# Patient Record
Sex: MALE | Race: WHITE
[De-identification: names, ages, dates, MRNs, and addresses within clinical notes are randomized per-mention and may not be internally consistent; named-entity substitution may affect disease eponyms.]

---

## 2017-07-11 ENCOUNTER — HOSPITAL ENCOUNTER (OUTPATIENT)
Dept: HOSPITAL 47 - RADUSWWP | Age: 52
Discharge: HOME | End: 2017-07-11
Payer: COMMERCIAL

## 2017-07-11 DIAGNOSIS — R74.8: Primary | ICD-10-CM

## 2017-07-11 PROCEDURE — 76700 US EXAM ABDOM COMPLETE: CPT

## 2017-07-11 NOTE — US
EXAMINATION TYPE: US abdomen complete

 

DATE OF EXAM: 7/11/2017

 

COMPARISON: CT abdomen February 11, 2016, US

 

CLINICAL HISTORY: R74.8 Abn Liver Enzymes levels.

 

EXAM MEASUREMENTS:

 

Liver Length:  16.5 cm   

Gallbladder Wall:  0.2 cm   

CBD:  0.3 cm

Spleen:  10.0 cm   

Right Kidney:  9.8 x 5.3 x 5.3 cm 

Left Kidney:  9.7 x 5.9 x 5.7 cm   

 

 

 

Pancreas:  not visualized due to midline bowel gas

Liver:  difficult to penetrate  

Gallbladder:  No stones seen

**Evidence for sonographic Farley's sign:  No

CBD:  wnl 

Spleen:  wnl   

Right Kidney:  No hydronephrosis or masses seen   

Left Kidney:  No hydronephrosis or masses seen   

Upper IVC:  partially obscured by bowel, visualized portions wnl  

Abd Aorta:  wnl

 

 

 

The visualized liver is heterogeneously hyperechoic.  Evaluation for focal masses is suboptimal due t
o the heterogeneity. The intrahepatic portion of the IVC and visualized abdominal aorta are within no
rmal limits.  There is no evidence of shadowing mobile cholelithiasis.  Common bile duct is unremarka
ble.  The pancreas is not well seen on images saved secondary to shadowing from overlying bowel gas. 
 The spleen is unremarkable.  Kidneys are symmetric and free of hydronephrosis.  No renal lesions are
 seen.

 

IMPRESSION: Heterogeneous hyperechoic appearance of liver suggests diffuse fatty infiltration or unde
rlying hepatocellular disease. Imaging guided random biopsy for tissue analysis can be performed if d
esired.

## 2017-07-20 ENCOUNTER — HOSPITAL ENCOUNTER (OUTPATIENT)
Dept: HOSPITAL 47 - CPPFTMAIN | Age: 52
Discharge: HOME | End: 2017-07-20
Payer: COMMERCIAL

## 2017-07-20 DIAGNOSIS — J45.40: Primary | ICD-10-CM

## 2017-07-20 PROCEDURE — 94729 DIFFUSING CAPACITY: CPT

## 2017-07-20 PROCEDURE — 94726 PLETHYSMOGRAPHY LUNG VOLUMES: CPT

## 2017-07-20 PROCEDURE — 94060 EVALUATION OF WHEEZING: CPT

## 2018-08-07 ENCOUNTER — HOSPITAL ENCOUNTER (OUTPATIENT)
Dept: HOSPITAL 47 - RADMRIMAIN | Age: 53
Discharge: HOME | End: 2018-08-07
Attending: PHYSICIAN ASSISTANT
Payer: COMMERCIAL

## 2018-08-07 DIAGNOSIS — Z79.891: ICD-10-CM

## 2018-08-07 DIAGNOSIS — M47.816: Primary | ICD-10-CM

## 2018-08-07 DIAGNOSIS — M47.812: ICD-10-CM

## 2018-08-07 LAB
ALBUMIN SERPL-MCNC: 4.3 G/DL (ref 3.5–5)
ALP SERPL-CCNC: 72 U/L (ref 38–126)
ALT SERPL-CCNC: 75 U/L (ref 21–72)
AST SERPL-CCNC: 109 U/L (ref 17–59)
BILIRUB INDIRECT SERPL-MCNC: 0 MG/DL (ref 0–1.1)
BILIRUBIN DIRECT+TOT PNL SERPL-MCNC: 0.3 MG/DL (ref 0–0.2)
PROT SERPL-MCNC: 7.1 G/DL (ref 6.3–8.2)

## 2018-08-07 PROCEDURE — 72141 MRI NECK SPINE W/O DYE: CPT

## 2018-08-07 PROCEDURE — 72148 MRI LUMBAR SPINE W/O DYE: CPT

## 2018-08-07 PROCEDURE — 80076 HEPATIC FUNCTION PANEL: CPT

## 2018-08-07 PROCEDURE — 36415 COLL VENOUS BLD VENIPUNCTURE: CPT

## 2018-08-07 NOTE — MR
EXAMINATION TYPE: MR cspine/lspine wo con

 

DATE OF EXAM: 8/7/2018

 

COMPARISON: Prior MRI cervical and lumbar spine April 12, 2014

 

HISTORY: Back pain and neck pain per order. Low back pain for years into both thighs and buttocks per
 patient. Headache with neck pain and stiffness for years causing pain or weakness into both arms and
 fingers per patient.

 

TECHNIQUE: Multiplanar, multisequence imaging of the cervical and lumbar spine are performed without 
IV contrast.

 

FINDINGS: 

 

C-SPINE:

 

FINDINGS: Coronal images redemonstrate levoconvex scoliotic curvature centered in the upper thoracic 
spine. Sagittal images of the cervical spine show the craniocervical junction to remain within normal
 limits.  The cervical and upper thoracic spinal cord is normal in course, caliber, and signal.  Vert
ebral alignment is stable and anatomic.  The vertebral body heights remain normal.  There is redemons
tration of multilevel mild to moderate disc space narrowing C3-C4 through C5-C6 levels with posterior
 spur disc complexes effacing anterior thecal sac at these levels on sagittal images. Additional tiny
 posterior disc herniation C6-C7 level is redemonstrated. There is mild to moderate multilevel anteri
or spurring most prominent in the mid cervical spine redemonstrated. Some heterogeneous endplate marino
ges are again seen.

 

Axial images show the C2-C3 level to remain within normal limits.

 

Axial images at C3-C4 level show uncovertebral facet degenerative changes bilaterally and broad-based
 central spur disc complex effacing anterior thecal sac, there is mild to moderate bilateral neural f
oraminal narrowing. There is no significant change from prior study.

 

Axial images at C4-C5 level show right paracentral disc protrusion on current study axial image 29 wi
th some uncovertebral facet arthropathy, there is effacement of the anterior thecal sac and mild left
-sided neural foraminal narrowing. Right-sided neural foramen is patent. Some change from prior study
.

 

Axial images at C5-C6 level show uncovertebral facet degenerative changes bilaterally with central sp
ur disc complex, there is effacement of the anterior thecal sac, there is moderate left and mild righ
t-sided neural foraminal narrowing. No significant change from prior.

 

Axial images at C6-C7 level show right paracentral disc protrusion effacing anterior thecal sac, bila
teral neural foramina are patent. No significant change from prior.

 

Axial images at C7-T1 level are felt within normal limits.

 

IMPRESSION: Multilevel degenerative changes in the cervical spine as detailed above, findings fairly 
stable, some interval change C4-C5 level is noted.

 

L-SPINE:

 

Sagittal images of the lumbar spine show vertebral body heights and alignment to remain satisfactory.
 There is further progression of multilevel disc desiccation from prior MRI. Disc space heights remai
n fairly well-maintained. No new large posterior disc herniations are seen on sagittal images. The co
nus medullaris remains normal in position and signal ending T12-L1 level. Modic type II degenerative 
change anterior L2-L3 endplates is redemonstrated. Small hemangioma T12 vertebra is again seen sagitt
al image 6.

 

Axial images show the T12-L1 level to remain within normal limits.

 

Axial images at L1-L2 level show new mild broad disc bulge with left paracentral disc protrusion comp
onent effacing anterior thecal sac and axial image 25, bilateral neural foramina are patent.

 

Axial images at L2-L3 level redemonstrate mild to moderate broad disc bulge slightly more prominent w
ith mild effacement of the anterior thecal sac noted on axial image 20 on current study, bilateral ne
ural foramina are patent.

 

Axial images at L3-L4 level redemonstrate mild broad disc bulge but spinal canal is preserved and david
ateral neural foramina are patent.

 

Axial images at L4-L5 level redemonstrate mild broad disc bulge and mild to minimal facet degenerativ
e changes bilaterally. There is effacement of the anterior thecal sac. There is mild right-sided ante
rior inferior neural foraminal narrowing on axial image 9. Left-sided neural foramen is patent. No si
gnificant change from prior.

 

Axial images at L5-S1 level redemonstrate mild facet degenerative changes bilaterally. Increased sign
al posteriorly consistent with annular tear is redemonstrated. There is broad disc bulge minimally ef
facing the anterior thecal sac. Bilateral neural foramina are patent.

 

No suspicious retroperitoneal findings are seen.

 

IMPRESSION: Multilevel degenerative changes in the lumbar spine with some interval progression from 2
014 MRI as detailed above.

## 2018-09-01 ENCOUNTER — HOSPITAL ENCOUNTER (EMERGENCY)
Dept: HOSPITAL 47 - EC | Age: 53
Discharge: HOME | End: 2018-09-01
Payer: COMMERCIAL

## 2018-09-01 VITALS — SYSTOLIC BLOOD PRESSURE: 131 MMHG | DIASTOLIC BLOOD PRESSURE: 80 MMHG | RESPIRATION RATE: 16 BRPM | HEART RATE: 106 BPM

## 2018-09-01 VITALS — TEMPERATURE: 97.7 F

## 2018-09-01 DIAGNOSIS — F17.200: ICD-10-CM
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
## 2018-09-01 DIAGNOSIS — Z79.899: ICD-10-CM

## 2018-09-01 DIAGNOSIS — S60.512A: ICD-10-CM

## 2018-09-01 DIAGNOSIS — J45.909: ICD-10-CM

## 2018-09-01 DIAGNOSIS — S60.511A: ICD-10-CM

## 2018-09-01 DIAGNOSIS — S63.501A: ICD-10-CM

## 2018-09-01 DIAGNOSIS — S76.119A: ICD-10-CM
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
## 2018-09-01 DIAGNOSIS — S80.02XA: Primary | ICD-10-CM
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
## 2018-09-01 PROCEDURE — 99283 EMERGENCY DEPT VISIT LOW MDM: CPT

## 2018-09-01 NOTE — XR
EXAMINATION TYPE: XR wrist complete 4 views RT, XR knee complete 3 views LT

 

DATE OF EXAM: 9/1/2018

 

COMPARISON: NONE

 

HISTORY: 53-year-old male with pain after fall

 

FINDINGS: 

 

Right wrist:

The radiocarpal and distal radial ulnar joint as well as the midcarpal compartment appear intact. No 
acute fracture, subluxation, or dislocation seen.

 

Left knee:

Prepatellar soft tissue swelling. Small knee joint effusion. There is thickening of the suprapatellar
 soft tissues of the extensor mechanism seems to be maintained. No acute fracture or dislocation. Deg
enerative spurring at the patellofemoral compartment.

 

 

IMPRESSION: 

1. Right wrist: No acute osseous abnormality seen.

2. Left knee: Prepatellar and suprapatellar soft tissue swelling. Contusion/swelling could involve th
e quadriceps tendon. Suspect that the tendon remains intact. If concern for injury to the extensor me
chanism, follow-up ultrasound or MRI. Small knee joint effusion. No acute osseous abnormality seen.
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
## 2019-03-26 ENCOUNTER — HOSPITAL ENCOUNTER (OUTPATIENT)
Dept: HOSPITAL 47 - RADXRMAIN | Age: 54
End: 2019-03-26
Attending: PHYSICIAN ASSISTANT
Payer: COMMERCIAL

## 2019-03-26 DIAGNOSIS — M12.852: ICD-10-CM

## 2019-03-26 DIAGNOSIS — M12.851: ICD-10-CM

## 2019-03-26 DIAGNOSIS — M12.811: Primary | ICD-10-CM

## 2019-03-26 DIAGNOSIS — M21.821: ICD-10-CM

## 2019-03-26 PROCEDURE — 73521 X-RAY EXAM HIPS BI 2 VIEWS: CPT

## 2019-03-26 NOTE — XR
EXAMINATION TYPE: XR Hip Bilateral Complete

 

DATE OF EXAM: 3/26/2019

 

COMPARISON: NONE

 

HISTORY: Pain

 

TECHNIQUE: 2 views submitted

 

FINDINGS:

There is no evidence of erosive change or acute fracture. Hypertrophic change of the acetabulum. Calc
ifications in the pelvis appear to be vascular. Mild concentric narrowing of the joint space bilatera
lly.

 

 

IMPRESSION:

1. No evidence of acute fracture or dislocation. 

2. Mild bilateral arthropathy with findings suggestive of femoral acetabular impingement.

## 2019-03-26 NOTE — XR
EXAMINATION TYPE: XR shoulder complete BILAT

 

DATE OF EXAM: 3/26/2019

 

COMPARISON: NONE

 

HISTORY: Pain

 

TECHNIQUE: Three views are submitted bilaterally.

 

FINDINGS:

The osseous structures are intact.  There is no acute fracture or dislocation. There is narrowing of 
the AC joint on the right. There is chronic appearing deformity of the distal left clavicle. Chronic 
appearing right-sided rib deformities suggest previous fracture.

 

IMPRESSION:

1. AC joint arthropathy on the right.

2. Deformity of the distal left clavicle likely related to previous trauma..

## 2019-10-29 ENCOUNTER — HOSPITAL ENCOUNTER (OUTPATIENT)
Dept: HOSPITAL 47 - RADXRMAIN | Age: 54
Discharge: HOME | End: 2019-10-29
Attending: FAMILY MEDICINE
Payer: COMMERCIAL

## 2019-10-29 DIAGNOSIS — M51.36: Primary | ICD-10-CM

## 2019-10-29 DIAGNOSIS — G89.4: ICD-10-CM

## 2019-10-29 DIAGNOSIS — M46.96: ICD-10-CM

## 2019-10-29 PROCEDURE — 72110 X-RAY EXAM L-2 SPINE 4/>VWS: CPT

## 2019-10-29 NOTE — XR
EXAM TYPE: LUMBAR SPINE X RAY SERIES

 

COMPARISON: NONE

 

HISTORY: low back pain

 

TECHNIQUE: 4 views are submitted.

 

FINDINGS:

Alignment is anatomic.  The pedicles are intact.  The transverse processes are intact.  There is no s
pondylolysis or spondylolisthesis.  Diffuse osteopenia with mild multilevel degenerative disc disease
. Vascular calcifications noted. There is facet arthropathy.

 

IMPRESSION:

1. Multilevel mild facet arthropathy and degenerative disc disease. If symptoms persist consider MRI.

## 2019-11-29 ENCOUNTER — HOSPITAL ENCOUNTER (OUTPATIENT)
Dept: HOSPITAL 47 - RADMAMWWP | Age: 54
End: 2019-11-29
Attending: FAMILY MEDICINE
Payer: COMMERCIAL

## 2019-11-29 DIAGNOSIS — Z12.31: Primary | ICD-10-CM

## 2019-11-29 DIAGNOSIS — R92.8: ICD-10-CM

## 2019-11-29 DIAGNOSIS — Z80.3: ICD-10-CM

## 2019-11-29 PROCEDURE — 77066 DX MAMMO INCL CAD BI: CPT

## 2019-11-29 PROCEDURE — 77062 BREAST TOMOSYNTHESIS BI: CPT

## 2019-11-29 PROCEDURE — 76642 ULTRASOUND BREAST LIMITED: CPT

## 2019-12-02 NOTE — USB
Reason for exam: additional evaluation requested from abnormal screening.



History:

Family history of breast cancer in mother and breast cancer in aunt.



US Breast Limited BILAT

Right limited breast ultrasound including focal area of concern, retroareolar and 

axilla demonstrates a 1.9 x 1.1cm irregular lesion at the posterior nipple, 

edematous appearance posterior to the right nipple may reflect gynecomastia 

although cannot exclude mastitis and a 2.1 x 0.7cm oval node at the axilla.



Left limited breast ultrasound including focal area of concern, retroareolar and 

axilla demonstrates a 2.8 x 0.7cm oval node at the axilla.



These results were verbally communicated with the patient and result sheet given 

to the patient on 11/29/19.





ASSESSMENT: Probably benign, BI-RAD 3



RECOMMENDATION:

Follow-up diagnostic mammogram and ultrasound of the right breast in 3 months.

## 2019-12-02 NOTE — MM
Reason for exam: clinical finding.

Baseline mammogram.



History:

Family history of breast cancer in mother and breast cancer in aunt.



Physical Findings:

Nurse Summary: 2-3cm nodule in the right breast at 12 o'clock and a 0.5cm nodule 

in the left breast at 12 o'clock (nurse kp).



MG 3D Diag Mammo W/Cad YURIY

Bilateral CC and MLO view(s) were taken.

Right sided increased density. Ultrasound recommended.



These results were verbally communicated with the patient and result sheet given 

to the patient on 11/29/19.





ASSESSMENT: Incomplete: need additional imaging evaluation, BI-RAD 0



RECOMMENDATION:

Ultrasound of both breasts.

## 2020-02-05 ENCOUNTER — HOSPITAL ENCOUNTER (OUTPATIENT)
Dept: HOSPITAL 47 - EC | Age: 55
Setting detail: OBSERVATION
LOS: 2 days | Discharge: HOME | End: 2020-02-07
Attending: FAMILY MEDICINE | Admitting: FAMILY MEDICINE
Payer: COMMERCIAL

## 2020-02-05 DIAGNOSIS — J45.909: ICD-10-CM

## 2020-02-05 DIAGNOSIS — F17.200: ICD-10-CM

## 2020-02-05 DIAGNOSIS — N18.1: ICD-10-CM

## 2020-02-05 DIAGNOSIS — F41.9: ICD-10-CM

## 2020-02-05 DIAGNOSIS — M79.604: Primary | ICD-10-CM

## 2020-02-05 DIAGNOSIS — H54.8: ICD-10-CM

## 2020-02-05 DIAGNOSIS — Z79.899: ICD-10-CM

## 2020-02-05 DIAGNOSIS — D68.9: ICD-10-CM

## 2020-02-05 DIAGNOSIS — Z79.891: ICD-10-CM

## 2020-02-05 DIAGNOSIS — M79.605: ICD-10-CM

## 2020-02-05 DIAGNOSIS — E66.9: ICD-10-CM

## 2020-02-05 DIAGNOSIS — G25.81: ICD-10-CM

## 2020-02-05 DIAGNOSIS — R79.89: ICD-10-CM

## 2020-02-05 DIAGNOSIS — F32.9: ICD-10-CM

## 2020-02-05 DIAGNOSIS — F10.239: ICD-10-CM

## 2020-02-05 DIAGNOSIS — M19.90: ICD-10-CM

## 2020-02-05 DIAGNOSIS — I87.8: ICD-10-CM

## 2020-02-05 DIAGNOSIS — E87.2: ICD-10-CM

## 2020-02-05 LAB
ALBUMIN SERPL-MCNC: 2.8 G/DL (ref 3.5–5)
ALP SERPL-CCNC: 149 U/L (ref 38–126)
ALT SERPL-CCNC: 17 U/L (ref 4–49)
ANION GAP SERPL CALC-SCNC: 10 MMOL/L
APTT BLD: 29.1 SEC (ref 22–30)
AST SERPL-CCNC: 127 U/L (ref 17–59)
BASOPHILS # BLD AUTO: 0.2 K/UL (ref 0–0.2)
BASOPHILS NFR BLD AUTO: 2 %
BUN SERPL-SCNC: 5 MG/DL (ref 9–20)
CALCIUM SPEC-MCNC: 7.7 MG/DL (ref 8.4–10.2)
CHLORIDE SERPL-SCNC: 107 MMOL/L (ref 98–107)
CK SERPL-CCNC: 49 U/L (ref 55–170)
CO2 SERPL-SCNC: 28 MMOL/L (ref 22–30)
EOSINOPHIL # BLD AUTO: 0.1 K/UL (ref 0–0.7)
EOSINOPHIL NFR BLD AUTO: 1 %
ERYTHROCYTE [DISTWIDTH] IN BLOOD BY AUTOMATED COUNT: 3.79 M/UL (ref 4.3–5.9)
ERYTHROCYTE [DISTWIDTH] IN BLOOD: 15 % (ref 11.5–15.5)
GLUCOSE SERPL-MCNC: 96 MG/DL (ref 74–99)
HCT VFR BLD AUTO: 40.7 % (ref 39–53)
HGB BLD-MCNC: 13.1 GM/DL (ref 13–17.5)
INR PPP: 1.4 (ref ?–1.2)
LYMPHOCYTES # SPEC AUTO: 2 K/UL (ref 1–4.8)
LYMPHOCYTES NFR SPEC AUTO: 26 %
MAGNESIUM SPEC-SCNC: 2.1 MG/DL (ref 1.6–2.3)
MCH RBC QN AUTO: 34.6 PG (ref 25–35)
MCHC RBC AUTO-ENTMCNC: 32.3 G/DL (ref 31–37)
MCV RBC AUTO: 107.2 FL (ref 80–100)
MONOCYTES # BLD AUTO: 0.5 K/UL (ref 0–1)
MONOCYTES NFR BLD AUTO: 7 %
NEUTROPHILS # BLD AUTO: 4.6 K/UL (ref 1.3–7.7)
NEUTROPHILS NFR BLD AUTO: 62 %
PLATELET # BLD AUTO: 94 K/UL (ref 150–450)
POTASSIUM SERPL-SCNC: 3.8 MMOL/L (ref 3.5–5.1)
PROT SERPL-MCNC: 7.3 G/DL (ref 6.3–8.2)
PT BLD: 13.8 SEC (ref 9–12)
SODIUM SERPL-SCNC: 145 MMOL/L (ref 137–145)
WBC # BLD AUTO: 7.5 K/UL (ref 3.8–10.6)

## 2020-02-05 PROCEDURE — 85730 THROMBOPLASTIN TIME PARTIAL: CPT

## 2020-02-05 PROCEDURE — 96376 TX/PRO/DX INJ SAME DRUG ADON: CPT

## 2020-02-05 PROCEDURE — 86140 C-REACTIVE PROTEIN: CPT

## 2020-02-05 PROCEDURE — 71045 X-RAY EXAM CHEST 1 VIEW: CPT

## 2020-02-05 PROCEDURE — 85025 COMPLETE CBC W/AUTO DIFF WBC: CPT

## 2020-02-05 PROCEDURE — 80053 COMPREHEN METABOLIC PANEL: CPT

## 2020-02-05 PROCEDURE — 80329 ANALGESICS NON-OPIOID 1 OR 2: CPT

## 2020-02-05 PROCEDURE — 82550 ASSAY OF CK (CPK): CPT

## 2020-02-05 PROCEDURE — 85652 RBC SED RATE AUTOMATED: CPT

## 2020-02-05 PROCEDURE — 85027 COMPLETE CBC AUTOMATED: CPT

## 2020-02-05 PROCEDURE — 83880 ASSAY OF NATRIURETIC PEPTIDE: CPT

## 2020-02-05 PROCEDURE — 83605 ASSAY OF LACTIC ACID: CPT

## 2020-02-05 PROCEDURE — 84443 ASSAY THYROID STIM HORMONE: CPT

## 2020-02-05 PROCEDURE — 85610 PROTHROMBIN TIME: CPT

## 2020-02-05 PROCEDURE — 96361 HYDRATE IV INFUSION ADD-ON: CPT

## 2020-02-05 PROCEDURE — 36415 COLL VENOUS BLD VENIPUNCTURE: CPT

## 2020-02-05 PROCEDURE — 99285 EMERGENCY DEPT VISIT HI MDM: CPT

## 2020-02-05 PROCEDURE — 93970 EXTREMITY STUDY: CPT

## 2020-02-05 PROCEDURE — 96366 THER/PROPH/DIAG IV INF ADDON: CPT

## 2020-02-05 PROCEDURE — 96375 TX/PRO/DX INJ NEW DRUG ADDON: CPT

## 2020-02-05 PROCEDURE — 83735 ASSAY OF MAGNESIUM: CPT

## 2020-02-05 PROCEDURE — 93005 ELECTROCARDIOGRAM TRACING: CPT

## 2020-02-05 PROCEDURE — 96365 THER/PROPH/DIAG IV INF INIT: CPT

## 2020-02-05 PROCEDURE — 96372 THER/PROPH/DIAG INJ SC/IM: CPT

## 2020-02-05 RX ADMIN — HYDROMORPHONE HYDROCHLORIDE PRN MG: 1 INJECTION, SOLUTION INTRAMUSCULAR; INTRAVENOUS; SUBCUTANEOUS at 23:15

## 2020-02-05 RX ADMIN — CEFAZOLIN SCH MLS/HR: 330 INJECTION, POWDER, FOR SOLUTION INTRAMUSCULAR; INTRAVENOUS at 19:44

## 2020-02-05 RX ADMIN — METHYLPREDNISOLONE SODIUM SUCCINATE SCH MG: 125 INJECTION, POWDER, FOR SOLUTION INTRAMUSCULAR; INTRAVENOUS at 23:16

## 2020-02-05 RX ADMIN — HYDROMORPHONE HYDROCHLORIDE PRN MG: 1 INJECTION, SOLUTION INTRAMUSCULAR; INTRAVENOUS; SUBCUTANEOUS at 20:18

## 2020-02-05 NOTE — US
EXAMINATION TYPE: US venous doppler duplex LE 

 

DATE OF EXAM: 2/5/2020 2:03 PM

 

COMPARISON: NONE

 

CLINICAL HISTORY: swelling, pain.

 

SIDE PERFORMED: Bilateral  

 

TECHNIQUE:  The lower extremity deep venous system is examined utilizing real time linear array sonog
farhana with graded compression, doppler sonography and color-flow sonography.

 

VESSELS IMAGED:

External Iliac Vein (EIV)

Common Femoral Vein

Deep Femoral Vein

Greater Saphenous Vein *

Femoral Vein

Popliteal Vein

Small Saphenous Vein *

Proximal Calf Veins

(* superficial vessels)

 

Grayscale, color doppler, spectral doppler imaging performed of the deep veins of the lower extremiti
es.  There is normal flow, compressibility, vascular waveforms.

 

Right Leg:  Negative for DVT

 

Left Leg:  Negative for DVT

 

 

IMPRESSION:  No sonographic evidence of deep venous thrombosis within either bilateral lower extremit
y.

## 2020-02-05 NOTE — ED
General Adult HPI





- General


Chief complaint: Extremity Problem,Nontraumatic


Stated complaint: leg & hand pain/numbness


Time Seen by Provider: 02/05/20 11:50


Source: patient


Mode of arrival: wheelchair


Limitations: physical limitation





- History of Present Illness


Initial comments: 





The patient is a 54-year-old female with past medical history of osteoarthritis,

renal disease and legally blind who presents emergency room with reported 

bilateral lower extremity pain.  He states the pain has been present for the 

past 2 months.  He has been seeing Dr. Pa in office regarding this complaint.

 He has been diagnosed with restless leg syndrome.  States he was placed on 

Lyrica however he did not like the way it made him feel.  States that earlier 

this week usage to gabapentin.  He states it has not been helping his symptoms. 

He also takes Norco for pain.  He is reported to increased swelling in his 

bilateral lower extremities.  Denies any back pain.  No saddle anesthesia or 

bowel or bladder incontinence.  He denies any shooting pains.  Denies fevers or 

chills.  It has gotten so bad that he has had difficulty in regulating.  He has 

also developed a tremor of his bilateral upper extremities.  States he can no 

longer take the pain and therefore came to the emergency room for further 

evaluation.  He denies any headaches or visual changes.  No weakness in his 

extremities.  No history of congestive heart failure.  Denies any shortness of 

breath or chest pain.  No history of DVT or PE.  Admits to calf pain.  There are

no other alleviating, precipitating or modifying factors





- Related Data


                                Home Medications











 Medication  Instructions  Recorded  Confirmed


 


Albuterol Inhaler [Ventolin Hfa 1 - 2 puff INHALATION RT-Q6H PRN 06/06/15 

02/05/20





Inhaler]   


 


Cetirizine HCl [Zyrtec] 10 mg PO DAILY 02/05/20 02/05/20


 


Fluticasone Nasal Spray [Flonase 1 spr EA NOSTRIL DAILY PRN 02/05/20 02/05/20





Nasal Spray]   


 


Omeprazole 20 mg PO DAILY 02/05/20 02/05/20








                                  Previous Rx's











 Medication  Instructions  Recorded


 


Folic Acid 1 mg PO DAILY #30 tablet 02/07/20


 


Gabapentin [Neurontin] 600 mg PO TID #9 tab 02/07/20


 


Multivitamins, Thera [Multivitamin 1 tab PO DAILY #30 tablet 02/07/20





(formulary)]  


 


Nortriptyline [Pamelor] 25 mg PO HS #30 cap 02/07/20


 


Thiamine [Vitamin B-1] 100 mg PO DAILY #30 tab 02/07/20


 


predniSONE 10 mg PO AS DIRECTED #30 tab 02/07/20











                                    Allergies











Allergy/AdvReac Type Severity Reaction Status Date / Time


 


No Known Allergies Allergy   Verified 02/05/20 13:21














Review of Systems


ROS Statement: 


Those systems with pertinent positive or pertinent negative responses have been 

documented in the HPI.





ROS Other: All systems not noted in ROS Statement are negative.





Past Medical History


Past Medical History: Asthma, Osteoarthritis (OA), Renal Disease


Additional Past Medical History / Comment(s): pericarditis, back pain, legally 

blind, bilateral knee pain, acute kidney injury from fall down stairs 9/2015


History of Any Multi-Drug Resistant Organisms: None Reported


Past Surgical History: Orthopedic Surgery


Additional Past Surgical History / Comment(s): Right hand surgery


Past Psychological History: Anxiety, Depression


Smoking Status: Current every day smoker


Past Alcohol Use History: Occasional


Past Drug Use History: Marijuana





- Past Family History


  ** Father


Family Medical History: No Reported History





General Exam


Limitations: physical limitation


General appearance: alert, in no apparent distress


Head exam: Present: atraumatic, normocephalic, normal inspection


Eye exam: Present: normal appearance, PERRL, EOMI.  Absent: scleral icterus, 

conjunctival injection, periorbital swelling


ENT exam: Present: normal exam, mucous membranes moist


Neck exam: Present: normal inspection.  Absent: tenderness, meningismus, 

lymphadenopathy


Respiratory exam: Present: normal lung sounds bilaterally.  Absent: respiratory 

distress, wheezes, rales, rhonchi, stridor


Cardiovascular Exam: Present: regular rate, normal rhythm, normal heart sounds. 

Absent: systolic murmur, diastolic murmur, rubs, gallop, clicks


GI/Abdominal exam: Present: soft, normal bowel sounds.  Absent: distended, 

tenderness, guarding, rebound, rigid


Extremities exam: Present: full ROM, normal capillary refill, pedal edema, other

(2+ DP and PT pulses).  Absent: tenderness, joint swelling, calf tenderness


Back exam: Present: normal inspection


Neurological exam: Present: alert, oriented X3, CN II-XII intact, other (resting

tremor b/l upper extremities)


Psychiatric exam: Present: normal affect, normal mood


Skin exam: Present: warm, dry, intact, normal color.  Absent: rash





Course


                                   Vital Signs











  02/05/20 02/05/20 02/05/20





  11:51 12:17 13:39


 


Temperature 97.8 F 97.8 F 


 


Pulse Rate 104 H 101 H 103 H


 


Respiratory 20 18 18





Rate   


 


Blood Pressure 123/74 131/91 149/100


 


O2 Sat by Pulse 95 93 L 93 L





Oximetry   














  02/05/20 02/05/20





  15:47 17:13


 


Temperature  


 


Pulse Rate 97 113 H


 


Respiratory 18 18





Rate  


 


Blood Pressure 141/80 113/59


 


O2 Sat by Pulse 91 L 96





Oximetry  














Medical Decision Making





- Medical Decision Making





Upon arrival the patient was placed into room 10.  A thorough history and 

physical exam was performed.  The patient does have a bilateral lower extremity 

edema.  Peripheral IV was established.  The patient is requesting something for 

pain control therefore he is given 1 mg of Dilaudid.  Laboratory studies were 

conducted.  INR elevated at 1.4.  Lactic acid is 2.4.  Calcium 7.7.  .  A

lk phos 149.  CK 49.  Venous Doppler of the lower extremity demonstrates no DVT.

 I discussed results with the patient.  He continues to report intractable pain.

 He is requesting another dose of pain medications.  Did provide him with an 

additional dose of Dilaudid.  I discussed the case with Dr. Stromberg as he is 

on-call for Dr. Blackmon.  He does agree to hospital admission with neurology 

consult for the lower extremity neuropathy pain and tremors.  I discussed this 

with the patient he does agree.  Bridging orders were placed patient is awaiting

a bed on the floor





- Lab Data


Result diagrams: 


                                 02/07/20 06:33





                                 02/07/20 06:33


                                   Lab Results











  02/05/20 02/05/20 02/05/20 Range/Units





  13:17 13:17 13:17 


 


WBC   7.5   (3.8-10.6)  k/uL


 


RBC   3.79 L   (4.30-5.90)  m/uL


 


Hgb   13.1   (13.0-17.5)  gm/dL


 


Hct   40.7   (39.0-53.0)  %


 


MCV   107.2 H   (80.0-100.0)  fL


 


MCH   34.6   (25.0-35.0)  pg


 


MCHC   32.3   (31.0-37.0)  g/dL


 


RDW   15.0   (11.5-15.5)  %


 


Plt Count   94 L   (150-450)  k/uL


 


Neutrophils %   62   %


 


Lymphocytes %   26   %


 


Monocytes %   7   %


 


Eosinophils %   1   %


 


Basophils %   2   %


 


Neutrophils #   4.6   (1.3-7.7)  k/uL


 


Lymphocytes #   2.0   (1.0-4.8)  k/uL


 


Monocytes #   0.5   (0-1.0)  k/uL


 


Eosinophils #   0.1   (0-0.7)  k/uL


 


Basophils #   0.2   (0-0.2)  k/uL


 


Manual Slide Review   Performed   


 


Poikilocytosis (manual   Present   


 


Anisocytosis (manual)   Present   


 


Macrocytosis   Moderate   


 


Target Cells   Present   


 


PT     (9.0-12.0)  sec


 


INR     (<1.2)  


 


APTT     (22.0-30.0)  sec


 


Sodium  145    (137-145)  mmol/L


 


Potassium  3.8    (3.5-5.1)  mmol/L


 


Chloride  107    ()  mmol/L


 


Carbon Dioxide  28    (22-30)  mmol/L


 


Anion Gap  10    mmol/L


 


BUN  5 L    (9-20)  mg/dL


 


Creatinine  0.62 L    (0.66-1.25)  mg/dL


 


Est GFR (CKD-EPI)AfAm  >90    (>60 ml/min/1.73 sqM)  


 


Est GFR (CKD-EPI)NonAf  >90    (>60 ml/min/1.73 sqM)  


 


Glucose  96    (74-99)  mg/dL


 


Lactic Ac Sepsis Rflx     


 


Plasma Lactic Acid Gagandeep     (0.7-2.0)  mmol/L


 


Calcium  7.7 L    (8.4-10.2)  mg/dL


 


Magnesium  2.1    (1.6-2.3)  mg/dL


 


Total Bilirubin  1.8 H    (0.2-1.3)  mg/dL


 


AST  127 H    (17-59)  U/L


 


ALT  17    (4-49)  U/L


 


Alkaline Phosphatase  149 H    ()  U/L


 


Creatine Kinase  49 L    ()  U/L


 


C-Reactive Protein     (<10.0)  mg/L


 


NT-Pro-B Natriuret Pep    246  pg/mL


 


Total Protein  7.3    (6.3-8.2)  g/dL


 


Albumin  2.8 L    (3.5-5.0)  g/dL


 


TSH  1.480    (0.465-4.680)  mIU/L














  02/05/20 02/05/20 02/05/20 Range/Units





  13:17 13:17 13:17 


 


WBC     (3.8-10.6)  k/uL


 


RBC     (4.30-5.90)  m/uL


 


Hgb     (13.0-17.5)  gm/dL


 


Hct     (39.0-53.0)  %


 


MCV     (80.0-100.0)  fL


 


MCH     (25.0-35.0)  pg


 


MCHC     (31.0-37.0)  g/dL


 


RDW     (11.5-15.5)  %


 


Plt Count     (150-450)  k/uL


 


Neutrophils %     %


 


Lymphocytes %     %


 


Monocytes %     %


 


Eosinophils %     %


 


Basophils %     %


 


Neutrophils #     (1.3-7.7)  k/uL


 


Lymphocytes #     (1.0-4.8)  k/uL


 


Monocytes #     (0-1.0)  k/uL


 


Eosinophils #     (0-0.7)  k/uL


 


Basophils #     (0-0.2)  k/uL


 


Manual Slide Review     


 


Poikilocytosis (manual     


 


Anisocytosis (manual)     


 


Macrocytosis     


 


Target Cells     


 


PT  13.8 H    (9.0-12.0)  sec


 


INR  1.4 H    (<1.2)  


 


APTT  29.1    (22.0-30.0)  sec


 


Sodium     (137-145)  mmol/L


 


Potassium     (3.5-5.1)  mmol/L


 


Chloride     ()  mmol/L


 


Carbon Dioxide     (22-30)  mmol/L


 


Anion Gap     mmol/L


 


BUN     (9-20)  mg/dL


 


Creatinine     (0.66-1.25)  mg/dL


 


Est GFR (CKD-EPI)AfAm     (>60 ml/min/1.73 sqM)  


 


Est GFR (CKD-EPI)NonAf     (>60 ml/min/1.73 sqM)  


 


Glucose     (74-99)  mg/dL


 


Lactic Ac Sepsis Rflx     


 


Plasma Lactic Acid Gagandeep   2.4 H*   (0.7-2.0)  mmol/L


 


Calcium     (8.4-10.2)  mg/dL


 


Magnesium     (1.6-2.3)  mg/dL


 


Total Bilirubin     (0.2-1.3)  mg/dL


 


AST     (17-59)  U/L


 


ALT     (4-49)  U/L


 


Alkaline Phosphatase     ()  U/L


 


Creatine Kinase     ()  U/L


 


C-Reactive Protein    20.8 H  (<10.0)  mg/L


 


NT-Pro-B Natriuret Pep     pg/mL


 


Total Protein     (6.3-8.2)  g/dL


 


Albumin     (3.5-5.0)  g/dL


 


TSH     (0.465-4.680)  mIU/L














  02/05/20 Range/Units





  14:00 


 


WBC   (3.8-10.6)  k/uL


 


RBC   (4.30-5.90)  m/uL


 


Hgb   (13.0-17.5)  gm/dL


 


Hct   (39.0-53.0)  %


 


MCV   (80.0-100.0)  fL


 


MCH   (25.0-35.0)  pg


 


MCHC   (31.0-37.0)  g/dL


 


RDW   (11.5-15.5)  %


 


Plt Count   (150-450)  k/uL


 


Neutrophils %   %


 


Lymphocytes %   %


 


Monocytes %   %


 


Eosinophils %   %


 


Basophils %   %


 


Neutrophils #   (1.3-7.7)  k/uL


 


Lymphocytes #   (1.0-4.8)  k/uL


 


Monocytes #   (0-1.0)  k/uL


 


Eosinophils #   (0-0.7)  k/uL


 


Basophils #   (0-0.2)  k/uL


 


Manual Slide Review   


 


Poikilocytosis (manual   


 


Anisocytosis (manual)   


 


Macrocytosis   


 


Target Cells   


 


PT   (9.0-12.0)  sec


 


INR   (<1.2)  


 


APTT   (22.0-30.0)  sec


 


Sodium   (137-145)  mmol/L


 


Potassium   (3.5-5.1)  mmol/L


 


Chloride   ()  mmol/L


 


Carbon Dioxide   (22-30)  mmol/L


 


Anion Gap   mmol/L


 


BUN   (9-20)  mg/dL


 


Creatinine   (0.66-1.25)  mg/dL


 


Est GFR (CKD-EPI)AfAm   (>60 ml/min/1.73 sqM)  


 


Est GFR (CKD-EPI)NonAf   (>60 ml/min/1.73 sqM)  


 


Glucose   (74-99)  mg/dL


 


Lactic Ac Sepsis Rflx  Y  


 


Plasma Lactic Acid Gagandeep   (0.7-2.0)  mmol/L


 


Calcium   (8.4-10.2)  mg/dL


 


Magnesium   (1.6-2.3)  mg/dL


 


Total Bilirubin   (0.2-1.3)  mg/dL


 


AST   (17-59)  U/L


 


ALT   (4-49)  U/L


 


Alkaline Phosphatase   ()  U/L


 


Creatine Kinase   ()  U/L


 


C-Reactive Protein   (<10.0)  mg/L


 


NT-Pro-B Natriuret Pep   pg/mL


 


Total Protein   (6.3-8.2)  g/dL


 


Albumin   (3.5-5.0)  g/dL


 


TSH   (0.465-4.680)  mIU/L














- EKG Data


EKG Comments: 


EKG demonstrates a normal sinus rhythm with a ventricular rate of 96.  ND 

interval 140.  QRS 78.  QTC of 469.  No acute ST segment elevation depressions 

concerning for ischemic changes





Disposition


Clinical Impression: 


 Bilateral lower extremity pain





Disposition: ADMITTED AS IP TO THIS HOSP


Condition: Stable


Is patient prescribed a controlled substance at d/c from ED?: No


Decision to Admit Reason: Admit from EC


Decision Date: 02/05/20


Decision Time: 15:44

## 2020-02-06 LAB
ALBUMIN SERPL-MCNC: 3.1 G/DL (ref 3.5–5)
ALP SERPL-CCNC: 122 U/L (ref 38–126)
ALT SERPL-CCNC: 19 U/L (ref 4–49)
ANION GAP SERPL CALC-SCNC: 9 MMOL/L
AST SERPL-CCNC: 104 U/L (ref 17–59)
BUN SERPL-SCNC: 7 MG/DL (ref 9–20)
CALCIUM SPEC-MCNC: 7.5 MG/DL (ref 8.4–10.2)
CHLORIDE SERPL-SCNC: 102 MMOL/L (ref 98–107)
CO2 SERPL-SCNC: 29 MMOL/L (ref 22–30)
ERYTHROCYTE [DISTWIDTH] IN BLOOD BY AUTOMATED COUNT: 3.9 M/UL (ref 4.3–5.9)
ERYTHROCYTE [DISTWIDTH] IN BLOOD: 14.8 % (ref 11.5–15.5)
GLUCOSE BLD-MCNC: 123 MG/DL (ref 75–99)
GLUCOSE BLD-MCNC: 130 MG/DL (ref 75–99)
GLUCOSE BLD-MCNC: 156 MG/DL (ref 75–99)
GLUCOSE SERPL-MCNC: 138 MG/DL (ref 74–99)
HCT VFR BLD AUTO: 42.4 % (ref 39–53)
HGB BLD-MCNC: 13.4 GM/DL (ref 13–17.5)
MCH RBC QN AUTO: 34.3 PG (ref 25–35)
MCHC RBC AUTO-ENTMCNC: 31.5 G/DL (ref 31–37)
MCV RBC AUTO: 108.6 FL (ref 80–100)
PLATELET # BLD AUTO: 97 K/UL (ref 150–450)
POTASSIUM SERPL-SCNC: 3.9 MMOL/L (ref 3.5–5.1)
PROT SERPL-MCNC: 7.9 G/DL (ref 6.3–8.2)
SODIUM SERPL-SCNC: 140 MMOL/L (ref 137–145)
WBC # BLD AUTO: 5.9 K/UL (ref 3.8–10.6)

## 2020-02-06 RX ADMIN — INSULIN ASPART SCH UNIT: 100 INJECTION, SOLUTION INTRAVENOUS; SUBCUTANEOUS at 16:55

## 2020-02-06 RX ADMIN — CEFAZOLIN SCH MLS/HR: 330 INJECTION, POWDER, FOR SOLUTION INTRAMUSCULAR; INTRAVENOUS at 10:03

## 2020-02-06 RX ADMIN — METHYLPREDNISOLONE SODIUM SUCCINATE SCH MG: 125 INJECTION, POWDER, FOR SOLUTION INTRAMUSCULAR; INTRAVENOUS at 05:34

## 2020-02-06 RX ADMIN — METHYLPREDNISOLONE SODIUM SUCCINATE SCH MG: 125 INJECTION, POWDER, FOR SOLUTION INTRAMUSCULAR; INTRAVENOUS at 11:36

## 2020-02-06 RX ADMIN — INSULIN ASPART SCH: 100 INJECTION, SOLUTION INTRAVENOUS; SUBCUTANEOUS at 12:03

## 2020-02-06 RX ADMIN — METHYLPREDNISOLONE SODIUM SUCCINATE SCH MG: 125 INJECTION, POWDER, FOR SOLUTION INTRAMUSCULAR; INTRAVENOUS at 17:50

## 2020-02-06 RX ADMIN — PANTOPRAZOLE SODIUM SCH MG: 40 TABLET, DELAYED RELEASE ORAL at 07:06

## 2020-02-06 RX ADMIN — INSULIN ASPART SCH: 100 INJECTION, SOLUTION INTRAVENOUS; SUBCUTANEOUS at 20:38

## 2020-02-06 RX ADMIN — METHYLPREDNISOLONE SODIUM SUCCINATE SCH MG: 125 INJECTION, POWDER, FOR SOLUTION INTRAMUSCULAR; INTRAVENOUS at 23:25

## 2020-02-06 RX ADMIN — HYDROMORPHONE HYDROCHLORIDE PRN MG: 1 INJECTION, SOLUTION INTRAMUSCULAR; INTRAVENOUS; SUBCUTANEOUS at 05:34

## 2020-02-06 RX ADMIN — HYDROMORPHONE HYDROCHLORIDE PRN MG: 1 INJECTION, SOLUTION INTRAMUSCULAR; INTRAVENOUS; SUBCUTANEOUS at 02:10

## 2020-02-06 RX ADMIN — CEFAZOLIN SCH: 330 INJECTION, POWDER, FOR SOLUTION INTRAMUSCULAR; INTRAVENOUS at 03:38

## 2020-02-06 RX ADMIN — LORATADINE SCH MG: 10 TABLET ORAL at 07:27

## 2020-02-06 RX ADMIN — HYDROMORPHONE HYDROCHLORIDE PRN MG: 1 INJECTION, SOLUTION INTRAMUSCULAR; INTRAVENOUS; SUBCUTANEOUS at 10:00

## 2020-02-06 RX ADMIN — Medication SCH MG: at 16:55

## 2020-02-06 NOTE — P.CNNES
History of Present Illness


Consult date: 02/06/20


Reason for Consult: new onset tremor


History of Present Illness: 





HISTORY OF PRESENT ILLNESS: 


Thank you for allowing me to evaluate Ms. Jerman Blanco.





Mr. Blanco is a 54 year-old man with PMhx of restless leg syndrome, asthma, 

osteoarthritis, pericarditis, legally blind, acute kidney injury after a fall, 

anxiety, derpession, who presented to [facility] for leg pain x2 months,

consulting Neurology for new onset tremor. Patient states that he has been 

having back pain for several years, which comes from his former job as a 

. Patient noticed since about 3-4 weeks ago where he started 

having severe aching pain in his legs bilaterally. He states his leg pain is 

10/10. Pain doesn't necessarily doesn't originate from his lower back, but he's 

been told that he had degenerative changes. Patient reports that he's had some 

numbness in his hands bilaterally and also numbness and tingling in his feet and

legs up to his thigh. He reports more tingling sensation in his legs than his 

feet. 





Patient reports that he was diagnosed with restless leg syndrome many years ago.

Patient states that he "hits his wife" in sleep and while in bed due to his 

restless leg syndrome. Patient does not remember any medications he was given 

for it, but he is taking baclofen, intermittently, when his back or legs spasm. 

Patient states that his "tremors" improve once he takes his pain medications. 

During the evaluation, patient demonstrated his episodes of tremors, and it was 

as if his entire body was shaking from EtOH withdrawal. Patient states that he 

was recently in alf, released about a year ago, and while he was in alf, he 

got his EtOH and cigarettes cut cold turkey and never had withdrawal symptoms. 





Patient denies any headache, vision changes, dizziness, weakness. Pt reports he 

vomited today after drinking mountain dew. Denies recent sickness, fever, 

coughing, chest pain or SOB. Endorses some diarrhea yesterday. Patient denies 

any urinary retention/incontinence or constipation. 





Patient has taken gabapentin and lyrica for peripheral neuropathy. Pt didn't 

like lyrica as it caused him to feel foggy mentally. He's been taking 

gabapentin. 





PAST MEDICAL HISTORY:


restless leg syndrome, asthma, osteoarthritis, pericarditis, legally blind, 

acute kidney injury after a fall, anxiety, derpession





PAST SURGICAL HISTORY:


R hand surgery





HOME MEDICATIONS: 


Albuterol, norco, baclofen, omeprazole, gabapentin, cetirizine 





ALLERGIES:


NKDA





SOCIAL HISTORY:


Current everyday smoker. Drinks at least 6-7 cans of beer daily. Endorses 

marijuana use





FAMILY HISTORY:


No family history of tremor, migraine, Parkinson's disease





REVIEW OF SYSTEMS: 


The 14 systems are reviewed and no additional points are identified compared to 

the review of systems documented history and physical





PHYSICAL EXAMINATION:


VITAL SIGNS: T 98.5 HR 86 RR 15 /81 O2 sat 90% on RA


GEN.: NAD, pleasant and cooperative


HEENT: NCAT, sclera without icterus


NECK: Supple


SKIN AND EXTREMITIES: Warm to touch, no edema (pt reports that his legs were 

more swollen last couple of days, much improved today)





NEURO:


MENTAL STATUS: Patient alert and oriented to self, place, time.  Able to name 

the current president.  Speech fluent, able to name and repeat, following all 

commands readily.  No right and left disorientation, neglect.





CRANIAL NERVES II THROUGH XII: II: Pupils are equal and reactive to light 

symmetrically.  Visual field deficit in R upper and lower quadrants. R eye has 

limited vision for years. L temporal quadrants also with visual field deficit. 

III, IV, VI:  No ptosis.  Extraocular movements full.  No nystagmus. V: Facial 

sensation intact from V1-3. VII. No clear facial asymmetry. VIII: Hearing intact

to finger rub bilaterally.  IX, X: Symmetric palate elevation. XI: Shoulder 

shrug intact. XII: Tongue midline without fasciculation or atrophy. 





MOTOR: Normal bulk/tone.  No pronator drift or tremor.  Strength is 5/5 

throughout all 4 extremities.





SENSORY: Intact to light touch in all 4 extremities. Pt reporting numbness and 

tingling in his legs bilaterally. Romberg is negative.





REFLEXES: 2+ throughout.  Toes are downgoing.  No clonus.  Giovanny's is absent





COORDINATION:  Finger to nose intact.  No dysmetria. 





GAIT: Narrow-based and stable.  Difficulty with toe/heel/tandem walk





DIAGNOSTIC TESTING:





LABORATORY:


WBC 7.5 Hgb 13.1 Platelet 94 ESR 48 CRP 20.8 INR 1.4 K 145 K 3.8 Cl 107 CO2 28 

BUN 5 Cr 0.62 glucose 96 lactic acid 2.4  ALT 17 AlkPhos 149 TSH 1.480





IMAGING:


No head imaging available at this time





ASSESSMENT:


54 year-old man with PMhx of restless leg syndrome, asthma, osteoarthritis, 

pericarditis, legally blind, acute kidney injury after a fall, anxiety, 

derpession, who presented to [facility] for leg pain x2 months, 

consulting Neurology for new onset tremor.  Exam remarkable for temporal visual 

deficit in both R and L eyes (R eye is legally blind) and b/l LE numbness and 

tingling. Pt with physiological tremor during this eval. Pt's report of episodes

of shaking could be from patient's EtOH abuse/withdrawal, especially considering

that his shaking episodes improve after taking Norco. Patient should get some 

head imaging, but can be done as outpatient. Patient needs formal education 

about EtOH abuse.Will start patient on nortriptyline 25mg qhs for his pain and 

also difficulty with sleeping. Discussed with wife and patient. Discussed side 

effects such as dry mouth, constipation, heart palpitations, dizziness. Patient 

will discontinue if he has any of these side effects. If patient tolerating med 

for 2 weeks, can increase to 50mg qhs. No additional inpatient neurological 

evaluation recommended at this time. Pt needs to follow-up with an outpatient 

Neurologist for better management of restless leg syndrome, possible EMG/NCS, 

and better management of paresthesia/pain. Neurology will sign off at this time.

Please call with additional questions or concerns. 








Past Medical History


Past Medical History: Asthma, Osteoarthritis (OA), Renal Disease


Additional Past Medical History / Comment(s): pericarditis, back pain, legally 

blind, bilateral knee pain, acute kidney injury from fall down stairs 9/2015, 

restless leg syndrome


History of Any Multi-Drug Resistant Organisms: None Reported


Past Surgical History: Orthopedic Surgery


Additional Past Surgical History / Comment(s): Right hand surgery


Past Anesthesia/Blood Transfusion Reactions: No Reported Reaction


Past Psychological History: Anxiety, Depression


Smoking Status: Current every day smoker


Past Alcohol Use History: Occasional


Past Drug Use History: Marijuana





- Past Family History


  ** Father


Family Medical History: No Reported History





Medications and Allergies


                                Home Medications











 Medication  Instructions  Recorded  Confirmed  Type


 


Albuterol Inhaler [Ventolin 1 - 2 puff INHALATION RT-Q6H PRN 06/06/15 02/05/20 

History





Inhaler]    


 


Baclofen [Lioresal] 10 mg PO TID 02/05/20 02/05/20 History


 


Cetirizine HCl [Zyrtec] 10 mg PO DAILY 02/05/20 02/05/20 History


 


Fluticasone Nasal Spray [Flonase 1 spr EA NOSTRIL DAILY PRN 02/05/20 02/05/20 

History





Nasal Spray]    


 


Gabapentin [Neurontin] 400 mg PO TID 02/05/20 02/05/20 History


 


HYDROcodone/APAP 10-325MG [Norco 1 tab PO TID PRN 02/05/20 02/05/20 History





]    


 


Omeprazole 20 mg PO DAILY 02/05/20 02/05/20 History








                                    Allergies











Allergy/AdvReac Type Severity Reaction Status Date / Time


 


No Known Allergies Allergy   Verified 02/05/20 13:21














Physical Examination





- Vital Signs


Vital Signs: 


                                   Vital Signs











  Temp Pulse Pulse Resp BP BP Pulse Ox


 


 02/06/20 08:31  98.5 F   86  15   157/81  90 L


 


 02/06/20 00:18  98.4 F   108 H  16   153/79  92 L


 


 02/05/20 20:00    109 H    


 


 02/05/20 17:37  97.9 F   105 H  17   132/82  95


 


 02/05/20 17:30  98.1 F  113 H   18  138/85   95


 


 02/05/20 17:13   113 H   18  113/59   96


 


 02/05/20 15:47   97   18  141/80   91 L


 


 02/05/20 13:39   103 H   18  149/100   93 L


 


 02/05/20 12:17  97.8 F  101 H   18  131/91   93 L


 


 02/05/20 11:51  97.8 F  104 H   20  123/74   95








                                Intake and Output











 02/05/20 02/06/20 02/06/20





 22:59 06:59 14:59


 


Intake Total 130 1170 


 


Balance 130 1170 


 


Intake:   


 


  Intake, IV Titration 130 1170 





  Amount   


 


    Sodium Chloride 0.9% 1, 130 1170 





    000 ml @ 130 mls/hr IV .   





    Q7H42M [facility] Rx#:[id number]   


 


Other:   


 


  Voiding Method Toilet  Toilet


 


  # Voids 1 3 


 


  Weight 87.543 kg  














Results





- Laboratory Findings


CBC and BMP: 


                                 02/06/20 11:18





                                 02/06/20 11:18


Abnormal Lab Findings: 


                                  Abnormal Labs











  02/05/20 02/05/20 02/05/20





  13:17 13:17 13:17


 


RBC   3.79 L 


 


MCV   107.2 H 


 


Plt Count   94 L 


 


ESR   


 


PT    13.8 H


 


INR    1.4 H


 


BUN  5 L  


 


Creatinine  0.62 L  


 


Plasma Lactic Acid Gagandeep   


 


Calcium  7.7 L  


 


Total Bilirubin  1.8 H  


 


AST  127 H  


 


Alkaline Phosphatase  149 H  


 


Creatine Kinase  49 L  


 


C-Reactive Protein   


 


Albumin  2.8 L  














  02/05/20 02/05/20 02/05/20





  13:17 13:17 17:21


 


RBC   


 


MCV   


 


Plt Count   


 


ESR   


 


PT   


 


INR   


 


BUN   


 


Creatinine   


 


Plasma Lactic Acid Gagandeep  2.4 H*   2.5 H*


 


Calcium   


 


Total Bilirubin   


 


AST   


 


Alkaline Phosphatase   


 


Creatine Kinase   


 


C-Reactive Protein   20.8 H 


 


Albumin   














  02/05/20





  20:32


 


RBC 


 


MCV 


 


Plt Count 


 


ESR  48 H


 


PT 


 


INR 


 


BUN 


 


Creatinine 


 


Plasma Lactic Acid Gagandeep 


 


Calcium 


 


Total Bilirubin 


 


AST 


 


Alkaline Phosphatase 


 


Creatine Kinase 


 


C-Reactive Protein 


 


Albumin

## 2020-02-06 NOTE — XR
EXAMINATION TYPE: XR chest 1V portable

 

DATE OF EXAM: 2/6/2020

 

COMPARISON: 10/27/2014

 

HISTORY: Shortness of breath and cough

 

TECHNIQUE: Single frontal view of the chest is obtained.

 

FINDINGS:  There is no focal air space opacity, pleural effusion, or pneumothorax seen.  The cardiac 
silhouette size is upper limits of normal in size. Old fracture deformities of the right lateral ribs
 are again noted.

 

IMPRESSION:  No acute process.

## 2020-02-06 NOTE — P.HPIM
History of Present Illness


H&P Date: 02/06/20


This is a 54-year-old gentleman, history of polysubstance abuse including 

alcohol abuse , legally blind, osteoarthritis and multiple other medical issues 

presented to the ER with worsening bilateral lower extremity pain accompanied by

tremors, numbness of feet and hands.  Reports leg pain has been present for 

greater than 8 months, over the last 3-4 weeks he has been taken to Norco dose 3

times a day which was not controlling the pain.  Denies difficulty 

ambulating.Reports PCP started him on Lyrica, did not like how it made him feel.

 Medication changed to Neurontin which helped but did not totally relieve his 

discomfort.  Complains of bilateral lower extremity edema.  Denies back pain, 

incontinence.  No fevers or chills.  Denies chest pain, palpitations or 

shortness of breath.  Denies lightheadedness dizziness or focal deficits.  

Received IV push Dilaudid 2 for pain management in the ER.  Elevated LFTs, INR 

1.4.  Reports he drinks 6-8 beers daily.  Lactic acid mildly elevated at 2.4. 

received gentle IV fluid hydration.  Currently complaining of nausea, vomiting, 

denies abdominal pain. Tremor significantly improved with Ativan.








EKG read normal sinus rhythm


Venous Doppler of bilateral lower extremities, reported negative for DVT.


Chest x-ray reported no acute process.





Review of Systems


ROS Statement: 


Those systems with pertinent positive or pertinent negative responses have been 

documented in the HPI.





ROS Other: All systems not noted in ROS Statement are negative.











Past Medical History


Past Medical History: Asthma, Osteoarthritis (OA), Renal Disease


Additional Past Medical History / Comment(s): pericarditis, back pain, legally 

blind, bilateral knee pain, acute kidney injury from fall down stairs 9/2015, 

restless leg syndrome


History of Any Multi-Drug Resistant Organisms: None Reported


Past Surgical History: Orthopedic Surgery


Additional Past Surgical History / Comment(s): Right hand surgery


Past Anesthesia/Blood Transfusion Reactions: No Reported Reaction


Past Psychological History: Anxiety, Depression


Smoking Status: Current every day smoker


Past Alcohol Use History: Occasional


Past Drug Use History: Marijuana





- Past Family History


  ** Father


Family Medical History: No Reported History





Medications and Allergies


                                Home Medications











 Medication  Instructions  Recorded  Confirmed  Type


 


Albuterol Inhaler [Ventolin 1 - 2 puff INHALATION RT-Q6H PRN 06/06/15 02/05/20 

History





Inhaler]    


 


Baclofen [Lioresal] 10 mg PO TID 02/05/20 02/05/20 History


 


Cetirizine HCl [Zyrtec] 10 mg PO DAILY 02/05/20 02/05/20 History


 


Fluticasone Nasal Spray [Flonase 1 spr EA NOSTRIL DAILY PRN 02/05/20 02/05/20 

History





Nasal Spray]    


 


Gabapentin [Neurontin] 400 mg PO TID 02/05/20 02/05/20 History


 


HYDROcodone/APAP 10-325MG [Norco 1 tab PO TID PRN 02/05/20 02/05/20 History





]    


 


Omeprazole 20 mg PO DAILY 02/05/20 02/05/20 History








                                    Allergies











Allergy/AdvReac Type Severity Reaction Status Date / Time


 


No Known Allergies Allergy   Verified 02/05/20 13:21














Physical Exam


Vitals: 


                                   Vital Signs











  Temp Pulse Pulse Resp BP BP Pulse Ox


 


 02/06/20 08:31  98.5 F   86  15   157/81  90 L


 


 02/06/20 00:18  98.4 F   108 H  16   153/79  92 L


 


 02/05/20 20:00    109 H    


 


 02/05/20 17:37  97.9 F   105 H  17   132/82  95


 


 02/05/20 17:30  98.1 F  113 H   18  138/85   95


 


 02/05/20 17:13   113 H   18  113/59   96


 


 02/05/20 15:47   97   18  141/80   91 L


 


 02/05/20 13:39   103 H   18  149/100   93 L


 


 02/05/20 12:17  97.8 F  101 H   18  131/91   93 L


 


 02/05/20 11:51  97.8 F  104 H   20  123/74   95








                                Intake and Output











 02/05/20 02/06/20 02/06/20





 22:59 06:59 14:59


 


Intake Total 130 1170 


 


Balance 130 1170 


 


Intake:   


 


  Intake, IV Titration 130 1170 





  Amount   


 


    Sodium Chloride 0.9% 1, 130 1170 





    000 ml @ 130 mls/hr IV .   





    Q7H42M [facility] Rx#:[id number]   


 


Other:   


 


  Voiding Method Toilet  Toilet


 


  # Voids 1 3 


 


  Weight 87.543 kg  














PHYSICAL EXAM:


VITAL SIGNS: As above


GENERAL: Sitting up in bed, no acute disTRESS.


HEENT:  Conjunctivae normal. eyes normal. 


NECK:  No JVD. No thyroid enlargement. No LNs


CARDIOVASCULAR:  S1, S2 regular.. No murmur


RESPIRATION: Coarse Breath sounds diminished in the bases. No rhonchi or 

crackles. No bronchial breathing.


ABDOMEN:  Soft, nondistended, nontender . No guarding. no masses palpable. No 

ascites, No hepatosplenomegaly.Bowel sounds heard.


LEGS:  Minimal Bilateral lower extremity edema, venous insufficiency


PSYCHIATRY: Alert and oriented X3, mood and affect normal.


NERVOUS SYSTEM:  Cranial N 2-12 grossly normal. Moves all 4 limbs.  Diffuse 

weakness No focal deficits.  Strength and sensation grossly intact.. Minimal 

tremors.


Skin:  no rash 


Lymphatic system. No LN neck axilla.








Results


CBC & Chem 7: 


                                 02/06/20 11:18





                                 02/06/20 11:18


Labs: 


                  Abnormal Lab Results - Last 24 Hours (Table)











  02/05/20 02/05/20 02/05/20 Range/Units





  13:17 13:17 13:17 


 


RBC   3.79 L   (4.30-5.90)  m/uL


 


MCV   107.2 H   (80.0-100.0)  fL


 


Plt Count   94 L   (150-450)  k/uL


 


ESR     (0-15)  mm/hr


 


PT    13.8 H  (9.0-12.0)  sec


 


INR    1.4 H  (<1.2)  


 


BUN  5 L    (9-20)  mg/dL


 


Creatinine  0.62 L    (0.66-1.25)  mg/dL


 


Plasma Lactic Acid Gagandeep     (0.7-2.0)  mmol/L


 


Calcium  7.7 L    (8.4-10.2)  mg/dL


 


Total Bilirubin  1.8 H    (0.2-1.3)  mg/dL


 


AST  127 H    (17-59)  U/L


 


Alkaline Phosphatase  149 H    ()  U/L


 


Creatine Kinase  49 L    ()  U/L


 


C-Reactive Protein     (<10.0)  mg/L


 


Albumin  2.8 L    (3.5-5.0)  g/dL














  02/05/20 02/05/20 02/05/20 Range/Units





  13:17 13:17 17:21 


 


RBC     (4.30-5.90)  m/uL


 


MCV     (80.0-100.0)  fL


 


Plt Count     (150-450)  k/uL


 


ESR     (0-15)  mm/hr


 


PT     (9.0-12.0)  sec


 


INR     (<1.2)  


 


BUN     (9-20)  mg/dL


 


Creatinine     (0.66-1.25)  mg/dL


 


Plasma Lactic Acid Gagandeep  2.4 H*   2.5 H*  (0.7-2.0)  mmol/L


 


Calcium     (8.4-10.2)  mg/dL


 


Total Bilirubin     (0.2-1.3)  mg/dL


 


AST     (17-59)  U/L


 


Alkaline Phosphatase     ()  U/L


 


Creatine Kinase     ()  U/L


 


C-Reactive Protein   20.8 H   (<10.0)  mg/L


 


Albumin     (3.5-5.0)  g/dL














  02/05/20 Range/Units





  20:32 


 


RBC   (4.30-5.90)  m/uL


 


MCV   (80.0-100.0)  fL


 


Plt Count   (150-450)  k/uL


 


ESR  48 H  (0-15)  mm/hr


 


PT   (9.0-12.0)  sec


 


INR   (<1.2)  


 


BUN   (9-20)  mg/dL


 


Creatinine   (0.66-1.25)  mg/dL


 


Plasma Lactic Acid Gagandeep   (0.7-2.0)  mmol/L


 


Calcium   (8.4-10.2)  mg/dL


 


Total Bilirubin   (0.2-1.3)  mg/dL


 


AST   (17-59)  U/L


 


Alkaline Phosphatase   ()  U/L


 


Creatine Kinase   ()  U/L


 


C-Reactive Protein   (<10.0)  mg/L


 


Albumin   (3.5-5.0)  g/dL














Thrombosis Risk Factor Assmnt





- Choose All That Apply


Any of the Below Risk Factors Present?: Yes


Each Factor Represents 1 point: Age 41-60 years, Obesity (BMI >25)


Other Risk Factors: No


Other congenital or acquired thrombophilia - If yes, enter type in comment: No


Thrombosis Risk Factor Assessment Total Risk Factor Score: 2


Thrombosis Risk Factor Assessment Level: Low Risk





Assessment and Plan


Assessment: 


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


Mild lactic acidosis


Possible opiate withdrawal secondary to patient has been taking significant 

amount of Norco dose over 3 week time span.


Alcohol abuse, possible DTs, drinks 6-8 beers daily


Coagulopathy, elevated INR on admission secondary to the above


Elevated LFTs


History of polysubstance abuse, per record review; patient reports only alcohol 

currently.


Ongoing nicotine dependence


Legally blind


Restless leg syndrome


Chronic renal failure, stage I


Obesity, BMI 31.2


Right lateral ribs old fracture deformities per chest x-ray


Osteoarthritis


Venous insufficiency























Plan, continue on current medication regime ,monitoring antidromic treatment.  

Zofran, npo diet with ice chips.  Dilaudid discontinued. IV fluid hydration 

including banana bag daily.  Acetaminophen level and F/U Labs ordered. CIWA 

protocol initiated. Neurology consult in place, recommendations pending.  Social

work and psychiatry consulted. Home meds have been reviewed and resumed 

accordingly.

















The impression and plan of care has been dictated as directed.





:


I performed a history and examination of this patient,  discussed the same with 

the dictator.  I agree with the dictator's note ,documented as a scribe.  Any 

additional findings or plans will be noted.

## 2020-02-07 VITALS
DIASTOLIC BLOOD PRESSURE: 71 MMHG | TEMPERATURE: 98.1 F | SYSTOLIC BLOOD PRESSURE: 147 MMHG | HEART RATE: 65 BPM | RESPIRATION RATE: 16 BRPM

## 2020-02-07 LAB
ALBUMIN SERPL-MCNC: 3 G/DL (ref 3.5–5)
ALP SERPL-CCNC: 102 U/L (ref 38–126)
ALT SERPL-CCNC: 17 U/L (ref 4–49)
ANION GAP SERPL CALC-SCNC: 6 MMOL/L
AST SERPL-CCNC: 83 U/L (ref 17–59)
BASOPHILS # BLD AUTO: 0.1 K/UL (ref 0–0.2)
BASOPHILS NFR BLD AUTO: 1 %
BUN SERPL-SCNC: 13 MG/DL (ref 9–20)
CALCIUM SPEC-MCNC: 7.4 MG/DL (ref 8.4–10.2)
CHLORIDE SERPL-SCNC: 103 MMOL/L (ref 98–107)
CO2 SERPL-SCNC: 28 MMOL/L (ref 22–30)
EOSINOPHIL # BLD AUTO: 0 K/UL (ref 0–0.7)
EOSINOPHIL NFR BLD AUTO: 0 %
ERYTHROCYTE [DISTWIDTH] IN BLOOD BY AUTOMATED COUNT: 3.72 M/UL (ref 4.3–5.9)
ERYTHROCYTE [DISTWIDTH] IN BLOOD: 14.9 % (ref 11.5–15.5)
GLUCOSE BLD-MCNC: 126 MG/DL (ref 75–99)
GLUCOSE BLD-MCNC: 142 MG/DL (ref 75–99)
GLUCOSE SERPL-MCNC: 131 MG/DL (ref 74–99)
HCT VFR BLD AUTO: 40.4 % (ref 39–53)
HGB BLD-MCNC: 13.4 GM/DL (ref 13–17.5)
LYMPHOCYTES # SPEC AUTO: 1.5 K/UL (ref 1–4.8)
LYMPHOCYTES NFR SPEC AUTO: 15 %
MCH RBC QN AUTO: 35.9 PG (ref 25–35)
MCHC RBC AUTO-ENTMCNC: 33.1 G/DL (ref 31–37)
MCV RBC AUTO: 108.6 FL (ref 80–100)
MONOCYTES # BLD AUTO: 0.4 K/UL (ref 0–1)
MONOCYTES NFR BLD AUTO: 4 %
NEUTROPHILS # BLD AUTO: 7.8 K/UL (ref 1.3–7.7)
NEUTROPHILS NFR BLD AUTO: 78 %
PLATELET # BLD AUTO: 111 K/UL (ref 150–450)
POTASSIUM SERPL-SCNC: 3.7 MMOL/L (ref 3.5–5.1)
PROT SERPL-MCNC: 7.5 G/DL (ref 6.3–8.2)
SODIUM SERPL-SCNC: 137 MMOL/L (ref 137–145)
WBC # BLD AUTO: 10 K/UL (ref 3.8–10.6)

## 2020-02-07 RX ADMIN — PANTOPRAZOLE SODIUM SCH MG: 40 TABLET, DELAYED RELEASE ORAL at 06:50

## 2020-02-07 RX ADMIN — INSULIN ASPART SCH UNIT: 100 INJECTION, SOLUTION INTRAVENOUS; SUBCUTANEOUS at 07:08

## 2020-02-07 RX ADMIN — METHYLPREDNISOLONE SODIUM SUCCINATE SCH MG: 125 INJECTION, POWDER, FOR SOLUTION INTRAMUSCULAR; INTRAVENOUS at 11:41

## 2020-02-07 RX ADMIN — LORATADINE SCH MG: 10 TABLET ORAL at 06:50

## 2020-02-07 RX ADMIN — METHYLPREDNISOLONE SODIUM SUCCINATE SCH MG: 125 INJECTION, POWDER, FOR SOLUTION INTRAMUSCULAR; INTRAVENOUS at 05:28

## 2020-02-07 RX ADMIN — CEFAZOLIN SCH: 330 INJECTION, POWDER, FOR SOLUTION INTRAMUSCULAR; INTRAVENOUS at 02:34

## 2020-02-07 RX ADMIN — Medication SCH MG: at 06:50

## 2020-02-07 RX ADMIN — CEFAZOLIN SCH: 330 INJECTION, POWDER, FOR SOLUTION INTRAMUSCULAR; INTRAVENOUS at 03:51

## 2020-02-07 RX ADMIN — INSULIN ASPART SCH: 100 INJECTION, SOLUTION INTRAVENOUS; SUBCUTANEOUS at 11:38

## 2020-02-07 RX ADMIN — CEFAZOLIN SCH: 330 INJECTION, POWDER, FOR SOLUTION INTRAMUSCULAR; INTRAVENOUS at 11:41

## 2020-02-07 NOTE — P.CN
Psychiatric Consult





- .


Consult date: 02/07/20


Consult:: 


IDENTIFYING DATA: He is a 54-year-old single  male admitted to medicine

with complaints of increasing pain and hand tremors.  The hospitalist consult to

psychiatry to evaluate depression and alcohol use problems.





HISTORY OF PRESENT ILLNESS: I reviewed the medical record and interviewed the 

patient.  I also spoke with his fiance Nadeen.  He was unaware of the 

psychiatric consult.  When I explained the nature of the cost replied that he 

was feeling depressed before he came in hospital because he was in such pain.  

The pain is less severe and the tremor has abated.  He stated he does not feel 

depressed and denied having thoughts of death or suicide.





She denied a history of depression or depressive symptoms.  He denied a history 

of suicide attempts or gestures.  He denied history of mental health treatment 

including psychiatric hospitalizations.





He has a history of alcohol use problems beginning in adolescence.  He has had 8

DUI citations; the last was 1 year ago.  He has served time in long-term for the DUI.

 His license was "permanently" revoked.  However he does not perceive his 

alcohol use as a problem.  His mother and his fiance have complained to him 

about his alcohol use.  He has attempted, unsuccessfully, to reduce his alcohol 

use.  He admits that he becomes annoyed by his mother's criticism of his alcohol

use.  He does not feel guilty about his use but has consumed alcohol in the 

morning to steady his nerves and "open his eyes".  He was in [place] 

"several years ago".  He finds no benefit from participation in AA.





He states that he only drinks beer between 6-8 beers today.  His alcohol use has

increased recently allegedly because of the increasing pain.  He denied use of 

other drugs get high, help her sleep or changes mood.





PAST PSYCHIATRIC HISTORY: Denied. 





PAST MEDICAL HISTORY: He has multiple medical problems including asthma, 

osteoarthritis, recurrent renal disease, peripheral neuropathy, decreased visual

acuity and anus insufficiency. 





ALLERGIES: Known drugs ALLERGIES. 





SUBSTANCE USE HISTORY: See above





FAMILY PSYCHIATRIC/SUBSTANCE USE HISTORY: He is unaware of family history of 

substance use or psychiatric problems. 





SOCIAL HISTORY: His born and raised in [place].  He graduated from high school.

 He is single and has no children.  He has been disabled since 2009 due to 

chronic decreased visual acuity.  He lives with his fiance.





MENTAL STATUS EXAM: He presented as a casually groomed 54-year-old  

male with male pattern balding.  He maintained eye contact and appeared to 

attend to interview.  He had no prominent physical abnormalities.  He had a 

blunted facial expression.  He was alert and oriented to person, place and time.

 He did not him straight an obvious hand tremor.  His speech was spontaneous 

with decreased rate, rhythm and volume.  His affect was blunted and minimally 

reactive.  He denied suicidal ideation, death wishes or homicidal ideation.  He 

denied feeling hopeless or worthless.  He expressed feelings of helplessness 

regarding his chronic medical problems.  He did not express ideas reference, 

paranoid ideation or delusions.  His thinking was concrete but his associations 

were coherent and logical.  He denied hallucinations and did not appear to be 

responding to internal stimuli.  Global impression of intellect is average.  He 

is aware of his alcohol use problem but is in the pre-contemplative stage of 

recovery





IMPRESSIONS: Alcohol use disorder moderate, alcohol withdrawal, rule out alcohol

induced mood disorder, rule out depressive disorder secondary to chronic medical

illnesses, rule out major depressive disorder.





PLAN: There is no indication for transfer to the psychiatric unit.  The 

neurologist started nortriptyline 25 mg at bedtime for treatment of peripheral 

neuropathy.  Nortriptyline is a good antidepressant and the dose may be titrated

as an outpatient for the treatment of symptoms of depression and anxiety.  He 

would benefit from substance abuse treatment but is in the pre-contemplative 

stage.  Thank you for the consult.. 


02/07/20 10:14





02/07/20 11:58

## 2020-02-07 NOTE — P.DS
Providers


Date of admission: 


02/05/20 16:31





Expected date of discharge: 02/07/20


Attending physician: 


Reid Stromberg





Consults: 





                                        





02/05/20 16:32


Consult Physician Urgent 


   Consulting Provider: Precious Ma


   Consult Reason/Comments: new onset tremor


   Do you want consulting provider notified?: Yes





02/06/20 11:45


Consult Physician Routine 


   Consulting Provider: Modesto Leary


   Consult Reason/Comments: DEPRESSION, ALCOHOL ABUSE


   Do you want consulting provider notified?: Yes











Primary care physician: 


[facility] Course: 


Final Diagnoses:


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


Mild lactic acidosis


Possible opiate withdrawal secondary to patient has been taking significant 

amount of Norco dose over 3 week time span.


Alcohol abuse, possible DTs, drinks 6-8 beers daily


Coagulopathy, elevated INR on admission secondary to the above


Elevated LFTs


History of polysubstance abuse, per record review; patient reports only alcohol 

currently.


Ongoing nicotine dependence


Legally blind


Restless leg syndrome


Chronic renal failure, stage I


Obesity, BMI 31.2


Right lateral ribs old fracture deformities per chest x-ray


Osteoarthritis


Venous insufficiency




















Hospital course:This is a 54-year-old gentleman, history of polysubstance abuse 

including alcohol abuse , legally blind, osteoarthritis and multiple other 

medical issues presented to the ER with worsening bilateral lower extremity pain

accompanied by tremors, numbness of feet and hands.  Reports leg pain has been 

present for greater than 8 months, over the last 3-4 weeks he has been taken to 

Norco dose 3 times a day which was not controlling the pain.  Denies difficulty 

ambulating.Reports PCP started him on Lyrica, did not like how it made him feel.

 Medication changed to Neurontin which helped but did not totally relieve his 

discomfort.  Complains of bilateral lower extremity edema.  Denies back pain, 

incontinence.  No fevers or chills.  Denies chest pain, palpitations or 

shortness of breath.  Denies lightheadedness dizziness or focal deficits.  

Received IV push Dilaudid 2 for pain management in the ER.  Elevated LFTs, INR 

1.4.  Reports he drinks 6-8 beers daily.  Lactic acid mildly elevated at 2.4. 

received gentle IV fluid hydration.  Currently complaining of nausea, vomiting, 

denies abdominal pain. Tremor significantly improved with Ativan.








EKG read normal sinus rhythm


Venous Doppler of bilateral lower extremities, reported negative for DVT.


Chest x-ray reported no acute process.











Evaluated by neurology and psychiatry.  Pamelor initiated as per neurology, may 

be doubled in dose in 1 week pending patient's tolerance.  Psychiatry 

recommended rehab for alcohol dependence.  Significant clinical improvement.  

Cleared by all consults for discharge.  Patient is being discharged home in a 

stable condition with guarded prognosis.  Alcohol cessation, rediscussed, along 

with Neurontin will be increased to 600 mg 3 times a day.  Thigh-high teds to 

continue at home.








EXAM:


GENERAL: Alert and oriented 3, no acute distress, mood and affect normal, no 

tremors.


CARDIOVASCULAR:  S1, S2 regular. No murmur


RESPIRATION: Coarse Breath sounds diminished in the bases. No rhonchi or 

crackles. No wheezing.


ABDOMEN:  Soft, nondistended, nontender . No guarding. no masses palpable. Bowel

sounds heard


NERVOUS SYSTEM: No focal deficits











The impression and plan of care has been dictated as directed.





:


I performed a history and examination of this patient,  discussed the same with 

the dictator.  I agree with the dictator's note ,documented as a scribe.  Any 

additional findings or plans will be noted.


Patient Condition at Discharge: Stable





Plan - Discharge Summary


Discharge Rx Participant: Yes


New Discharge Prescriptions: 


New


   Folic Acid 1 mg PO DAILY #30 tablet


   Multivitamins, Thera [Multivitamin (formulary)] 1 tab PO DAILY #30 tablet


   Thiamine [Vitamin B-1] 100 mg PO DAILY #30 tab


   Nortriptyline [Pamelor] 25 mg PO HS #30 cap


   predniSONE 10 mg PO AS DIRECTED #30 tab


   Gabapentin [Neurontin] 600 mg PO TID #9 tab





Continue


   Albuterol Inhaler [Ventolin Hfa Inhaler] 1 - 2 puff INHALATION RT-Q6H PRN


     PRN Reason: Shortness Of Breath


   Fluticasone Nasal Spray [Flonase Nasal Spray] 1 spr EA NOSTRIL DAILY PRN


     PRN Reason: Allergy Symptoms


   Omeprazole 20 mg PO DAILY


   Cetirizine HCl [Zyrtec] 10 mg PO DAILY





Discontinued


   HYDROcodone/APAP 10-325MG [Norco ] 1 tab PO TID PRN


     PRN Reason: Pain


   Baclofen [Lioresal] 10 mg PO TID


   Gabapentin [Neurontin] 400 mg PO TID


Discharge Medication List





Albuterol Inhaler [Ventolin Hfa Inhaler] 1 - 2 puff INHALATION RT-Q6H PRN 

06/06/15 [History]


Cetirizine HCl [Zyrtec] 10 mg PO DAILY 02/05/20 [History]


Fluticasone Nasal Spray [Flonase Nasal Spray] 1 spr EA NOSTRIL DAILY PRN 

02/05/20 [History]


Omeprazole 20 mg PO DAILY 02/05/20 [History]


Folic Acid 1 mg PO DAILY #30 tablet 02/07/20 [Rx]


Gabapentin [Neurontin] 600 mg PO TID #9 tab 02/07/20 [Rx]


Multivitamins, Thera [Multivitamin (formulary)] 1 tab PO DAILY #30 tablet 

02/07/20 [Rx]


Nortriptyline [Pamelor] 25 mg PO HS #30 cap 02/07/20 [Rx]


Thiamine [Vitamin B-1] 100 mg PO DAILY #30 tab 02/07/20 [Rx]


predniSONE 10 mg PO AS DIRECTED #30 tab 02/07/20 [Rx]








Follow up Appointment(s)/Referral(s): 


Dr. SARAH Psychiatry [Other] - 1 Week


Thomas Rodriguez MD [STAFF PHYSICIAN] - 02/14/20 9:00 am


Shahid Blackmon Jr, DO [Primary Care Provider] - 02/10/20 10:30 am


Patient Instructions/Handouts:  Back Pain (ED), Leg Pain (ED)


Activity/Diet/Wound Care/Special Instructions: 


OP MRI as per neurology.    Send Thigh High teds home with patient








No ETOH








Substance abuse rehab


Discharge Disposition: HOME SELF-CARE

## 2020-02-12 ENCOUNTER — HOSPITAL ENCOUNTER (OUTPATIENT)
Dept: HOSPITAL 47 - RADMAMWWP | Age: 55
Discharge: HOME | End: 2020-02-12
Attending: FAMILY MEDICINE
Payer: COMMERCIAL

## 2020-02-12 DIAGNOSIS — N63.10: ICD-10-CM

## 2020-02-12 DIAGNOSIS — R92.8: Primary | ICD-10-CM

## 2020-02-12 PROCEDURE — 77065 DX MAMMO INCL CAD UNI: CPT

## 2020-02-12 PROCEDURE — 76642 ULTRASOUND BREAST LIMITED: CPT

## 2020-02-12 PROCEDURE — 77061 BREAST TOMOSYNTHESIS UNI: CPT

## 2020-02-12 NOTE — USB
Reason for exam: follow-up at short interval from prior study.



History:

Family history of breast cancer in mother at age 60 and breast cancer in maternal 

aunt.



US Breast Limited RT

Right limited breast ultrasound including focal area of concern, retroareolar and 

axilla demonstrates a 1.4 x 1.3 x 1.7cm hypoechoic lesion at the posterior nipple.

Redemonstration of retroareolar gynecomastia.



These results were verbally communicated with the patient and result sheet given 

to the patient on 2/12/20.





ASSESSMENT: Benign, BI-RAD 2



RECOMMENDATION:

Clinical management of the right breast.

Manage patient on a clinical basis.

## 2020-02-12 NOTE — MM
Reason for exam: follow-up at short interval from prior study.

Last mammogram was performed 2 months ago.



History:

Family history of breast cancer in mother at age 60 and breast cancer in maternal 

aunt.



Physical Findings:

Nurse did not find any significant physical abnormalities on exam.



MG 3D Diag Mammo W/Cad RT

CC and MLO view(s) were taken of the right breast.

Prior study comparison: November 29, 2019, bilateral MG 3d diag mammo w/cad YURIY.

Right retroareolar flamed shaped gynecomastia similar in appearance to the prior 

of 11/29/19.



These results were verbally communicated with the patient and result sheet given 

to the patient on 2/12/20.





ASSESSMENT: Benign, BI-RAD 2



RECOMMENDATION:

Clinical management of the right breast.

Manage patient on a clinical basis.

## 2020-03-03 ENCOUNTER — HOSPITAL ENCOUNTER (EMERGENCY)
Dept: HOSPITAL 47 - EC | Age: 55
Discharge: HOME | End: 2020-03-03
Payer: COMMERCIAL

## 2020-03-03 VITALS — RESPIRATION RATE: 18 BRPM

## 2020-03-03 VITALS — HEART RATE: 106 BPM | DIASTOLIC BLOOD PRESSURE: 94 MMHG | SYSTOLIC BLOOD PRESSURE: 141 MMHG | TEMPERATURE: 98 F
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
## 2020-03-03 DIAGNOSIS — J45.909: ICD-10-CM

## 2020-03-03 DIAGNOSIS — S22.31XA: Primary | ICD-10-CM

## 2020-03-03 DIAGNOSIS — F17.200: ICD-10-CM
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
## 2020-03-03 DIAGNOSIS — G62.9: ICD-10-CM

## 2020-03-03 DIAGNOSIS — G89.29: ICD-10-CM

## 2020-03-03 DIAGNOSIS — M79.605: ICD-10-CM

## 2020-03-03 DIAGNOSIS — Z79.899: ICD-10-CM

## 2020-03-03 DIAGNOSIS — Z79.891: ICD-10-CM

## 2020-03-03 DIAGNOSIS — M79.604: ICD-10-CM

## 2020-03-03 DIAGNOSIS — F10.10: ICD-10-CM

## 2020-03-03 DIAGNOSIS — H54.8: ICD-10-CM

## 2020-03-03 DIAGNOSIS — M19.90: ICD-10-CM

## 2020-03-03 DIAGNOSIS — Z79.51: ICD-10-CM

## 2020-03-03 PROCEDURE — 99284 EMERGENCY DEPT VISIT MOD MDM: CPT

## 2020-03-03 PROCEDURE — 71101 X-RAY EXAM UNILAT RIBS/CHEST: CPT

## 2020-03-03 PROCEDURE — 96372 THER/PROPH/DIAG INJ SC/IM: CPT
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
## 2020-03-17 ENCOUNTER — HOSPITAL ENCOUNTER (OUTPATIENT)
Dept: HOSPITAL 47 - EC | Age: 55
Setting detail: OBSERVATION
LOS: 1 days | Discharge: HOME | End: 2020-03-18
Attending: SURGERY | Admitting: SURGERY
Payer: COMMERCIAL

## 2020-03-17 DIAGNOSIS — Z98.890: ICD-10-CM

## 2020-03-17 DIAGNOSIS — G25.81: ICD-10-CM
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
## 2020-03-17 DIAGNOSIS — S37.009A: ICD-10-CM

## 2020-03-17 DIAGNOSIS — Z79.899: ICD-10-CM

## 2020-03-17 DIAGNOSIS — M54.9: ICD-10-CM

## 2020-03-17 DIAGNOSIS — F41.9: ICD-10-CM

## 2020-03-17 DIAGNOSIS — F32.9: ICD-10-CM

## 2020-03-17 DIAGNOSIS — S22.49XA: Primary | ICD-10-CM

## 2020-03-17 DIAGNOSIS — Z91.81: ICD-10-CM

## 2020-03-17 DIAGNOSIS — F17.200: ICD-10-CM

## 2020-03-17 DIAGNOSIS — F10.11: ICD-10-CM

## 2020-03-17 DIAGNOSIS — G89.29: ICD-10-CM

## 2020-03-17 DIAGNOSIS — N28.9: ICD-10-CM

## 2020-03-17 DIAGNOSIS — I31.9: ICD-10-CM

## 2020-03-17 DIAGNOSIS — J45.909: ICD-10-CM

## 2020-03-17 DIAGNOSIS — Z79.52: ICD-10-CM

## 2020-03-17 LAB
ANION GAP SERPL CALC-SCNC: 5 MMOL/L
BASOPHILS # BLD AUTO: 0 K/UL (ref 0–0.2)
BASOPHILS NFR BLD AUTO: 0 %
BUN SERPL-SCNC: 6 MG/DL (ref 9–20)
CALCIUM SPEC-MCNC: 8.6 MG/DL (ref 8.4–10.2)
CHLORIDE SERPL-SCNC: 103 MMOL/L (ref 98–107)
CO2 SERPL-SCNC: 30 MMOL/L (ref 22–30)
EOSINOPHIL # BLD AUTO: 0.2 K/UL (ref 0–0.7)
EOSINOPHIL NFR BLD AUTO: 2 %
ERYTHROCYTE [DISTWIDTH] IN BLOOD BY AUTOMATED COUNT: 4.45 M/UL (ref 4.3–5.9)
ERYTHROCYTE [DISTWIDTH] IN BLOOD: 13.5 % (ref 11.5–15.5)
GLUCOSE SERPL-MCNC: 103 MG/DL (ref 74–99)
HCT VFR BLD AUTO: 46.4 % (ref 39–53)
HGB BLD-MCNC: 14.9 GM/DL (ref 13–17.5)
LYMPHOCYTES # SPEC AUTO: 3 K/UL (ref 1–4.8)
LYMPHOCYTES NFR SPEC AUTO: 30 %
MAGNESIUM SPEC-SCNC: 1.9 MG/DL (ref 1.6–2.3)
MCH RBC QN AUTO: 33.6 PG (ref 25–35)
MCHC RBC AUTO-ENTMCNC: 32.2 G/DL (ref 31–37)
MCV RBC AUTO: 104.4 FL (ref 80–100)
MONOCYTES # BLD AUTO: 0.7 K/UL (ref 0–1)
MONOCYTES NFR BLD AUTO: 7 %
NEUTROPHILS # BLD AUTO: 6 K/UL (ref 1.3–7.7)
NEUTROPHILS NFR BLD AUTO: 59 %
PLATELET # BLD AUTO: 202 K/UL (ref 150–450)
POTASSIUM SERPL-SCNC: 3.9 MMOL/L (ref 3.5–5.1)
SODIUM SERPL-SCNC: 138 MMOL/L (ref 137–145)
WBC # BLD AUTO: 10.3 K/UL (ref 3.8–10.6)

## 2020-03-17 PROCEDURE — 73562 X-RAY EXAM OF KNEE 3: CPT

## 2020-03-17 PROCEDURE — 71046 X-RAY EXAM CHEST 2 VIEWS: CPT

## 2020-03-17 PROCEDURE — 74177 CT ABD & PELVIS W/CONTRAST: CPT

## 2020-03-17 PROCEDURE — 72125 CT NECK SPINE W/O DYE: CPT

## 2020-03-17 PROCEDURE — 97116 GAIT TRAINING THERAPY: CPT

## 2020-03-17 PROCEDURE — 85025 COMPLETE CBC W/AUTO DIFF WBC: CPT

## 2020-03-17 PROCEDURE — 97530 THERAPEUTIC ACTIVITIES: CPT

## 2020-03-17 PROCEDURE — 80048 BASIC METABOLIC PNL TOTAL CA: CPT

## 2020-03-17 PROCEDURE — 97110 THERAPEUTIC EXERCISES: CPT

## 2020-03-17 PROCEDURE — 83735 ASSAY OF MAGNESIUM: CPT

## 2020-03-17 PROCEDURE — 97162 PT EVAL MOD COMPLEX 30 MIN: CPT

## 2020-03-17 PROCEDURE — 96372 THER/PROPH/DIAG INJ SC/IM: CPT

## 2020-03-17 PROCEDURE — 70450 CT HEAD/BRAIN W/O DYE: CPT

## 2020-03-17 PROCEDURE — 93005 ELECTROCARDIOGRAM TRACING: CPT

## 2020-03-17 PROCEDURE — 71260 CT THORAX DX C+: CPT

## 2020-03-17 PROCEDURE — 80320 DRUG SCREEN QUANTALCOHOLS: CPT

## 2020-03-17 PROCEDURE — 99285 EMERGENCY DEPT VISIT HI MDM: CPT

## 2020-03-17 PROCEDURE — 36415 COLL VENOUS BLD VENIPUNCTURE: CPT

## 2020-03-17 PROCEDURE — 83605 ASSAY OF LACTIC ACID: CPT

## 2020-03-17 RX ADMIN — HYDROCODONE BITARTRATE AND ACETAMINOPHEN PRN EACH: 5; 325 TABLET ORAL at 14:27

## 2020-03-17 RX ADMIN — HYDROCODONE BITARTRATE AND ACETAMINOPHEN PRN EACH: 5; 325 TABLET ORAL at 20:24

## 2020-03-17 RX ADMIN — GABAPENTIN SCH MG: 400 CAPSULE ORAL at 20:25

## 2020-03-17 RX ADMIN — GABAPENTIN SCH MG: 400 CAPSULE ORAL at 16:43

## 2020-03-17 RX ADMIN — CEFAZOLIN SCH MLS/HR: 330 INJECTION, POWDER, FOR SOLUTION INTRAMUSCULAR; INTRAVENOUS at 05:26

## 2020-03-17 RX ADMIN — HYDROCODONE BITARTRATE AND ACETAMINOPHEN PRN EACH: 5; 325 TABLET ORAL at 08:59

## 2020-03-17 NOTE — P.CNPUL
History of Present Illness


Consult date: 20


Requesting physician: Brown Alvarado


Reason for consult: abnormal CXR/CT (Multiple posterior lateral rib fractures on

the right)


Chief complaint: Right chest wall pain


History of present illness: 





This is a 54-year-old gentleman with a known history of pericarditis, 

osteoarthritis, chronic tobacco dependence, anxiety/depression, alcohol abuse, 

marijuana use. He has been having issues with balance and dizziness.  He had 

recently taken a fall in his bathroom where he states he slipped on the floor 

and landed on the side of the tub on his right chest. He was also having right 

knee pain. He denied any loss of consciousness. The pain became too much and he 

presented here to the emergency room early this morning. X-ray of the knee 

revealed a possible nondisplaced patella fracture. Computed tomography scan of 

the brain and C-spine revealed cerebral atrophy but no acute intracranial 

abnormalities.  There was spent a lytic changes in the cervical spine.  No 

fractures noted. computed tomography scan of the chest revealed emphysematous 

changes with bilateral lung APCs.  I lateral basilar mild atelectasis.  No 

paracardial effusion.  No mediastinal adenopathy.  No pleural effusion.  No munira

r masses. There is noted multiple acute posterior lateral right rib fractures. 

He is seen today in consultation on the regular medical floor.  Awake and alert 

in no acute distress. He is maintaining O2 saturations in the 90s on room air.  

He's been afebrile.  Hemodynamically stable. He is currently on Norco for pain 

control. white count 10.3.  Hemoglobin 14.9.  .4.  Creatinine 0.84. Serum

alcohol less than 10.





Review of Systems





REVIEW OF SYSTEMS:


CONSTITUTIONAL: Denies any recent significant weight loss or weight gain.


EYES: Denies change in vision. Legally blind.


EARS, NOSE, MOUTH, THROAT: Denies headaches, denies sore throat.


CARDIOVASCULAR: Positive for right-sided chest wall pain, no palpitations or 

syncopal episodes.


RESPIRATORY: Positive for pain on inhalation. s NO hortness of breath, cough, 

congestion or hemoptysis.


GASTROINTESTINAL: Denies change in appetite, denies abdominal pain


GENITOURINARY: Denies hematuria, denies infections.


MUSKULOSKELETAL: Denies pain, denies swelling.


INTEGUMENTARY: Denies rash, denies eczema.


NEUROLOGICAL: Denies recent memory loss, no recent seizure activity. 


PSYCHIATRIC: Denies anxiety, denies depression.


HEMATOLOGIC/LYMPHATIC: Denies anemia, denies enlarged lymph nodes.








Past Medical History


Past Medical History: Asthma, Eye Disorder, Osteoarthritis (OA), Renal Disease


Additional Past Medical History / Comment(s): Pt admitted to [facility] on 20 with

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 

opiate withdrawal/alcohol abuse possible DTs/coagulopathy, elevated LFTs, venous

insufficiency.     Other Hx:  Unsteady gait/falls, ETOH abuse with withdrawal 

symptoms in the past pt states-tremors, bilateral leg pain/numbness, chronic 

renal disease stage I, pericarditis, bilateral macular degeneration/legally 

blind, chronic back pain, RLS, bilateral carpal tunnel syndrome, past infected 

sebaceous cyst on back


History of Any Multi-Drug Resistant Organisms: None Reported


Past Surgical History: Orthopedic Surgery


Additional Past Surgical History / Comment(s): Right hand surgery d/t injury


Past Anesthesia/Blood Transfusion Reactions: No Reported Reaction


Smoking Status: Light tobacco smoker





- Past Family History


  ** Father


Family Medical History: Cancer


Additional Family Medical History / Comment(s): Father  of esophageal 

cancer.





  ** Mother


Family Medical History: No Reported History


Additional Family Medical History / Comment(s): Mopther is healthy





Medications and Allergies


                                Home Medications











 Medication  Instructions  Recorded  Confirmed  Type


 


Albuterol Inhaler [Ventolin Hfa 1 - 2 puff INHALATION RT-Q6H PRN 06/06/15 

03/17/20 History





Inhaler]    


 


Cetirizine HCl [Zyrtec] 10 mg PO DAILY 20 History


 


Fluticasone Nasal Spray [Flonase 1 spr EA NOSTRIL DAILY PRN 20 

History





Nasal Spray]    


 


Omeprazole 20 mg PO DAILY 20 History


 


Folic Acid 1 mg PO DAILY #30 tablet 20 Rx


 


Multivitamins, Thera [Multivitamin 1 tab PO DAILY #30 tablet 20 

Rx





(formulary)]    


 


Nortriptyline [Pamelor] 25 mg PO HS #30 cap 20 Rx


 


Thiamine [Vitamin B-1] 100 mg PO DAILY #30 tab 20 Rx


 


Gabapentin 600 mg PO AS DIRECTED 20 History


 


Gabapentin [Neurontin] 400 mg PO AS DIRECTED 20 History


 


HYDROcodone/APAP 7.5-325MG [Norco 1 tab PO TID 20 History





7.5-325]    


 


chlordiazePOXIDE HCl [Librium] 25 mg PO Q8H PRN 20 History








                                    Allergies











Allergy/AdvReac Type Severity Reaction Status Date / Time


 


No Known Allergies Allergy   Verified 20 01:41














Physical Exam


Vitals: 


                                   Vital Signs











  Temp Pulse Resp BP BP Pulse Ox


 


 20 07:00  98.2 F   20   103/65  92 L


 


 20 06:00  99.2 F  94  18  149/76   98


 


 20 05:05  99.2 F  94  18  101/60   94 L


 


 20 01:35  98.5 F  102 H  16  117/75   96








                                Intake and Output











 20





 22:59 06:59 14:59


 


Other:   


 


  Weight  86.183 kg 86.183 kg














GENERAL EXAM: Alert, pleasant 54-year-old gentleman, on room air, fairly 

comfortable in no apparent distress.


HEAD: Normocephalic.


EYES: Normal reaction of pupils, equal size.


NOSE: Clear with pink turbinates.


THROAT: No erythema or exudates.


NECK: No masses, no JVD.


CHEST: No chest wall deformity.


LUNGS: Equal air entry with no crackles, wheeze, rhonchi or dullness. 

Diminished.


CVS: S1 and S2 normal with no audible murmur, regular rhythm.


ABDOMEN: No hepatosplenomegaly, normal bowel sounds, no guarding or rigidity.


SPINE: No scoliosis or deformity


SKIN: No rashes


CENTRAL NERVOUS SYSTEM: No focal deficits, tone is normal in all 4 extremities.


EXTREMITIES: There is no peripheral edema.  No clubbing, no cyanosis.  

Peripheral pulses are intact.





Results





- Laboratory Findings


CBC and BMP: 


                                 20 02:24





                                 20 02:24


Abnormal lab findings: 


                                  Abnormal Labs











  20





  02:24 02:24


 


MCV  104.4 H 


 


BUN   6 L


 


Glucose   103 H














- Diagnostic Findings


CT scan - chest: image reviewed





Assessment and Plan


Assessment: 





1 Right-sided chest wall trauma secondary to fall with new posterior lateral rib

 fractures of 9, 10 and 11





2 Right knee pain secondary to trauma from fall with possible displaced patellar

 fracture





3 History of alcohol abuse with recent admission for alcoholic neuropathy, 

states last drink was about a week ago





4 Chronic and ongoing tobacco dependence with suspected COPD and emphysema noted

 on CAT scan





5 History of marijuana use





6 Legally blind





7 History of osteoarthritis





8 History of pericarditis





9 History of anxiety/depression





10 History of chronic back pain





Plan





The patient was seen and evaluated by Dr. Mann


CAT scan of the chest reviewed


Multiple right-sided rib fractures


Add incentive spirometer and encourage cough and deep breathing exercises


Encouraged regarding the importance of complete smoking cessation


Add NicoDerm patch


We'll continue to follow





I, the cosigning physician, performed a history & physical examination of the 

patient. Lungs sounds are clear, diminished.  Maintaining good O2 saturations in

 the 90s on room air.  I discussed the assessment and plan of care with my nurse

 practitioner, Asmita Ramsay. I attest to the above note as dictated by her.


Time with Patient: Greater than 30
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
## 2020-03-17 NOTE — CT
EXAMINATION TYPE: CT brain tete bahena con

 

DATE OF EXAM: 3/17/2020

 

COMPARISON: None

 

HISTORY: Patient presents with head and neck pain after fall.

 

CT DLP: 1486.9 mGycm

Automated exposure control for dose reduction was used.

 

Exam performed without contrast.

 

There is mild cerebral cortical atrophy. There is no mass effect nor midline shift. There is no sign 
of intracranial hemorrhage. The calvarium is intact.

 

There is some straightening of the cervical spine. There is mild disc space narrowing from C3 to C6 w
ith spurring of the endplates. Posterior elements are intact. Facet joints are intact. There is no ev
idence of a fracture. The skull base is intact.

 

There are fibrotic changes and emphysematous bulla at the lung apices.

 

IMPRESSION:

Cerebral atrophy. No acute intracranial abnormality.

 

Spondylotic changes in the cervical spine. No fracture seen.

 

Pulmonary emphysema and pulmonary fibrotic changes.

## 2020-03-17 NOTE — ED
General Adult HPI





- General


Chief complaint: Fall


Stated complaint: Fall, Weakness


Time Seen by Provider: 03/17/20 01:42


Source: patient


Mode of arrival: wheelchair


Limitations: physical limitation





- History of Present Illness


Initial comments: 


Dictation was produced using dragon dictation software. please excuse any 

grammatical, word or spelling errors. 





Chief Complaint: 54-year-old male past medical history of remote alcohol abuse, 

debility, chronic back pain presents after fall.  





History of Present Illness: 54-year-old male presents after fall.  Patient has 

been unsteady in his feet for several leg weeks and months.  Patient was lended 

a walker by his wife to help with mobilization.  He continues to fall.  Patient 

has been unsteady in his feet.  Patient has suffered fractured ribs in the past.

 When asked why he does not use the walker barred from his wife he states that 

he is "bulll headed."  He was in the shower yesterday when he fell.  He landed 

on his right side.  He does have some right-sided rib pain.  States he also is 

struck his knee.  Patient has some anterior knee pain to the right.  Denies any 

head trauma.  Patient denies any blood thinner medications.  Does have a history

of back pain.  Wife at bedside reports that he walks with a hunched back often.








The ROS documented in this emergency department record has been reviewed and 

confirmed by me.  Those systems with pertinent positive or negative responses 

have been documented in the HPI.  All other systems are other negative and/or 

noncontributory.








PHYSICAL EXAM:


General Impression: Alert and oriented x3, not in acute distress


HEENT: Normocephalic atraumatic, extra-ocular movements intact, pupils equal and

reactive to light bilaterally, mucous membranes moist.


Cardiovascular: Heart regular rate and rhythm, S1&S2 audible, no murmurs, rubs 

or gallops


Chest: Lungs clear to auscultation bilaterally, no rhonchi, no wheeze, no rales


Abdomen: Bowel sounds present, abdomen soft, non-tender, non-distended, no 

organomegaly


Musculoskeletal: Pulses present and equal in all extremities, no peripheral 

edema, tenderness to palpation of the right lateral chest.  Symmetrical weakness

of bilateral lower extremities.


Motor:  no focal deficits noted


Neurological: CN II-XII grossly intact, no focal motor or sensory deficits 

noted, patient has slow shuffling steps, patient is not ataxic


Skin: Intact with no visualized rashes


Psych: Normal affect and mood





ED course: 54-year-old male presents with fall.  He also has unsteady gait for 

the last several weeks.  He is accompanied by his wife.  Signs upon arrival are 

within acceptable limits


Laboratory evaluation obtained per it CBC, metabolic panel is unremarkable.  

Serum alcohol is negative.  Computed tomography scan of the head and C-spine 

shows no acute processes.  The x-rays concerning for possible patellar fracture.

 CT chest abdomen pelvis shows multiple rib fractures to the left right ribs.  

Discussed patient case with Dr. Atwood is willing to accept patient's care.  

Will have workup consulted for concern for possible patellar fracture.  Patient 

will remain on bedrest.he'll be nonweightbearing to the right lower extremity.





EKG interpretation: Ventricular rate 92, normal sinus rhythm, ND interval 140, 

QRS 76, . No ND prolongation, no QTC prolongation, no ST or T-wave 

changes noted.    Overall, this EKG is unremarkable








- Related Data


                                Home Medications











 Medication  Instructions  Recorded  Confirmed


 


Albuterol Inhaler [Ventolin Hfa 1 - 2 puff INHALATION RT-Q6H PRN 06/06/15 

02/05/20





Inhaler]   


 


Cetirizine HCl [Zyrtec] 10 mg PO DAILY 02/05/20 02/05/20


 


Fluticasone Nasal Spray [Flonase 1 spr EA NOSTRIL DAILY PRN 02/05/20 02/05/20





Nasal Spray]   


 


Omeprazole 20 mg PO DAILY 02/05/20 02/05/20








                                  Previous Rx's











 Medication  Instructions  Recorded


 


Folic Acid 1 mg PO DAILY #30 tablet 02/07/20


 


Gabapentin [Neurontin] 600 mg PO TID #9 tab 02/07/20


 


Multivitamins, Thera [Multivitamin 1 tab PO DAILY #30 tablet 02/07/20





(formulary)]  


 


Nortriptyline [Pamelor] 25 mg PO HS #30 cap 02/07/20


 


Thiamine [Vitamin B-1] 100 mg PO DAILY #30 tab 02/07/20


 


predniSONE 10 mg PO AS DIRECTED #30 tab 02/07/20


 


chlordiazePOXIDE HCl [Librium] 25 mg PO QID 3 Days #12 capsule 03/03/20











                                    Allergies











Allergy/AdvReac Type Severity Reaction Status Date / Time


 


No Known Allergies Allergy   Verified 03/17/20 01:41














Review of Systems


ROS Statement: 


Those systems with pertinent positive or pertinent negative responses have been 

documented in the HPI.





ROS Other: All systems not noted in ROS Statement are negative.





Past Medical History


Past Medical History: Asthma, Osteoarthritis (OA), Renal Disease


Additional Past Medical History / Comment(s): pericarditis, back pain, legally 

blind, bilateral knee pain, acute kidney injury from fall down stairs 9/2015, 

restless leg syndrome


History of Any Multi-Drug Resistant Organisms: None Reported


Past Surgical History: Orthopedic Surgery


Additional Past Surgical History / Comment(s): Right hand surgery


Past Anesthesia/Blood Transfusion Reactions: No Reported Reaction


Past Psychological History: Anxiety, Depression


Smoking Status: Current every day smoker


Past Alcohol Use History: Abuse, Occasional, Rare


Past Drug Use History: None Reported, Marijuana





- Past Family History


  ** Father


Family Medical History: No Reported History





General Exam


Limitations: physical limitation





Course


                                   Vital Signs











  03/17/20





  01:35


 


Temperature 98.5 F


 


Pulse Rate 102 H


 


Respiratory 16





Rate 


 


Blood Pressure 117/75


 


O2 Sat by Pulse 96





Oximetry 














Medical Decision Making





- Lab Data


Result diagrams: 


                                 03/17/20 02:24





                                 03/17/20 02:24


                                   Lab Results











  03/17/20 03/17/20 03/17/20 Range/Units





  02:24 02:24 02:24 


 


WBC  10.3    (3.8-10.6)  k/uL


 


RBC  4.45    (4.30-5.90)  m/uL


 


Hgb  14.9    (13.0-17.5)  gm/dL


 


Hct  46.4    (39.0-53.0)  %


 


MCV  104.4 H    (80.0-100.0)  fL


 


MCH  33.6    (25.0-35.0)  pg


 


MCHC  32.2    (31.0-37.0)  g/dL


 


RDW  13.5    (11.5-15.5)  %


 


Plt Count  202    (150-450)  k/uL


 


Neutrophils %  59    %


 


Lymphocytes %  30    %


 


Monocytes %  7    %


 


Eosinophils %  2    %


 


Basophils %  0    %


 


Neutrophils #  6.0    (1.3-7.7)  k/uL


 


Lymphocytes #  3.0    (1.0-4.8)  k/uL


 


Monocytes #  0.7    (0-1.0)  k/uL


 


Eosinophils #  0.2    (0-0.7)  k/uL


 


Basophils #  0.0    (0-0.2)  k/uL


 


Macrocytosis  Slight    


 


Sodium   138   (137-145)  mmol/L


 


Potassium   3.9   (3.5-5.1)  mmol/L


 


Chloride   103   ()  mmol/L


 


Carbon Dioxide   30   (22-30)  mmol/L


 


Anion Gap   5   mmol/L


 


BUN   6 L   (9-20)  mg/dL


 


Creatinine   0.84   (0.66-1.25)  mg/dL


 


Est GFR (CKD-EPI)AfAm   >90   (>60 ml/min/1.73 sqM)  


 


Est GFR (CKD-EPI)NonAf   >90   (>60 ml/min/1.73 sqM)  


 


Glucose   103 H   (74-99)  mg/dL


 


Plasma Lactic Acid Gagandeep    1.3  (0.7-2.0)  mmol/L


 


Calcium   8.6   (8.4-10.2)  mg/dL


 


Magnesium   1.9   (1.6-2.3)  mg/dL


 


Serum Alcohol   <10   mg/dL














Disposition


Clinical Impression: 


 Fall, Rib fractures





Disposition: ADMITTED AS IP TO THIS [facility]


Condition: Fair


Referrals: 


Shahid Blackmon Jr, DO [Primary Care Provider] - 1-2 days


Decision Time: 04:30

## 2020-03-17 NOTE — XR
EXAMINATION TYPE: XR knee complete RT

 

DATE OF EXAM: 3/17/2020

 

COMPARISON: NONE

 

HISTORY: Fall. Pain.

 

TECHNIQUE: 3 views

 

FINDINGS: There is a lucent line over the patella on the oblique view. I do not see a definite fractu
re line on the lateral view. There is no sign of a joint effusion. Joint spaces are normal.

 

IMPRESSION: Possible nondisplaced patella fracture.

This is seen on the medial inferior aspect of the patella.
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
## 2020-03-17 NOTE — CT
EXAMINATION TYPE: CT ChestAbdPelvis w con

 

DATE OF EXAM: 3/17/2020

 

COMPARISON:

Chest CT scan 5/23/2014. Abdomen CT scan 2/11/2016.

HISTORY: Patient presents with chest and abdominal pain after fall.

 

CT DLP: 898.4 mGycm

Automated exposure control for dose reduction was used.

 

CONTRAST: 

Performed with IV Contrast, patient injected with 100mL mL of Isovue 300.

 

There are emphysematous bulla at the lung apices. There is coarse interstitial density in both lungs.
 There is bilateral basilar mild atelectasis. Heart size is normal. There is no pericardial effusion.
 There is no mediastinal adenopathy. There are a few paratracheal lymph nodes that measure up to 1 cm
. There are no hilar masses. There is no pleural effusion.

 

Liver shows no focal defect. Spleen is intact. Stomach appears normal. There is no pancreatic mass. G
allbladder appears normal. Bile ducts are not dilated.

 

There is no adrenal mass. Kidneys show satisfactory contrast opacification. There is no hydronephrosi
s. There is no retroperitoneal adenopathy. Appendix is posterior and appears normal. There are multip
le sigmoid diverticula. There is no sign of diverticulitis. Bladder distends smoothly. There is no in
guinal hernia.

 

There is no free fluid in the pelvis. There is no mesenteric edema. There is no ascites or free air. 
There is no sign of a bowel obstruction.

 

Thoracic vertebra have fairly normal spacing and alignment. Sternum is intact. Bony pelvis is intact.
 The ribs appear intact. There is old lateral right side healed rib fracture. Shoulder joints are int
act. There are fractures of the lateral and posterior right ninth and 10th and 11th ribs. Displacemen
t is up to 8 mm. No pneumothorax. The bony pelvis is intact. Proximal femurs and hip joints are intac
t. Sacroiliac joints appear normal.

 

IMPRESSION:

Mild emphysema. Patchy atelectasis at the lung bases is new compared to old exam. 

 

Multiple acute posterior lateral right rib fractures.

 

 Mild fibrotic changes at the lung apices.

 

Sigmoid diverticulosis without diverticulitis.

## 2020-03-17 NOTE — P.CNOR
History of Present Illness





- [facility]


Consult date: 20


History of present illness: 


This patient is a 54-year-old male with past medical history of renal disease, 

nicotine dependence, alcohol abuse, marijuana abuse that presented to [facility] on 3/17/20 with complaints of pain following a fall at home.  The 

patient states he fell in the home while in the bathroom a few days ago.  He 

states he landed directly onto his chest.  He states he experienced immediate 

pain in the chest in the right knee.  He states he has been unable to ambulate 

without pain following this injury.  He states he decided to present to the 

emergency department due to ongoing pain.  Upon arrival to the emergency 

department, x-rays were taken of the right knee and revealed a possible patella 

fracture.  CT of chest out of the pelvis was also performed and revealed 

multiple foot fractures.  The patient was admitted under the care of Dr. Alvarado, with a consult placed to orthopedics for evaluation of his right knee.




At the time of my exam, the patient is complaining of pain in the ribs as well 

as his right knee.  He states his right knee pain is improved since admission.  

He states he is ambulating with the assistance of a walker with moderate pain in

his right knee.  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 

past, as he worked construction.  He states he has fractured both patellas in 

the past.  He also states he has dislocated his patella.  He is unable to 

provide additional history regarding his injuries.  Patient denies additional 

complaints today.  Vital signs stable.


 














Past Medical History


Past Medical History: Asthma, Eye Disorder, Osteoarthritis (OA), Renal Disease


Additional Past Medical History / Comment(s): Pt admitted to [facility] on 20 with

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 

opiate withdrawal/alcohol abuse possible DTs/coagulopathy, elevated LFTs, venous

insufficiency.     Other Hx:  Unsteady gait/falls, ETOH abuse with withdrawal 

symptoms in the past pt states-tremors, bilateral leg pain/numbness, chronic 

renal disease stage I, pericarditis, bilateral macular degeneration/legally 

blind, chronic back pain, RLS, bilateral carpal tunnel syndrome, past infected 

sebaceous cyst on back


History of Any Multi-Drug Resistant Organisms: None Reported


Past Surgical History: Orthopedic Surgery


Additional Past Surgical History / Comment(s): Right hand surgery d/t injury


Past Anesthesia/Blood Transfusion Reactions: No Reported Reaction


Smoking Status: Light tobacco smoker





- Past Family History


  ** Father


Family Medical History: Cancer


Additional Family Medical History / Comment(s): Father  of esophageal 

cancer.





  ** Mother


Family Medical History: No Reported History


Additional Family Medical History / Comment(s): Mopther is healthy





Medications and Allergies


                                Home Medications











 Medication  Instructions  Recorded  Confirmed  Type


 


Albuterol Inhaler [Ventolin Hfa 1 - 2 puff INHALATION RT-Q6H PRN 06/06/15 

03/17/20 History





Inhaler]    


 


Cetirizine HCl [Zyrtec] 10 mg PO DAILY 20 History


 


Fluticasone Nasal Spray [Flonase 1 spr EA NOSTRIL DAILY PRN 20 

History





Nasal Spray]    


 


Omeprazole 20 mg PO DAILY 20 History


 


Folic Acid 1 mg PO DAILY #30 tablet 20 Rx


 


Multivitamins, Thera [Multivitamin 1 tab PO DAILY #30 tablet 20 

Rx





(formulary)]    


 


Nortriptyline [Pamelor] 25 mg PO HS #30 cap 20 Rx


 


Thiamine [Vitamin B-1] 100 mg PO DAILY #30 tab 20 Rx


 


Gabapentin [Neurontin] 400 mg PO TID 20 History


 


HYDROcodone/APAP 7.5-325MG [Norco 1 tab PO TID 20 History





7.5-325]    


 


chlordiazePOXIDE HCl [Librium] 25 mg PO Q8H PRN 20 History








                                    Allergies











Allergy/AdvReac Type Severity Reaction Status Date / Time


 


No Known Allergies Allergy   Verified 20 01:41














Physical Examination


On examination, the patient is lying in bed in no apparent distress.  He is 

alert and oriented 3.  His head appears normocephalic and atraumatic.  His 

breathing appears nonlabored.  On inspection of the bilateral upper extremities,

 there is no obvious deformity or signs of trauma.  On inspection of the right 

knee, there is no effusion appreciated.  There is a very mild, superficial 

laceration to the anterior knee.  There is no ecchymosis, erythema.  There is 

mild pain on palpation of the patella.  No pain on palpation of the medial, l

ateral joint lines.  No pain on palpation of the MCL, LCL. Knee appears stable. 

Patient is able to perform a straight leg raise without pain or issue.  There is

 no pain on palpation of the lower leg, ankle, foot.  No pain with logrolling of

 the hip.


Motor and sensory function appear to be intact of the right lower extremity.  

Right lower extremities warm and well-perfused with brisk capillary refill 

distally.  Dorsalis pedis pulse +2.  Calf is soft and nontender to palpation.





Results


Right knee x-ray 3/17/2020: Possible chronic appearing nondisplaced fracture of 

the patella. 








- Labs


Labs: 


                  Abnormal Lab Results - Last 24 Hours (Table)











  20 Range/Units





  02:24 02:24 


 


MCV  104.4 H   (80.0-100.0)  fL


 


BUN   6 L  (9-20)  mg/dL


 


Glucose   103 H  (74-99)  mg/dL








                                      H & H











  20 Range/Units





  02:24 


 


Hgb  14.9  (13.0-17.5)  gm/dL


 


Hct  46.4  (39.0-53.0)  %











Result Diagrams: 


                                 20 02:24





                                 20 02:24





Assessment and Plan


Assessment: 


Possible nondisplaced right patella fracture


Plan: 


I explained the clinical and imaging findings with the patient. Recommended 

immobilization of the right knee with a knee immobilizer.  He may bear to 

tolerance on the right knee with use of a walker. 


Physical therapy for gait and balance training.


Medical management per admitting team. Rib fracture management per admitting 

team. 


We will continue to follow patient while he remains inpatient and make 

recommendations as needed.





Patient discussed with Dr. Monroy.

## 2020-03-18 VITALS
RESPIRATION RATE: 12 BRPM | DIASTOLIC BLOOD PRESSURE: 67 MMHG | TEMPERATURE: 97.4 F | SYSTOLIC BLOOD PRESSURE: 96 MMHG | HEART RATE: 73 BPM

## 2020-03-18 RX ADMIN — HYDROCODONE BITARTRATE AND ACETAMINOPHEN PRN EACH: 5; 325 TABLET ORAL at 02:40

## 2020-03-18 RX ADMIN — HYDROCODONE BITARTRATE AND ACETAMINOPHEN PRN EACH: 5; 325 TABLET ORAL at 07:39

## 2020-03-18 RX ADMIN — GABAPENTIN SCH MG: 400 CAPSULE ORAL at 07:39

## 2020-03-18 RX ADMIN — CEFAZOLIN SCH MLS/HR: 330 INJECTION, POWDER, FOR SOLUTION INTRAMUSCULAR; INTRAVENOUS at 06:08

## 2020-03-18 NOTE — XR
EXAMINATION TYPE: XR chest 2V

 

DATE OF EXAM: 3/18/2020

 

COMPARISON: CT of the chest, abdomen, and pelvis dated 3/17/2020

 

HISTORY: 3/3/2020 and pain

 

TECHNIQUE:  Frontal and lateral views of the chest are obtained.

 

FINDINGS: There is chronic interstitial prominence.  Platelike linear atelectasis of the left lower l
simona is new.  The cardiac silhouette size is within normal limits. Old healed right posterior fourth r
ib fracture is seen. Mild multilevel degenerative change of the spine. The multiple acute posterior r
ight rib fractures are better evaluated on CT and appear nondisplaced. No new pleural effusion or pne
umothorax.

 

IMPRESSION:  

1. New left lower lung subsegmental linear atelectasis.

2. The known multiple right nondisplaced acute posterior rib fractures are better seen on CT.

## 2020-03-18 NOTE — P.CONS
History of Present Illness





- Reason for Consult


Consult date: 20


Medical management anxiety


Requesting physician: Brown Alvarado





- Chief Complaint


fall,rib fx, patella fx





- History of Present Illness


This is a 54-year-old gentleman, history of polysubstance abuse including 

alcohol abuse , legally blind, osteoarthritis and multiple other medical issues 

presented to the ER status post fall in the shower.  He reports he slipped while

shaving.  Denies lightheadedness dizziness or focal deficits.  Denies syncope.  

Denies chest pain, palpitations or shortness of breath.  Reports chronic back 

pain.  No fevers or chills.  Denies cough.  Denies lightheadedness dizziness or 

focal deficits.  Radiology studies reporting possible nondisplaced right patella

fracture-possibly chronic, right rib fractures 9, 10, 11.  Serum Alcohol level 

less than 10.  Afebrile, and ABC 10.3.  Hemoglobin 14.9, platelets 202.  

Electrolyte within normal limits.  Creatinine 0.84, BUN 6.  Evaluated by trauma 

surgery, orthopedic surgery and pulmonary.  Pain currently controlled.  Able to 

cough with less pain.  Denies nausea vomiting or diarrhea.  Denies abdominal 

pain.





Review of Systems


ROS Statement: 


Those systems with pertinent positive or pertinent negative responses have been 

documented in the HPI.





ROS Other: All systems not noted in ROS Statement are negative.











Past Medical History


Past Medical History: Asthma, Eye Disorder, Osteoarthritis (OA), Renal Disease


Additional Past Medical History / Comment(s): Pt admitted to [facility] on 20 with

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 

opiate withdrawal/alcohol abuse possible DTs/coagulopathy, elevated LFTs, venous

insufficiency.     Other Hx:  Unsteady gait/falls, ETOH abuse with withdrawal 

symptoms in the past pt states-tremors, bilateral leg pain/numbness, chronic 

renal disease stage I, pericarditis, bilateral macular degeneration/legally 

blind, chronic back pain, RLS, bilateral carpal tunnel syndrome, past infected 

sebaceous cyst on back


History of Any Multi-Drug Resistant Organisms: None Reported


Past Surgical History: Orthopedic Surgery


Additional Past Surgical History / Comment(s): Right hand surgery d/t injury


Past Anesthesia/Blood Transfusion Reactions: No Reported Reaction


Smoking Status: Light tobacco smoker





- Past Family History


  ** Father


Family Medical History: Cancer


Additional Family Medical History / Comment(s): Father  of esophageal 

cancer.





  ** Mother


Family Medical History: No Reported History


Additional Family Medical History / Comment(s): Mopther is healthy





Medications and Allergies


                                Home Medications











 Medication  Instructions  Recorded  Confirmed  Type


 


Albuterol Inhaler [Ventolin Hfa 1 - 2 puff INHALATION RT-Q6H PRN 06/06/15 

03/17/20 History





Inhaler]    


 


Cetirizine HCl [Zyrtec] 10 mg PO DAILY 20 History


 


Fluticasone Nasal Spray [Flonase 1 spr EA NOSTRIL DAILY PRN 20 

History





Nasal Spray]    


 


Omeprazole 20 mg PO DAILY 20 History


 


Folic Acid 1 mg PO DAILY #30 tablet 20 Rx


 


Multivitamins, Thera [Multivitamin 1 tab PO DAILY #30 tablet 20 

Rx





(formulary)]    


 


Nortriptyline [Pamelor] 25 mg PO HS #30 cap 20 Rx


 


Thiamine [Vitamin B-1] 100 mg PO DAILY #30 tab 20 Rx


 


Gabapentin [Neurontin] 400 mg PO TID 20 History


 


HYDROcodone/APAP 7.5-325MG [Norco 1 tab PO TID 20 History





7.5-325]    


 


chlordiazePOXIDE HCl [Librium] 25 mg PO Q8H PRN 20 History








                                    Allergies











Allergy/AdvReac Type Severity Reaction Status Date / Time


 


No Known Allergies Allergy   Verified 20 01:41














Physical Exam


Vitals: 


                                   Vital Signs











  Temp Pulse Resp BP Pulse Ox


 


 20 12:17  97.4 F L  73  12  96/67  93 L


 


 20 05:40  97.9 F  71  20  92/56  96


 


 20 20:45  98.4 F  75  20  113/73  93 L








                                Intake and Output











 20





 06:59 14:59 22:59


 


Intake Total 300  


 


Balance 300  


 


Intake:   


 


  Oral 300  


 


Other:   


 


  # Voids 2 2 











VITAL SIGNS: As above


GENERAL: Sitting up in chair-reclined, no acute distress


HEENT:  Conjunctivae normal. eyes normal.  Oral mucosa moist


NECK:  No JVD. No thyroid enlargement. No LNs


CARDIOVASCULAR:  S1, S2 regular. No murmur


RESPIRATION: Coarse Breath sounds diminished in the bases. Minimal bibasilar 

crackles


ABDOMEN:  Soft, nondistended, nontender . No guarding. no masses palpable. No 

ascites, No hepatosplenomegaly.Bowel sounds heard.


LEGS: Right knee immobilizer present, mild tenderness, no edema ,positive 

dorsalis pedis pulses.


PSYCHIATRY: Alert and oriented X3, mood and affect normal.


NERVOUS SYSTEM:  Cranial N 2-12 grossly normal. Moves all 4 limbs.  No focal 

deficits.  Strength and sensation grossly intact.


Skin:  no rash 











Results


CBC & Chem 7: 


                                 20 02:24





                                 20 02:24





Assessment and Plan


Assessment: 


New posterior lateral rib fractures of 9, 10 and 11 status post fall


Possible nondisplaced right patella fracture, possibly chronic


Chronic back pain


History of Alcohol abuse


History of polysubstance abuse, per record review; patient reports alcohol, 

marijuana use currently.


History of nicotine dependence, reports he has not smoked for 4 weeks


COPD, emphysema


Legally blind


Obesity, BMI 30.7


Right lateral ribs old fracture deformities per chest x-ray


Osteoarthritis


Venous insufficiency


Anxiety, depression, history of, currently controlled











Plan: Continue on current medication regime ,monitoring and symptomatic 

treatment.  Home meds have been reviewed and resumed accordingly.  PT/OT.  

Alcohol cessation reinforced .Patient is being discharged by surgery today.  

Follow up PCP in 2 weeks.  Aggressive pulmonary toileting with incentive 

spirometer reinforced.  Thank you Dr. Alvarado for the consult.

















The impression and plan of care has been dictated as directed.





:


I performed a history and examination of this patient,  discussed the same with 

the dictator.  I agree with the dictator's note ,documented as a scribe.  Any 

additional findings or plans will be noted.

## 2020-03-18 NOTE — P.PN
Subjective


Progress Note Date: 03/18/20


This is a 54-year-old gentleman with a known history of pericarditis, 

osteoarthritis, chronic tobacco dependence, anxiety/depression, alcohol abuse, 

marijuana use. He has been having issues with balance and dizziness.  He had 

recently taken a fall in his bathroom where he states he slipped on the floor 

and landed on the side of the tub on his right chest. He was also having right 

knee pain. He denied any loss of consciousness. The pain became too much and he 

presented here to the emergency room early this morning. X-ray of the knee 

revealed a possible nondisplaced patella fracture. Computed tomography scan of 

the brain and C-spine revealed cerebral atrophy but no acute intracranial 

abnormalities.  There was spent a lytic changes in the cervical spine.  No 

fractures noted. computed tomography scan of the chest revealed emphysematous 

changes with bilateral lung APCs.  I lateral basilar mild atelectasis.  No 

paracardial effusion.  No mediastinal adenopathy.  No pleural effusion.  No 

hilar masses. There is noted multiple acute posterior lateral right rib 

fractures. He is seen today in consultation on the regular medical floor.  Awake

and alert in no acute distress. He is maintaining O2 saturations in the 90s on 

room air.  He's been afebrile.  Hemodynamically stable. He is currently on Norco

for pain control. white count 10.3.  Hemoglobin 14.9.  .4.  Creatinine 

0.84. Serum alcohol less than 10.





On 03/18/2020 patient seen in follow-up on general medical floor, he is sitting 

up in the recliner, in no acute distress, he has a chair alarm on him to prevent

him from get up unassisted, he is oriented 3, cooperative, no agitation, no 

signs of DTs.  He is on 2 L of oxygen with a pulse ox of 96%, hemodynamically 

stable, afebrile, he is receiving oral medications for his right-sided rib pain,

he is able to achieve 2000 on his incentive spirometer, lung sounds reveal some 

bibasilar crackles, no rhonchi, no wheezes, he states his pain is fairly well 

controlled, and is not limiting his breathing.  Today's chest x-ray has been 

reviewed showing new left lower lung subsegmental linear atelectasis.  Patient 

declined intercostal nerve block citing adequate pain control with current 

regimen of pain medications.








Objective





- Vital Signs


Vital signs: 


                                   Vital Signs











Temp  97.9 F   03/18/20 05:40


 


Pulse  71   03/18/20 05:40


 


Resp  20   03/18/20 05:40


 


BP  92/56   03/18/20 05:40


 


Pulse Ox  96   03/18/20 05:40








                                 Intake & Output











 03/17/20 03/18/20 03/18/20





 18:59 06:59 18:59


 


Intake Total  600 


 


Balance  600 


 


Weight 86.183 kg  


 


Intake:   


 


  Oral  600 


 


Other:   


 


  # Voids 2 2 


 


  # Bowel Movements  0 














- Exam


 GENERAL EXAM: Alert, very pleasant, 54-year-old white male, sitting up in the 

recliner, 3 L of oxygen with a pulse ox of 95% with a chair alarm on comfortable

in no apparent distress.  Right leg is in a immobilizer


HEAD: Normocephalic/atraumatic.


EYES: Normal reaction of pupils, equal size.  Conjunctiva pink, sclera white.


NOSE: Clear with pink turbinates.


THROAT: No erythema or exudates.


NECK: No masses, no JVD, no thyroid enlargement, no adenopathy.


CHEST: No chest wall deformity.  Symmetrical expansion.  Right lateral chest 

tenderness with deep breathing and coughing related to history of broken ribs


LUNGS: Equal air entry with minimal basilar crackles, but no wheeze, rhonchi or 

dullness.


CVS: Regular rate and rhythm, normal S1 and S2, no gallops, no murmurs, no rubs


ABDOMEN: Soft, nontender.  No hepatosplenomegaly, normal bowel sounds, no 

guarding or rigidity.


EXTREMITIES: No clubbing, no edema, no cyanosis, 2+ pulses and upper and lower 

extremities.


MUSCULOSKELETAL: Muscle strength and tone normal.  Right leg is in a immobilizer


SPINE: No scoliosis or deformity


SKIN: No rashes


CENTRAL NERVOUS SYSTEM: Alert and oriented -3.  No focal deficits, tone is 

normal in all 4 extremities.


PSYCHIATRIC: Alert and oriented -3.  Appropriate affect.  Intact judgment and 

insight.











- Labs


CBC & Chem 7: 


                                 03/17/20 02:24





                                 03/17/20 02:24





Assessment and Plan


Plan: 


 Assessment:





1 Right-sided chest wall trauma secondary to fall with new posterior lateral rib

fractures of 9, 10 and 11





2 Right knee pain secondary to trauma from fall with possible displaced patellar

fracture





3 History of alcohol abuse with recent admission for alcoholic neuropathy, 

states last drink was about a week ago





4 Chronic and ongoing tobacco dependence with suspected COPD and emphysema noted

on CAT scan





5 History of marijuana use





6 Legally blind





7 History of osteoarthritis





8 History of pericarditis





9 History of anxiety/depression





10 History of chronic back pain





Plan:





Continue encouraging deep breathing and coughing, today's chest x-ray has been 

reviewed showing new left lung atelectasis, no evidence of pneumothorax, or 

focal infiltrate.  Vital signs are stable, wean FiO2, encourage incentive 

spirometry use.  He states his pain is fairly well controlled with current 

regimen of medications.  Patient is fairly cooperative,, no agitation or 

anxiety, and monitoring for signs of delirium tremens.  





I performed a history & physical examination of the patient and discussed their 

management with my nurse practitioner, Shagufta Booker.  I reviewed the nurse 

practitioner's note and agree with the documented findings and plan of care.  

Lung sounds are positive for bibasilar crackles.  The findings and the 

impression was discussed with the patient.  I attest to the documentation by the

nurse practitioner. 





Time with Patient: Less than 30

## 2020-04-18 ENCOUNTER — HOSPITAL ENCOUNTER (EMERGENCY)
Dept: HOSPITAL 47 - EC | Age: 55
Discharge: HOME | End: 2020-04-18
Payer: COMMERCIAL

## 2020-04-18 VITALS — DIASTOLIC BLOOD PRESSURE: 76 MMHG | HEART RATE: 77 BPM | RESPIRATION RATE: 16 BRPM | SYSTOLIC BLOOD PRESSURE: 123 MMHG

## 2020-04-18 VITALS — TEMPERATURE: 98 F

## 2020-04-18 DIAGNOSIS — G89.29: ICD-10-CM

## 2020-04-18 DIAGNOSIS — R26.81: ICD-10-CM

## 2020-04-18 DIAGNOSIS — M54.9: ICD-10-CM

## 2020-04-18 DIAGNOSIS — E86.0: ICD-10-CM

## 2020-04-18 DIAGNOSIS — Z79.891: ICD-10-CM

## 2020-04-18 DIAGNOSIS — R74.0: ICD-10-CM

## 2020-04-18 DIAGNOSIS — J45.909: ICD-10-CM

## 2020-04-18 DIAGNOSIS — F10.10: ICD-10-CM

## 2020-04-18 DIAGNOSIS — F17.210: ICD-10-CM
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
## 2020-04-18 DIAGNOSIS — Z79.51: ICD-10-CM

## 2020-04-18 DIAGNOSIS — G25.81: ICD-10-CM

## 2020-04-18 DIAGNOSIS — Z79.899: ICD-10-CM

## 2020-04-18 DIAGNOSIS — N18.1: ICD-10-CM

## 2020-04-18 DIAGNOSIS — M19.90: ICD-10-CM

## 2020-04-18 DIAGNOSIS — J18.9: Primary | ICD-10-CM

## 2020-04-18 LAB
ALBUMIN SERPL-MCNC: 3.4 G/DL (ref 3.5–5)
ALP SERPL-CCNC: 125 U/L (ref 38–126)
ALT SERPL-CCNC: 16 U/L (ref 4–49)
ANION GAP SERPL CALC-SCNC: 12 MMOL/L
APTT BLD: 35.4 SEC (ref 22–30)
AST SERPL-CCNC: 65 U/L (ref 17–59)
BASOPHILS # BLD AUTO: 0 K/UL (ref 0–0.2)
BASOPHILS NFR BLD AUTO: 0 %
BUN SERPL-SCNC: <2 MG/DL (ref 9–20)
CALCIUM SPEC-MCNC: 8.2 MG/DL (ref 8.4–10.2)
CHLORIDE SERPL-SCNC: 101 MMOL/L (ref 98–107)
CO2 SERPL-SCNC: 24 MMOL/L (ref 22–30)
EOSINOPHIL # BLD AUTO: 0.1 K/UL (ref 0–0.7)
EOSINOPHIL NFR BLD AUTO: 1 %
ERYTHROCYTE [DISTWIDTH] IN BLOOD BY AUTOMATED COUNT: 4.74 M/UL (ref 4.3–5.9)
ERYTHROCYTE [DISTWIDTH] IN BLOOD: 13.1 % (ref 11.5–15.5)
GLUCOSE SERPL-MCNC: 118 MG/DL (ref 74–99)
HCT VFR BLD AUTO: 45.5 % (ref 39–53)
HGB BLD-MCNC: 15.1 GM/DL (ref 13–17.5)
INR PPP: 2.2 (ref ?–1.2)
LYMPHOCYTES # SPEC AUTO: 4.3 K/UL (ref 1–4.8)
LYMPHOCYTES NFR SPEC AUTO: 40 %
MAGNESIUM SPEC-SCNC: 1.8 MG/DL (ref 1.6–2.3)
MCH RBC QN AUTO: 32 PG (ref 25–35)
MCHC RBC AUTO-ENTMCNC: 33.3 G/DL (ref 31–37)
MCV RBC AUTO: 96.2 FL (ref 80–100)
MONOCYTES # BLD AUTO: 0.4 K/UL (ref 0–1)
MONOCYTES NFR BLD AUTO: 4 %
NEUTROPHILS # BLD AUTO: 5.5 K/UL (ref 1.3–7.7)
NEUTROPHILS NFR BLD AUTO: 52 %
PLATELET # BLD AUTO: 145 K/UL (ref 150–450)
POTASSIUM SERPL-SCNC: 3.5 MMOL/L (ref 3.5–5.1)
PROT SERPL-MCNC: 7.8 G/DL (ref 6.3–8.2)
PT BLD: 21.7 SEC (ref 9–12)
SODIUM SERPL-SCNC: 137 MMOL/L (ref 137–145)
WBC # BLD AUTO: 10.6 K/UL (ref 3.8–10.6)

## 2020-04-18 PROCEDURE — 83735 ASSAY OF MAGNESIUM: CPT

## 2020-04-18 PROCEDURE — 96374 THER/PROPH/DIAG INJ IV PUSH: CPT

## 2020-04-18 PROCEDURE — 80320 DRUG SCREEN QUANTALCOHOLS: CPT

## 2020-04-18 PROCEDURE — 36415 COLL VENOUS BLD VENIPUNCTURE: CPT

## 2020-04-18 PROCEDURE — 85730 THROMBOPLASTIN TIME PARTIAL: CPT

## 2020-04-18 PROCEDURE — 96361 HYDRATE IV INFUSION ADD-ON: CPT

## 2020-04-18 PROCEDURE — 83690 ASSAY OF LIPASE: CPT

## 2020-04-18 PROCEDURE — 80053 COMPREHEN METABOLIC PANEL: CPT

## 2020-04-18 PROCEDURE — 85025 COMPLETE CBC W/AUTO DIFF WBC: CPT

## 2020-04-18 PROCEDURE — 71045 X-RAY EXAM CHEST 1 VIEW: CPT

## 2020-04-18 PROCEDURE — 83605 ASSAY OF LACTIC ACID: CPT

## 2020-04-18 PROCEDURE — 99284 EMERGENCY DEPT VISIT MOD MDM: CPT

## 2020-04-18 PROCEDURE — 85610 PROTHROMBIN TIME: CPT

## 2020-04-18 NOTE — ED
Nausea/Vomiting/Diarrhea HPI





- General


Chief complaint: Nausea/Vomiting/Diarrhea


Stated complaint: Vomiting


Time Seen by Provider: 20 13:36


Source: patient


Mode of arrival: ambulatory


Limitations: no limitations





- History of Present Illness


Initial comments: 





Patient is a 54-year-old male, with history of alcoholism and asthma, presenting

to the emergency Department with complaints of a cough and nausea and diarrhea 

5 days.  Patient is also complaining of back pain.  He states he has a history 

of chronic back pain and sees see his PCP for pain control.  Patient denies 

having a fever, shortness of breath, chest pain.  He does admit to mild lower 

abdominal discomfort.  He states he is a daily drinker and smoker.  He has been 

drinking today.  He denies any vomiting today.  He states he "just does not feel

well."  Patient has no other complaints at this time.  Upon arrival to the ER 

his vitals are stable.





- Related Data


                                Home Medications











 Medication  Instructions  Recorded  Confirmed


 


Albuterol Inhaler (Bulk) [Ventolin 1 - 2 puff INHALATION RT-Q6H PRN 06/06/15 

03/17/20





Hfa Inhaler (Bulk)]   


 


Cetirizine HCl [Zyrtec] 10 mg PO DAILY 20


 


Fluticasone Nasal Spray [Flonase 1 spr EA NOSTRIL DAILY PRN 20





Nasal Spray]   


 


Omeprazole 20 mg PO DAILY 20


 


Gabapentin [Neurontin] 400 mg PO TID 20


 


HYDROcodone/APAP 7.5-325MG [Norco 1 tab PO TID 20





7.5-325]   


 


chlordiazePOXIDE HCl [Librium] 25 mg PO Q8H PRN 20








                                  Previous Rx's











 Medication  Instructions  Recorded


 


Folic Acid 1 mg PO DAILY #30 tablet 20


 


Multivitamins, Thera [Multivitamin 1 tab PO DAILY #30 tablet 20





(formulary)]  


 


Nortriptyline [Pamelor] 25 mg PO HS #30 cap 20


 


Thiamine [Vitamin B-1] 100 mg PO DAILY #30 tab 20


 


Azithromycin [Zithromax Z-pack] 0 mg PO AS DIRECTED #1 pack 20











                                    Allergies











Allergy/AdvReac Type Severity Reaction Status Date / Time


 


No Known Allergies Allergy   Verified 20 13:35














Review of Systems


ROS Statement: 


Those systems with pertinent positive or pertinent negative responses have been 

documented in the HPI.





ROS Other: All systems not noted in ROS Statement are negative.





Past Medical History


Past Medical History: Asthma, Eye Disorder, Osteoarthritis (OA), Renal Disease


Additional Past Medical History / Comment(s): Pt admitted to [facility] on 20 with

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 

opiate withdrawal/alcohol abuse possible DTs/coagulopathy, elevated LFTs, venous

insufficiency.     Other Hx:  Unsteady gait/falls, ETOH abuse with withdrawal 

symptoms in the past pt states-tremors, bilateral leg pain/numbness, chronic 

renal disease stage I, pericarditis, bilateral macular degeneration/legally 

blind, chronic back pain, RLS, bilateral carpal tunnel syndrome, past infected 

sebaceous cyst on back


History of Any Multi-Drug Resistant Organisms: None Reported


Past Surgical History: Orthopedic Surgery


Additional Past Surgical History / Comment(s): Right hand surgery d/t injury


Past Anesthesia/Blood Transfusion Reactions: No Reported Reaction


Past Psychological History: Anxiety, Depression


Smoking Status: Light tobacco smoker


Past Alcohol Use History: Daily


Past Drug Use History: None Reported





- Past Family History


  ** Father


Family Medical History: Cancer


Additional Family Medical History / Comment(s): Father  of esophageal 

cancer.





  ** Mother


Family Medical History: No Reported History


Additional Family Medical History / Comment(s): Mopther is healthy





General Exam





- General Exam Comments


Initial Comments: 





GENERAL: 


Well-appearing, well-nourished and in no acute distress.  Patient appears 

slightly intoxicated.





HEAD: 


Atraumatic, normocephalic.





EYES:


Pupils equal round and reactive to light, extraocular movements intact, sclera 

anicteric, conjunctiva are normal.





ENT: 


TMs normal, nares patent, oropharynx clear without exudates.  Moist mucous 

membranes.





NECK: 


Normal range of motion, supple without lymphadenopathy or JVD.





LUNGS:


 Breath sounds clear to auscultation bilaterally and equal.  No wheezes rales or

rhonchi.





HEART:


Regular rate and rhythm without murmurs, rubs or gallops.





ABDOMEN: 


Mild lower abdominal discomfort on palpation, no severe pains.  Soft, 

normoactive bowel sounds.  No guarding, no rebound.  No masses appreciated.





: Deferred 





EXTREMITIES: 


Normal range of motion, no pitting or edema.  No clubbing or cyanosis.





NEUROLOGICAL: 


Cranial nerves II through XII grossly intact.  Normal speech, normal gait.





PSYCH:


Normal mood, normal affect.





SKIN:


 Warm, Dry, normal turgor, no rashes or lesions noted.


Limitations: no limitations





Course


                                   Vital Signs











  20





  13:32 15:30


 


Temperature 98 F 


 


Pulse Rate 95 77


 


Respiratory 18 16





Rate  


 


Blood Pressure 131/69 123/76


 


O2 Sat by Pulse 94 L 99





Oximetry  














Medical Decision Making





- Medical Decision Making





Patient is a 54-year-old male presenting with cough, general fatigue, nausea 5 

days.  Patient is an alcoholic and has been drinking today.  His vitals are 

stable.  Exam is unremarkable.  Chest x-ray shows mild left infiltrate for 

possible pneumonia.  Patient has a normal white count.  Patient does have a 

lactic acid of 2.4 however I do not feel this is due to any kind of infection.  

I feel like this is due to dehydration.  Patient's alcohol level is 176.  AST is

slightly elevated at 65.  Patient received a liter of fluids and Toradol for 

pain control.  He is stable.  I discussed with patient that his symptoms are 

most likely related to dehydration as well as possible pneumonia.  Patient will 

be started on a Z-Bandar.  Patient was counseled to increase his water intake.  

Patient is stable for discharge.  He is in agreement this plan of care.  

Patient's wife is in the parking lot and will drive him home.  Return parameters

were discussed with the patient he verbalizes understanding.  Case discussed 

with Dr. Maravilla.





- Lab Data


Result diagrams: 


                                 20 14:00





                                 20 14:00


                                   Lab Results











  20 Range/Units





  14:00 14:00 14:00 


 


WBC  10.6    (3.8-10.6)  k/uL


 


RBC  4.74    (4.30-5.90)  m/uL


 


Hgb  15.1    (13.0-17.5)  gm/dL


 


Hct  45.5    (39.0-53.0)  %


 


MCV  96.2  D    (80.0-100.0)  fL


 


MCH  32.0    (25.0-35.0)  pg


 


MCHC  33.3    (31.0-37.0)  g/dL


 


RDW  13.1    (11.5-15.5)  %


 


Plt Count  145 L    (150-450)  k/uL


 


Neutrophils %  52    %


 


Lymphocytes %  40    %


 


Monocytes %  4    %


 


Eosinophils %  1    %


 


Basophils %  0    %


 


Neutrophils #  5.5    (1.3-7.7)  k/uL


 


Lymphocytes #  4.3    (1.0-4.8)  k/uL


 


Monocytes #  0.4    (0-1.0)  k/uL


 


Eosinophils #  0.1    (0-0.7)  k/uL


 


Basophils #  0.0    (0-0.2)  k/uL


 


PT   21.7 H   (9.0-12.0)  sec


 


INR   2.2 H   (<1.2)  


 


APTT   35.4 H   (22.0-30.0)  sec


 


Sodium    137  (137-145)  mmol/L


 


Potassium    3.5  (3.5-5.1)  mmol/L


 


Chloride    101  ()  mmol/L


 


Carbon Dioxide    24  (22-30)  mmol/L


 


Anion Gap    12  mmol/L


 


BUN    <2 L  (9-20)  mg/dL


 


Creatinine    0.63 L  (0.66-1.25)  mg/dL


 


Est GFR (CKD-EPI)AfAm    >90  (>60 ml/min/1.73 sqM)  


 


Est GFR (CKD-EPI)NonAf    >90  (>60 ml/min/1.73 sqM)  


 


Glucose    118 H  (74-99)  mg/dL


 


Plasma Lactic Acid Gagandeep     (0.7-2.0)  mmol/L


 


Calcium    8.2 L  (8.4-10.2)  mg/dL


 


Magnesium    1.8  (1.6-2.3)  mg/dL


 


Total Bilirubin    0.9  (0.2-1.3)  mg/dL


 


AST    65 H  (17-59)  U/L


 


ALT    16  (4-49)  U/L


 


Alkaline Phosphatase    125  ()  U/L


 


Total Protein    7.8  (6.3-8.2)  g/dL


 


Albumin    3.4 L  (3.5-5.0)  g/dL


 


Lipase    151  ()  U/L


 


Serum Alcohol    176  mg/dL














  20 Range/Units





  14:00 


 


WBC   (3.8-10.6)  k/uL


 


RBC   (4.30-5.90)  m/uL


 


Hgb   (13.0-17.5)  gm/dL


 


Hct   (39.0-53.0)  %


 


MCV   (80.0-100.0)  fL


 


MCH   (25.0-35.0)  pg


 


MCHC   (31.0-37.0)  g/dL


 


RDW   (11.5-15.5)  %


 


Plt Count   (150-450)  k/uL


 


Neutrophils %   %


 


Lymphocytes %   %


 


Monocytes %   %


 


Eosinophils %   %


 


Basophils %   %


 


Neutrophils #   (1.3-7.7)  k/uL


 


Lymphocytes #   (1.0-4.8)  k/uL


 


Monocytes #   (0-1.0)  k/uL


 


Eosinophils #   (0-0.7)  k/uL


 


Basophils #   (0-0.2)  k/uL


 


PT   (9.0-12.0)  sec


 


INR   (<1.2)  


 


APTT   (22.0-30.0)  sec


 


Sodium   (137-145)  mmol/L


 


Potassium   (3.5-5.1)  mmol/L


 


Chloride   ()  mmol/L


 


Carbon Dioxide   (22-30)  mmol/L


 


Anion Gap   mmol/L


 


BUN   (9-20)  mg/dL


 


Creatinine   (0.66-1.25)  mg/dL


 


Est GFR (CKD-EPI)AfAm   (>60 ml/min/1.73 sqM)  


 


Est GFR (CKD-EPI)NonAf   (>60 ml/min/1.73 sqM)  


 


Glucose   (74-99)  mg/dL


 


Plasma Lactic Acid Gagandeep  2.4 H*  (0.7-2.0)  mmol/L


 


Calcium   (8.4-10.2)  mg/dL


 


Magnesium   (1.6-2.3)  mg/dL


 


Total Bilirubin   (0.2-1.3)  mg/dL


 


AST   (17-59)  U/L


 


ALT   (4-49)  U/L


 


Alkaline Phosphatase   ()  U/L


 


Total Protein   (6.3-8.2)  g/dL


 


Albumin   (3.5-5.0)  g/dL


 


Lipase   ()  U/L


 


Serum Alcohol   mg/dL














Disposition


Clinical Impression: 


 Dehydration, Pneumonia, Alcohol abuse, daily use





Disposition: HOME SELF-CARE


Condition: Stable


Instructions (If sedation given, give patient instructions):  Pneumonia (ED)


Additional Instructions: 


Please return to the Emergency Department if symptoms worsen or any other 

concerns.


Take antibiotics as prescribed.  


Increase water intake.  


Follow-up with PCP.


Prescriptions: 


Azithromycin [Zithromax Z-pack] 0 mg PO AS DIRECTED #1 pack


Is patient prescribed a controlled substance at d/c from ED?: No


Referrals: 


Shahid Blackmon Jr, DO [Primary Care Provider] - 1-2 days

## 2020-04-18 NOTE — XR
EXAMINATION TYPE: XR chest 1V portable

 

DATE OF EXAM: 4/18/2020

 

COMPARISON: 3/18/2020

 

HISTORY: Chest pain. Trauma.

 

 

There is no heart failure. There is a small infiltrate in the left midlung probably in the left lower
 lobe. There is coarsening of the interstitial markings. There is suboptimal inspiration. I see no pl
eural effusion or pneumothorax. Trachea is midline.

 

 

IMPRESSION: Small infiltrate or atelectasis left lower lobe. There is probably mild pulmonary fibrosi
s. Poor inspiration.

## 2020-06-28 ENCOUNTER — HOSPITAL ENCOUNTER (INPATIENT)
Dept: HOSPITAL 47 - EC | Age: 55
LOS: 17 days | Discharge: NURSING, REHAB OR LONG-TERM CARE | DRG: 4 | End: 2020-07-15
Attending: FAMILY MEDICINE | Admitting: FAMILY MEDICINE
Payer: COMMERCIAL

## 2020-06-28 VITALS — BODY MASS INDEX: 31.8 KG/M2

## 2020-06-28 DIAGNOSIS — J95.01: ICD-10-CM

## 2020-06-28 DIAGNOSIS — I85.11: ICD-10-CM

## 2020-06-28 DIAGNOSIS — E87.0: ICD-10-CM

## 2020-06-28 DIAGNOSIS — G89.29: ICD-10-CM

## 2020-06-28 DIAGNOSIS — M19.90: ICD-10-CM

## 2020-06-28 DIAGNOSIS — H35.30: ICD-10-CM

## 2020-06-28 DIAGNOSIS — Z20.828: ICD-10-CM

## 2020-06-28 DIAGNOSIS — G62.1: ICD-10-CM

## 2020-06-28 DIAGNOSIS — J96.01: ICD-10-CM

## 2020-06-28 DIAGNOSIS — K76.6: ICD-10-CM

## 2020-06-28 DIAGNOSIS — E87.6: ICD-10-CM

## 2020-06-28 DIAGNOSIS — K70.11: ICD-10-CM

## 2020-06-28 DIAGNOSIS — G92: ICD-10-CM

## 2020-06-28 DIAGNOSIS — F17.210: ICD-10-CM

## 2020-06-28 DIAGNOSIS — F32.9: ICD-10-CM

## 2020-06-28 DIAGNOSIS — J98.11: ICD-10-CM

## 2020-06-28 DIAGNOSIS — A41.9: Primary | ICD-10-CM

## 2020-06-28 DIAGNOSIS — F10.20: ICD-10-CM

## 2020-06-28 DIAGNOSIS — K21.9: ICD-10-CM

## 2020-06-28 DIAGNOSIS — E87.8: ICD-10-CM

## 2020-06-28 DIAGNOSIS — M54.9: ICD-10-CM

## 2020-06-28 DIAGNOSIS — N18.1: ICD-10-CM

## 2020-06-28 DIAGNOSIS — F19.10: ICD-10-CM

## 2020-06-28 DIAGNOSIS — M79.89: ICD-10-CM

## 2020-06-28 DIAGNOSIS — S22.49XD: ICD-10-CM

## 2020-06-28 DIAGNOSIS — E66.9: ICD-10-CM

## 2020-06-28 DIAGNOSIS — R65.21: ICD-10-CM

## 2020-06-28 DIAGNOSIS — N50.89: ICD-10-CM

## 2020-06-28 DIAGNOSIS — Z91.81: ICD-10-CM
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
## 2020-06-28 DIAGNOSIS — T38.0X5A: ICD-10-CM

## 2020-06-28 DIAGNOSIS — D69.59: ICD-10-CM

## 2020-06-28 DIAGNOSIS — J43.9: ICD-10-CM

## 2020-06-28 DIAGNOSIS — G25.81: ICD-10-CM

## 2020-06-28 DIAGNOSIS — D53.9: ICD-10-CM

## 2020-06-28 DIAGNOSIS — H54.8: ICD-10-CM

## 2020-06-28 DIAGNOSIS — K70.31: ICD-10-CM

## 2020-06-28 DIAGNOSIS — R73.9: ICD-10-CM

## 2020-06-28 DIAGNOSIS — R57.1: ICD-10-CM

## 2020-06-28 DIAGNOSIS — J90: ICD-10-CM

## 2020-06-28 DIAGNOSIS — K70.40: ICD-10-CM

## 2020-06-28 DIAGNOSIS — I87.2: ICD-10-CM

## 2020-06-28 DIAGNOSIS — D68.9: ICD-10-CM

## 2020-06-28 DIAGNOSIS — J69.0: ICD-10-CM

## 2020-06-28 DIAGNOSIS — Z99.11: ICD-10-CM
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
## 2020-06-28 DIAGNOSIS — N17.9: ICD-10-CM
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
## 2020-06-28 DIAGNOSIS — J45.909: ICD-10-CM

## 2020-06-28 DIAGNOSIS — F41.9: ICD-10-CM

## 2020-06-28 DIAGNOSIS — R26.9: ICD-10-CM

## 2020-06-28 DIAGNOSIS — E44.0: ICD-10-CM

## 2020-06-28 DIAGNOSIS — D62: ICD-10-CM

## 2020-06-28 LAB
ALBUMIN SERPL-MCNC: 2.3 G/DL (ref 3.5–5)
ALBUMIN SERPL-MCNC: 2.3 G/DL (ref 3.5–5)
ALBUMIN SERPL-MCNC: 2.5 G/DL (ref 3.5–5)
ALP SERPL-CCNC: 102 U/L (ref 38–126)
ALP SERPL-CCNC: 137 U/L (ref 38–126)
ALP SERPL-CCNC: 138 U/L (ref 38–126)
ALT SERPL-CCNC: 34 U/L (ref 4–49)
ALT SERPL-CCNC: 36 U/L (ref 4–49)
ALT SERPL-CCNC: 38 U/L (ref 4–49)
AMYLASE SERPL-CCNC: <30 U/L (ref 30–110)
ANION GAP SERPL CALC-SCNC: 5 MMOL/L
ANION GAP SERPL CALC-SCNC: 7 MMOL/L
ANION GAP SERPL CALC-SCNC: 8 MMOL/L
APTT BLD: 46 SEC (ref 22–30)
AST SERPL-CCNC: 127 U/L (ref 17–59)
AST SERPL-CCNC: 137 U/L (ref 17–59)
AST SERPL-CCNC: 139 U/L (ref 17–59)
BASOPHILS # BLD AUTO: 0 K/UL (ref 0–0.2)
BASOPHILS # BLD AUTO: 0.1 K/UL (ref 0–0.2)
BASOPHILS NFR BLD AUTO: 0 %
BASOPHILS NFR BLD AUTO: 0 %
BILIRUB UR QL STRIP.AUTO: (no result)
BUN SERPL-SCNC: 26 MG/DL (ref 9–20)
BUN SERPL-SCNC: 29 MG/DL (ref 9–20)
BUN SERPL-SCNC: 29 MG/DL (ref 9–20)
CALCIUM SPEC-MCNC: 6.5 MG/DL (ref 8.4–10.2)
CALCIUM SPEC-MCNC: 7.3 MG/DL (ref 8.4–10.2)
CALCIUM SPEC-MCNC: 7.4 MG/DL (ref 8.4–10.2)
CELLS COUNTED: 100
CHLORIDE SERPL-SCNC: 103 MMOL/L (ref 98–107)
CHLORIDE SERPL-SCNC: 107 MMOL/L (ref 98–107)
CHLORIDE SERPL-SCNC: 108 MMOL/L (ref 98–107)
CO2 BLDA-SCNC: 23 MMOL/L (ref 19–24)
CO2 SERPL-SCNC: 19 MMOL/L (ref 22–30)
CO2 SERPL-SCNC: 20 MMOL/L (ref 22–30)
CO2 SERPL-SCNC: 23 MMOL/L (ref 22–30)
EOSINOPHIL # BLD AUTO: 0.1 K/UL (ref 0–0.7)
EOSINOPHIL # BLD AUTO: 0.3 K/UL (ref 0–0.7)
EOSINOPHIL NFR BLD AUTO: 1 %
EOSINOPHIL NFR BLD AUTO: 2 %
ERYTHROCYTE [DISTWIDTH] IN BLOOD BY AUTOMATED COUNT: 1.89 M/UL (ref 4.3–5.9)
ERYTHROCYTE [DISTWIDTH] IN BLOOD BY AUTOMATED COUNT: 2.08 M/UL (ref 4.3–5.9)
ERYTHROCYTE [DISTWIDTH] IN BLOOD BY AUTOMATED COUNT: 2.17 M/UL (ref 4.3–5.9)
ERYTHROCYTE [DISTWIDTH] IN BLOOD: 20.6 % (ref 11.5–15.5)
ERYTHROCYTE [DISTWIDTH] IN BLOOD: 20.9 % (ref 11.5–15.5)
ERYTHROCYTE [DISTWIDTH] IN BLOOD: 21 % (ref 11.5–15.5)
GLUCOSE BLD-MCNC: 193 MG/DL (ref 75–99)
GLUCOSE SERPL-MCNC: 101 MG/DL (ref 74–99)
GLUCOSE SERPL-MCNC: 111 MG/DL (ref 74–99)
GLUCOSE SERPL-MCNC: 97 MG/DL (ref 74–99)
HCO3 BLDA-SCNC: 22 MMOL/L (ref 21–25)
HCT VFR BLD AUTO: 21.6 % (ref 39–53)
HCT VFR BLD AUTO: 23.8 % (ref 39–53)
HCT VFR BLD AUTO: 24.3 % (ref 39–53)
HGB BLD-MCNC: 7 GM/DL (ref 13–17.5)
HGB BLD-MCNC: 8.1 GM/DL (ref 13–17.5)
HGB BLD-MCNC: 8.3 GM/DL (ref 13–17.5)
HYALINE CASTS UR QL AUTO: 2 /LPF (ref 0–2)
INR PPP: 3.7 (ref ?–1.2)
KETONES UR QL STRIP.AUTO: (no result)
LACTATE BLDV-SCNC: 2.6 MMOL/L (ref 0.7–2)
LYMPHOCYTES # BLD MANUAL: 1.6 K/UL (ref 1–4.8)
LYMPHOCYTES # SPEC AUTO: 1.8 K/UL (ref 1–4.8)
LYMPHOCYTES # SPEC AUTO: 2.1 K/UL (ref 1–4.8)
LYMPHOCYTES NFR SPEC AUTO: 12 %
LYMPHOCYTES NFR SPEC AUTO: 15 %
MCH RBC QN AUTO: 37.3 PG (ref 25–35)
MCH RBC QN AUTO: 38.3 PG (ref 25–35)
MCH RBC QN AUTO: 38.8 PG (ref 25–35)
MCHC RBC AUTO-ENTMCNC: 32.6 G/DL (ref 31–37)
MCHC RBC AUTO-ENTMCNC: 34 G/DL (ref 31–37)
MCHC RBC AUTO-ENTMCNC: 34.3 G/DL (ref 31–37)
MCV RBC AUTO: 111.7 FL (ref 80–100)
MCV RBC AUTO: 114 FL (ref 80–100)
MCV RBC AUTO: 114.4 FL (ref 80–100)
MONOCYTES # BLD AUTO: 0.7 K/UL (ref 0–1)
MONOCYTES # BLD AUTO: 0.7 K/UL (ref 0–1)
MONOCYTES # BLD MANUAL: 0.58 K/UL (ref 0–1)
MONOCYTES NFR BLD AUTO: 5 %
MONOCYTES NFR BLD AUTO: 5 %
NEUTROPHILS # BLD AUTO: 10.2 K/UL (ref 1.3–7.7)
NEUTROPHILS # BLD AUTO: 11.6 K/UL (ref 1.3–7.7)
NEUTROPHILS NFR BLD AUTO: 76 %
NEUTROPHILS NFR BLD AUTO: 78 %
NEUTROPHILS NFR BLD MANUAL: 85 %
NEUTS SEG # BLD MANUAL: 12.33 K/UL (ref 1.3–7.7)
PCO2 BLDA: 29 MMHG (ref 35–45)
PH BLDA: 7.49 [PH] (ref 7.35–7.45)
PH UR: 6 [PH] (ref 5–8)
PLATELET # BLD AUTO: 72 K/UL (ref 150–450)
PLATELET # BLD AUTO: 75 K/UL (ref 150–450)
PLATELET # BLD AUTO: 86 K/UL (ref 150–450)
PO2 BLDA: 63 MMHG (ref 83–108)
POTASSIUM SERPL-SCNC: 4.1 MMOL/L (ref 3.5–5.1)
POTASSIUM SERPL-SCNC: 4.2 MMOL/L (ref 3.5–5.1)
POTASSIUM SERPL-SCNC: 4.3 MMOL/L (ref 3.5–5.1)
PROT SERPL-MCNC: 6.7 G/DL (ref 6.3–8.2)
PROT SERPL-MCNC: 6.8 G/DL (ref 6.3–8.2)
PROT SERPL-MCNC: 6.8 G/DL (ref 6.3–8.2)
PROT UR QL: (no result)
PT BLD: 35.9 SEC (ref 9–12)
SODIUM SERPL-SCNC: 131 MMOL/L (ref 137–145)
SODIUM SERPL-SCNC: 134 MMOL/L (ref 137–145)
SODIUM SERPL-SCNC: 135 MMOL/L (ref 137–145)
SP GR UR: 1.02 (ref 1–1.03)
SQUAMOUS UR QL AUTO: 3 /HPF (ref 0–4)
UROBILINOGEN UR QL STRIP: 8 MG/DL (ref ?–2)
WBC # BLD AUTO: 13.4 K/UL (ref 3.8–10.6)
WBC # BLD AUTO: 14.5 K/UL (ref 3.8–10.6)
WBC # BLD AUTO: 14.8 K/UL (ref 3.8–10.6)
WBC # UR AUTO: 2 /HPF (ref 0–5)

## 2020-06-28 PROCEDURE — 93306 TTE W/DOPPLER COMPLETE: CPT

## 2020-06-28 PROCEDURE — 82272 OCCULT BLD FECES 1-3 TESTS: CPT

## 2020-06-28 PROCEDURE — 94640 AIRWAY INHALATION TREATMENT: CPT

## 2020-06-28 PROCEDURE — 83880 ASSAY OF NATRIURETIC PEPTIDE: CPT

## 2020-06-28 PROCEDURE — 80048 BASIC METABOLIC PNL TOTAL CA: CPT

## 2020-06-28 PROCEDURE — 36600 WITHDRAWAL OF ARTERIAL BLOOD: CPT

## 2020-06-28 PROCEDURE — 84132 ASSAY OF SERUM POTASSIUM: CPT

## 2020-06-28 PROCEDURE — 83735 ASSAY OF MAGNESIUM: CPT

## 2020-06-28 PROCEDURE — 83930 ASSAY OF BLOOD OSMOLALITY: CPT

## 2020-06-28 PROCEDURE — 84450 TRANSFERASE (AST) (SGOT): CPT

## 2020-06-28 PROCEDURE — 84484 ASSAY OF TROPONIN QUANT: CPT

## 2020-06-28 PROCEDURE — 84145 PROCALCITONIN (PCT): CPT

## 2020-06-28 PROCEDURE — 74018 RADEX ABDOMEN 1 VIEW: CPT

## 2020-06-28 PROCEDURE — 86920 COMPATIBILITY TEST SPIN: CPT

## 2020-06-28 PROCEDURE — 71045 X-RAY EXAM CHEST 1 VIEW: CPT

## 2020-06-28 PROCEDURE — 87040 BLOOD CULTURE FOR BACTERIA: CPT

## 2020-06-28 PROCEDURE — 81001 URINALYSIS AUTO W/SCOPE: CPT

## 2020-06-28 PROCEDURE — 96361 HYDRATE IV INFUSION ADD-ON: CPT

## 2020-06-28 PROCEDURE — 93005 ELECTROCARDIOGRAM TRACING: CPT

## 2020-06-28 PROCEDURE — 86850 RBC ANTIBODY SCREEN: CPT

## 2020-06-28 PROCEDURE — 85610 PROTHROMBIN TIME: CPT

## 2020-06-28 PROCEDURE — 86900 BLOOD TYPING SEROLOGIC ABO: CPT

## 2020-06-28 PROCEDURE — 80306 DRUG TEST PRSMV INSTRMNT: CPT

## 2020-06-28 PROCEDURE — 51701 INSERT BLADDER CATHETER: CPT

## 2020-06-28 PROCEDURE — 86901 BLOOD TYPING SEROLOGIC RH(D): CPT

## 2020-06-28 PROCEDURE — 87070 CULTURE OTHR SPECIMN AEROBIC: CPT

## 2020-06-28 PROCEDURE — 85027 COMPLETE CBC AUTOMATED: CPT

## 2020-06-28 PROCEDURE — 82150 ASSAY OF AMYLASE: CPT

## 2020-06-28 PROCEDURE — 96365 THER/PROPH/DIAG IV INF INIT: CPT

## 2020-06-28 PROCEDURE — 83036 HEMOGLOBIN GLYCOSYLATED A1C: CPT

## 2020-06-28 PROCEDURE — 43246 EGD PLACE GASTROSTOMY TUBE: CPT

## 2020-06-28 PROCEDURE — 85025 COMPLETE CBC W/AUTO DIFF WBC: CPT

## 2020-06-28 PROCEDURE — 87205 SMEAR GRAM STAIN: CPT

## 2020-06-28 PROCEDURE — 84100 ASSAY OF PHOSPHORUS: CPT

## 2020-06-28 PROCEDURE — 84460 ALANINE AMINO (ALT) (SGPT): CPT

## 2020-06-28 PROCEDURE — 80320 DRUG SCREEN QUANTALCOHOLS: CPT

## 2020-06-28 PROCEDURE — 95816 EEG AWAKE AND DROWSY: CPT

## 2020-06-28 PROCEDURE — 87086 URINE CULTURE/COLONY COUNT: CPT

## 2020-06-28 PROCEDURE — 94003 VENT MGMT INPAT SUBQ DAY: CPT

## 2020-06-28 PROCEDURE — 94002 VENT MGMT INPAT INIT DAY: CPT

## 2020-06-28 PROCEDURE — 82247 BILIRUBIN TOTAL: CPT

## 2020-06-28 PROCEDURE — 93880 EXTRACRANIAL BILAT STUDY: CPT

## 2020-06-28 PROCEDURE — 83690 ASSAY OF LIPASE: CPT

## 2020-06-28 PROCEDURE — 30233K1 TRANSFUSION OF NONAUTOLOGOUS FROZEN PLASMA INTO PERIPHERAL VEIN, PERCUTANEOUS APPROACH: ICD-10-PCS

## 2020-06-28 PROCEDURE — 82805 BLOOD GASES W/O2 SATURATION: CPT

## 2020-06-28 PROCEDURE — 85730 THROMBOPLASTIN TIME PARTIAL: CPT

## 2020-06-28 PROCEDURE — 83935 ASSAY OF URINE OSMOLALITY: CPT

## 2020-06-28 PROCEDURE — 82140 ASSAY OF AMMONIA: CPT

## 2020-06-28 PROCEDURE — 83605 ASSAY OF LACTIC ACID: CPT

## 2020-06-28 PROCEDURE — 96375 TX/PRO/DX INJ NEW DRUG ADDON: CPT

## 2020-06-28 PROCEDURE — 71046 X-RAY EXAM CHEST 2 VIEWS: CPT

## 2020-06-28 PROCEDURE — 99291 CRITICAL CARE FIRST HOUR: CPT

## 2020-06-28 PROCEDURE — 76705 ECHO EXAM OF ABDOMEN: CPT

## 2020-06-28 PROCEDURE — 80053 COMPREHEN METABOLIC PANEL: CPT

## 2020-06-28 PROCEDURE — 36415 COLL VENOUS BLD VENIPUNCTURE: CPT

## 2020-06-28 PROCEDURE — 96374 THER/PROPH/DIAG INJ IV PUSH: CPT

## 2020-06-28 PROCEDURE — 70450 CT HEAD/BRAIN W/O DYE: CPT

## 2020-06-28 PROCEDURE — 30233N1 TRANSFUSION OF NONAUTOLOGOUS RED BLOOD CELLS INTO PERIPHERAL VEIN, PERCUTANEOUS APPROACH: ICD-10-PCS

## 2020-06-28 RX ADMIN — LACTULOSE SCH GM: 20 SOLUTION ORAL at 16:21

## 2020-06-28 RX ADMIN — CEFAZOLIN SCH MLS/HR: 330 INJECTION, POWDER, FOR SOLUTION INTRAMUSCULAR; INTRAVENOUS at 22:38

## 2020-06-28 RX ADMIN — CHLORHEXIDINE GLUCONATE ONE: 1.2 RINSE ORAL at 21:12

## 2020-06-28 RX ADMIN — PANTOPRAZOLE SODIUM SCH MG: 40 INJECTION, POWDER, FOR SOLUTION INTRAVENOUS at 16:26

## 2020-06-28 NOTE — XR
EXAMINATION TYPE: XR chest 2V

 

DATE OF EXAM: 6/28/2020

 

COMPARISON: 4/18/2020

 

HISTORY: Cough

 

TECHNIQUE: 2 views

 

FINDINGS: There is poor inspiration and elevation of the right diaphragm. There is some mild coarse i
nterstitial infiltrate in both lungs. There is no pleural effusion. Bony thorax is intact. There are 
chest leads.

 

IMPRESSION: There are new pulmonary interstitial infiltrates with decreased inspiration compared to o
ld exam. No pleural fluid seen to suggest heart failure.

## 2020-06-28 NOTE — US
EXAMINATION TYPE: US gallbladder

 

DATE OF EXAM: 6/28/2020

 

COMPARISON: CT 2020, US 2017

 

CLINICAL HISTORY: abp. abdominal distention

 

EXAM MEASUREMENTS:

 

Liver Length:  18.7 cm   

Gallbladder Wall:  0.3 cm   

CBD:  not definitely seen 

Right Kidney:  9.9 x 5.4 x 5.8 cm cm

 

Patient not cooperative for test. Limited evaluation.

 

Pancreas:  not visualized due to bowel gas

Liver:  enlarged at 18.7 cm.  

Gallbladder:  No stones seen

**Evidence for sonographic Farley's sign:  No

CBD:  not definitely seen, no intraductal dilatation noted 

Right Kidney:  No hydronephrosis or masses seen 

 

Free fluid noted surrounding liver.

 

IMPRESSION:

There is abdominal ascites. No dilated ducts. No gallstones.

## 2020-06-28 NOTE — XR
EXAMINATION TYPE: XR foot complete RT

 

DATE OF EXAM: 6/28/2020

 

COMPARISON: NONE

 

HISTORY: Foot pain. Injury.

 

TECHNIQUE: 3 views.

 

FINDINGS: I see no fracture nor dislocation. Metatarsals appear intact. There is some soft tissue swe
lling of the dorsum of the foot. The toes appear intact. Hindfoot is intact.

 

IMPRESSION: Soft tissue swelling. No fracture.

## 2020-06-28 NOTE — XR
EXAMINATION TYPE: XR abdomen 1V

 

DATE OF EXAM: 6/28/2020

 

COMPARISON: NONE

 

HISTORY: Abdominal pain

 

TECHNIQUE: 2 views supine

 

FINDINGS: Bowel gas pattern is normal. There is no sign of intestinal obstruction or pneumoperitoneum
. Fecal pattern is fairly normal. There is gas in the rectum. There is no evidence of a mass. Lung ba
ses are clear.

 

IMPRESSION: Nonacute abdomen.

## 2020-06-28 NOTE — ED
General Adult HPI





- General


Chief complaint: Altered Mental Status


Stated complaint: AMS


Time Seen by Provider: 20 13:46


Source: patient, EMS, RN notes reviewed


Mode of arrival: EMS


Limitations: altered mental status, physical limitation





- History of Present Illness


Initial comments: 





Patient is a pleasant 54-year-old male presenting to the emergency department 

with EMS for change in mental status.  Patient is a poor historian and has 

limited ability to provide history.  Supposedly symptoms have been occurring for

a couple of days.  Patient is normally a daily drinker.  Unclear if history of 

similar symptoms previously.





- Related Data


                                Home Medications











 Medication  Instructions  Recorded  Confirmed


 


Cetirizine HCl [Zyrtec] 10 mg PO DAILY 20


 


Omeprazole 20 mg PO DAILY 20


 


Albuterol Inhaler [Ventolin Hfa 1 - 2 puff INHALATION RT-Q4H PRN 20





Inhaler]   








                                  Previous Rx's











 Medication  Instructions  Recorded


 


Folic Acid 1 mg PO DAILY #30 tablet 20


 


Multivitamins, Thera [Multivitamin 1 tab PO DAILY #30 tablet 20





(formulary)]  


 


Thiamine [Vitamin B-1] 100 mg PO DAILY #30 tab 20











                                    Allergies











Allergy/AdvReac Type Severity Reaction Status Date / Time


 


No Known Allergies Allergy   Verified 20 14:27














Review of Systems


ROS Statement: 


Those systems with pertinent positive or pertinent negative responses have been 

documented in the HPI.





ROS Other: All systems not noted in ROS Statement are negative.


Constitutional: Reports: fever


Eyes: Denies: eye pain


ENT: Denies: ear pain


Respiratory: Denies: cough, dyspnea


Cardiovascular: Denies: chest pain


Endocrine: Denies: fatigue


Gastrointestinal: Denies: abdominal pain, vomiting


Genitourinary: Denies: dysuria


Musculoskeletal: Denies: back pain


Skin: Reports: rash


Neurological: Denies: headache





Past Medical History


Past Medical History: Asthma, Eye Disorder, Osteoarthritis (OA), Renal Disease


Additional Past Medical History / Comment(s): Pt admitted to [facility] on 20 with

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 

opiate withdrawal/alcohol abuse possible DTs/coagulopathy, elevated LFTs, venous

insufficiency.     Other Hx:  Unsteady gait/falls, ETOH abuse with withdrawal 

symptoms in the past pt states-tremors, bilateral leg pain/numbness, chronic 

renal disease stage I, pericarditis, bilateral macular degeneration/legally 

blind, chronic back pain, RLS, bilateral carpal tunnel syndrome, past infected 

sebaceous cyst on back


History of Any Multi-Drug Resistant Organisms: None Reported


Past Surgical History: Orthopedic Surgery


Additional Past Surgical History / Comment(s): Right hand surgery d/t injury


Past Anesthesia/Blood Transfusion Reactions: No Reported Reaction


Past Psychological History: Anxiety, Depression


Smoking Status: Light tobacco smoker


Past Alcohol Use History: Abuse, Daily


Past Drug Use History: None Reported





- Past Family History


  ** Father


Family Medical History: Cancer


Additional Family Medical History / Comment(s): Father  of esophageal 

cancer.





  ** Mother


Family Medical History: No Reported History


Additional Family Medical History / Comment(s): Mopther is healthy





General Exam


Limitations: altered mental status


General appearance: alert


Head exam: Present: normocephalic


Eye exam: Present: scleral icterus


ENT exam: Present: normal oropharynx


Neck exam: Present: normal inspection


Respiratory exam: Present: wheezes


Cardiovascular Exam: Present: tachycardia


GI/Abdominal exam: Present: soft, distended (Moderate distention/ascites), 

tenderness (Mild tenderness, mostly in the upper/right abdomen), normal bowel 

sounds.  Absent: guarding, rebound, rigid, pulsatile mass


Extremities exam: Present: other (Right foot with dorsal swelling and 

ecchymosis)


Neurological exam: Present: alert


Psychiatric exam: Present: normal affect, normal mood


Skin exam: Present: other (Jaundice)





Course


                                   Vital Signs











  20





  13:54 14:38 15:06


 


Temperature 99.4 F  


 


Pulse Rate 116 H 116 H 111 H


 


Respiratory 26 H 22 24





Rate   


 


Blood Pressure 102/63 108/59 128/64


 


O2 Sat by Pulse 89 L 99 92 L





Oximetry   














- Reevaluation(s)


Reevaluation #1: 





20 16:03


Patient is provided fluid bolus based off a deal body weight of 67.1 kg turning 

out to be 2050 mL.  Patient was provided 2500 mL.


Patient does meet sepsis criteria diagnosed at 1600.


Blood culture, lactic acid, and IV antibiotics ordered.


Patient reevaluated.  Patient and family updated on results and plan.  Dr. Pa

has been paged for admission of his patient.





20 16:11


Case was discussed in detail with Dr. Stromberg, who will admit.  Case also 

discussed with Dr. Goodson who will consult for critical care and agrees with 

ICU admission.  GI will also be placed on consult.  Patient will be provided for

units FFP and Zosyn is advised by yolanda





EKG Findings





- EKG Comments:


EKG Findings:: sinus tachycardia 114..  QRS 78.  .  .  Normal

axis.  Low QRS voltage.  No acute ST change.





Medical Decision Making





- Lab Data


Result diagrams: 


                                 20 14:13





                                 20 14:13


                                   Lab Results











  20 Range/Units





  14:13 14:13 14:13 


 


WBC  14.5 H    (3.8-10.6)  k/uL


 


RBC  2.17 L    (4.30-5.90)  m/uL


 


Hgb  8.3 L    (13.0-17.5)  gm/dL


 


Hct  24.3 L    (39.0-53.0)  %


 


MCV  111.7 H    (80.0-100.0)  fL


 


MCH  38.3 H    (25.0-35.0)  pg


 


MCHC  34.3    (31.0-37.0)  g/dL


 


RDW  20.6 H    (11.5-15.5)  %


 


Plt Count  86 L    (150-450)  k/uL


 


Neutrophils % (Manual)  85    %


 


Lymphocytes % (Manual)  11    %


 


Monocytes % (Manual)  4    %


 


Neutrophils # (Manual)  12.33 H    (1.3-7.7)  k/uL


 


Lymphocytes # (Manual)  1.60    (1.0-4.8)  k/uL


 


Monocytes # (Manual)  0.58    (0-1.0)  k/uL


 


Nucleated RBCs  0    (0-0)  /100 WBC


 


Manual Slide Review  Performed    


 


Polychromasia  Present    


 


Anisocytosis  Moderate    


 


Anisocytosis (manual)  Present    


 


Macrocytosis  Marked A    


 


Stomatocytes  Present    


 


PT   35.9 H   (9.0-12.0)  sec


 


INR   3.7 H   (<1.2)  


 


APTT   46.0 H   (22.0-30.0)  sec


 


Sodium     (137-145)  mmol/L


 


Potassium     (3.5-5.1)  mmol/L


 


Chloride     ()  mmol/L


 


Carbon Dioxide     (22-30)  mmol/L


 


Anion Gap     mmol/L


 


BUN     (9-20)  mg/dL


 


Creatinine     (0.66-1.25)  mg/dL


 


Est GFR (CKD-EPI)AfAm     (>60 ml/min/1.73 sqM)  


 


Est GFR (CKD-EPI)NonAf     (>60 ml/min/1.73 sqM)  


 


Glucose     (74-99)  mg/dL


 


Plasma Lactic Acid Gagandeep     (0.7-2.0)  mmol/L


 


Calcium     (8.4-10.2)  mg/dL


 


Total Bilirubin     (0.2-1.3)  mg/dL


 


AST     (17-59)  U/L


 


ALT     (4-49)  U/L


 


Alkaline Phosphatase     ()  U/L


 


Ammonia     (<30)  umol/L


 


Troponin I     (0.000-0.034)  ng/mL


 


Total Protein     (6.3-8.2)  g/dL


 


Albumin     (3.5-5.0)  g/dL


 


Amylase     ()  U/L


 


Lipase     ()  U/L


 


Urine Color    Dark Brown  


 


Urine Appearance    Cloudy  (Clear)  


 


Urine pH    6.0  (5.0-8.0)  


 


Ur Specific Gravity    1.020  (1.001-1.035)  


 


Urine Protein    1+ H  (Negative)  


 


Urine Glucose (UA)    Negative  (Negative)  


 


Urine Ketones    1+ H  (Negative)  


 


Urine Blood    Negative  (Negative)  


 


Urine Nitrite    Negative  (Negative)  


 


Urine Bilirubin    4+ H  (Negative)  


 


Urine Urobilinogen    8.0  (<2.0)  mg/dL


 


Ur Leukocyte Esterase    Trace H  (Negative)  


 


Urine WBC    2  (0-5)  /hpf


 


Ur Squamous Epith Cells    3  (0-4)  /hpf


 


Amorphous Sediment    Moderate H  (None)  /hpf


 


Urine Bacteria    Occasional H  (None)  /hpf


 


Hyaline Casts    2  (0-2)  /lpf


 


Urine Mucus    Few H  (None)  /hpf


 


Stool Occult Blood     (Negative)  


 


Urine Opiates Screen    Not Detected  (NotDetected)  


 


Ur Oxycodone Screen    Not Detected  (NotDetected)  


 


Urine Methadone Screen    Not Detected  (NotDetected)  


 


Ur Propoxyphene Screen    Not Detected  (NotDetected)  


 


Ur Barbiturates Screen    Not Detected  (NotDetected)  


 


U Tricyclic Antidepress    Detected H  (NotDetected)  


 


Ur Phencyclidine Scrn    Not Detected  (NotDetected)  


 


Ur Amphetamines Screen    Not Detected  (NotDetected)  


 


U Methamphetamines Scrn    Not Detected  (NotDetected)  


 


U Benzodiazepines Scrn    Detected H  (NotDetected)  


 


Urine Cocaine Screen    Not Detected  (NotDetected)  


 


U Marijuana (THC) Screen    Not Detected  (NotDetected)  


 


Serum Alcohol     mg/dL














  20 Range/Units





  14:13 14:13 14:13 


 


WBC     (3.8-10.6)  k/uL


 


RBC     (4.30-5.90)  m/uL


 


Hgb     (13.0-17.5)  gm/dL


 


Hct     (39.0-53.0)  %


 


MCV     (80.0-100.0)  fL


 


MCH     (25.0-35.0)  pg


 


MCHC     (31.0-37.0)  g/dL


 


RDW     (11.5-15.5)  %


 


Plt Count     (150-450)  k/uL


 


Neutrophils % (Manual)     %


 


Lymphocytes % (Manual)     %


 


Monocytes % (Manual)     %


 


Neutrophils # (Manual)     (1.3-7.7)  k/uL


 


Lymphocytes # (Manual)     (1.0-4.8)  k/uL


 


Monocytes # (Manual)     (0-1.0)  k/uL


 


Nucleated RBCs     (0-0)  /100 WBC


 


Manual Slide Review     


 


Polychromasia     


 


Anisocytosis     


 


Anisocytosis (manual)     


 


Macrocytosis     


 


Stomatocytes     


 


PT     (9.0-12.0)  sec


 


INR     (<1.2)  


 


APTT     (22.0-30.0)  sec


 


Sodium  131 L    (137-145)  mmol/L


 


Potassium  4.3    (3.5-5.1)  mmol/L


 


Chloride  103    ()  mmol/L


 


Carbon Dioxide  23    (22-30)  mmol/L


 


Anion Gap  5    mmol/L


 


BUN  29 H    (9-20)  mg/dL


 


Creatinine  1.05    (0.66-1.25)  mg/dL


 


Est GFR (CKD-EPI)AfAm  >90    (>60 ml/min/1.73 sqM)  


 


Est GFR (CKD-EPI)NonAf  80    (>60 ml/min/1.73 sqM)  


 


Glucose  101 H    (74-99)  mg/dL


 


Plasma Lactic Acid Gagandeep   2.6 H*   (0.7-2.0)  mmol/L


 


Calcium  7.4 L    (8.4-10.2)  mg/dL


 


Total Bilirubin  16.1 H*    (0.2-1.3)  mg/dL


 


AST  137 H    (17-59)  U/L


 


ALT  38    (4-49)  U/L


 


Alkaline Phosphatase  138 H    ()  U/L


 


Ammonia   91 H   (<30)  umol/L


 


Troponin I    <0.012  (0.000-0.034)  ng/mL


 


Total Protein  6.8    (6.3-8.2)  g/dL


 


Albumin  2.3 L    (3.5-5.0)  g/dL


 


Amylase  <30 L    ()  U/L


 


Lipase  83    ()  U/L


 


Urine Color     


 


Urine Appearance     (Clear)  


 


Urine pH     (5.0-8.0)  


 


Ur Specific Gravity     (1.001-1.035)  


 


Urine Protein     (Negative)  


 


Urine Glucose (UA)     (Negative)  


 


Urine Ketones     (Negative)  


 


Urine Blood     (Negative)  


 


Urine Nitrite     (Negative)  


 


Urine Bilirubin     (Negative)  


 


Urine Urobilinogen     (<2.0)  mg/dL


 


Ur Leukocyte Esterase     (Negative)  


 


Urine WBC     (0-5)  /hpf


 


Ur Squamous Epith Cells     (0-4)  /hpf


 


Amorphous Sediment     (None)  /hpf


 


Urine Bacteria     (None)  /hpf


 


Hyaline Casts     (0-2)  /lpf


 


Urine Mucus     (None)  /hpf


 


Stool Occult Blood     (Negative)  


 


Urine Opiates Screen     (NotDetected)  


 


Ur Oxycodone Screen     (NotDetected)  


 


Urine Methadone Screen     (NotDetected)  


 


Ur Propoxyphene Screen     (NotDetected)  


 


Ur Barbiturates Screen     (NotDetected)  


 


U Tricyclic Antidepress     (NotDetected)  


 


Ur Phencyclidine Scrn     (NotDetected)  


 


Ur Amphetamines Screen     (NotDetected)  


 


U Methamphetamines Scrn     (NotDetected)  


 


U Benzodiazepines Scrn     (NotDetected)  


 


Urine Cocaine Screen     (NotDetected)  


 


U Marijuana (THC) Screen     (NotDetected)  


 


Serum Alcohol  <10    mg/dL














  20 Range/Units





  14:13 


 


WBC   (3.8-10.6)  k/uL


 


RBC   (4.30-5.90)  m/uL


 


Hgb   (13.0-17.5)  gm/dL


 


Hct   (39.0-53.0)  %


 


MCV   (80.0-100.0)  fL


 


MCH   (25.0-35.0)  pg


 


MCHC   (31.0-37.0)  g/dL


 


RDW   (11.5-15.5)  %


 


Plt Count   (150-450)  k/uL


 


Neutrophils % (Manual)   %


 


Lymphocytes % (Manual)   %


 


Monocytes % (Manual)   %


 


Neutrophils # (Manual)   (1.3-7.7)  k/uL


 


Lymphocytes # (Manual)   (1.0-4.8)  k/uL


 


Monocytes # (Manual)   (0-1.0)  k/uL


 


Nucleated RBCs   (0-0)  /100 WBC


 


Manual Slide Review   


 


Polychromasia   


 


Anisocytosis   


 


Anisocytosis (manual)   


 


Macrocytosis   


 


Stomatocytes   


 


PT   (9.0-12.0)  sec


 


INR   (<1.2)  


 


APTT   (22.0-30.0)  sec


 


Sodium   (137-145)  mmol/L


 


Potassium   (3.5-5.1)  mmol/L


 


Chloride   ()  mmol/L


 


Carbon Dioxide   (22-30)  mmol/L


 


Anion Gap   mmol/L


 


BUN   (9-20)  mg/dL


 


Creatinine   (0.66-1.25)  mg/dL


 


Est GFR (CKD-EPI)AfAm   (>60 ml/min/1.73 sqM)  


 


Est GFR (CKD-EPI)NonAf   (>60 ml/min/1.73 sqM)  


 


Glucose   (74-99)  mg/dL


 


Plasma Lactic Acid Gagandeep   (0.7-2.0)  mmol/L


 


Calcium   (8.4-10.2)  mg/dL


 


Total Bilirubin   (0.2-1.3)  mg/dL


 


AST   (17-59)  U/L


 


ALT   (4-49)  U/L


 


Alkaline Phosphatase   ()  U/L


 


Ammonia   (<30)  umol/L


 


Troponin I   (0.000-0.034)  ng/mL


 


Total Protein   (6.3-8.2)  g/dL


 


Albumin   (3.5-5.0)  g/dL


 


Amylase   ()  U/L


 


Lipase   ()  U/L


 


Urine Color   


 


Urine Appearance   (Clear)  


 


Urine pH   (5.0-8.0)  


 


Ur Specific Gravity   (1.001-1.035)  


 


Urine Protein   (Negative)  


 


Urine Glucose (UA)   (Negative)  


 


Urine Ketones   (Negative)  


 


Urine Blood   (Negative)  


 


Urine Nitrite   (Negative)  


 


Urine Bilirubin   (Negative)  


 


Urine Urobilinogen   (<2.0)  mg/dL


 


Ur Leukocyte Esterase   (Negative)  


 


Urine WBC   (0-5)  /hpf


 


Ur Squamous Epith Cells   (0-4)  /hpf


 


Amorphous Sediment   (None)  /hpf


 


Urine Bacteria   (None)  /hpf


 


Hyaline Casts   (0-2)  /lpf


 


Urine Mucus   (None)  /hpf


 


Stool Occult Blood  Positive  (Negative)  


 


Urine Opiates Screen   (NotDetected)  


 


Ur Oxycodone Screen   (NotDetected)  


 


Urine Methadone Screen   (NotDetected)  


 


Ur Propoxyphene Screen   (NotDetected)  


 


Ur Barbiturates Screen   (NotDetected)  


 


U Tricyclic Antidepress   (NotDetected)  


 


Ur Phencyclidine Scrn   (NotDetected)  


 


Ur Amphetamines Screen   (NotDetected)  


 


U Methamphetamines Scrn   (NotDetected)  


 


U Benzodiazepines Scrn   (NotDetected)  


 


Urine Cocaine Screen   (NotDetected)  


 


U Marijuana (THC) Screen   (NotDetected)  


 


Serum Alcohol   mg/dL














- Radiology Data


Radiology results: report reviewed (Abdominal ultrasound shows ascites.  No 

dilated ducts.  No gallstones.  Computed tomography scan of the brain shows 

atrophy.  No acute abnormality.), image reviewed (Two-view chest x-ray shows p

ulmonary interstitial infiltrates.  Both lungs.  Abdominal x-ray shows nonacute 

abdomen.  X-ray of the right foot shows no fracture.)





Critical Care Time


Critical Care Time: Yes


Total Critical Care Time: 45





Disposition


Clinical Impression: 


 Hepatic encephalopathy, Liver failure, Pneumonia, GI hemorrhage, Sepsis





Disposition: ADMITTED AS IP TO THIS [facility]


Condition: Critical


Is patient prescribed a controlled substance at d/c from ED?: No


Referrals: 


Shahid Blackmon Jr, DO [Primary Care Provider] - 1-2 days


Decision Time: 16:05

## 2020-06-28 NOTE — CT
EXAMINATION TYPE: CT brain wo con

 

DATE OF EXAM: 6/28/2020

 

COMPARISON: 3/17/2020

 

HISTORY: AMS

 

CT DLP: 1137.4 mGycm

Automated exposure control for dose reduction was used.

 

Ventricles have normal size. There is no mass effect nor midline shift. There is no sign of intracran
ial hemorrhage. There is mild cerebral atrophy. Calvarium is intact.

 

IMPRESSION:

Mild cerebral atrophy. No acute intracranial abnormality.

## 2020-06-29 LAB
ALBUMIN SERPL-MCNC: 3.1 G/DL (ref 3.5–5)
ALP SERPL-CCNC: 129 U/L (ref 38–126)
ALT SERPL-CCNC: 47 U/L (ref 4–49)
AMYLASE SERPL-CCNC: 70 U/L (ref 30–110)
ANION GAP SERPL CALC-SCNC: 5 MMOL/L
ANION GAP SERPL CALC-SCNC: 8 MMOL/L
AST SERPL-CCNC: 176 U/L (ref 17–59)
BASOPHILS # BLD AUTO: 0.1 K/UL (ref 0–0.2)
BASOPHILS NFR BLD AUTO: 0 %
BUN SERPL-SCNC: 25 MG/DL (ref 9–20)
BUN SERPL-SCNC: 27 MG/DL (ref 9–20)
CALCIUM SPEC-MCNC: 7.1 MG/DL (ref 8.4–10.2)
CALCIUM SPEC-MCNC: 7.1 MG/DL (ref 8.4–10.2)
CHLORIDE SERPL-SCNC: 112 MMOL/L (ref 98–107)
CHLORIDE SERPL-SCNC: 113 MMOL/L (ref 98–107)
CO2 BLDA-SCNC: 21 MMOL/L (ref 19–24)
CO2 BLDA-SCNC: 23 MMOL/L (ref 19–24)
CO2 BLDA-SCNC: 24 MMOL/L (ref 19–24)
CO2 SERPL-SCNC: 18 MMOL/L (ref 22–30)
CO2 SERPL-SCNC: 20 MMOL/L (ref 22–30)
EOSINOPHIL # BLD AUTO: 0.3 K/UL (ref 0–0.7)
EOSINOPHIL NFR BLD AUTO: 2 %
ERYTHROCYTE [DISTWIDTH] IN BLOOD BY AUTOMATED COUNT: 2.46 M/UL (ref 4.3–5.9)
ERYTHROCYTE [DISTWIDTH] IN BLOOD BY AUTOMATED COUNT: 2.53 M/UL (ref 4.3–5.9)
ERYTHROCYTE [DISTWIDTH] IN BLOOD BY AUTOMATED COUNT: 2.55 M/UL (ref 4.3–5.9)
ERYTHROCYTE [DISTWIDTH] IN BLOOD: 23.8 % (ref 11.5–15.5)
ERYTHROCYTE [DISTWIDTH] IN BLOOD: 23.9 % (ref 11.5–15.5)
ERYTHROCYTE [DISTWIDTH] IN BLOOD: 24.1 % (ref 11.5–15.5)
GLUCOSE BLD-MCNC: 100 MG/DL (ref 75–99)
GLUCOSE BLD-MCNC: 124 MG/DL (ref 75–99)
GLUCOSE SERPL-MCNC: 100 MG/DL (ref 74–99)
GLUCOSE SERPL-MCNC: 99 MG/DL (ref 74–99)
HCO3 BLDA-SCNC: 20 MMOL/L (ref 21–25)
HCO3 BLDA-SCNC: 22 MMOL/L (ref 21–25)
HCO3 BLDA-SCNC: 23 MMOL/L (ref 21–25)
HCT VFR BLD AUTO: 25.7 % (ref 39–53)
HCT VFR BLD AUTO: 27 % (ref 39–53)
HCT VFR BLD AUTO: 27.2 % (ref 39–53)
HGB BLD-MCNC: 8.5 GM/DL (ref 13–17.5)
HGB BLD-MCNC: 8.6 GM/DL (ref 13–17.5)
HGB BLD-MCNC: 9.2 GM/DL (ref 13–17.5)
INR PPP: 1.9 (ref ?–1.2)
INR PPP: 2.2 (ref ?–1.2)
LYMPHOCYTES # SPEC AUTO: 1.1 K/UL (ref 1–4.8)
LYMPHOCYTES NFR SPEC AUTO: 7 %
MAGNESIUM SPEC-SCNC: 1.7 MG/DL (ref 1.6–2.3)
MCH RBC QN AUTO: 34.1 PG (ref 25–35)
MCH RBC QN AUTO: 34.7 PG (ref 25–35)
MCH RBC QN AUTO: 36.2 PG (ref 25–35)
MCHC RBC AUTO-ENTMCNC: 32 G/DL (ref 31–37)
MCHC RBC AUTO-ENTMCNC: 33.2 G/DL (ref 31–37)
MCHC RBC AUTO-ENTMCNC: 33.8 G/DL (ref 31–37)
MCV RBC AUTO: 104.6 FL (ref 80–100)
MCV RBC AUTO: 106.7 FL (ref 80–100)
MCV RBC AUTO: 107 FL (ref 80–100)
MONOCYTES # BLD AUTO: 0.4 K/UL (ref 0–1)
MONOCYTES NFR BLD AUTO: 3 %
NEUTROPHILS # BLD AUTO: 13 K/UL (ref 1.3–7.7)
NEUTROPHILS NFR BLD AUTO: 86 %
PCO2 BLDA: 31 MMHG (ref 35–45)
PCO2 BLDA: 32 MMHG (ref 35–45)
PCO2 BLDA: 52 MMHG (ref 35–45)
PH BLDA: 7.25 [PH] (ref 7.35–7.45)
PH BLDA: 7.4 [PH] (ref 7.35–7.45)
PH BLDA: 7.46 [PH] (ref 7.35–7.45)
PLATELET # BLD AUTO: 56 K/UL (ref 150–450)
PLATELET # BLD AUTO: 64 K/UL (ref 150–450)
PLATELET # BLD AUTO: 79 K/UL (ref 150–450)
PO2 BLDA: 170 MMHG (ref 83–108)
PO2 BLDA: 66 MMHG (ref 83–108)
PO2 BLDA: 79 MMHG (ref 83–108)
POTASSIUM SERPL-SCNC: 3.2 MMOL/L (ref 3.5–5.1)
POTASSIUM SERPL-SCNC: 5.5 MMOL/L (ref 3.5–5.1)
PROT SERPL-MCNC: 8.1 G/DL (ref 6.3–8.2)
PT BLD: 18.5 SEC (ref 9–12)
PT BLD: 21.1 SEC (ref 9–12)
SODIUM SERPL-SCNC: 138 MMOL/L (ref 137–145)
SODIUM SERPL-SCNC: 138 MMOL/L (ref 137–145)
WBC # BLD AUTO: 12.5 K/UL (ref 3.8–10.6)
WBC # BLD AUTO: 12.5 K/UL (ref 3.8–10.6)
WBC # BLD AUTO: 15.2 K/UL (ref 3.8–10.6)

## 2020-06-29 PROCEDURE — 4A133B1 MONITORING OF ARTERIAL PRESSURE, PERIPHERAL, PERCUTANEOUS APPROACH: ICD-10-PCS

## 2020-06-29 PROCEDURE — 5A1955Z RESPIRATORY VENTILATION, GREATER THAN 96 CONSECUTIVE HOURS: ICD-10-PCS

## 2020-06-29 PROCEDURE — 06HM33Z INSERTION OF INFUSION DEVICE INTO RIGHT FEMORAL VEIN, PERCUTANEOUS APPROACH: ICD-10-PCS

## 2020-06-29 PROCEDURE — 03HY32Z INSERTION OF MONITORING DEVICE INTO UPPER ARTERY, PERCUTANEOUS APPROACH: ICD-10-PCS

## 2020-06-29 PROCEDURE — 0BH17EZ INSERTION OF ENDOTRACHEAL AIRWAY INTO TRACHEA, VIA NATURAL OR ARTIFICIAL OPENING: ICD-10-PCS

## 2020-06-29 PROCEDURE — 4A133J1 MONITORING OF ARTERIAL PULSE, PERIPHERAL, PERCUTANEOUS APPROACH: ICD-10-PCS

## 2020-06-29 RX ADMIN — NOREPINEPHRINE BITARTRATE SCH MLS/HR: 1 INJECTION, SOLUTION, CONCENTRATE INTRAVENOUS at 03:52

## 2020-06-29 RX ADMIN — PIPERACILLIN AND TAZOBACTAM SCH MLS/HR: 3; .375 INJECTION, POWDER, FOR SOLUTION INTRAVENOUS at 00:39

## 2020-06-29 RX ADMIN — PANTOPRAZOLE SODIUM SCH MG: 40 INJECTION, POWDER, FOR SOLUTION INTRAVENOUS at 20:35

## 2020-06-29 RX ADMIN — PROPOFOL SCH MLS/HR: 10 INJECTION, EMULSION INTRAVENOUS at 03:30

## 2020-06-29 RX ADMIN — PIPERACILLIN AND TAZOBACTAM SCH MLS/HR: 3; .375 INJECTION, POWDER, FOR SOLUTION INTRAVENOUS at 23:25

## 2020-06-29 RX ADMIN — PANTOPRAZOLE SODIUM SCH MG: 40 INJECTION, POWDER, FOR SOLUTION INTRAVENOUS at 08:24

## 2020-06-29 RX ADMIN — MORPHINE SULFATE PRN MG: 2 INJECTION, SOLUTION INTRAMUSCULAR; INTRAVENOUS at 08:31

## 2020-06-29 RX ADMIN — PROPOFOL SCH MLS/HR: 10 INJECTION, EMULSION INTRAVENOUS at 08:49

## 2020-06-29 RX ADMIN — MORPHINE SULFATE PRN MG: 2 INJECTION, SOLUTION INTRAMUSCULAR; INTRAVENOUS at 05:15

## 2020-06-29 RX ADMIN — PROPOFOL SCH MLS/HR: 10 INJECTION, EMULSION INTRAVENOUS at 20:48

## 2020-06-29 RX ADMIN — NOREPINEPHRINE BITARTRATE SCH MLS/HR: 1 INJECTION, SOLUTION, CONCENTRATE INTRAVENOUS at 23:28

## 2020-06-29 RX ADMIN — IPRATROPIUM BROMIDE AND ALBUTEROL SULFATE SCH ML: .5; 3 SOLUTION RESPIRATORY (INHALATION) at 11:32

## 2020-06-29 RX ADMIN — POTASSIUM CHLORIDE SCH MLS/HR: 14.9 INJECTION, SOLUTION INTRAVENOUS at 20:36

## 2020-06-29 RX ADMIN — LACTULOSE SCH GM: 20 SOLUTION ORAL at 08:23

## 2020-06-29 RX ADMIN — PROPOFOL SCH MLS/HR: 10 INJECTION, EMULSION INTRAVENOUS at 05:29

## 2020-06-29 RX ADMIN — PIPERACILLIN AND TAZOBACTAM SCH MLS/HR: 3; .375 INJECTION, POWDER, FOR SOLUTION INTRAVENOUS at 17:16

## 2020-06-29 RX ADMIN — IPRATROPIUM BROMIDE AND ALBUTEROL SULFATE SCH ML: .5; 3 SOLUTION RESPIRATORY (INHALATION) at 21:45

## 2020-06-29 RX ADMIN — LACTULOSE SCH GM: 20 SOLUTION ORAL at 00:40

## 2020-06-29 RX ADMIN — LACTULOSE SCH GM: 20 SOLUTION ORAL at 17:17

## 2020-06-29 RX ADMIN — CEFAZOLIN SCH MLS/HR: 330 INJECTION, POWDER, FOR SOLUTION INTRAMUSCULAR; INTRAVENOUS at 20:38

## 2020-06-29 RX ADMIN — IPRATROPIUM BROMIDE AND ALBUTEROL SULFATE SCH ML: .5; 3 SOLUTION RESPIRATORY (INHALATION) at 15:25

## 2020-06-29 RX ADMIN — MORPHINE SULFATE PRN MG: 2 INJECTION, SOLUTION INTRAMUSCULAR; INTRAVENOUS at 15:53

## 2020-06-29 RX ADMIN — POTASSIUM CHLORIDE SCH MLS/HR: 14.9 INJECTION, SOLUTION INTRAVENOUS at 23:27

## 2020-06-29 RX ADMIN — OCTREOTIDE ACETATE SCH MLS/HR: 200 INJECTION, SOLUTION INTRAVENOUS; SUBCUTANEOUS at 08:22

## 2020-06-29 RX ADMIN — LACTULOSE SCH GM: 20 SOLUTION ORAL at 20:37

## 2020-06-29 RX ADMIN — CEFAZOLIN SCH MLS/HR: 330 INJECTION, POWDER, FOR SOLUTION INTRAMUSCULAR; INTRAVENOUS at 17:18

## 2020-06-29 RX ADMIN — PROPOFOL SCH MLS/HR: 10 INJECTION, EMULSION INTRAVENOUS at 23:26

## 2020-06-29 RX ADMIN — CHLORHEXIDINE GLUCONATE ONE ML: 1.2 RINSE ORAL at 23:26

## 2020-06-29 RX ADMIN — CEFAZOLIN SCH MLS/HR: 330 INJECTION, POWDER, FOR SOLUTION INTRAMUSCULAR; INTRAVENOUS at 05:29

## 2020-06-29 RX ADMIN — PIPERACILLIN AND TAZOBACTAM SCH MLS/HR: 3; .375 INJECTION, POWDER, FOR SOLUTION INTRAVENOUS at 08:23

## 2020-06-29 NOTE — ECHOF
Referral Reason:pulm edema



MEASUREMENTS

--------

HEIGHT: 170.2 cm

WEIGHT: 79.8 kg

BP: 

RVIDd:   3.5 cm     (< 3.3)

IVSd:   1.1 cm     (0.6 - 1.1)

LVIDd:   3.9 cm     (3.9 - 5.3)

LVPWd:   1.2 cm     (0.6 - 1.1)

IVSs:   1.6 cm

LVIDs:   1.2 cm

LVPWs:   1.4 cm

Ao Diam:   3.0 cm     (2.0 - 3.7)

AV Cusp:   1.8 cm     (1.5 - 2.6)

LA Diam:   2.7 cm     (2.7 - 3.8)

MV EXCURSION:   18.395 mm     (> 18.000)

MV EF SLOPE:   87 mm/s     (70 - 150)

EPSS:   1.9 cm

MV E Yovany:   0.95 m/s

MV DecT:   149 ms

MV A Yovany:   0.81 m/s

MV E/A Ratio:   1.17 

RAP:   5.00 mmHg

RVSP:   22.40 mmHg







FINDINGS

--------

Resting tachycardia (HR>100bpm).

This was a technically difficult study with suboptimal views.

The left ventricular size is normal.   There is borderline concentric left ventricular hypertrophy.  
 Overall left ventricular systolic function is normal with, an EF between 60 - 65 %.

The right ventricle is mildly enlarged.

The left atrial size is normal.

The right atrial size is normal.

Lumason used

The aortic valve is trileaflet and appears structurally normal.

The mitral valve is normal.   Mild mitral regurgitation is present.

The tricuspid valve appears structurally normal.   Mild tricuspid regurgitation present.   Right vent
ricular systolic pressure is normal at < 35 mmHg.

There is no pulmonic regurgitation present.

The aortic root size is normal.

IVC Not well visulized.

There is no pericardial effusion.



CONCLUSIONS

--------

1. Resting tachycardia (HR>100bpm).

2. This was a technically difficult study with suboptimal views.

3. The left ventricular size is normal.

4. There is borderline concentric left ventricular hypertrophy.

5. Overall left ventricular systolic function is normal with, an EF between 60 - 65 %.

6. The right ventricle is mildly enlarged.

7. The left atrial size is normal.

8. The right atrial size is normal.

9. Lumason used

10. The aortic valve is trileaflet and appears structurally normal.

11. The mitral valve is normal.

12. Mild mitral regurgitation is present.

13. The tricuspid valve appears structurally normal.

14. Mild tricuspid regurgitation present.

15. Right ventricular systolic pressure is normal at < 35 mmHg.

16. There is no pulmonic regurgitation present.

17. The aortic root size is normal.

18. IVC Not well visulized.

19. There is no pericardial effusion.





SONOGRAPHER: Rut Abreu RDCS

## 2020-06-29 NOTE — OP
OPERATIVE REPORT



OPERATIVE REPORT:

Placement of a right femoral triple-lumen catheter.



PREOPERATIVE DIAGNOSIS:

Hypotension, sepsis. Patient needs to be on norepinephrine for low blood pressure.



POSTOPERATIVE DIAGNOSIS:

Hypotension, sepsis. Patient needs to be on norepinephrine for low blood pressure.



ANESTHESIA USED:

2 mL of 1% lidocaine.



PROCEDURE DESCRIPTION:

The patient was placed in supine position. The area of the right groin was prepared in

a sterile fashion and drapes were applied.  The area was locally anesthetized with

lidocaine. Then the right femoral vein was easily cannulated and a guidewire was

placed.  The area around the guidewire was dilated using a dilator.  Then a triple-

lumen catheter was inserted over the guidewire, and the guidewire was removed.  Good

blood flow was noted in the 3 different ports of the triple-lumen catheter.  Line was

secured using 3.0 silk sutures.  No evidence of any immediate complication.





MMODL / IJN: [id number] / Job#: [id number]

## 2020-06-29 NOTE — P.HPIM
History of Present Illness


H&P Date: 20


This is a 54-year-old gentleman, history of polysubstance abuse including 

alcohol abuse , falls with recent rib fractures, legally blind, osteoarthritis 

and multiple other medical issues presented to the ER with changes in mental 

status 2 days.  Drug screen positive for tricyclics and benzos, serum alcohol 

less than 10.  UA reported dark brown cloudy urine with occasional bacteria, 

hyaline casts, 2 WBCs, trace leukocytes, 4+ bilirubin, negative for nitrates. 

Ammonia level 91, T bili 14.9, currently 15.8 , elevated LFTs .Gallbladder 

ultrasound reporting abdominal ascites, abdominal x-ray nonacute, right foot x-

ray reported no fracture, soft tissue swelling, EKG reported sinus tachycardia, 

troponin normal, , echo reported normal LV function, EF 60-65% ,lactic 

acid 2.1 now down to 1.7, BUN 25, creatinine 0.8 chest x-ray reporting moderate 

pulmonary interstitial and airspace edema significantly worse than yesterday, 

pulmonary edema.  ABGs noted.  INR on admission 3.7, down to 2.2 Sepsis 

protocol, Received IV fluid resuscitation, IV antibiotics, cultures drawn.  

Developed respiratory failure, requiring intubation during the night.  Currently

on Sandostatin drip.  3 maroon stools during the night.  Received 4 units of FFP

and 2 units of packed RBCs to date.  Hemoglobin currently 8.5.  And had required

pressor support with Levophed off since .  Maintained on IV fluid resu

scitation.  Telemetry sinus rhythm.  Potassium 3.2, magnesium 1.7. 




















Review of Systems


Unable to complete review of systems, patient intubated and sedated











Past Medical History


Past Medical History: Asthma, Eye Disorder, Osteoarthritis (OA), Renal Disease


Additional Past Medical History / Comment(s): Pt admitted to [facility] on 20 with

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 

opiate withdrawal/alcohol abuse possible DTs/coagulopathy, elevated LFTs, venous

insufficiency.     Other Hx:  Unsteady gait/falls, ETOH abuse with withdrawal 

symptoms in the past pt states-tremors, bilateral leg pain/numbness, chronic 

renal disease stage I, pericarditis, bilateral macular degeneration/legally 

blind, chronic back pain, RLS, bilateral carpal tunnel syndrome, past infected 

sebaceous cyst on back


History of Any Multi-Drug Resistant Organisms: None Reported


Past Surgical History: Orthopedic Surgery


Additional Past Surgical History / Comment(s): Right hand surgery d/t injury


Past Anesthesia/Blood Transfusion Reactions: No Reported Reaction


Past Psychological History: Anxiety, Depression


Additional Psychological History / Comment(s): Pt resides with his channing.  He 

has been using her walker on occasion.  He is legally blind/has no 's 

license, his fredie drives.


Smoking Status: Light tobacco smoker


Past Alcohol Use History: Abuse, Daily


Additional Past Alcohol Use History / Comment(s): Pt started smoking in  and

states that he has recently cut down to a pack lasting 4-5 weeks.  Pt states he 

has hx of alcohol abuse-8 beers a day but has not drank in a few weeks.


Past Drug Use History: None Reported


Additional Drug Use History / Comment(s): Pt smoked marijuana as a teen.





- Past Family History


  ** Father


Family Medical History: Cancer


Additional Family Medical History / Comment(s): Father  of esophageal 

cancer.





  ** Mother


Family Medical History: No Reported History


Additional Family Medical History / Comment(s): Mopther is healthy





Medications and Allergies


                                Home Medications











 Medication  Instructions  Recorded  Confirmed  Type


 


Cetirizine HCl [Zyrtec] 10 mg PO DAILY 20 History


 


Omeprazole 20 mg PO DAILY 20 History


 


Folic Acid 1 mg PO DAILY #30 tablet 20 Rx


 


Multivitamins, Thera [Multivitamin 1 tab PO DAILY #30 tablet 20 

Rx





(formulary)]    


 


Thiamine [Vitamin B-1] 100 mg PO DAILY #30 tab 20 Rx


 


Albuterol Inhaler [Ventolin Hfa 1 - 2 puff INHALATION RT-Q4H PRN 20 History





Inhaler]    








                                    Allergies











Allergy/AdvReac Type Severity Reaction Status Date / Time


 


No Known Allergies Allergy   Verified 20 14:27














Physical Exam


Vitals: 


                                   Vital Signs











  Temp Pulse Pulse Resp BP BP Pulse Ox


 


 20 15:45   88     


 


 20 15:30   84     


 


 20 15:00   91   26 H  85/48   99


 


 20 14:00   96   23  95/56   99


 


 20 13:00   96   26 H  88/48   99


 


 06/29/20 12:00  98.3 F  98   26 H  93/54   99


 


 20 11:42   98     


 


 20 11:37   98     


 


 20 11:00   99   26 H  100/54   98


 


 20 10:00   98   23  93/52   97


 


 20 09:00   101 H   28 H  97/56   100


 


 20 08:00  98.5 F  101 H   17  98/57   100


 


 20 07:00   106 H   28 H  111/62   100


 


 20 06:45   109 H   28 H  111/65   99


 


 20 06:30   110 H   29 H  115/55   99


 


 20 06:25  98.6 F  109 H   28 H  112/50   100


 


 20 06:19  98.6 F   112 H  28 H   115/55  99


 


 20 06:15   112 H   28 H  123/58   98


 


 20 06:00   113 H   27 H  118/61   99


 


 20 05:45   113 H   28 H  113/64   98


 


 20 05:30   115 H   28 H  115/60   98


 


 20 05:15   115 H   27 H  118/61   98


 


 20 05:00   117 H   25 H  119/61   95


 


 20 04:45   115 H   23  121/59   97


 


 20 04:30   116 H   24  112/79   95


 


 20 04:20  98.6 F  114 H   26 H  121/59   96


 


 20 04:15  98.7 F  113 H   20  119/61   96


 


 20 04:07  98.6 F  114 H   26 H  117/66   96


 


 20 04:05  98.0 F  116 H   28 H  110/57   95


 


 20 04:00  98.6 F  116 H   27 H  115/59   95


 


 20 03:30   102 H   18  130/68   95


 


 20 03:00   120 H   41 H  97/66   92 L


 


 20 02:55  98.6 F  116 H   24  118/50   92 L


 


 20 02:53  99.0 F  120 H   20  97/66   90 L


 


 20 02:30   121 H   37 H  117/93   86 L


 


 20 02:25  98.8 F  120 H   33 H  117/93   90 L


 


 20 02:15  98.6 F  120 H   26 H  106/51   90 L


 


 20 02:00   116 H   29 H  98/71   93 L


 


 20 01:54  98.6 F  117 H   30 H  125/77   91 L


 


 20 01:30   116 H   33 H  92/64   91 L


 


 20 01:00   112 H   30 H  99/62   91 L


 


 20 00:30   112 H   17  110/60   88 L


 


 20 00:25  98.7 F  112 H   24  100/65   91 L


 


 20 00:17   114 H   30 H  102/31   90 L


 


 20 00:05  98.6 F  114 H   13  102/31   92 L


 


 20 00:00  98.6 F  113 H   16  79/55   94 L


 


 20 23:55  98.6 F  114 H   25 H  110/60   91 L


 


 20 23:45  98.6 F  115 H   29 H  110/70   92 L


 


 20 23:37  98.8 F  114 H   24  98/68   92 L


 


 20 23:30   112 H   14  87/62   93 L


 


 20 23:07  98.6 F  115 H   25 H  92/64   91 L


 


 20 23:01  98.6 F  112 H   18  79/55   93 L


 


 20 23:00   116 H   20  128/89   88 L


 


 20 22:57  98.6 F  112 H   20  83/46   92 L


 


 20 22:30   110 H   12  109/81   88 L


 


 20 22:05  98.2 F  116 H   18  128/89  


 


 20 22:00   105 H   25 H  112/58   92 L


 


 20 21:50  98.5 F  108 H   15  112/58   89 L


 


 20 21:35  99.0 F  114 H   20  78/44   93 L


 


 20 21:30   111 H   20  99/71   92 L


 


 20 21:25  98.9 F  112 H   21  94/76   93 L


 


 20 21:00   111 H   25 H  102/81   94 L


 


 20 20:30   110 H   26 H  95/74   90 L


 


 20 20:29        91 L


 


 20 20:00  98.9 F  112 H   21  97/63   90 L


 


 20 19:30   114 H   24  94/58   92 L


 


 20 19:12  98.7 F  117 H   18  109/26   94 L


 


 20 19:09   117 H   17  83/38   89 L


 


 20 18:53  98.9 F  116 H   19  125/67  


 


 20 18:23  98.6 F  113 H   15  97/58  


 


 20 18:13  98.5 F  110 H   17  134/65  


 


 20 18:00   110 H   21  112/67   93 L








                                Intake and Output











 20





 06:59 14:59 22:59


 


Intake Total 2869.192 1203.2 127.531


 


Output Total 315 270 15


 


Balance 2554.192 933.2 112.531


 


Intake:   


 


   1048.0 112.5


 


    0.9 650 760 100


 


    Octreotide 500 mcg In  188.0 12.5





    Sodium Chloride 0.9% 250   





    ml @ 25 MCG/HR 12.5 mls/   





    hr IV .Q20H [facility] Rx#:   





    [id number]   


 


    Piperacillin-Tazobactam 3  100 





    .375 gm In Sodium   





    Chloride 0.9% 100 ml @ 25   





    mls/hr IVPB Q8HR [facility] Rx#   





    :[id number]   


 


  Intake, IV Titration 68.192 80.2 15.031





  Amount   


 


    Norepinephrine 4 mg In 41.157  15.031





    Sodium Chloride 0.9% 250   





    ml @ 0.05 MCG/KG/MIN 15.   





    338 mls/hr IV .[id number]   





    [facility] Rx#:[id number]   


 


    Propofol 1,000 mg In 27.035 80.2 





    Empty Bag 1 bag @ Titrate   





    IV .Q0M [facility] Rx#:   





    [id number]   


 


  Oral  75 


 


  Blood Product 2151  


 


    Ffp 24 Cpd  Unit 299  





    [id number]   


 


    Ffp 24 Cpd  Unit 334  





    [id number]   


 


    Ffp 24 Cpd  Unit 288  





    [id number]   


 


    Rc Pheresis 2 As3  Unit 310  





    [id number]   


 


    Rc Pheresis 2 As3  Unit 310  





    [id number]   


 


Output:   


 


  Urine 315 270 15


 


Other:   


 


  Voiding Method Indwelling Catheter Indwelling Catheter 


 


  Weight 80.2 kg 80.2 kg 











VITAL SIGNS: As above


GENERAL: Sitting up in bed, intubated, sedated


HEENT:  Conjunctivae normal.  Positive icterus, jaundice


NECK:  No JVD. No thyroid enlargement. No LNs


CARDIOVASCULAR:  S1, S2 regular. No murmur


RESPIRATION: Coarse Breath sounds diminished in the bases with scattered 

bilateral rhonchi and crackles


ABDOMEN:  Soft, distended, positive ascites,  positive bowel sounds


Extremities: Trace bilateral lower extremity edema, no clubbing, no cyanosis


NERVOUS SYSTEM: Unable to assess, sedated and intubated.


Skin: Warm and dry, no rash 








Results


CBC & Chem 7: 


                                 20 15:43





                                 20 06:09


Labs: 


                  Abnormal Lab Results - Last 24 Hours (Table)











  20 Range/Units





  14:10 17:24 19:19 


 


WBC    14.8 H  (3.8-10.6)  k/uL


 


RBC    2.08 L  (4.30-5.90)  m/uL


 


Hgb    8.1 L  (13.0-17.5)  gm/dL


 


Hct    23.8 L  (39.0-53.0)  %


 


MCV    114.0 H  (80.0-100.0)  fL


 


MCH    38.8 H  (25.0-35.0)  pg


 


RDW    21.0 H  (11.5-15.5)  %


 


Plt Count    75 L  (150-450)  k/uL


 


Neutrophils #    11.6 H  (1.3-7.7)  k/uL


 


Macrocytosis    Marked A  


 


PT     (9.0-12.0)  sec


 


INR     (<1.2)  


 


ABG pH     (7.35-7.45)  


 


ABG pCO2     (35-45)  mmHg


 


ABG pO2     ()  mmHg


 


ABG O2 Saturation     (94-97)  %


 


Sodium     (137-145)  mmol/L


 


Potassium     (3.5-5.1)  mmol/L


 


Chloride     ()  mmol/L


 


Carbon Dioxide     (22-30)  mmol/L


 


BUN     (9-20)  mg/dL


 


Glucose     (74-99)  mg/dL


 


POC Glucose (mg/dL)     (75-99)  mg/dL


 


Plasma Lactic Acid Gagandeep   3.1 H*   (0.7-2.0)  mmol/L


 


Calcium     (8.4-10.2)  mg/dL


 


Total Bilirubin     (0.2-1.3)  mg/dL


 


AST     (17-59)  U/L


 


Alkaline Phosphatase     ()  U/L


 


Albumin     (3.5-5.0)  g/dL


 


Crossmatch  See Detail    














  20 Range/Units





  19:19 20:03 20:45 


 


WBC     (3.8-10.6)  k/uL


 


RBC     (4.30-5.90)  m/uL


 


Hgb     (13.0-17.5)  gm/dL


 


Hct     (39.0-53.0)  %


 


MCV     (80.0-100.0)  fL


 


MCH     (25.0-35.0)  pg


 


RDW     (11.5-15.5)  %


 


Plt Count     (150-450)  k/uL


 


Neutrophils #     (1.3-7.7)  k/uL


 


Macrocytosis     


 


PT     (9.0-12.0)  sec


 


INR     (<1.2)  


 


ABG pH    7.49 H  (7.35-7.45)  


 


ABG pCO2    29 L  (35-45)  mmHg


 


ABG pO2    63 L  ()  mmHg


 


ABG O2 Saturation    92.0 L  (94-97)  %


 


Sodium  134 L    (137-145)  mmol/L


 


Potassium     (3.5-5.1)  mmol/L


 


Chloride  108 H    ()  mmol/L


 


Carbon Dioxide  19 L    (22-30)  mmol/L


 


BUN  29 H    (9-20)  mg/dL


 


Glucose  111 H    (74-99)  mg/dL


 


POC Glucose (mg/dL)     (75-99)  mg/dL


 


Plasma Lactic Acid Gagandeep   2.2 H*   (0.7-2.0)  mmol/L


 


Calcium  7.3 L    (8.4-10.2)  mg/dL


 


Total Bilirubin  14.9 H    (0.2-1.3)  mg/dL


 


AST  139 H    (17-59)  U/L


 


Alkaline Phosphatase  137 H    ()  U/L


 


Albumin  2.3 L    (3.5-5.0)  g/dL


 


Crossmatch     














  20 Range/Units





  22:50 22:50 22:50 


 


WBC   13.4 H   (3.8-10.6)  k/uL


 


RBC   1.89 L   (4.30-5.90)  m/uL


 


Hgb   7.0 L   (13.0-17.5)  gm/dL


 


Hct   21.6 L   (39.0-53.0)  %


 


MCV   114.4 H   (80.0-100.0)  fL


 


MCH   37.3 H   (25.0-35.0)  pg


 


RDW   20.9 H   (11.5-15.5)  %


 


Plt Count   72 L   (150-450)  k/uL


 


Neutrophils #   10.2 H   (1.3-7.7)  k/uL


 


Macrocytosis   Marked A   


 


PT     (9.0-12.0)  sec


 


INR     (<1.2)  


 


ABG pH     (7.35-7.45)  


 


ABG pCO2     (35-45)  mmHg


 


ABG pO2     ()  mmHg


 


ABG O2 Saturation     (94-97)  %


 


Sodium    135 L  (137-145)  mmol/L


 


Potassium     (3.5-5.1)  mmol/L


 


Chloride     ()  mmol/L


 


Carbon Dioxide    20 L  (22-30)  mmol/L


 


BUN    26 H  (9-20)  mg/dL


 


Glucose     (74-99)  mg/dL


 


POC Glucose (mg/dL)     (75-99)  mg/dL


 


Plasma Lactic Acid Gagandeep  2.1 H*    (0.7-2.0)  mmol/L


 


Calcium    6.5 L  (8.4-10.2)  mg/dL


 


Total Bilirubin    14.9 H  (0.2-1.3)  mg/dL


 


AST    127 H  (17-59)  U/L


 


Alkaline Phosphatase     ()  U/L


 


Albumin    2.5 L  (3.5-5.0)  g/dL


 


Crossmatch     














  20 Range/Units





  00:00 04:38 06:09 


 


WBC    15.2 H  (3.8-10.6)  k/uL


 


RBC    2.55 L  (4.30-5.90)  m/uL


 


Hgb    9.2 L D  (13.0-17.5)  gm/dL


 


Hct    27.2 L  (39.0-53.0)  %


 


MCV    107.0 H D  (80.0-100.0)  fL


 


MCH    36.2 H  (25.0-35.0)  pg


 


RDW    23.8 H  (11.5-15.5)  %


 


Plt Count    79 L  (150-450)  k/uL


 


Neutrophils #    13.0 H  (1.3-7.7)  k/uL


 


Macrocytosis    Marked A  


 


PT     (9.0-12.0)  sec


 


INR     (<1.2)  


 


ABG pH   7.25 L   (7.35-7.45)  


 


ABG pCO2   52 H   (35-45)  mmHg


 


ABG pO2   79 L   ()  mmHg


 


ABG O2 Saturation   92.6 L   (94-97)  %


 


Sodium     (137-145)  mmol/L


 


Potassium     (3.5-5.1)  mmol/L


 


Chloride     ()  mmol/L


 


Carbon Dioxide     (22-30)  mmol/L


 


BUN     (9-20)  mg/dL


 


Glucose     (74-99)  mg/dL


 


POC Glucose (mg/dL)  100 H    (75-99)  mg/dL


 


Plasma Lactic Acid Gagandeep     (0.7-2.0)  mmol/L


 


Calcium     (8.4-10.2)  mg/dL


 


Total Bilirubin     (0.2-1.3)  mg/dL


 


AST     (17-59)  U/L


 


Alkaline Phosphatase     ()  U/L


 


Albumin     (3.5-5.0)  g/dL


 


Crossmatch     














  20 Range/Units





  06:09 06:09 07:54 


 


WBC     (3.8-10.6)  k/uL


 


RBC     (4.30-5.90)  m/uL


 


Hgb     (13.0-17.5)  gm/dL


 


Hct     (39.0-53.0)  %


 


MCV     (80.0-100.0)  fL


 


MCH     (25.0-35.0)  pg


 


RDW     (11.5-15.5)  %


 


Plt Count     (150-450)  k/uL


 


Neutrophils #     (1.3-7.7)  k/uL


 


Macrocytosis     


 


PT  18.5 H    (9.0-12.0)  sec


 


INR  1.9 H    (<1.2)  


 


ABG pH    7.46 H  (7.35-7.45)  


 


ABG pCO2    31 L  (35-45)  mmHg


 


ABG pO2    170 H  ()  mmHg


 


ABG O2 Saturation    99.8 H  (94-97)  %


 


Sodium     (137-145)  mmol/L


 


Potassium   5.5 H   (3.5-5.1)  mmol/L


 


Chloride   112 H   ()  mmol/L


 


Carbon Dioxide   18 L   (22-30)  mmol/L


 


BUN   27 H   (9-20)  mg/dL


 


Glucose     (74-99)  mg/dL


 


POC Glucose (mg/dL)     (75-99)  mg/dL


 


Plasma Lactic Acid Gagandeep     (0.7-2.0)  mmol/L


 


Calcium   7.1 L   (8.4-10.2)  mg/dL


 


Total Bilirubin   15.8 H*   (0.2-1.3)  mg/dL


 


AST   176 H   (17-59)  U/L


 


Alkaline Phosphatase   129 H   ()  U/L


 


Albumin   3.1 L   (3.5-5.0)  g/dL


 


Crossmatch     














  20 Range/Units





  11:04 11:04 11:59 


 


WBC  12.5 H    (3.8-10.6)  k/uL


 


RBC  2.46 L    (4.30-5.90)  m/uL


 


Hgb  8.5 L    (13.0-17.5)  gm/dL


 


Hct  25.7 L    (39.0-53.0)  %


 


MCV  104.6 H    (80.0-100.0)  fL


 


MCH     (25.0-35.0)  pg


 


RDW  23.9 H    (11.5-15.5)  %


 


Plt Count  64 L    (150-450)  k/uL


 


Neutrophils #     (1.3-7.7)  k/uL


 


Macrocytosis  Marked A    


 


PT   21.1 H   (9.0-12.0)  sec


 


INR   2.2 H   (<1.2)  


 


ABG pH     (7.35-7.45)  


 


ABG pCO2     (35-45)  mmHg


 


ABG pO2     ()  mmHg


 


ABG O2 Saturation     (94-97)  %


 


Sodium     (137-145)  mmol/L


 


Potassium     (3.5-5.1)  mmol/L


 


Chloride     ()  mmol/L


 


Carbon Dioxide     (22-30)  mmol/L


 


BUN     (9-20)  mg/dL


 


Glucose     (74-99)  mg/dL


 


POC Glucose (mg/dL)    124 H  (75-99)  mg/dL


 


Plasma Lactic Acid Gagandeep     (0.7-2.0)  mmol/L


 


Calcium     (8.4-10.2)  mg/dL


 


Total Bilirubin     (0.2-1.3)  mg/dL


 


AST     (17-59)  U/L


 


Alkaline Phosphatase     ()  U/L


 


Albumin     (3.5-5.0)  g/dL


 


Crossmatch     














  20 Range/Units





  15:43 


 


WBC  12.5 H  (3.8-10.6)  k/uL


 


RBC  2.53 L  (4.30-5.90)  m/uL


 


Hgb  8.6 L  (13.0-17.5)  gm/dL


 


Hct  27.0 L  (39.0-53.0)  %


 


MCV  106.7 H  (80.0-100.0)  fL


 


MCH   (25.0-35.0)  pg


 


RDW  24.1 H  (11.5-15.5)  %


 


Plt Count  56 L  (150-450)  k/uL


 


Neutrophils #   (1.3-7.7)  k/uL


 


Macrocytosis  Marked A  


 


PT   (9.0-12.0)  sec


 


INR   (<1.2)  


 


ABG pH   (7.35-7.45)  


 


ABG pCO2   (35-45)  mmHg


 


ABG pO2   ()  mmHg


 


ABG O2 Saturation   (94-97)  %


 


Sodium   (137-145)  mmol/L


 


Potassium   (3.5-5.1)  mmol/L


 


Chloride   ()  mmol/L


 


Carbon Dioxide   (22-30)  mmol/L


 


BUN   (9-20)  mg/dL


 


Glucose   (74-99)  mg/dL


 


POC Glucose (mg/dL)   (75-99)  mg/dL


 


Plasma Lactic Acid Gagandeep   (0.7-2.0)  mmol/L


 


Calcium   (8.4-10.2)  mg/dL


 


Total Bilirubin   (0.2-1.3)  mg/dL


 


AST   (17-59)  U/L


 


Alkaline Phosphatase   ()  U/L


 


Albumin   (3.5-5.0)  g/dL


 


Crossmatch   








                      Microbiology - Last 24 Hours (Table)











 20 14:13 Blood Culture - Preliminary





 Blood    No Growth after 24 hours


 


 20 03:46 Gram Stain - Preliminary





 Sputum Sputum Culture - Preliminary














Thrombosis Risk Factor Assmnt





- Choose All That Apply


Any of the Below Risk Factors Present?: Yes


Each Factor Represents 1 point: Age 41-60 years, Swollen legs (current)


Other Risk Factors: No


Thrombosis Risk Factor Assessment Total Risk Factor Score: 2


Thrombosis Risk Factor Assessment Level: Low Risk





Assessment and Plan


Assessment: 


Sepsis secondary to aspiration bilateral pneumonia, possibly abdominal source,


Acute hypoxic respiratory failure, suspect related to aspiration pneumonia, 

ventilator dependent


Hypotension, status post pressor support secondary to sepsis


Acute GI bleed, possible esophageal varices in a patient with significant 

alcohol dependence, on Sandostatin


Acute blood loss anemia, status post multiple transfusions of both FFP, packed 

RBCs


Acute Hepatic encephalopathy


Alcoholic liver disease with ascites


Recent posterior lateral rib fractures of 9, 10 and 11 status post fall


History of pericarditis


Chronic back pain


Polysubstance abuse, per record review; patient reports alcohol, marijuana use 

currently.


History of nicotine dependence


COPD, emphysema


Legally blind


Obesity, BMI 30.7


Osteoarthritis


Venous insufficiency


Anxiety, depression, history of, currently controlled











Plan: Continue on current medication regime ,monitoring and symptomatic 

treatment.  Lactulose 3 times a day scheduled. Maintain fluid boluses, pressor 

support recently removed this morning.  Sandostatin drip .empiric antibiotics 

Levaquin, Zosyn Follow closely with pulmonary, GI.  Close monitoring of coags.. 

Prognosis guarded given multiple complex medical issues.














The impression and plan of care has been dictated as directed.





:


I performed a history and examination of this patient,  discussed the same with 

the dictator.  I agree with the dictator's note ,documented as a scribe.  Any 

additional findings or plans will be noted.

## 2020-06-29 NOTE — XR
EXAMINATION TYPE: XR chest 1V portable

 

DATE OF EXAM: 6/29/2020

 

COMPARISON: Yesterday

 

HISTORY: Tube placement

 

TECHNIQUE: Single view

 

FINDINGS: Endotracheal tube is 3 mm from the clive. There is moderate pulmonary edema. There is naso
gastric tube in the stomach. There are chest leads.

 

IMPRESSION: There is malposition of the endotracheal tube and should BE pulled back 3 cm.

 

There is moderate pulmonary interstitial and airspace edema significantly worse than yesterday.

## 2020-06-29 NOTE — P.CNPUL
History of Present Illness


Consult date: 20


Requesting physician: Reid Stromberg


Reason for consult: hypoxemia, pneumonia


Chief complaint: Altered mental status


History of present illness: 





This is a 54-year-old white male, history of alcohol abuse, patient was brought 

into the ER with change in mental status according to EMS.  Patient could not 

give any history, he was a very poor historian upon arrival to the ER.  Patient 

is supposedly a daily drinker, however from my review of the chart, I saw this 

patient back on 3/17/20, patient presented back then with multiple posterior 

lateral rib fractures on the right.  Apparently he fell in his bathroom, and 

landed on the side of the top sustaining multiple rib fractures.  Patient susta

ined rib fractures of 910 and 11 ribs.  Patient is also known to have history of

severe emphysema/COPD.  He is legally blind.  He has history of pericarditis, 

anxiety, and history of degenerative joint disease.  Patient was discharged home

on 3/18/20.  Drug screen in the ER was positive for tricyclic antidepressants 

and positive for benzodiazepines.  Rest of the drug screen was basically 

negative.  ABG in the ER showed a pO2 of 63 pCO2 of 29 pH of 7.49.  Patient was 

also noted to have elevated INR of 3.7.  Low hemoglobin of 7.0 although his 

baseline hemoglobin from 2 months ago was 15.1.  Ammonia level was 91.  Total 

bilirubin was 14.9, and his liver enzymes were a bit elevated.  Normal amylase 

and lipase were noted.  Gallbladder ultrasound showed abdominal ascites.  Chest 

x-ray showed bilateral diffuse infiltrates.  Patient was presumed septic upon 

presentation, received fluid boluses of 2500 ML's.  Patient was found to have 

hepatic encephalopathy liver failure, bilateral pneumonia, GI hemorrhage and 

sepsis.  Early this morning, patient was intubated, placed on mechanical 

ventilation, and he was aware of the patient since admission.  Presently the 

patient is on assist control rate of 28 tidal volume is 500 FiO2 is 100% PEEP of

5.  ABG showed a pO2 of 170 pCO2 of 31 pH of 7.46.  Hence I cut down the FiO2 to

50%, increased the PEEP to 8, cut down the tidal volume to 450, and decrease the

respiratory rate to 26.  Patient is on Sandostatin for his GI bleeding, and he 

is yet to be seen by gastroenterology on consultation is also on Protonix.  He 

is on lactulose for his elevated ammonia level.  And he is empirically on Zosyn 

for presumptive aspiration pneumonia.  Echocardiogram showed evidence of good LV

function.  And mild valvular heart disease.  BNP level was borderline elevated 

at 910





Review of Systems


ROS unobtainable: due to endotracheal tube





Past Medical History


Past Medical History: Asthma, Eye Disorder, Osteoarthritis (OA), Renal Disease


Additional Past Medical History / Comment(s): Pt admitted to [facility] on 20 with

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 

opiate withdrawal/alcohol abuse possible DTs/coagulopathy, elevated LFTs, venous

insufficiency.     Other Hx:  Unsteady gait/falls, ETOH abuse with withdrawal 

symptoms in the past pt states-tremors, bilateral leg pain/numbness, chronic 

renal disease stage I, pericarditis, bilateral macular degeneration/legally 

blind, chronic back pain, RLS, bilateral carpal tunnel syndrome, past infected 

sebaceous cyst on back


History of Any Multi-Drug Resistant Organisms: None Reported


Past Surgical History: Orthopedic Surgery


Additional Past Surgical History / Comment(s): Right hand surgery d/t injury


Past Anesthesia/Blood Transfusion Reactions: No Reported Reaction


Past Psychological History: Anxiety, Depression


Additional Psychological History / Comment(s): Pt resides with his channing.  He 

has been using her walker on occasion.  He is legally blind/has no 's 

license, his channing drives.


Smoking Status: Light tobacco smoker


Past Alcohol Use History: Abuse, Daily


Additional Past Alcohol Use History / Comment(s): Pt started smoking in  and

states that he has recently cut down to a pack lasting 4-5 weeks.  Pt states he 

has hx of alcohol abuse-8 beers a day but has not drank in a few weeks.


Past Drug Use History: None Reported


Additional Drug Use History / Comment(s): Pt smoked marijuana as a teen.





- Past Family History


  ** Father


Family Medical History: Cancer


Additional Family Medical History / Comment(s): Father  of esophageal 

cancer.





  ** Mother


Family Medical History: No Reported History


Additional Family Medical History / Comment(s): Mopther is healthy





Medications and Allergies


                                Home Medications











 Medication  Instructions  Recorded  Confirmed  Type


 


Cetirizine HCl [Zyrtec] 10 mg PO DAILY 20 History


 


Omeprazole 20 mg PO DAILY 20 History


 


Folic Acid 1 mg PO DAILY #30 tablet 20 Rx


 


Multivitamins, Thera [Multivitamin 1 tab PO DAILY #30 tablet 20 

Rx





(formulary)]    


 


Thiamine [Vitamin B-1] 100 mg PO DAILY #30 tab 20 Rx


 


Albuterol Inhaler [Ventolin Hfa 1 - 2 puff INHALATION RT-Q4H PRN 20 History





Inhaler]    








                                    Allergies











Allergy/AdvReac Type Severity Reaction Status Date / Time


 


No Known Allergies Allergy   Verified 20 14:27














Physical Exam


Vitals: 


                                   Vital Signs











  Temp Pulse Pulse Resp BP BP Pulse Ox


 


 20 15:00   91   26 H  85/48   99


 


 20 14:00   96   23  95/56   99


 


 20 13:00   96   26 H  88/48   99


 


 20 12:00  98.3 F  98   26 H  93/54   99


 


 20 11:42   98     


 


 20 11:37   98     


 


 20 11:00   99   26 H  100/54   98


 


 20 10:00   98   23  93/52   97


 


 20 09:00   101 H   28 H  97/56   100


 


 20 08:00  98.5 F  101 H   17  98/57   100


 


 20 07:00   106 H   28 H  111/62   100


 


 20 06:45   109 H   28 H  111/65   99


 


 20 06:30   110 H   29 H  115/55   99


 


 20 06:25  98.6 F  109 H   28 H  112/50   100


 


 20 06:19  98.6 F   112 H  28 H   115/55  99


 


 20 06:15   112 H   28 H  123/58   98


 


 20 06:00   113 H   27 H  118/61   99


 


 20 05:45   113 H   28 H  113/64   98


 


 20 05:30   115 H   28 H  115/60   98


 


 20 05:15   115 H   27 H  118/61   98


 


 20 05:00   117 H   25 H  119/61   95


 


 20 04:45   115 H   23  121/59   97


 


 20 04:30   116 H   24  112/79   95


 


 20 04:20  98.6 F  114 H   26 H  121/59   96


 


 20 04:15  98.7 F  113 H   20  119/61   96


 


 20 04:07  98.6 F  114 H   26 H  117/66   96


 


 20 04:05  98.0 F  116 H   28 H  110/57   95


 


 20 04:00  98.6 F  116 H   27 H  115/59   95


 


 20 03:30   102 H   18  130/68   95


 


 20 03:00   120 H   41 H  97/66   92 L


 


 20 02:55  98.6 F  116 H   24  118/50   92 L


 


 20 02:53  99.0 F  120 H   20  97/66   90 L


 


 20 02:30   121 H   37 H  117/93   86 L


 


 20 02:25  98.8 F  120 H   33 H  117/93   90 L


 


 20 02:15  98.6 F  120 H   26 H  106/51   90 L


 


 20 02:00   116 H   29 H  98/71   93 L


 


 20 01:54  98.6 F  117 H   30 H  125/77   91 L


 


 20 01:30   116 H   33 H  92/64   91 L


 


 20 01:00   112 H   30 H  99/62   91 L


 


 20 00:30   112 H   17  110/60   88 L


 


 20 00:25  98.7 F  112 H   24  100/65   91 L


 


 20 00:17   114 H   30 H  102/31   90 L


 


 20 00:05  98.6 F  114 H   13  102/31   92 L


 


 20 00:00  98.6 F  113 H   16  79/55   94 L


 


 20 23:55  98.6 F  114 H   25 H  110/60   91 L


 


 20 23:45  98.6 F  115 H   29 H  110/70   92 L


 


 20 23:37  98.8 F  114 H   24  98/68   92 L


 


 20 23:30   112 H   14  87/62   93 L


 


 20 23:07  98.6 F  115 H   25 H  92/64   91 L


 


 06/28/20 23:01  98.6 F  112 H   18  79/55   93 L


 


 20 23:00   116 H   20  128/89   88 L


 


 20 22:57  98.6 F  112 H   20  83/46   92 L


 


 20 22:30   110 H   12  109/81   88 L


 


 20 22:05  98.2 F  116 H   18  128/89  


 


 20 22:00   105 H   25 H  112/58   92 L


 


 20 21:50  98.5 F  108 H   15  112/58   89 L


 


 20 21:35  99.0 F  114 H   20  78/44   93 L


 


 20 21:30   111 H   20  99/71   92 L


 


 20 21:25  98.9 F  112 H   21  94/76   93 L


 


 20 21:00   111 H   25 H  102/81   94 L


 


 20 20:30   110 H   26 H  95/74   90 L


 


 20 20:29        91 L


 


 20 20:00  98.9 F  112 H   21  97/63   90 L


 


 20 19:30   114 H   24  94/58   92 L


 


 20 19:12  98.7 F  117 H   18  109/26   94 L


 


 20 19:09   117 H   17  83/38   89 L


 


 20 18:53  98.9 F  116 H   19  125/67  


 


 20 18:23  98.6 F  113 H   15  97/58  


 


 20 18:13  98.5 F  110 H   17  134/65  


 


 20 18:00   110 H   21  112/67   93 L


 


 20 17:30   121 H   22  98/50   93 L


 


 20 17:00   118 H   24  98/50   93 L


 


 20 16:51  98.9 F   109 H  16   89/45  90 L


 


 20 16:30   121 H   24  87/48   93 L


 


 20 16:28   120 H   20  98/51   92 L


 


 20 16:14        93 L


 


 20 16:00  98.8 F  104 H   13  85/42   89 L


 


 20 15:30   118 H   27 H  136/65   87 L








                                Intake and Output











 0620





 06:59 14:59 22:59


 


Intake Total 2869.192 1203.2 112.5


 


Output Total 315 270 15


 


Balance 2554.192 933.2 97.5


 


Intake:   


 


   1048.0 112.5


 


    0.9 650 760 100


 


    Octreotide 500 mcg In  188.0 12.5





    Sodium Chloride 0.9% 250   





    ml @ 25 MCG/HR 12.5 mls/   





    hr IV .Q20H RISA Rx#:   





    [id number]   


 


    Piperacillin-Tazobactam 3  100 





    .375 gm In Sodium   





    Chloride 0.9% 100 ml @ 25   





    mls/hr IVPB Q8HR RISA Rx#   





    [id number]   


 


  Intake, IV Titration 68.192 80.2 0





  Amount   


 


    Norepinephrine 4 mg In 41.157  0





    Sodium Chloride 0.9% 250   





    ml @ 0.05 MCG/KG/MIN 15.   





    338 mls/hr IV .[id number]   





    RISA Rx#:[id number]   


 


    Propofol 1,000 mg In 27.035 80.2 





    Empty Bag 1 bag @ Titrate   





    IV .Q0M RISA Rx#:   





    [id number]   


 


  Oral  75 


 


  Blood Product 2151  


 


    Ffp 24 Cpd  Unit [address]  





    [id number]   


 


    Ffp 24 Cpd  Unit 334  





    [id number]   


 


    Ffp 24 Cpd  Unit 288  





    [id number]   


 


    Rc Pheresis 2 As3  Unit 310  





    [id number]   


 


    Rc Pheresis 2 As3  Unit 310  





    [id number]   


 


Output:   


 


  Urine 315 270 15


 


Other:   


 


  Voiding Method Indwelling Catheter Indwelling Catheter 


 


  Weight 80.2 kg 80.2 kg 














Physical Exam: Revealed 54-year-old white male on mechanical ventilation, 

sedated, on propofol.  In no distress.  Hemodynamically stable.


Head: Atraumatic, normocephalic.


HEENT:[PERRLA, EOMI, positive icterus.  No neck masses, no JVD, no stridor, 

endotracheal tube and orogastric tube are intact.


Chest: Crackles and rhonchi bilaterally.  No wheezes.  Symmetrical chest 

expansion.]


Cardiac Exam: [Normal S1 and S2, no S3 gallop, 2/6 systolic murmur thought the 

precordium.]


Abdomen: [Soft, nontender, suspect positive ascites, no rebound, no guarding, 

positive bowel sounds.


Extremities: [No clubbing, trace of bipedal edema, no cyanosis.]


Neurological Exam: Cannot be assessed, patient is fully sedated, on mechanical 

ventilation.


Psychiatric: Could not be assessed.


Skin: No rashes.





Results





- Laboratory Findings


CBC and BMP: 


                                 20 11:04





                                 20 06:09


ABG











ABG pH  7.46  (7.35-7.45)  H  20  07:54    


 


ABG pCO2  31 mmHg (35-45)  L  20  07:54    


 


ABG pO2  170 mmHg ()  H  20  07:54    


 


ABG O2 Saturation  99.8 % (94-97)  H  20  07:54    





PT/INR, D-dimer











PT  21.1 sec (9.0-12.0)  H  20  11:04    


 


INR  2.2  (<1.2)  H  20  11:04    








Abnormal lab findings: 


                                  Abnormal Labs











  20





  14:10 14:13 14:13


 


WBC   14.5 H 


 


RBC   2.17 L 


 


Hgb   8.3 L 


 


Hct   24.3 L 


 


MCV   111.7 H 


 


MCH   38.3 H 


 


RDW   20.6 H 


 


Plt Count   86 L 


 


Neutrophils #   


 


Neutrophils # (Manual)   12.33 H 


 


Macrocytosis   Marked A 


 


PT    35.9 H


 


INR    3.7 H


 


APTT    46.0 H


 


ABG pH   


 


ABG pCO2   


 


ABG pO2   


 


ABG O2 Saturation   


 


Sodium   


 


Potassium   


 


Chloride   


 


Carbon Dioxide   


 


BUN   


 


Glucose   


 


POC Glucose (mg/dL)   


 


Plasma Lactic Acid Gagandeep   


 


Calcium   


 


Total Bilirubin   


 


AST   


 


Alkaline Phosphatase   


 


Ammonia   


 


Albumin   


 


Amylase   


 


Urine Protein   


 


Urine Ketones   


 


Urine Bilirubin   


 


Ur Leukocyte Esterase   


 


Amorphous Sediment   


 


Urine Bacteria   


 


Urine Mucus   


 


U Tricyclic Antidepress   


 


U Benzodiazepines Scrn   


 


Crossmatch  See Detail  














  20





  14:13 14:13 14:13


 


WBC   


 


RBC   


 


Hgb   


 


Hct   


 


MCV   


 


MCH   


 


RDW   


 


Plt Count   


 


Neutrophils #   


 


Neutrophils # (Manual)   


 


Macrocytosis   


 


PT   


 


INR   


 


APTT   


 


ABG pH   


 


ABG pCO2   


 


ABG pO2   


 


ABG O2 Saturation   


 


Sodium   131 L 


 


Potassium   


 


Chloride   


 


Carbon Dioxide   


 


BUN   29 H 


 


Glucose   101 H 


 


POC Glucose (mg/dL)   


 


Plasma Lactic Acid Gagandeep    2.6 H*


 


Calcium   7.4 L 


 


Total Bilirubin   16.1 H* 


 


AST   137 H 


 


Alkaline Phosphatase   138 H 


 


Ammonia    91 H


 


Albumin   2.3 L 


 


Amylase   <30 L 


 


Urine Protein  1+ H  


 


Urine Ketones  1+ H  


 


Urine Bilirubin  4+ H  


 


Ur Leukocyte Esterase  Trace H  


 


Amorphous Sediment  Moderate H  


 


Urine Bacteria  Occasional H  


 


Urine Mucus  Few H  


 


U Tricyclic Antidepress  Detected H  


 


U Benzodiazepines Scrn  Detected H  


 


Crossmatch   














  20





  16:50 17:24 19:19


 


WBC    14.8 H


 


RBC    2.08 L


 


Hgb    8.1 L


 


Hct    23.8 L


 


MCV    114.0 H


 


MCH    38.8 H


 


RDW    21.0 H


 


Plt Count    75 L


 


Neutrophils #    11.6 H


 


Neutrophils # (Manual)   


 


Macrocytosis    Marked A


 


PT   


 


INR   


 


APTT   


 


ABG pH   


 


ABG pCO2   


 


ABG pO2   


 


ABG O2 Saturation   


 


Sodium   


 


Potassium   


 


Chloride   


 


Carbon Dioxide   


 


BUN   


 


Glucose   


 


POC Glucose (mg/dL)  193 H  


 


Plasma Lactic Acid Gagandeep   3.1 H* 


 


Calcium   


 


Total Bilirubin   


 


AST   


 


Alkaline Phosphatase   


 


Ammonia   


 


Albumin   


 


Amylase   


 


Urine Protein   


 


Urine Ketones   


 


Urine Bilirubin   


 


Ur Leukocyte Esterase   


 


Amorphous Sediment   


 


Urine Bacteria   


 


Urine Mucus   


 


U Tricyclic Antidepress   


 


U Benzodiazepines Scrn   


 


Crossmatch   














  20





  19:19 20:03 20:45


 


WBC   


 


RBC   


 


Hgb   


 


Hct   


 


MCV   


 


MCH   


 


RDW   


 


Plt Count   


 


Neutrophils #   


 


Neutrophils # (Manual)   


 


Macrocytosis   


 


PT   


 


INR   


 


APTT   


 


ABG pH    7.49 H


 


ABG pCO2    29 L


 


ABG pO2    63 L


 


ABG O2 Saturation    92.0 L


 


Sodium  134 L  


 


Potassium   


 


Chloride  108 H  


 


Carbon Dioxide  19 L  


 


BUN  29 H  


 


Glucose  111 H  


 


POC Glucose (mg/dL)   


 


Plasma Lactic Acid Gagandeep   2.2 H* 


 


Calcium  7.3 L  


 


Total Bilirubin  14.9 H  


 


AST  139 H  


 


Alkaline Phosphatase  137 H  


 


Ammonia   


 


Albumin  2.3 L  


 


Amylase   


 


Urine Protein   


 


Urine Ketones   


 


Urine Bilirubin   


 


Ur Leukocyte Esterase   


 


Amorphous Sediment   


 


Urine Bacteria   


 


Urine Mucus   


 


U Tricyclic Antidepress   


 


U Benzodiazepines Scrn   


 


Crossmatch   














  20





  22:50 22:50 22:50


 


WBC   13.4 H 


 


RBC   1.89 L 


 


Hgb   7.0 L 


 


Hct   21.6 L 


 


MCV   114.4 H 


 


MCH   37.3 H 


 


RDW   20.9 H 


 


Plt Count   72 L 


 


Neutrophils #   10.2 H 


 


Neutrophils # (Manual)   


 


Macrocytosis   Marked A 


 


PT   


 


INR   


 


APTT   


 


ABG pH   


 


ABG pCO2   


 


ABG pO2   


 


ABG O2 Saturation   


 


Sodium    135 L


 


Potassium   


 


Chloride   


 


Carbon Dioxide    20 L


 


BUN    26 H


 


Glucose   


 


POC Glucose (mg/dL)   


 


Plasma Lactic Acid Gagandeep  2.1 H*  


 


Calcium    6.5 L


 


Total Bilirubin    14.9 H


 


AST    127 H


 


Alkaline Phosphatase   


 


Ammonia   


 


Albumin    2.5 L


 


Amylase   


 


Urine Protein   


 


Urine Ketones   


 


Urine Bilirubin   


 


Ur Leukocyte Esterase   


 


Amorphous Sediment   


 


Urine Bacteria   


 


Urine Mucus   


 


U Tricyclic Antidepress   


 


U Benzodiazepines Scrn   


 


Crossmatch   














  20





  00:00 04:38 06:09


 


WBC    15.2 H


 


RBC    2.55 L


 


Hgb    9.2 L D


 


Hct    27.2 L


 


MCV    107.0 H D


 


MCH    36.2 H


 


RDW    23.8 H


 


Plt Count    79 L


 


Neutrophils #    13.0 H


 


Neutrophils # (Manual)   


 


Macrocytosis    Marked A


 


PT   


 


INR   


 


APTT   


 


ABG pH   7.25 L 


 


ABG pCO2   52 H 


 


ABG pO2   79 L 


 


ABG O2 Saturation   92.6 L 


 


Sodium   


 


Potassium   


 


Chloride   


 


Carbon Dioxide   


 


BUN   


 


Glucose   


 


POC Glucose (mg/dL)  100 H  


 


Plasma Lactic Acid Gagandeep   


 


Calcium   


 


Total Bilirubin   


 


AST   


 


Alkaline Phosphatase   


 


Ammonia   


 


Albumin   


 


Amylase   


 


Urine Protein   


 


Urine Ketones   


 


Urine Bilirubin   


 


Ur Leukocyte Esterase   


 


Amorphous Sediment   


 


Urine Bacteria   


 


Urine Mucus   


 


U Tricyclic Antidepress   


 


U Benzodiazepines Scrn   


 


Crossmatch   














  20





  06:09 06:09 07:54


 


WBC   


 


RBC   


 


Hgb   


 


Hct   


 


MCV   


 


MCH   


 


RDW   


 


Plt Count   


 


Neutrophils #   


 


Neutrophils # (Manual)   


 


Macrocytosis   


 


PT  18.5 H  


 


INR  1.9 H  


 


APTT   


 


ABG pH    7.46 H


 


ABG pCO2    31 L


 


ABG pO2    170 H


 


ABG O2 Saturation    99.8 H


 


Sodium   


 


Potassium   5.5 H 


 


Chloride   112 H 


 


Carbon Dioxide   18 L 


 


BUN   27 H 


 


Glucose   


 


POC Glucose (mg/dL)   


 


Plasma Lactic Acid Gagandeep   


 


Calcium   7.1 L 


 


Total Bilirubin   15.8 H* 


 


AST   176 H 


 


Alkaline Phosphatase   129 H 


 


Ammonia   


 


Albumin   3.1 L 


 


Amylase   


 


Urine Protein   


 


Urine Ketones   


 


Urine Bilirubin   


 


Ur Leukocyte Esterase   


 


Amorphous Sediment   


 


Urine Bacteria   


 


Urine Mucus   


 


U Tricyclic Antidepress   


 


U Benzodiazepines Scrn   


 


Crossmatch   














  20





  11:04 11:04 11:59


 


WBC  12.5 H  


 


RBC  2.46 L  


 


Hgb  8.5 L  


 


Hct  25.7 L  


 


MCV  104.6 H  


 


MCH   


 


RDW  23.9 H  


 


Plt Count  64 L  


 


Neutrophils #   


 


Neutrophils # (Manual)   


 


Macrocytosis  Marked A  


 


PT   21.1 H 


 


INR   2.2 H 


 


APTT   


 


ABG pH   


 


ABG pCO2   


 


ABG pO2   


 


ABG O2 Saturation   


 


Sodium   


 


Potassium   


 


Chloride   


 


Carbon Dioxide   


 


BUN   


 


Glucose   


 


POC Glucose (mg/dL)    124 H


 


Plasma Lactic Acid Gagandeep   


 


Calcium   


 


Total Bilirubin   


 


AST   


 


Alkaline Phosphatase   


 


Ammonia   


 


Albumin   


 


Amylase   


 


Urine Protein   


 


Urine Ketones   


 


Urine Bilirubin   


 


Ur Leukocyte Esterase   


 


Amorphous Sediment   


 


Urine Bacteria   


 


Urine Mucus   


 


U Tricyclic Antidepress   


 


U Benzodiazepines Scrn   


 


Crossmatch   














- Diagnostic Findings


Chest x-ray: image reviewed (Bilateral air space disease, suspect aspiration 

pneumonia.  Cannot rule out pulmonary edema)





Assessment and Plan


Assessment: 





Impression:





Acute hypoxic respiratory failure, suspect aspiration pneumonia.


Acute sepsis, likely source is aspiration pneumonia, although abdominal source 

is not entirely ruled out.


Alcohol liver disease with ascites.  Jaundice, and elevated liver enzymes.


Blood loss anemia, most likely the patient has acute or subacute GI bleeding, 

hence GI was consulted.  Possible esophageal varices and that is yet to be 

determined.


Acute hepatic encephalopathy with significantly elevated ammonia level.


Recent history of fall about 2 months ago, and multiple right-sided rib 

fractures.


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


History of pericarditis


Chronic back pain


History of restless leg syndrome.





Recommendation:


Continue ventilatory support.


Continue fluid boluses and consider pressors if blood pressure does not improve 

much with fluids.


GI to see on consultation, patient may need EGD


Continue octreotide.


Empiric antibiotics/Zosyn.


Continue to monitor lactic acid, and check blood cultures.


Address his coagulopathy and give fresh frozen plasma if needed.


Transfused to a hemoglobin of above 7.  Patient already received 2 units of 

packed RBCs since admission and 4 units of fresh frozen plasma.  Most recent 

hemoglobin is 8.5.


Patient is extremely ill, we will likely arrange for central line placement and 

arterial line placement, prognosis is extremely poor and guarded.


We'll continue to follow.  Critical care time is 55 minutes











Time with Patient: Greater than 30

## 2020-06-29 NOTE — OP
OPERATIVE REPORT



OPERATIVE REPORT:

Placement of right radial arterial line.



PREOPERATIVE DIAGNOSIS:

Sepsis and hypotension.



POSTOPERATIVE DIAGNOSIS:

Sepsis and hypotension.



ANESTHESIA USED:

None deployed.



PROCEDURE DESCRIPTION:

The right wrist was prepared in a sterile fashion.  Drapes were applied.  The right

radial artery was palpated, cannulated easily and a guidewire was placed.  A Cook's

catheter was inserted over the guidewire, and the guidewire was removed.  Good blood

flow and good waveform were noted; no evidence of any immediate complications.  The

line was secured using 3.0 silk sutures.





MMODL / IJN: [id number] / Job#: [id number]

## 2020-06-30 LAB
ALBUMIN SERPL-MCNC: 2.3 G/DL (ref 3.5–5)
ALP SERPL-CCNC: 118 U/L (ref 38–126)
ALT SERPL-CCNC: 36 U/L (ref 4–49)
ANION GAP SERPL CALC-SCNC: 7 MMOL/L
AST SERPL-CCNC: 132 U/L (ref 17–59)
BASOPHILS # BLD AUTO: 0.1 K/UL (ref 0–0.2)
BASOPHILS NFR BLD AUTO: 1 %
BUN SERPL-SCNC: 25 MG/DL (ref 9–20)
CALCIUM SPEC-MCNC: 7.2 MG/DL (ref 8.4–10.2)
CHLORIDE SERPL-SCNC: 113 MMOL/L (ref 98–107)
CO2 BLDA-SCNC: 22 MMOL/L (ref 19–24)
CO2 SERPL-SCNC: 19 MMOL/L (ref 22–30)
EOSINOPHIL # BLD AUTO: 0.2 K/UL (ref 0–0.7)
EOSINOPHIL NFR BLD AUTO: 1 %
ERYTHROCYTE [DISTWIDTH] IN BLOOD BY AUTOMATED COUNT: 2.46 M/UL (ref 4.3–5.9)
ERYTHROCYTE [DISTWIDTH] IN BLOOD: 24.2 % (ref 11.5–15.5)
GLUCOSE BLD-MCNC: 130 MG/DL (ref 75–99)
GLUCOSE BLD-MCNC: 203 MG/DL (ref 75–99)
GLUCOSE SERPL-MCNC: 130 MG/DL (ref 74–99)
HCO3 BLDA-SCNC: 21 MMOL/L (ref 21–25)
HCT VFR BLD AUTO: 26 % (ref 39–53)
HGB BLD-MCNC: 8.6 GM/DL (ref 13–17.5)
INR PPP: 2.1 (ref ?–1.2)
LYMPHOCYTES # SPEC AUTO: 1.5 K/UL (ref 1–4.8)
LYMPHOCYTES NFR SPEC AUTO: 10 %
MCH RBC QN AUTO: 35 PG (ref 25–35)
MCHC RBC AUTO-ENTMCNC: 33.2 G/DL (ref 31–37)
MCV RBC AUTO: 105.5 FL (ref 80–100)
MONOCYTES # BLD AUTO: 0.6 K/UL (ref 0–1)
MONOCYTES NFR BLD AUTO: 4 %
NEUTROPHILS # BLD AUTO: 12 K/UL (ref 1.3–7.7)
NEUTROPHILS NFR BLD AUTO: 82 %
PCO2 BLDA: 39 MMHG (ref 35–45)
PH BLDA: 7.33 [PH] (ref 7.35–7.45)
PLATELET # BLD AUTO: 66 K/UL (ref 150–450)
PO2 BLDA: 67 MMHG (ref 83–108)
POTASSIUM SERPL-SCNC: 3.3 MMOL/L (ref 3.5–5.1)
PROT SERPL-MCNC: 6.6 G/DL (ref 6.3–8.2)
PT BLD: 20.8 SEC (ref 9–12)
SODIUM SERPL-SCNC: 139 MMOL/L (ref 137–145)
WBC # BLD AUTO: 14.7 K/UL (ref 3.8–10.6)

## 2020-06-30 RX ADMIN — PROPOFOL SCH MLS/HR: 10 INJECTION, EMULSION INTRAVENOUS at 20:23

## 2020-06-30 RX ADMIN — MINERAL OIL AND PETROLATUM SCH: 150; 830 OINTMENT OPHTHALMIC at 11:56

## 2020-06-30 RX ADMIN — DEXTRAN 70 AND HYPROMELLOSE 2910 SCH DROPS: 1; 3 SOLUTION/ DROPS OPHTHALMIC at 06:08

## 2020-06-30 RX ADMIN — IPRATROPIUM BROMIDE AND ALBUTEROL SULFATE SCH ML: .5; 3 SOLUTION RESPIRATORY (INHALATION) at 21:07

## 2020-06-30 RX ADMIN — MINERAL OIL AND PETROLATUM SCH APPLIC: 150; 830 OINTMENT OPHTHALMIC at 20:23

## 2020-06-30 RX ADMIN — PIPERACILLIN AND TAZOBACTAM SCH MLS/HR: 3; .375 INJECTION, POWDER, FOR SOLUTION INTRAVENOUS at 09:20

## 2020-06-30 RX ADMIN — FUROSEMIDE SCH MG: 10 INJECTION, SOLUTION INTRAMUSCULAR; INTRAVENOUS at 11:56

## 2020-06-30 RX ADMIN — CHLORHEXIDINE GLUCONATE SCH ML: 1.2 RINSE ORAL at 20:24

## 2020-06-30 RX ADMIN — PROPOFOL SCH MLS/HR: 10 INJECTION, EMULSION INTRAVENOUS at 17:01

## 2020-06-30 RX ADMIN — MINERAL OIL AND PETROLATUM SCH APPLIC: 150; 830 OINTMENT OPHTHALMIC at 04:02

## 2020-06-30 RX ADMIN — PANTOPRAZOLE SODIUM SCH MG: 40 INJECTION, POWDER, FOR SOLUTION INTRAVENOUS at 20:24

## 2020-06-30 RX ADMIN — POTASSIUM CHLORIDE SCH MLS/HR: 14.9 INJECTION, SOLUTION INTRAVENOUS at 09:20

## 2020-06-30 RX ADMIN — MINERAL OIL AND PETROLATUM SCH: 150; 830 OINTMENT OPHTHALMIC at 15:38

## 2020-06-30 RX ADMIN — SPIRONOLACTONE SCH MG: 25 TABLET, FILM COATED ORAL at 17:02

## 2020-06-30 RX ADMIN — SPIRONOLACTONE SCH MG: 25 TABLET, FILM COATED ORAL at 20:25

## 2020-06-30 RX ADMIN — PROPOFOL SCH MLS/HR: 10 INJECTION, EMULSION INTRAVENOUS at 06:14

## 2020-06-30 RX ADMIN — SODIUM CHLORIDE SCH MLS/HR: 900 INJECTION, SOLUTION INTRAVENOUS at 11:51

## 2020-06-30 RX ADMIN — DEXTRAN 70 AND HYPROMELLOSE 2910 SCH DROPS: 1; 3 SOLUTION/ DROPS OPHTHALMIC at 11:55

## 2020-06-30 RX ADMIN — CEFAZOLIN SCH MLS/HR: 330 INJECTION, POWDER, FOR SOLUTION INTRAMUSCULAR; INTRAVENOUS at 06:10

## 2020-06-30 RX ADMIN — LACTULOSE SCH GM: 20 SOLUTION ORAL at 20:25

## 2020-06-30 RX ADMIN — DEXTRAN 70 AND HYPROMELLOSE 2910 SCH DROPS: 1; 3 SOLUTION/ DROPS OPHTHALMIC at 15:37

## 2020-06-30 RX ADMIN — CHLORHEXIDINE GLUCONATE SCH ML: 1.2 RINSE ORAL at 09:21

## 2020-06-30 RX ADMIN — LACTULOSE SCH GM: 20 SOLUTION ORAL at 15:37

## 2020-06-30 RX ADMIN — PIPERACILLIN AND TAZOBACTAM SCH MLS/HR: 3; .375 INJECTION, POWDER, FOR SOLUTION INTRAVENOUS at 15:36

## 2020-06-30 RX ADMIN — MORPHINE SULFATE PRN MG: 2 INJECTION, SOLUTION INTRAMUSCULAR; INTRAVENOUS at 07:01

## 2020-06-30 RX ADMIN — LACTULOSE SCH GM: 20 SOLUTION ORAL at 09:21

## 2020-06-30 RX ADMIN — NOREPINEPHRINE BITARTRATE SCH MLS/HR: 1 INJECTION, SOLUTION, CONCENTRATE INTRAVENOUS at 04:06

## 2020-06-30 RX ADMIN — IPRATROPIUM BROMIDE AND ALBUTEROL SULFATE SCH ML: .5; 3 SOLUTION RESPIRATORY (INHALATION) at 15:27

## 2020-06-30 RX ADMIN — DEXTRAN 70 AND HYPROMELLOSE 2910 SCH DROPS: 1; 3 SOLUTION/ DROPS OPHTHALMIC at 20:23

## 2020-06-30 RX ADMIN — PROPOFOL SCH MLS/HR: 10 INJECTION, EMULSION INTRAVENOUS at 10:56

## 2020-06-30 RX ADMIN — IPRATROPIUM BROMIDE AND ALBUTEROL SULFATE SCH ML: .5; 3 SOLUTION RESPIRATORY (INHALATION) at 11:08

## 2020-06-30 RX ADMIN — POTASSIUM CHLORIDE SCH MLS/HR: 14.9 INJECTION, SOLUTION INTRAVENOUS at 06:14

## 2020-06-30 RX ADMIN — DEXTRAN 70 AND HYPROMELLOSE 2910 SCH DROPS: 1; 3 SOLUTION/ DROPS OPHTHALMIC at 09:36

## 2020-06-30 RX ADMIN — OCTREOTIDE ACETATE SCH MLS/HR: 200 INJECTION, SOLUTION INTRAVENOUS; SUBCUTANEOUS at 04:04

## 2020-06-30 RX ADMIN — CEFAZOLIN SCH MLS/HR: 330 INJECTION, POWDER, FOR SOLUTION INTRAMUSCULAR; INTRAVENOUS at 12:39

## 2020-06-30 RX ADMIN — MORPHINE SULFATE PRN MG: 2 INJECTION, SOLUTION INTRAMUSCULAR; INTRAVENOUS at 00:26

## 2020-06-30 RX ADMIN — IPRATROPIUM BROMIDE AND ALBUTEROL SULFATE SCH ML: .5; 3 SOLUTION RESPIRATORY (INHALATION) at 07:31

## 2020-06-30 RX ADMIN — PROPOFOL SCH MLS/HR: 10 INJECTION, EMULSION INTRAVENOUS at 01:46

## 2020-06-30 RX ADMIN — SPIRONOLACTONE SCH MG: 25 TABLET, FILM COATED ORAL at 11:57

## 2020-06-30 RX ADMIN — FUROSEMIDE SCH MG: 10 INJECTION, SOLUTION INTRAMUSCULAR; INTRAVENOUS at 20:24

## 2020-06-30 RX ADMIN — PANTOPRAZOLE SODIUM SCH MG: 40 INJECTION, POWDER, FOR SOLUTION INTRAVENOUS at 09:20

## 2020-06-30 RX ADMIN — MORPHINE SULFATE PRN MG: 2 INJECTION, SOLUTION INTRAMUSCULAR; INTRAVENOUS at 04:02

## 2020-06-30 RX ADMIN — MINERAL OIL AND PETROLATUM SCH: 150; 830 OINTMENT OPHTHALMIC at 10:37

## 2020-06-30 RX ADMIN — NOREPINEPHRINE BITARTRATE SCH MLS/HR: 1 INJECTION, SOLUTION, CONCENTRATE INTRAVENOUS at 17:02

## 2020-06-30 NOTE — P.PN
Subjective


Progress Note Date: 06/30/20





This is a 54-year-old gentleman, history of polysubstance abuse including 

alcohol abuse , falls with recent rib fractures, legally blind, osteoarthritis 

and multiple other medical issues presented to the ER with changes in mental 

status 2 days.  Drug screen positive for tricyclics and benzos, serum alcohol 

less than 10.  UA reported dark brown cloudy urine with occasional bacteria, 

hyaline casts, 2 WBCs, trace leukocytes, 4+ bilirubin, negative for nitrates. 

Ammonia level 91, T bili 14.9, currently 15.8 , elevated LFTs .Gallbladder 

ultrasound reporting abdominal ascites, abdominal x-ray nonacute, right foot x-

ray reported no fracture, soft tissue swelling, EKG reported sinus tachycardia, 

troponin normal, , echo reported normal LV function, EF 60-65% ,lactic 

acid 2.1 now down to 1.7, BUN 25, creatinine 0.8 chest x-ray reporting moderate 

pulmonary interstitial and airspace edema significantly worse than yesterday, 

pulmonary edema.  ABGs noted.  INR on admission 3.7, down to 2.2 Sepsis 

protocol, Received IV fluid resuscitation, IV antibiotics, cultures drawn.  

Developed respiratory failure, requiring intubation during the night.  Currently

on Sandostatin drip.  3 maroon stools during the night.  Received 4 units of FFP

and 2 units of packed RBCs to date.  Hemoglobin currently 8.5.  And had required

pressor support with Levophed off since 06 100.  Maintained on IV fluid 

resuscitation.  Telemetry sinus rhythm.  Potassium 3.2, magnesium 1.7. 








06/30/2020 chest x-ray reporting persistent bilateral scattered airspace 

infiltrates, pleural effusion unchanged.  Ventilator dependent, on FiO2 of 50 

with PEEP increased to 12.  Continues on lactulose with ammonia down to 80.  T 

bili decreased to 11.3, LFTs improving.  No further maroon stools.  Small black 

stool this morning.  Hemoglobin 8.6.  Telemetry sinus rhythm to sinus tach.  

Sandostatin drip discontinued this morning.  Last night patient asynchronous 

with vent, stacking breaths, Nimbex drip initiated.  This morning Nimbex 

discontinued, changed to fentanyl drip.  Requiring pressor support, Levophed 

resumed.  Received vitamin K yesterday, INR 2.1.  Marginal urine output, IV 

fluids decreased, and Lasix IV push added to med regime.  Tube feedings ordered.

 Afebrile, WBC 14.7.  Sputum culture pending, preliminary blood cultures 

negative at 48 hours.  ABGs noted.





Objective





- Vital Signs


Vital signs: 


                                   Vital Signs











Temp  97.2 F L  06/30/20 16:00


 


Pulse  105 H  06/30/20 16:00


 


Resp  26 H  06/30/20 16:00


 


BP  91/51   06/30/20 16:00


 


Pulse Ox  96   06/30/20 16:00








                                 Intake & Output











 06/29/20 06/30/20 06/30/20





 18:59 06:59 18:59


 


Intake Total 6393.443 9240.644 1407.555


 


Output Total 375 1192 268


 


Balance 7970.972 0889.644 1139.555


 


Weight 80.2 kg 87.2 kg 87.2 kg


 


Intake:   


 


  IV 1548.0 1940.0 1077.5


 


    0.9 1160 1290 130


 


    Levofloxacin 750Mg-D5w  150 





    Pmx 750 mg In Dextrose/   





    Water 1 150ml.bag @ 100   





    mls/hr IVPB Q24H [facility] Rx#:   





    [id number]   


 


    Octreotide 500 mcg In 238.0 150.0 37.5





    Sodium Chloride 0.9% 250   





    ml @ 25 MCG/HR 12.5 mls/   





    hr IV .Q20H [facility] Rx#:   





    [id number]   


 


    Piperacillin-Tazobactam 3 150 150 200





    .375 gm In Sodium   





    Chloride 0.9% 100 ml @ 25   





    mls/hr IVPB Q8HR [facility] Rx#   





    :[id number]   


 


    Potassium Chloride 20 meq  200 100





    In Water For Injection 1   





    100ml.bag @ 50 mls/hr   





    IVPB Q2H [facility] Rx#:   





    [id number]   


 


    Sodium Chloride 0.9% 1,   610





    000 ml @ 50 mls/hr IV .   





    Q20H [facility] Rx#:[id number]   


 


  Intake, IV Titration 195.231 674.644 300.055





  Amount   


 


    Cisatracurium 200 mg In   48.521





    Sodium Chloride 0.9% 180   





    ml @ 1 MCG/KG/MIN 4.812   





    mls/hr IV .Q24H [facility] Rx#:   





    [id number]   


 


    Norepinephrine 4 mg In 15.031 208.896 151.534





    Sodium Chloride 0.9% 250   





    ml @ 0.05 MCG/KG/MIN 15.   





    338 mls/hr IV .[id number]   





    [facility] Rx#:[id number]   


 


    Octreotide 500 mcg In  246.25 





    Sodium Chloride 0.9% 250   





    ml @ 25 MCG/HR 12.5 mls/   





    hr IV .Q20H [facility] Rx#:   





    [id number]   


 


    Propofol 1,000 mg In 180.2 219.498 100





    Empty Bag 1 bag @ Titrate   





    IV .Q0M [facility] Rx#:   





    [id number]   


 


  Oral 150  30


 


Output:   


 


  Urine 375 1192 268


 


Other:   


 


  Voiding Method Indwelling Catheter Indwelling Catheter Indwelling Catheter








                       ABP, PAP, CO, CI - Last Documented











Arterial Blood Pressure        114/50

















- Exam


VITAL SIGNS: As above


GENERAL: Sitting up in bed, intubated, sedated


HEENT:  Conjunctivae normal.  Positive icterus, jaundice, NG,OG tubes present.


NECK:  No JVD. No thyroid enlargement. No LNs


CARDIOVASCULAR:  S1, S2 regular.  Systolic murmur


RESPIRATION: Coarse Breath sounds diminished in the bases with scattered 

bilateral rhonchi and crackles


ABDOMEN:  Soft, distended, positive ascites,  positive bowel sounds


Extremities: Trace bilateral lower extremity edema, no clubbing, no cyanosis.  

Dorsal Right foot injury/deformity-orthopedic evaluation pending


NERVOUS SYSTEM: Unable to assess, sedated and intubated.


Skin: Warm and dry, no rash 








- Labs


CBC & Chem 7: 


                                 06/30/20 05:05





                                 06/30/20 14:25


Labs: 


                  Abnormal Lab Results - Last 24 Hours (Table)











  06/29/20 06/29/20 06/30/20 Range/Units





  17:30 17:34 00:04 


 


WBC     (3.8-10.6)  k/uL


 


RBC     (4.30-5.90)  m/uL


 


Hgb     (13.0-17.5)  gm/dL


 


Hct     (39.0-53.0)  %


 


MCV     (80.0-100.0)  fL


 


RDW     (11.5-15.5)  %


 


Plt Count     (150-450)  k/uL


 


Neutrophils #     (1.3-7.7)  k/uL


 


Macrocytosis     


 


PT     (9.0-12.0)  sec


 


INR     (<1.2)  


 


ABG pH     (7.35-7.45)  


 


ABG pCO2   32 L   (35-45)  mmHg


 


ABG pO2   66 L   ()  mmHg


 


ABG HCO3   20 L   (21-25)  mmol/L


 


ABG O2 Saturation   92.9 L   (94-97)  %


 


Potassium  3.2 L    (3.5-5.1)  mmol/L


 


Chloride  113 H    ()  mmol/L


 


Carbon Dioxide  20 L    (22-30)  mmol/L


 


BUN  25 H    (9-20)  mg/dL


 


Glucose  100 H    (74-99)  mg/dL


 


POC Glucose (mg/dL)    203 H  (75-99)  mg/dL


 


Calcium  7.1 L    (8.4-10.2)  mg/dL


 


Total Bilirubin     (0.2-1.3)  mg/dL


 


AST     (17-59)  U/L


 


Ammonia     (<30)  umol/L


 


Albumin     (3.5-5.0)  g/dL














  06/30/20 06/30/20 06/30/20 Range/Units





  05:05 05:05 05:30 


 


WBC  14.7 H    (3.8-10.6)  k/uL


 


RBC  2.46 L    (4.30-5.90)  m/uL


 


Hgb  8.6 L    (13.0-17.5)  gm/dL


 


Hct  26.0 L    (39.0-53.0)  %


 


MCV  105.5 H    (80.0-100.0)  fL


 


RDW  24.2 H    (11.5-15.5)  %


 


Plt Count  66 L    (150-450)  k/uL


 


Neutrophils #  12.0 H    (1.3-7.7)  k/uL


 


Macrocytosis  Marked A    


 


PT     (9.0-12.0)  sec


 


INR     (<1.2)  


 


ABG pH     (7.35-7.45)  


 


ABG pCO2     (35-45)  mmHg


 


ABG pO2     ()  mmHg


 


ABG HCO3     (21-25)  mmol/L


 


ABG O2 Saturation     (94-97)  %


 


Potassium   3.3 L   (3.5-5.1)  mmol/L


 


Chloride   113 H   ()  mmol/L


 


Carbon Dioxide   19 L   (22-30)  mmol/L


 


BUN   25 H   (9-20)  mg/dL


 


Glucose   130 H   (74-99)  mg/dL


 


POC Glucose (mg/dL)     (75-99)  mg/dL


 


Calcium   7.2 L   (8.4-10.2)  mg/dL


 


Total Bilirubin   11.3 H   (0.2-1.3)  mg/dL


 


AST   132 H   (17-59)  U/L


 


Ammonia    80 H  (<30)  umol/L


 


Albumin   2.3 L   (3.5-5.0)  g/dL














  06/30/20 06/30/20 06/30/20 Range/Units





  05:30 06:59 12:01 


 


WBC     (3.8-10.6)  k/uL


 


RBC     (4.30-5.90)  m/uL


 


Hgb     (13.0-17.5)  gm/dL


 


Hct     (39.0-53.0)  %


 


MCV     (80.0-100.0)  fL


 


RDW     (11.5-15.5)  %


 


Plt Count     (150-450)  k/uL


 


Neutrophils #     (1.3-7.7)  k/uL


 


Macrocytosis     


 


PT  20.8 H    (9.0-12.0)  sec


 


INR  2.1 H    (<1.2)  


 


ABG pH   7.33 L   (7.35-7.45)  


 


ABG pCO2     (35-45)  mmHg


 


ABG pO2   67 L   ()  mmHg


 


ABG HCO3     (21-25)  mmol/L


 


ABG O2 Saturation   92.0 L   (94-97)  %


 


Potassium     (3.5-5.1)  mmol/L


 


Chloride     ()  mmol/L


 


Carbon Dioxide     (22-30)  mmol/L


 


BUN     (9-20)  mg/dL


 


Glucose     (74-99)  mg/dL


 


POC Glucose (mg/dL)    130 H  (75-99)  mg/dL


 


Calcium     (8.4-10.2)  mg/dL


 


Total Bilirubin     (0.2-1.3)  mg/dL


 


AST     (17-59)  U/L


 


Ammonia     (<30)  umol/L


 


Albumin     (3.5-5.0)  g/dL








                      Microbiology - Last 24 Hours (Table)











 06/28/20 14:13 Blood Culture - Preliminary





 Blood    No Growth after 48 hours


 


 06/29/20 03:46 Gram Stain - Preliminary





 Sputum Sputum Culture - Preliminary














Assessment and Plan


Assessment: 


Sepsis secondary to aspiration bilateral pneumonia, possibly abdominal source,


Acute hypoxic respiratory failure,  related to the above,  ventilator dependent


Septic and hypovolemic shock multifactorial, secondary to bilateral aspiration 

pneumonia, alcoholic liver disease


Hypotension, secondary to the above, pressor support dependent secondary to 

sepsis


Acute GI bleed, possible esophageal varices in a patient with significant 

alcohol dependence, on Sandostatin


Acute blood loss anemia, status post multiple transfusions of both FFP, packed 

RBCs


Acute Hepatic encephalopathy


Alcoholic liver disease with ascites


Recent posterior lateral rib fractures of 9, 10 and 11 status post fall


History of pericarditis


Chronic back pain


Polysubstance abuse, per record review; patient reports alcohol, marijuana use 

currently.


History of nicotine dependence


COPD, emphysema


Legally blind


Obesity, BMI 30.7


Osteoarthritis


Venous insufficiency


Anxiety, depression, history of, currently controlled











Plan: Continue on current medication regime ,monitoring and symptomatic 

treatment.  ICU management/Vent support as per intensivist.  Monitor cultures 

closely .Continue on Lactulose,empiric antibiotics .paracentesis on hold, as per

GI, until hemodynamically stable .  Close monitoring of coags. orthopedic 

surgery consult in place for evaluation of right foot.Prognosis guarded given 

multiple complex medical issues.














The impression and plan of care has been dictated as directed.





:


I performed a history and examination of this patient,  discussed the same with 

the dictator.  I agree with the dictator's note ,documented as a scribe.  Any 

additional findings or plans will be noted.

## 2020-06-30 NOTE — XR
EXAMINATION TYPE: XR chest 1V portable

 

DATE OF EXAM: 6/30/2020

 

COMPARISON: 6/29/2020

 

HISTORY: SOB, Follow Up

 

FINDINGS:

 

Indwelling tubes and catheters are unchanged.

 

Bilateral scattered airspace infiltrates persist without significant change.

 

Stable appearance of the cardio-mediastinal structures at this time.

 

Pleural effusion unchanged.

 

IMPRESSION:

1.  Stable portable chest.  Clinical correlation and follow up until resolution is recommended.

## 2020-06-30 NOTE — P.CONS
History of Present Illness





- Reason for Consult


Consult date: 20


Alcohol hepatitis, cirrhosis


Requesting physician: Reid Stromberg





- Chief Complaint


Altered mental status





- History of Present Illness





54-year-old male with a medical history significant for osteoarthritis, alcohol 

abuse who presented to the hospital due to complaints over altered mental 

status.  Of note the patient is currently intubated and sedated in the ICU and 

history is been taken on review of the electronic medical record and in 

conversation with the patient's fiance.  Per the patient's fiance the patient 

has a long-standing history of alcohol abuse with approximately 6-7 beers daily 

which has been going on for years.  He recently the patient has started drinking

liquor as well to.  The patient had a worsening altered mental status prior to 

presentation and was brought to the hospital for further evaluation.  On 

presentation ultrasound of the abdomen was significant for abdominal ascites 

with no evidence of cholelithiasis.  Laboratory evaluation significant for an 

ammonia of 91 with a WBC 15.2, hemoglobin 8.5, platelet count 64,000 with an INR

of 3.7 improved at 2.2 on repeat draw with a total bilirubin 15.8, alkaline 

phosphatase 129,  and ALTs 47.  Currently the patient is in the ICU being

treated for suspected aspiration pneumonia and sepsis.











Review of Systems


ROS unobtainable: due to mental status (Unable to obtain review of systems and 

the patient with altered mental status was intubated and sedated)





Past Medical History


Past Medical History: Asthma, Eye Disorder, Osteoarthritis (OA), Renal Disease


Additional Past Medical History / Comment(s): Pt admitted to [facility] on 20 with

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 

opiate withdrawal/alcohol abuse possible DTs/coagulopathy, elevated LFTs, venous

insufficiency.     Other Hx:  Unsteady gait/falls, ETOH abuse with withdrawal 

symptoms in the past pt states-tremors, bilateral leg pain/numbness, chronic 

renal disease stage I, pericarditis, bilateral macular degeneration/legally 

blind, chronic back pain, RLS, bilateral carpal tunnel syndrome, past infected 

sebaceous cyst on back


History of Any Multi-Drug Resistant Organisms: None Reported


Past Surgical History: Orthopedic Surgery


Additional Past Surgical History / Comment(s): Right hand surgery d/t injury


Past Anesthesia/Blood Transfusion Reactions: No Reported Reaction


Past Psychological History: Anxiety, Depression


Additional Psychological History / Comment(s): Pt resides with his fiancee.  He 

has been using her walker on occasion.  He is legally blind/has no 's 

license, his channing drives.


Smoking Status: Light tobacco smoker


Past Alcohol Use History: Abuse, Daily


Additional Past Alcohol Use History / Comment(s): Pt started smoking in  and

states that he has recently cut down to a pack lasting 4-5 weeks.  Pt states he 

has hx of alcohol abuse-8 beers a day but has not drank in a few weeks.


Past Drug Use History: None Reported


Additional Drug Use History / Comment(s): Pt smoked marijuana as a teen.





- Past Family History


  ** Father


Family Medical History: Cancer


Additional Family Medical History / Comment(s): Father  of esophageal 

cancer.





  ** Mother


Family Medical History: No Reported History


Additional Family Medical History / Comment(s): Mopther is healthy





Medications and Allergies


                                Home Medications











 Medication  Instructions  Recorded  Confirmed  Type


 


Cetirizine HCl [Zyrtec] 10 mg PO DAILY 20 History


 


Omeprazole 20 mg PO DAILY 20 History


 


Folic Acid 1 mg PO DAILY #30 tablet 20 Rx


 


Multivitamins, Thera [Multivitamin 1 tab PO DAILY #30 tablet 20 

Rx





(formulary)]    


 


Thiamine [Vitamin B-1] 100 mg PO DAILY #30 tab 20 Rx


 


Albuterol Inhaler [Ventolin Hfa 1 - 2 puff INHALATION RT-Q4H PRN 20 History





Inhaler]    








                                    Allergies











Allergy/AdvReac Type Severity Reaction Status Date / Time


 


No Known Allergies Allergy   Verified 20 14:27














Physical Exam


Vitals: 


                                   Vital Signs











  Temp Pulse Pulse Resp BP BP Pulse Ox


 


 20 12:00  98.3 F  98   26 H  93/54   99


 


 20 11:42   98     


 


 20 11:37   98     


 


 20 11:00   99   26 H  100/54   98


 


 20 10:00   98   23  93/52   97


 


 20 09:00   101 H   28 H  97/56   100


 


 20 08:00  98.5 F  101 H   17  98/57   100


 


 06/29/20 07:00   106 H   28 H  111/62   100


 


 20 06:45   109 H   28 H  111/65   99


 


 20 06:30   110 H   29 H  115/55   99


 


 20 06:25  98.6 F  109 H   28 H  112/50   100


 


 20 06:19  98.6 F   112 H  28 H   115/55  99


 


 20 06:15   112 H   28 H  123/58   98


 


 20 06:00   113 H   27 H  118/61   99


 


 20 05:45   113 H   28 H  113/64   98


 


 20 05:30   115 H   28 H  115/60   98


 


 20 05:15   115 H   27 H  118/61   98


 


 20 05:00   117 H   25 H  119/61   95


 


 20 04:45   115 H   23  121/59   97


 


 20 04:30   116 H   24  112/79   95


 


 20 04:20  98.6 F  114 H   26 H  121/59   96


 


 20 04:15  98.7 F  113 H   20  119/61   96


 


 20 04:07  98.6 F  114 H   26 H  117/66   96


 


 20 04:05  98.0 F  116 H   28 H  110/57   95


 


 20 04:00  98.6 F  116 H   27 H  115/59   95


 


 20 03:30   102 H   18  130/68   95


 


 20 03:00   120 H   41 H  97/66   92 L


 


 20 02:55  98.6 F  116 H   24  118/50   92 L


 


 20 02:53  99.0 F  120 H   20  97/66   90 L


 


 20 02:30   121 H   37 H  117/93   86 L


 


 20 02:25  98.8 F  120 H   33 H  117/93   90 L


 


 20 02:15  98.6 F  120 H   26 H  106/51   90 L


 


 20 02:00   116 H   29 H  98/71   93 L


 


 20 01:54  98.6 F  117 H   30 H  125/77   91 L


 


 20 01:30   116 H   33 H  92/64   91 L


 


 20 01:00   112 H   30 H  99/62   91 L


 


 20 00:30   112 H   17  110/60   88 L


 


 20 00:25  98.7 F  112 H   24  100/65   91 L


 


 20 00:17   114 H   30 H  102/31   90 L


 


 20 00:05  98.6 F  114 H   13  102/31   92 L


 


 20 00:00  98.6 F  113 H   16  79/55   94 L


 


 20 23:55  98.6 F  114 H   25 H  110/60   91 L


 


 20 23:45  98.6 F  115 H   29 H  110/70   92 L


 


 20 23:37  98.8 F  114 H   24  98/68   92 L


 


 20 23:30   112 H   14  87/62   93 L


 


 20 23:07  98.6 F  115 H   25 H  92/64   91 L


 


 20 23:01  98.6 F  112 H   18  79/55   93 L


 


 20 23:00   116 H   20  128/89   88 L


 


 20 22:57  98.6 F  112 H   20  83/46   92 L


 


 20 22:30   110 H   12  109/81   88 L


 


 20 22:05  98.2 F  116 H   18  128/89  


 


 20 22:00   105 H   25 H  112/58   92 L


 


 20 21:50  98.5 F  108 H   15  112/58   89 L


 


 20 21:35  99.0 F  114 H   20  78/44   93 L


 


 20 21:30   111 H   20  99/71   92 L


 


 20 21:25  98.9 F  112 H   21  94/76   93 L


 


 20 21:00   111 H   25 H  102/81   94 L


 


 20 20:30   110 H   26 H  95/74   90 L


 


 20 20:29        91 L


 


 20 20:00  98.9 F  112 H   21  97/63   90 L


 


 20 19:30   114 H   24  94/58   92 L


 


 20 19:12  98.7 F  117 H   18  109/26   94 L


 


 20 19:09   117 H   17  83/38   89 L


 


 20 18:53  98.9 F  116 H   19  125/67  


 


 20 18:23  98.6 F  113 H   15  97/58  


 


 20 18:13  98.5 F  110 H   17  134/65  


 


 20 18:00   110 H   21  112/67   93 L


 


 20 17:30   121 H   22  98/50   93 L


 


 20 17:00   118 H   24  98/50   93 L


 


 20 16:51  98.9 F   109 H  16   89/45  90 L


 


 20 16:30   121 H   24  87/48   93 L


 


 20 16:28   120 H   20  98/51   92 L


 


 20 16:14        93 L


 


 20 16:00  98.8 F  104 H   13  85/42   89 L


 


 20 15:30   118 H   27 H  136/65   87 L


 


 20 15:06   116 H   25 H  128/64   92 L


 


 20 14:38   116 H   22  108/59   99


 


 20 13:54  99.4 F  116 H   26 H  102/63   89 L








                                Intake and Output











 20





 22:59 06:59 14:59


 


Intake Total 5980 2869.192 978.2


 


Output Total 130 315 220


 


Balance 5850 2554.192 758.2


 


Intake:   


 


  IV 3090 650 823.0


 


    0.9 3090 650 560


 


    Octreotide 500 mcg In   163.0





    Sodium Chloride 0.9% 250   





    ml @ 25 MCG/HR 12.5 mls/   





    hr IV .Q20H [facility] Rx#:   





    [id number]   


 


    Piperacillin-Tazobactam 3   100





    .375 gm In Sodium   





    Chloride 0.9% 100 ml @ 25   





    mls/hr IVPB Q8HR [facility] Rx#   





    [id number]   


 


  Intake, IV Titration  68.192 80.2





  Amount   


 


    Norepinephrine 4 mg In  41.157 





    Sodium Chloride 0.9% 250   





    ml @ 0.05 MCG/KG/MIN 15.   





    338 mls/hr IV .[id number]   





    [facility] Rx#:[id number]   


 


    Propofol 1,000 mg In  27.035 80.2





    Empty Bag 1 bag @ Titrate   





    IV .Q0M [facility] Rx#:   





    [id number]   


 


    Sodium Chloride 0.9% 2,   





    000 ml @ 999 mls/hr IV .   





    Q2H1M ONE Rx#:[id number]   


 


  Oral   75


 


  Blood Product 890 2151 


 


    Ffp 24 Cpd  Unit  299 





    [id number]   


 


    Ffp 24 Cpd  Unit  334 





    [id number]   


 


    Ffp 24 Cpd  Unit 0 288 





    [id number]   


 


    Ffp 24 Cpd  Unit 290  





    [id number]   


 


    Rc Pheresis 2 As3  Unit 0 310 





    [id number]   


 


    Rc Pheresis 2 As3  Unit  310 





    [id number]   


 


Output:   


 


  Urine 130 315 220


 


Other:   


 


  Voiding Method Indwelling Catheter Indwelling Catheter Indwelling Catheter


 


  Weight 80.513 kg 80.2 kg 80.2 kg














On physical examination, patient appears comfortable in no apparent distress. 


HEAD: Normocephalic, atraumatic. 


EYES: Scleral icterus. No conjunctival injection. 


MOUTH: No lesions, tongue midline, endotracheal tube in place. 


NECK: Trachea midline, no gross abnormalities. 


CHEST: Coarse respiratory noises in all lung fields. 


HEART: Regular rate and rhythm. 


ABDOMEN: Soft, mildly distended. Bowel sounds are positive. No organomegaly.  No

guarding or rigidity.


EXTREMITIES: Bilateral pedal edema. 


SKIN: No rashes, no jaundice. 


NEUROLOGIC: Intubated and sedated. 





Results


CBC & Chem 7: 


                                 20 05:05





                                 20 05:05


Labs: 


                  Abnormal Lab Results - Last 24 Hours (Table)











  20 Range/Units





  14:10 14:13 14:13 


 


WBC   14.5 H   (3.8-10.6)  k/uL


 


RBC   2.17 L   (4.30-5.90)  m/uL


 


Hgb   8.3 L   (13.0-17.5)  gm/dL


 


Hct   24.3 L   (39.0-53.0)  %


 


MCV   111.7 H   (80.0-100.0)  fL


 


MCH   38.3 H   (25.0-35.0)  pg


 


RDW   20.6 H   (11.5-15.5)  %


 


Plt Count   86 L   (150-450)  k/uL


 


Neutrophils #     (1.3-7.7)  k/uL


 


Neutrophils # (Manual)   12.33 H   (1.3-7.7)  k/uL


 


Macrocytosis   Marked A   


 


PT    35.9 H  (9.0-12.0)  sec


 


INR    3.7 H  (<1.2)  


 


APTT    46.0 H  (22.0-30.0)  sec


 


ABG pH     (7.35-7.45)  


 


ABG pCO2     (35-45)  mmHg


 


ABG pO2     ()  mmHg


 


ABG O2 Saturation     (94-97)  %


 


Sodium     (137-145)  mmol/L


 


Potassium     (3.5-5.1)  mmol/L


 


Chloride     ()  mmol/L


 


Carbon Dioxide     (22-30)  mmol/L


 


BUN     (9-20)  mg/dL


 


Glucose     (74-99)  mg/dL


 


POC Glucose (mg/dL)     (75-99)  mg/dL


 


Plasma Lactic Acid Gagandeep     (0.7-2.0)  mmol/L


 


Calcium     (8.4-10.2)  mg/dL


 


Total Bilirubin     (0.2-1.3)  mg/dL


 


AST     (17-59)  U/L


 


Alkaline Phosphatase     ()  U/L


 


Ammonia     (<30)  umol/L


 


Albumin     (3.5-5.0)  g/dL


 


Amylase     ()  U/L


 


Urine Protein     (Negative)  


 


Urine Ketones     (Negative)  


 


Urine Bilirubin     (Negative)  


 


Ur Leukocyte Esterase     (Negative)  


 


Amorphous Sediment     (None)  /hpf


 


Urine Bacteria     (None)  /hpf


 


Urine Mucus     (None)  /hpf


 


U Tricyclic Antidepress     (NotDetected)  


 


U Benzodiazepines Scrn     (NotDetected)  


 


Crossmatch  See Detail    














  20 Range/Units





  14:13 14:13 14:13 


 


WBC     (3.8-10.6)  k/uL


 


RBC     (4.30-5.90)  m/uL


 


Hgb     (13.0-17.5)  gm/dL


 


Hct     (39.0-53.0)  %


 


MCV     (80.0-100.0)  fL


 


MCH     (25.0-35.0)  pg


 


RDW     (11.5-15.5)  %


 


Plt Count     (150-450)  k/uL


 


Neutrophils #     (1.3-7.7)  k/uL


 


Neutrophils # (Manual)     (1.3-7.7)  k/uL


 


Macrocytosis     


 


PT     (9.0-12.0)  sec


 


INR     (<1.2)  


 


APTT     (22.0-30.0)  sec


 


ABG pH     (7.35-7.45)  


 


ABG pCO2     (35-45)  mmHg


 


ABG pO2     ()  mmHg


 


ABG O2 Saturation     (94-97)  %


 


Sodium   131 L   (137-145)  mmol/L


 


Potassium     (3.5-5.1)  mmol/L


 


Chloride     ()  mmol/L


 


Carbon Dioxide     (22-30)  mmol/L


 


BUN   29 H   (9-20)  mg/dL


 


Glucose   101 H   (74-99)  mg/dL


 


POC Glucose (mg/dL)     (75-99)  mg/dL


 


Plasma Lactic Acid Gagandeep    2.6 H*  (0.7-2.0)  mmol/L


 


Calcium   7.4 L   (8.4-10.2)  mg/dL


 


Total Bilirubin   16.1 H*   (0.2-1.3)  mg/dL


 


AST   137 H   (17-59)  U/L


 


Alkaline Phosphatase   138 H   ()  U/L


 


Ammonia    91 H  (<30)  umol/L


 


Albumin   2.3 L   (3.5-5.0)  g/dL


 


Amylase   <30 L   ()  U/L


 


Urine Protein  1+ H    (Negative)  


 


Urine Ketones  1+ H    (Negative)  


 


Urine Bilirubin  4+ H    (Negative)  


 


Ur Leukocyte Esterase  Trace H    (Negative)  


 


Amorphous Sediment  Moderate H    (None)  /hpf


 


Urine Bacteria  Occasional H    (None)  /hpf


 


Urine Mucus  Few H    (None)  /hpf


 


U Tricyclic Antidepress  Detected H    (NotDetected)  


 


U Benzodiazepines Scrn  Detected H    (NotDetected)  


 


Crossmatch     














  20 Range/Units





  16:50 17:24 19:19 


 


WBC    14.8 H  (3.8-10.6)  k/uL


 


RBC    2.08 L  (4.30-5.90)  m/uL


 


Hgb    8.1 L  (13.0-17.5)  gm/dL


 


Hct    23.8 L  (39.0-53.0)  %


 


MCV    114.0 H  (80.0-100.0)  fL


 


MCH    38.8 H  (25.0-35.0)  pg


 


RDW    21.0 H  (11.5-15.5)  %


 


Plt Count    75 L  (150-450)  k/uL


 


Neutrophils #    11.6 H  (1.3-7.7)  k/uL


 


Neutrophils # (Manual)     (1.3-7.7)  k/uL


 


Macrocytosis    Marked A  


 


PT     (9.0-12.0)  sec


 


INR     (<1.2)  


 


APTT     (22.0-30.0)  sec


 


ABG pH     (7.35-7.45)  


 


ABG pCO2     (35-45)  mmHg


 


ABG pO2     ()  mmHg


 


ABG O2 Saturation     (94-97)  %


 


Sodium     (137-145)  mmol/L


 


Potassium     (3.5-5.1)  mmol/L


 


Chloride     ()  mmol/L


 


Carbon Dioxide     (22-30)  mmol/L


 


BUN     (9-20)  mg/dL


 


Glucose     (74-99)  mg/dL


 


POC Glucose (mg/dL)  193 H    (75-99)  mg/dL


 


Plasma Lactic Acid Gagandeep   3.1 H*   (0.7-2.0)  mmol/L


 


Calcium     (8.4-10.2)  mg/dL


 


Total Bilirubin     (0.2-1.3)  mg/dL


 


AST     (17-59)  U/L


 


Alkaline Phosphatase     ()  U/L


 


Ammonia     (<30)  umol/L


 


Albumin     (3.5-5.0)  g/dL


 


Amylase     ()  U/L


 


Urine Protein     (Negative)  


 


Urine Ketones     (Negative)  


 


Urine Bilirubin     (Negative)  


 


Ur Leukocyte Esterase     (Negative)  


 


Amorphous Sediment     (None)  /hpf


 


Urine Bacteria     (None)  /hpf


 


Urine Mucus     (None)  /hpf


 


U Tricyclic Antidepress     (NotDetected)  


 


U Benzodiazepines Scrn     (NotDetected)  


 


Crossmatch     














  20 Range/Units





  19:19 20:03 20:45 


 


WBC     (3.8-10.6)  k/uL


 


RBC     (4.30-5.90)  m/uL


 


Hgb     (13.0-17.5)  gm/dL


 


Hct     (39.0-53.0)  %


 


MCV     (80.0-100.0)  fL


 


MCH     (25.0-35.0)  pg


 


RDW     (11.5-15.5)  %


 


Plt Count     (150-450)  k/uL


 


Neutrophils #     (1.3-7.7)  k/uL


 


Neutrophils # (Manual)     (1.3-7.7)  k/uL


 


Macrocytosis     


 


PT     (9.0-12.0)  sec


 


INR     (<1.2)  


 


APTT     (22.0-30.0)  sec


 


ABG pH    7.49 H  (7.35-7.45)  


 


ABG pCO2    29 L  (35-45)  mmHg


 


ABG pO2    63 L  ()  mmHg


 


ABG O2 Saturation    92.0 L  (94-97)  %


 


Sodium  134 L    (137-145)  mmol/L


 


Potassium     (3.5-5.1)  mmol/L


 


Chloride  108 H    ()  mmol/L


 


Carbon Dioxide  19 L    (22-30)  mmol/L


 


BUN  29 H    (9-20)  mg/dL


 


Glucose  111 H    (74-99)  mg/dL


 


POC Glucose (mg/dL)     (75-99)  mg/dL


 


Plasma Lactic Acid Gagandeep   2.2 H*   (0.7-2.0)  mmol/L


 


Calcium  7.3 L    (8.4-10.2)  mg/dL


 


Total Bilirubin  14.9 H    (0.2-1.3)  mg/dL


 


AST  139 H    (17-59)  U/L


 


Alkaline Phosphatase  137 H    ()  U/L


 


Ammonia     (<30)  umol/L


 


Albumin  2.3 L    (3.5-5.0)  g/dL


 


Amylase     ()  U/L


 


Urine Protein     (Negative)  


 


Urine Ketones     (Negative)  


 


Urine Bilirubin     (Negative)  


 


Ur Leukocyte Esterase     (Negative)  


 


Amorphous Sediment     (None)  /hpf


 


Urine Bacteria     (None)  /hpf


 


Urine Mucus     (None)  /hpf


 


U Tricyclic Antidepress     (NotDetected)  


 


U Benzodiazepines Scrn     (NotDetected)  


 


Crossmatch     














  20 Range/Units





  22:50 22:50 22:50 


 


WBC   13.4 H   (3.8-10.6)  k/uL


 


RBC   1.89 L   (4.30-5.90)  m/uL


 


Hgb   7.0 L   (13.0-17.5)  gm/dL


 


Hct   21.6 L   (39.0-53.0)  %


 


MCV   114.4 H   (80.0-100.0)  fL


 


MCH   37.3 H   (25.0-35.0)  pg


 


RDW   20.9 H   (11.5-15.5)  %


 


Plt Count   72 L   (150-450)  k/uL


 


Neutrophils #   10.2 H   (1.3-7.7)  k/uL


 


Neutrophils # (Manual)     (1.3-7.7)  k/uL


 


Macrocytosis   Marked A   


 


PT     (9.0-12.0)  sec


 


INR     (<1.2)  


 


APTT     (22.0-30.0)  sec


 


ABG pH     (7.35-7.45)  


 


ABG pCO2     (35-45)  mmHg


 


ABG pO2     ()  mmHg


 


ABG O2 Saturation     (94-97)  %


 


Sodium    135 L  (137-145)  mmol/L


 


Potassium     (3.5-5.1)  mmol/L


 


Chloride     ()  mmol/L


 


Carbon Dioxide    20 L  (22-30)  mmol/L


 


BUN    26 H  (9-20)  mg/dL


 


Glucose     (74-99)  mg/dL


 


POC Glucose (mg/dL)     (75-99)  mg/dL


 


Plasma Lactic Acid Gagandeep  2.1 H*    (0.7-2.0)  mmol/L


 


Calcium    6.5 L  (8.4-10.2)  mg/dL


 


Total Bilirubin    14.9 H  (0.2-1.3)  mg/dL


 


AST    127 H  (17-59)  U/L


 


Alkaline Phosphatase     ()  U/L


 


Ammonia     (<30)  umol/L


 


Albumin    2.5 L  (3.5-5.0)  g/dL


 


Amylase     ()  U/L


 


Urine Protein     (Negative)  


 


Urine Ketones     (Negative)  


 


Urine Bilirubin     (Negative)  


 


Ur Leukocyte Esterase     (Negative)  


 


Amorphous Sediment     (None)  /hpf


 


Urine Bacteria     (None)  /hpf


 


Urine Mucus     (None)  /hpf


 


U Tricyclic Antidepress     (NotDetected)  


 


U Benzodiazepines Scrn     (NotDetected)  


 


Crossmatch     














  20 Range/Units





  00:00 04:38 06:09 


 


WBC    15.2 H  (3.8-10.6)  k/uL


 


RBC    2.55 L  (4.30-5.90)  m/uL


 


Hgb    9.2 L D  (13.0-17.5)  gm/dL


 


Hct    27.2 L  (39.0-53.0)  %


 


MCV    107.0 H D  (80.0-100.0)  fL


 


MCH    36.2 H  (25.0-35.0)  pg


 


RDW    23.8 H  (11.5-15.5)  %


 


Plt Count    79 L  (150-450)  k/uL


 


Neutrophils #    13.0 H  (1.3-7.7)  k/uL


 


Neutrophils # (Manual)     (1.3-7.7)  k/uL


 


Macrocytosis    Marked A  


 


PT     (9.0-12.0)  sec


 


INR     (<1.2)  


 


APTT     (22.0-30.0)  sec


 


ABG pH   7.25 L   (7.35-7.45)  


 


ABG pCO2   52 H   (35-45)  mmHg


 


ABG pO2   79 L   ()  mmHg


 


ABG O2 Saturation   92.6 L   (94-97)  %


 


Sodium     (137-145)  mmol/L


 


Potassium     (3.5-5.1)  mmol/L


 


Chloride     ()  mmol/L


 


Carbon Dioxide     (22-30)  mmol/L


 


BUN     (9-20)  mg/dL


 


Glucose     (74-99)  mg/dL


 


POC Glucose (mg/dL)  100 H    (75-99)  mg/dL


 


Plasma Lactic Acid Gagandeep     (0.7-2.0)  mmol/L


 


Calcium     (8.4-10.2)  mg/dL


 


Total Bilirubin     (0.2-1.3)  mg/dL


 


AST     (17-59)  U/L


 


Alkaline Phosphatase     ()  U/L


 


Ammonia     (<30)  umol/L


 


Albumin     (3.5-5.0)  g/dL


 


Amylase     ()  U/L


 


Urine Protein     (Negative)  


 


Urine Ketones     (Negative)  


 


Urine Bilirubin     (Negative)  


 


Ur Leukocyte Esterase     (Negative)  


 


Amorphous Sediment     (None)  /hpf


 


Urine Bacteria     (None)  /hpf


 


Urine Mucus     (None)  /hpf


 


U Tricyclic Antidepress     (NotDetected)  


 


U Benzodiazepines Scrn     (NotDetected)  


 


Crossmatch     














  20 Range/Units





  06:09 06:09 07:54 


 


WBC     (3.8-10.6)  k/uL


 


RBC     (4.30-5.90)  m/uL


 


Hgb     (13.0-17.5)  gm/dL


 


Hct     (39.0-53.0)  %


 


MCV     (80.0-100.0)  fL


 


MCH     (25.0-35.0)  pg


 


RDW     (11.5-15.5)  %


 


Plt Count     (150-450)  k/uL


 


Neutrophils #     (1.3-7.7)  k/uL


 


Neutrophils # (Manual)     (1.3-7.7)  k/uL


 


Macrocytosis     


 


PT  18.5 H    (9.0-12.0)  sec


 


INR  1.9 H    (<1.2)  


 


APTT     (22.0-30.0)  sec


 


ABG pH    7.46 H  (7.35-7.45)  


 


ABG pCO2    31 L  (35-45)  mmHg


 


ABG pO2    170 H  ()  mmHg


 


ABG O2 Saturation    99.8 H  (94-97)  %


 


Sodium     (137-145)  mmol/L


 


Potassium   5.5 H   (3.5-5.1)  mmol/L


 


Chloride   112 H   ()  mmol/L


 


Carbon Dioxide   18 L   (22-30)  mmol/L


 


BUN   27 H   (9-20)  mg/dL


 


Glucose     (74-99)  mg/dL


 


POC Glucose (mg/dL)     (75-99)  mg/dL


 


Plasma Lactic Acid Gagandeep     (0.7-2.0)  mmol/L


 


Calcium   7.1 L   (8.4-10.2)  mg/dL


 


Total Bilirubin   15.8 H*   (0.2-1.3)  mg/dL


 


AST   176 H   (17-59)  U/L


 


Alkaline Phosphatase   129 H   ()  U/L


 


Ammonia     (<30)  umol/L


 


Albumin   3.1 L   (3.5-5.0)  g/dL


 


Amylase     ()  U/L


 


Urine Protein     (Negative)  


 


Urine Ketones     (Negative)  


 


Urine Bilirubin     (Negative)  


 


Ur Leukocyte Esterase     (Negative)  


 


Amorphous Sediment     (None)  /hpf


 


Urine Bacteria     (None)  /hpf


 


Urine Mucus     (None)  /hpf


 


U Tricyclic Antidepress     (NotDetected)  


 


U Benzodiazepines Scrn     (NotDetected)  


 


Crossmatch     














  20 Range/Units





  11:04 11:04 11:59 


 


WBC  12.5 H    (3.8-10.6)  k/uL


 


RBC  2.46 L    (4.30-5.90)  m/uL


 


Hgb  8.5 L    (13.0-17.5)  gm/dL


 


Hct  25.7 L    (39.0-53.0)  %


 


MCV  104.6 H    (80.0-100.0)  fL


 


MCH     (25.0-35.0)  pg


 


RDW  23.9 H    (11.5-15.5)  %


 


Plt Count  64 L    (150-450)  k/uL


 


Neutrophils #     (1.3-7.7)  k/uL


 


Neutrophils # (Manual)     (1.3-7.7)  k/uL


 


Macrocytosis  Marked A    


 


PT   21.1 H   (9.0-12.0)  sec


 


INR   2.2 H   (<1.2)  


 


APTT     (22.0-30.0)  sec


 


ABG pH     (7.35-7.45)  


 


ABG pCO2     (35-45)  mmHg


 


ABG pO2     ()  mmHg


 


ABG O2 Saturation     (94-97)  %


 


Sodium     (137-145)  mmol/L


 


Potassium     (3.5-5.1)  mmol/L


 


Chloride     ()  mmol/L


 


Carbon Dioxide     (22-30)  mmol/L


 


BUN     (9-20)  mg/dL


 


Glucose     (74-99)  mg/dL


 


POC Glucose (mg/dL)    124 H  (75-99)  mg/dL


 


Plasma Lactic Acid Gagandeep     (0.7-2.0)  mmol/L


 


Calcium     (8.4-10.2)  mg/dL


 


Total Bilirubin     (0.2-1.3)  mg/dL


 


AST     (17-59)  U/L


 


Alkaline Phosphatase     ()  U/L


 


Ammonia     (<30)  umol/L


 


Albumin     (3.5-5.0)  g/dL


 


Amylase     ()  U/L


 


Urine Protein     (Negative)  


 


Urine Ketones     (Negative)  


 


Urine Bilirubin     (Negative)  


 


Ur Leukocyte Esterase     (Negative)  


 


Amorphous Sediment     (None)  /hpf


 


Urine Bacteria     (None)  /hpf


 


Urine Mucus     (None)  /hpf


 


U Tricyclic Antidepress     (NotDetected)  


 


U Benzodiazepines Scrn     (NotDetected)  


 


Crossmatch     








                      Microbiology - Last 24 Hours (Table)











 20 03:46 Sputum Culture - Preliminary





 Sputum 











US - abdomen: report reviewed (Ultrasound of the abdomen with findings of 

ascites with no cholelithiasis or gallbladder dysfunction noted.)





Assessment and Plan


(1) Acute alcoholic hepatitis


Narrative/Plan: 


54-year-old male with known history of alcohol abuse presenting to the hospital 

with altered mental status.  Likely multifactorial given suspected underlying 

decompensated cirrhosis, acute alcoholic hepatitis and concern for aspiration 

pneumonia and sepsis.  Currently receiving treatment in the ICU.


Current Visit: Yes   Status: Acute   Code(s): K70.10 - ALCOHOLIC HEPATITIS 

WITHOUT ASCITES   SNOMED Code(s): 8145934


   





(2) Liver cirrhosis


Current Visit: Yes   Status: Acute   Code(s): K74.60 - UNSPECIFIED CIRRHOSIS OF 

LIVER   SNOMED Code(s): 26549773


   





(3) Ascites


Current Visit: Yes   Status: Acute   Code(s): R18.8 - OTHER ASCITES   SNOMED 

Code(s): 772420367


   





(4) Hepatic encephalopathy


Current Visit: Yes   Status: Acute   Code(s): K72.90 - HEPATIC FAILURE, 

UNSPECIFIED WITHOUT COMA   SNOMED Code(s): 68383448


   


Plan: 





Supportive care


Nothing by mouth


Continue management per intensivist service, patient currently intubated and 

sedated


Continue respiratory antibiotic therapy


Continue lactulose 3 times a day


Can consider paracentesis when patient is medically stable


No evidence/signs or symptoms of GI bleeding at this time with anemia likely 

multifactorial and secondary to myelosuppression from alcohol, we'll continue to

monitor for signs or symptoms of GI bleeding


Plan is for diuretic therapy and sodium restricted diet after extubation


Alcohol abstinence


Vitamin K ordered for elevated INR, likely secondary to vitamin K malabsorption 

and underlying liver disease


Thank you for allowing us to participate in the care of the patient we will 

continue to follow

## 2020-06-30 NOTE — P.PN
Subjective


Progress Note Date: 06/30/20


Principal diagnosis: 





Acute hypoxic respiratory failure secondary to aspiration pneumonia, and acute 

sepsis.








This is a 54-year-old white male, history of alcohol abuse, patient was brought 

into the ER with change in mental status according to EMS.  Patient could not 

give any history, he was a very poor historian upon arrival to the ER.  Patient 

is supposedly a daily drinker, however from my review of the chart, I saw this 

patient back on 3/17/20, patient presented back then with multiple posterior 

lateral rib fractures on the right.  Apparently he fell in his bathroom, and 

landed on the side of the top sustaining multiple rib fractures.  Patient 

sustained rib fractures of 910 and 11 ribs.  Patient is also known to have 

history of severe emphysema/COPD.  He is legally blind.  He has history of 

pericarditis, anxiety, and history of degenerative joint disease.  Patient was 

discharged home on 3/18/20.  Drug screen in the ER was positive for tricyclic 

antidepressants and positive for benzodiazepines.  Rest of the drug screen was 

basically negative.  ABG in the ER showed a pO2 of 63 pCO2 of 29 pH of 7.49.  

Patient was also noted to have elevated INR of 3.7.  Low hemoglobin of 7.0 

although his baseline hemoglobin from 2 months ago was 15.1.  Ammonia level was 

91.  Total bilirubin was 14.9, and his liver enzymes were a bit elevated.  

Normal amylase and lipase were noted.  Gallbladder ultrasound showed abdominal 

ascites.  Chest x-ray showed bilateral diffuse infiltrates.  Patient was 

presumed septic upon presentation, received fluid boluses of 2500 ML's.  Patient

was found to have hepatic encephalopathy liver failure, bilateral pneumonia, GI 

hemorrhage and sepsis.  Early this morning, patient was intubated, placed on 

mechanical ventilation, and he was aware of the patient since admission.  

Presently the patient is on assist control rate of 28 tidal volume is 500 FiO2 

is 100% PEEP of 5.  ABG showed a pO2 of 170 pCO2 of 31 pH of 7.46.  Hence I cut 

down the FiO2 to 50%, increased the PEEP to 8, cut down the tidal volume to 450,

and decrease the respiratory rate to 26.  Patient is on Sandostatin for his GI 

bleeding, and he is yet to be seen by gastroenterology on consultation is also 

on Protonix.  He is on lactulose for his elevated ammonia level.  And he is 

empirically on Zosyn for presumptive aspiration pneumonia.  Echocardiogram 

showed evidence of good LV function.  And mild valvular heart disease.  BNP 

level was borderline elevated at 910





Patient was reevaluated today on 6/30/20, remains on mechanical ventilation.  He

is presently on assist control rate of 26 tidal volume is 450 FiO2 of 60% and 

PEEP of 8.  However I increased his PEEP to 12 and cut down his FiO2 to 50%.  At

night the patient developed worsening agitation, and Stacking his breaths, was 

not synchronous with mechanical ventilation, hence had to place the patient on 

Nimbex.  Today I plan to discontinue Nimbex and place him on fentanyl 25 g per 

hour.  Patient will be started on tube feeding, and I cut down his IV fluid to 

50 MLS per hour.  Remains on propofol at 60 mcg/kg/m, Nimbex which will be 

discontinued, but creatinine which was discontinued this morning, and no

repinephrine at 0.06 mcg/kg/m.  Enteral feeding to be started today by 

nutritionist.  Patient remains on antibiotics, remains on lactulose for his 

elevated ammonia level which is 80 today.  And he remains on Protonix.  Chest x-

ray showed more consolidation noted bilaterally in both lungs more so in the 

left lung.  Bilateraldisease is noted consistent with aspiration pneumonia.  

Sputum cultures and blood cultures so far remain on diagnostic.  ABG today 

showed a pO2 of 67 pCO2 of 39 pH of 7.33.  WBC count is 14.7 hemoglobin is 8.6 

platelets are 66,000 and INR is 2.1.  Electrolytes are basically normal renal 

profile showed beer of 25 creatinine 0.94.








Objective





- Vital Signs


Vital signs: 


                                   Vital Signs











Temp  35.9 F L  06/30/20 14:00


 


Pulse  108 H  06/30/20 14:00


 


Resp  26 H  06/30/20 14:00


 


BP  97/55   06/30/20 14:00


 


Pulse Ox  96   06/30/20 14:00








                                 Intake & Output











 06/29/20 06/30/20 06/30/20





 18:59 06:59 18:59


 


Intake Total 6936.623 0118.644 977.555


 


Output Total 375 1192 108


 


Balance 2739.209 6502.644 869.555


 


Weight 80.2 kg 87.2 kg 87.2 kg


 


Intake:   


 


  IV 1548.0 1940.0 677.5


 


    0.9 1160 1290 130


 


    Levofloxacin 750Mg-D5w  150 





    Pmx 750 mg In Dextrose/   





    Water 1 150ml.bag @ 100   





    mls/hr IVPB Q24H [facility] Rx#:   





    [id number]   


 


    Octreotide 500 mcg In 238.0 150.0 37.5





    Sodium Chloride 0.9% 250   





    ml @ 25 MCG/HR 12.5 mls/   





    hr IV .Q20H [facility] Rx#:   





    [id number]   


 


    Piperacillin-Tazobactam 3 150 150 100





    .375 gm In Sodium   





    Chloride 0.9% 100 ml @ 25   





    mls/hr IVPB Q8HR [facility] Rx#   





    :[id number]   


 


    Potassium Chloride 20 meq  200 100





    In Water For Injection 1   





    100ml.bag @ 50 mls/hr   





    IVPB Q2H [facility] Rx#:   





    [id number]   


 


    Sodium Chloride 0.9% 1,   310





    000 ml @ 50 mls/hr IV .   





    Q20H [facility] Rx#:[id number]   


 


  Intake, IV Titration 195.231 674.644 300.055





  Amount   


 


    Cisatracurium 200 mg In   48.521





    Sodium Chloride 0.9% 180   





    ml @ 1 MCG/KG/MIN 4.812   





    mls/hr IV .Q24H [facility] Rx#:   





    [id number]   


 


    Norepinephrine 4 mg In 15.031 208.896 151.534





    Sodium Chloride 0.9% 250   





    ml @ 0.05 MCG/KG/MIN 15.   





    338 mls/hr IV .[id number]   





    [facility] Rx#:[id number]   


 


    Octreotide 500 mcg In  246.25 





    Sodium Chloride 0.9% 250   





    ml @ 25 MCG/HR 12.5 mls/   





    hr IV .Q20H [facility] Rx#:   





    [id number]   


 


    Propofol 1,000 mg In 180.2 219.498 100





    Empty Bag 1 bag @ Titrate   





    IV .Q0M [facility] Rx#:   





    [id number]   


 


  Oral 150  


 


Output:   


 


  Urine 375 1192 108


 


Other:   


 


  Voiding Method Indwelling Catheter Indwelling Catheter Indwelling Catheter








                       ABP, PAP, CO, CI - Last Documented











Arterial Blood Pressure        102/47

















- Exam





Physical Exam: Revealed 54-year-old white male on mechanical ventilation, 

sedated, on propofol, and paralyzed with Nimbex.


Head: Atraumatic, normocephalic.


HEENT:[PERRLA, EOMI, positive icterus.  No neck masses, no JVD, no stridor, 

endotracheal tube and orogastric tube are intact.


Chest: Crackles and rhonchi bilaterally.  No wheezes.  Symmetrical chest 

expansion.]


Cardiac Exam: [Normal S1 and S2, no S3 gallop, 2/6 systolic murmur thought the 

precordium.]


Abdomen: [Soft, nontender, suspect positive ascites, no rebound, no guarding, 

positive bowel sounds.


Extremities: [No clubbing, trace of bipedal edema, no cyanosis.]  Deformity 

noted on the dorsal aspect of the right foot, hence I recommended orthopedic 

evaluation.  This is related to trauma.


Neurological Exam: Cannot be assessed, patient is fully sedated, on mechanical 

ventilation.


Psychiatric: Could not be assessed.


Skin: No rashes.











- Labs


CBC & Chem 7: 


                                 06/30/20 05:05





                                 06/30/20 05:05


Labs: 


                  Abnormal Lab Results - Last 24 Hours (Table)











  06/29/20 06/29/20 06/29/20 Range/Units





  15:43 17:30 17:34 


 


WBC  12.5 H    (3.8-10.6)  k/uL


 


RBC  2.53 L    (4.30-5.90)  m/uL


 


Hgb  8.6 L    (13.0-17.5)  gm/dL


 


Hct  27.0 L    (39.0-53.0)  %


 


MCV  106.7 H    (80.0-100.0)  fL


 


RDW  24.1 H    (11.5-15.5)  %


 


Plt Count  56 L    (150-450)  k/uL


 


Neutrophils #     (1.3-7.7)  k/uL


 


Macrocytosis  Marked A    


 


PT     (9.0-12.0)  sec


 


INR     (<1.2)  


 


ABG pH     (7.35-7.45)  


 


ABG pCO2    32 L  (35-45)  mmHg


 


ABG pO2    66 L  ()  mmHg


 


ABG HCO3    20 L  (21-25)  mmol/L


 


ABG O2 Saturation    92.9 L  (94-97)  %


 


Potassium   3.2 L   (3.5-5.1)  mmol/L


 


Chloride   113 H   ()  mmol/L


 


Carbon Dioxide   20 L   (22-30)  mmol/L


 


BUN   25 H   (9-20)  mg/dL


 


Glucose   100 H   (74-99)  mg/dL


 


POC Glucose (mg/dL)     (75-99)  mg/dL


 


Calcium   7.1 L   (8.4-10.2)  mg/dL


 


Total Bilirubin     (0.2-1.3)  mg/dL


 


AST     (17-59)  U/L


 


Ammonia     (<30)  umol/L


 


Albumin     (3.5-5.0)  g/dL














  06/30/20 06/30/20 06/30/20 Range/Units





  00:04 05:05 05:05 


 


WBC   14.7 H   (3.8-10.6)  k/uL


 


RBC   2.46 L   (4.30-5.90)  m/uL


 


Hgb   8.6 L   (13.0-17.5)  gm/dL


 


Hct   26.0 L   (39.0-53.0)  %


 


MCV   105.5 H   (80.0-100.0)  fL


 


RDW   24.2 H   (11.5-15.5)  %


 


Plt Count   66 L   (150-450)  k/uL


 


Neutrophils #   12.0 H   (1.3-7.7)  k/uL


 


Macrocytosis   Marked A   


 


PT     (9.0-12.0)  sec


 


INR     (<1.2)  


 


ABG pH     (7.35-7.45)  


 


ABG pCO2     (35-45)  mmHg


 


ABG pO2     ()  mmHg


 


ABG HCO3     (21-25)  mmol/L


 


ABG O2 Saturation     (94-97)  %


 


Potassium    3.3 L  (3.5-5.1)  mmol/L


 


Chloride    113 H  ()  mmol/L


 


Carbon Dioxide    19 L  (22-30)  mmol/L


 


BUN    25 H  (9-20)  mg/dL


 


Glucose    130 H  (74-99)  mg/dL


 


POC Glucose (mg/dL)  203 H    (75-99)  mg/dL


 


Calcium    7.2 L  (8.4-10.2)  mg/dL


 


Total Bilirubin    11.3 H  (0.2-1.3)  mg/dL


 


AST    132 H  (17-59)  U/L


 


Ammonia     (<30)  umol/L


 


Albumin    2.3 L  (3.5-5.0)  g/dL














  06/30/20 06/30/20 06/30/20 Range/Units





  05:30 05:30 06:59 


 


WBC     (3.8-10.6)  k/uL


 


RBC     (4.30-5.90)  m/uL


 


Hgb     (13.0-17.5)  gm/dL


 


Hct     (39.0-53.0)  %


 


MCV     (80.0-100.0)  fL


 


RDW     (11.5-15.5)  %


 


Plt Count     (150-450)  k/uL


 


Neutrophils #     (1.3-7.7)  k/uL


 


Macrocytosis     


 


PT   20.8 H   (9.0-12.0)  sec


 


INR   2.1 H   (<1.2)  


 


ABG pH    7.33 L  (7.35-7.45)  


 


ABG pCO2     (35-45)  mmHg


 


ABG pO2    67 L  ()  mmHg


 


ABG HCO3     (21-25)  mmol/L


 


ABG O2 Saturation    92.0 L  (94-97)  %


 


Potassium     (3.5-5.1)  mmol/L


 


Chloride     ()  mmol/L


 


Carbon Dioxide     (22-30)  mmol/L


 


BUN     (9-20)  mg/dL


 


Glucose     (74-99)  mg/dL


 


POC Glucose (mg/dL)     (75-99)  mg/dL


 


Calcium     (8.4-10.2)  mg/dL


 


Total Bilirubin     (0.2-1.3)  mg/dL


 


AST     (17-59)  U/L


 


Ammonia  80 H    (<30)  umol/L


 


Albumin     (3.5-5.0)  g/dL














  06/30/20 Range/Units





  12:01 


 


WBC   (3.8-10.6)  k/uL


 


RBC   (4.30-5.90)  m/uL


 


Hgb   (13.0-17.5)  gm/dL


 


Hct   (39.0-53.0)  %


 


MCV   (80.0-100.0)  fL


 


RDW   (11.5-15.5)  %


 


Plt Count   (150-450)  k/uL


 


Neutrophils #   (1.3-7.7)  k/uL


 


Macrocytosis   


 


PT   (9.0-12.0)  sec


 


INR   (<1.2)  


 


ABG pH   (7.35-7.45)  


 


ABG pCO2   (35-45)  mmHg


 


ABG pO2   ()  mmHg


 


ABG HCO3   (21-25)  mmol/L


 


ABG O2 Saturation   (94-97)  %


 


Potassium   (3.5-5.1)  mmol/L


 


Chloride   ()  mmol/L


 


Carbon Dioxide   (22-30)  mmol/L


 


BUN   (9-20)  mg/dL


 


Glucose   (74-99)  mg/dL


 


POC Glucose (mg/dL)  130 H  (75-99)  mg/dL


 


Calcium   (8.4-10.2)  mg/dL


 


Total Bilirubin   (0.2-1.3)  mg/dL


 


AST   (17-59)  U/L


 


Ammonia   (<30)  umol/L


 


Albumin   (3.5-5.0)  g/dL








                      Microbiology - Last 24 Hours (Table)











 06/29/20 03:46 Gram Stain - Preliminary





 Sputum Sputum Culture - Preliminary


 


 06/28/20 14:13 Blood Culture - Preliminary





 Blood    No Growth after 24 hours














Assessment and Plan


Assessment: 





Impression:





Acute hypoxic respiratory failure, suspect aspiration pneumonia.


Acute sepsis, likely source is aspiration pneumonia, although abdominal source 

is not entirely ruled out.


Alcohol liver disease with ascites.  Jaundice, and elevated liver enzymes.


Blood loss anemia, most likely the patient has acute or subacute GI bleeding, GI

is following.  Procardia tried was discontinued today.


Acute hepatic encephalopathy with significantly elevated ammonia level.  Remains

on lactulose.


Recent history of fall about 2 months ago, and multiple right-sided rib 

fractures.


History of alcoholic neuropathy.


History of pericarditis


Chronic back pain


History of restless leg syndrome.





Recommendation:


Continue ventilatory support.


Continue propofol, discontinue Nimbex, use fentanyl instead for now.


Continue hemodynamic support, patient is presently on norepinephrine.


Empiric antibiotics/Zosyn.


Monitor cultures including blood and sputum


Monitor coagulopathy and liver profile.


Transfuse to a hemoglobin of above 7.  Patient already received 2 units of 

packed RBCs since admission and 4 units of fresh frozen plasma.  Today's 

hemoglobin is 8.6.


Prognosis remains extremely poor and guarded.


Critical care time is 30 6.





Time with Patient: Greater than 30

## 2020-06-30 NOTE — CDI
Documentation Clarification Form



Date: 06/30/2020 11:18:37 AM

From: Josie Murphy RN, CCDS

Phone: [phone]

MRN: [id number]

Admit Date: 06/28/2020 04:12:00 PM

Patient Name: Jerman Blanco

Visit Number: [id number]



ATTENTION: The Clinical Documentation Specialists (CDI) and [facility] Coding Staff 
appreciate your assistance in clarifying documentation. Please respond to the 
clarification below the line at the bottom and electronically sign. The CDI & 
[facility] Coding staff will review the response and follow-up if needed. Please note: 
Queries are made part of the Legal Health Record. If you have any questions, 
please contact the author of this message via ITS.



Dr. Reid Stromberg



Hypotension w pressor support in a septic patient is documented and requires 
further specificity.



Patient history/risk factors:

ETOH Abuse, CRF stage 1, Aspiration pneumonia



Clinical Indicators:  

6/29 H&P: "Sepsis secondary to aspiration bilateral pneumonia, possibly 
abdominal source, acute hypoxic respiratory failure, suspect related to 
aspiration pneumonia, ventilator dependent.  Hypotension, status post pressor 
support secondary to sepsis Acute GI bleed, possible esophageal varices in a 
patient with significant alcohol dependence, on Sandostatin."

6/28 1600 Vitals: Temp 101, , RR 24, and B/P 77/54, Spo2 99% 4 L NC

6/28 1651 V/S: temp 98.9, Hr 109, B/P 89/45, Spo2 90% on 15L High Flow NC



Treatment:

Levophed titrate for B/P

6/28 4.5 L 0.9% NS IVF Bolus

6/29 1L 0.9%NS IVF Bolus followed by 50 cc/hr



In your professional opinion, can you please specify the type of shock if known?




Septic Shock

        Suspected or known causative organism_________

        Any associated organ failure___________________



Hypovolemic Shock

         Cause______________          

Other, please specify__I have not seen this patient yet, only Lorri has_______

Unable to determine



(Last Revision: October 2017)

___________________________________________________________________________

MTDD

## 2020-07-01 LAB
ALBUMIN SERPL-MCNC: 2.3 G/DL (ref 3.5–5)
ALP SERPL-CCNC: 126 U/L (ref 38–126)
ALT SERPL-CCNC: 39 U/L (ref 4–49)
ANION GAP SERPL CALC-SCNC: 6 MMOL/L
AST SERPL-CCNC: 164 U/L (ref 17–59)
BASOPHILS # BLD AUTO: 0.1 K/UL (ref 0–0.2)
BASOPHILS NFR BLD AUTO: 1 %
BUN SERPL-SCNC: 27 MG/DL (ref 9–20)
CALCIUM SPEC-MCNC: 7.5 MG/DL (ref 8.4–10.2)
CHLORIDE SERPL-SCNC: 116 MMOL/L (ref 98–107)
CO2 BLDA-SCNC: 22 MMOL/L (ref 19–24)
CO2 SERPL-SCNC: 19 MMOL/L (ref 22–30)
EOSINOPHIL # BLD AUTO: 0.3 K/UL (ref 0–0.7)
EOSINOPHIL NFR BLD AUTO: 2 %
ERYTHROCYTE [DISTWIDTH] IN BLOOD BY AUTOMATED COUNT: 2.49 M/UL (ref 4.3–5.9)
ERYTHROCYTE [DISTWIDTH] IN BLOOD: 23.8 % (ref 11.5–15.5)
GLUCOSE BLD-MCNC: 130 MG/DL (ref 75–99)
GLUCOSE BLD-MCNC: 165 MG/DL (ref 75–99)
GLUCOSE BLD-MCNC: 178 MG/DL (ref 75–99)
GLUCOSE SERPL-MCNC: 121 MG/DL (ref 74–99)
HCO3 BLDA-SCNC: 21 MMOL/L (ref 21–25)
HCT VFR BLD AUTO: 26.7 % (ref 39–53)
HGB BLD-MCNC: 9.4 GM/DL (ref 13–17.5)
INR PPP: 1.7 (ref ?–1.2)
LYMPHOCYTES # SPEC AUTO: 2.7 K/UL (ref 1–4.8)
LYMPHOCYTES NFR SPEC AUTO: 19 %
MAGNESIUM SPEC-SCNC: 1.7 MG/DL (ref 1.6–2.3)
MCH RBC QN AUTO: 37.7 PG (ref 25–35)
MCHC RBC AUTO-ENTMCNC: 35.3 G/DL (ref 31–37)
MCV RBC AUTO: 107 FL (ref 80–100)
MONOCYTES # BLD AUTO: 0.8 K/UL (ref 0–1)
MONOCYTES NFR BLD AUTO: 6 %
NEUTROPHILS # BLD AUTO: 9.8 K/UL (ref 1.3–7.7)
NEUTROPHILS NFR BLD AUTO: 69 %
PCO2 BLDA: 37 MMHG (ref 35–45)
PH BLDA: 7.36 [PH] (ref 7.35–7.45)
PLATELET # BLD AUTO: 75 K/UL (ref 150–450)
PO2 BLDA: 65 MMHG (ref 83–108)
POTASSIUM SERPL-SCNC: 3.4 MMOL/L (ref 3.5–5.1)
PROT SERPL-MCNC: 6.7 G/DL (ref 6.3–8.2)
PT BLD: 17.1 SEC (ref 9–12)
SODIUM SERPL-SCNC: 141 MMOL/L (ref 137–145)
WBC # BLD AUTO: 14.1 K/UL (ref 3.8–10.6)

## 2020-07-01 RX ADMIN — IPRATROPIUM BROMIDE AND ALBUTEROL SULFATE SCH ML: .5; 3 SOLUTION RESPIRATORY (INHALATION) at 20:05

## 2020-07-01 RX ADMIN — POTASSIUM CHLORIDE SCH MLS/HR: 14.9 INJECTION, SOLUTION INTRAVENOUS at 08:26

## 2020-07-01 RX ADMIN — DEXTRAN 70 AND HYPROMELLOSE 2910 SCH DROPS: 1; 3 SOLUTION/ DROPS OPHTHALMIC at 11:09

## 2020-07-01 RX ADMIN — LACTULOSE SCH GM: 20 SOLUTION ORAL at 17:15

## 2020-07-01 RX ADMIN — DEXTRAN 70 AND HYPROMELLOSE 2910 SCH DROPS: 1; 3 SOLUTION/ DROPS OPHTHALMIC at 03:15

## 2020-07-01 RX ADMIN — SPIRONOLACTONE SCH MG: 25 TABLET, FILM COATED ORAL at 17:15

## 2020-07-01 RX ADMIN — PROPOFOL SCH MLS/HR: 10 INJECTION, EMULSION INTRAVENOUS at 00:13

## 2020-07-01 RX ADMIN — SPIRONOLACTONE SCH MG: 25 TABLET, FILM COATED ORAL at 08:42

## 2020-07-01 RX ADMIN — MINERAL OIL AND PETROLATUM SCH APPLIC: 150; 830 OINTMENT OPHTHALMIC at 21:46

## 2020-07-01 RX ADMIN — MINERAL OIL AND PETROLATUM SCH: 150; 830 OINTMENT OPHTHALMIC at 17:16

## 2020-07-01 RX ADMIN — PIPERACILLIN AND TAZOBACTAM SCH MLS/HR: 3; .375 INJECTION, POWDER, FOR SOLUTION INTRAVENOUS at 08:27

## 2020-07-01 RX ADMIN — PROPOFOL SCH MLS/HR: 10 INJECTION, EMULSION INTRAVENOUS at 21:58

## 2020-07-01 RX ADMIN — DEXTRAN 70 AND HYPROMELLOSE 2910 SCH DROPS: 1; 3 SOLUTION/ DROPS OPHTHALMIC at 17:16

## 2020-07-01 RX ADMIN — METHYLPREDNISOLONE SODIUM SUCCINATE SCH MG: 40 INJECTION, POWDER, FOR SOLUTION INTRAMUSCULAR; INTRAVENOUS at 08:40

## 2020-07-01 RX ADMIN — FUROSEMIDE SCH MG: 10 INJECTION, SOLUTION INTRAMUSCULAR; INTRAVENOUS at 21:45

## 2020-07-01 RX ADMIN — DEXTRAN 70 AND HYPROMELLOSE 2910 SCH DROPS: 1; 3 SOLUTION/ DROPS OPHTHALMIC at 21:46

## 2020-07-01 RX ADMIN — DEXTRAN 70 AND HYPROMELLOSE 2910 SCH DROPS: 1; 3 SOLUTION/ DROPS OPHTHALMIC at 00:11

## 2020-07-01 RX ADMIN — NOREPINEPHRINE BITARTRATE SCH MLS/HR: 1 INJECTION, SOLUTION, CONCENTRATE INTRAVENOUS at 06:54

## 2020-07-01 RX ADMIN — SODIUM CHLORIDE SCH MLS/HR: 900 INJECTION, SOLUTION INTRAVENOUS at 14:21

## 2020-07-01 RX ADMIN — INSULIN ASPART SCH UNIT: 100 INJECTION, SOLUTION INTRAVENOUS; SUBCUTANEOUS at 17:14

## 2020-07-01 RX ADMIN — IPRATROPIUM BROMIDE AND ALBUTEROL SULFATE SCH ML: .5; 3 SOLUTION RESPIRATORY (INHALATION) at 09:52

## 2020-07-01 RX ADMIN — SODIUM CHLORIDE SCH MLS/HR: 900 INJECTION, SOLUTION INTRAVENOUS at 06:04

## 2020-07-01 RX ADMIN — LACTULOSE SCH GM: 20 SOLUTION ORAL at 08:40

## 2020-07-01 RX ADMIN — SPIRONOLACTONE SCH MG: 25 TABLET, FILM COATED ORAL at 21:45

## 2020-07-01 RX ADMIN — RIFAXIMIN SCH MG: 550 TABLET ORAL at 21:47

## 2020-07-01 RX ADMIN — IPRATROPIUM BROMIDE AND ALBUTEROL SULFATE SCH ML: .5; 3 SOLUTION RESPIRATORY (INHALATION) at 13:21

## 2020-07-01 RX ADMIN — METHYLPREDNISOLONE SODIUM SUCCINATE SCH MG: 40 INJECTION, POWDER, FOR SOLUTION INTRAMUSCULAR; INTRAVENOUS at 17:15

## 2020-07-01 RX ADMIN — INSULIN ASPART SCH UNIT: 100 INJECTION, SOLUTION INTRAVENOUS; SUBCUTANEOUS at 12:51

## 2020-07-01 RX ADMIN — MINERAL OIL AND PETROLATUM SCH APPLIC: 150; 830 OINTMENT OPHTHALMIC at 03:15

## 2020-07-01 RX ADMIN — DEXTRAN 70 AND HYPROMELLOSE 2910 SCH DROPS: 1; 3 SOLUTION/ DROPS OPHTHALMIC at 08:46

## 2020-07-01 RX ADMIN — CHLORHEXIDINE GLUCONATE SCH ML: 1.2 RINSE ORAL at 08:40

## 2020-07-01 RX ADMIN — MINERAL OIL AND PETROLATUM SCH: 150; 830 OINTMENT OPHTHALMIC at 08:46

## 2020-07-01 RX ADMIN — LACTULOSE SCH GM: 20 SOLUTION ORAL at 12:50

## 2020-07-01 RX ADMIN — PROPOFOL SCH MLS/HR: 10 INJECTION, EMULSION INTRAVENOUS at 03:15

## 2020-07-01 RX ADMIN — MINERAL OIL AND PETROLATUM SCH: 150; 830 OINTMENT OPHTHALMIC at 11:12

## 2020-07-01 RX ADMIN — PROPOFOL SCH MLS/HR: 10 INJECTION, EMULSION INTRAVENOUS at 14:21

## 2020-07-01 RX ADMIN — PANTOPRAZOLE SODIUM SCH MG: 40 INJECTION, POWDER, FOR SOLUTION INTRAVENOUS at 21:45

## 2020-07-01 RX ADMIN — PIPERACILLIN AND TAZOBACTAM SCH MLS/HR: 3; .375 INJECTION, POWDER, FOR SOLUTION INTRAVENOUS at 17:15

## 2020-07-01 RX ADMIN — RIFAXIMIN SCH MG: 550 TABLET ORAL at 11:10

## 2020-07-01 RX ADMIN — PROPOFOL SCH MLS/HR: 10 INJECTION, EMULSION INTRAVENOUS at 17:30

## 2020-07-01 RX ADMIN — IPRATROPIUM BROMIDE AND ALBUTEROL SULFATE SCH ML: .5; 3 SOLUTION RESPIRATORY (INHALATION) at 15:26

## 2020-07-01 RX ADMIN — PROPOFOL SCH MLS/HR: 10 INJECTION, EMULSION INTRAVENOUS at 15:09

## 2020-07-01 RX ADMIN — CHLORHEXIDINE GLUCONATE SCH ML: 1.2 RINSE ORAL at 21:45

## 2020-07-01 RX ADMIN — PROPOFOL SCH MLS/HR: 10 INJECTION, EMULSION INTRAVENOUS at 06:19

## 2020-07-01 RX ADMIN — PIPERACILLIN AND TAZOBACTAM SCH MLS/HR: 3; .375 INJECTION, POWDER, FOR SOLUTION INTRAVENOUS at 00:10

## 2020-07-01 RX ADMIN — FUROSEMIDE SCH MG: 10 INJECTION, SOLUTION INTRAMUSCULAR; INTRAVENOUS at 08:40

## 2020-07-01 RX ADMIN — PROPOFOL SCH MLS/HR: 10 INJECTION, EMULSION INTRAVENOUS at 10:30

## 2020-07-01 RX ADMIN — CEFAZOLIN SCH MLS/HR: 330 INJECTION, POWDER, FOR SOLUTION INTRAMUSCULAR; INTRAVENOUS at 08:40

## 2020-07-01 RX ADMIN — NOREPINEPHRINE BITARTRATE SCH MLS/HR: 1 INJECTION, SOLUTION, CONCENTRATE INTRAVENOUS at 00:11

## 2020-07-01 RX ADMIN — LACTULOSE SCH GM: 20 SOLUTION ORAL at 21:44

## 2020-07-01 RX ADMIN — MINERAL OIL AND PETROLATUM SCH APPLIC: 150; 830 OINTMENT OPHTHALMIC at 00:11

## 2020-07-01 RX ADMIN — PANTOPRAZOLE SODIUM SCH MG: 40 INJECTION, POWDER, FOR SOLUTION INTRAVENOUS at 08:42

## 2020-07-01 RX ADMIN — POTASSIUM CHLORIDE SCH MLS/HR: 14.9 INJECTION, SOLUTION INTRAVENOUS at 05:31

## 2020-07-01 NOTE — XR
EXAMINATION TYPE: XR chest 1V portable

 

DATE OF EXAM: 7/1/2020

 

COMPARISON: 6/30/2020

 

HISTORY: SOB, Follow Up

 

FINDINGS:

 

Indwelling tubes and catheters are unchanged.

 

Scattered air space infiltrates persist although there is improving aeration left lower lobe.

 

Stable appearance of the cardio-mediastinal structures at this time.

 

Pleural effusion unchanged.

 

IMPRESSION:

1.  Scattered air space infiltrates persist although there is improving aeration left lower lobe. Cli
nical correlation and follow up until resolution is recommended.

## 2020-07-01 NOTE — P.PN
Subjective


Progress Note Date: 06/30/20


Principal diagnosis: 





Alcoholic hepatitis, alcoholic cirrhosis of the liver with ascites, hepatic 

encephalopathy, elevated liver enzymes





Patient is seen in the intensive care unit where he rings intubated and sedated.

 No acute events.  No signs or symptoms of GI bleed.





Objective





- Vital Signs


Vital signs: 


                                   Vital Signs











Temp  95.7 F L  06/30/20 08:00


 


Pulse  101 H  06/30/20 11:30


 


Resp  26 H  06/30/20 10:00


 


BP  99/55   06/30/20 10:00


 


Pulse Ox  95   06/30/20 10:00








                                 Intake & Output











 06/29/20 06/30/20 06/30/20





 18:59 06:59 18:59


 


Intake Total 9228.176 5859.644 977.555


 


Output Total 375 1192 108


 


Balance 4005.161 0490.644 869.555


 


Weight 80.2 kg 87.2 kg 87.2 kg


 


Intake:   


 


  IV 1548.0 1940.0 677.5


 


    0.9 1160 1290 130


 


    Levofloxacin 750Mg-D5w  150 





    Pmx 750 mg In Dextrose/   





    Water 1 150ml.bag @ 100   





    mls/hr IVPB Q24H [facility] Rx#:   





    [id number]   


 


    Octreotide 500 mcg In 238.0 150.0 37.5





    Sodium Chloride 0.9% 250   





    ml @ 25 MCG/HR 12.5 mls/   





    hr IV .Q20H [facility] Rx#:   





    [id number]   


 


    Piperacillin-Tazobactam 3 150 150 100





    .375 gm In Sodium   





    Chloride 0.9% 100 ml @ 25   





    mls/hr IVPB Q8HR [facility] Rx#   





    :[id number]   


 


    Potassium Chloride 20 meq  200 100





    In Water For Injection 1   





    100ml.bag @ 50 mls/hr   





    IVPB Q2H [facility] Rx#:   





    [id number]   


 


    Sodium Chloride 0.9% 1,   310





    000 ml @ 50 mls/hr IV .   





    Q20H [facility] Rx#:[id number]   


 


  Intake, IV Titration 195.231 674.644 300.055





  Amount   


 


    Cisatracurium 200 mg In   48.521





    Sodium Chloride 0.9% 180   





    ml @ 1 MCG/KG/MIN 4.812   





    mls/hr IV .Q24H [facility] Rx#:   





    [id number]   


 


    Norepinephrine 4 mg In 15.031 208.896 151.534





    Sodium Chloride 0.9% 250   





    ml @ 0.05 MCG/KG/MIN 15.   





    338 mls/hr IV .[id number]   





    [facility] Rx#:[id number]   


 


    Octreotide 500 mcg In  246.25 





    Sodium Chloride 0.9% 250   





    ml @ 25 MCG/HR 12.5 mls/   





    hr IV .Q20H [facility] Rx#:   





    [id number]   


 


    Propofol 1,000 mg In 180.2 219.498 100





    Empty Bag 1 bag @ Titrate   





    IV .Q0M [facility] Rx#:   





    [id number]   


 


  Oral 150  


 


Output:   


 


  Urine 375 1192 108


 


Other:   


 


  Voiding Method Indwelling Catheter Indwelling Catheter Indwelling Catheter








                       ABP, PAP, CO, CI - Last Documented











Arterial Blood Pressure        92/59

















- Exam





On physical examination, patient appears comfortable in no apparent distress. 


HEAD: Normocephalic, atraumatic. 


EYES: Scleral icterus. No conjunctival injection. 


MOUTH: No lesions, tongue midline, endotracheal tube in place. 


NECK: Trachea midline, no gross abnormalities. 


CHEST: Coarse respiratory noises in all lung fields on mechanical ventilation.


ABDOMEN: Soft, obese. Bowel sounds are positive. No organomegaly.  No guarding 

or rigidity.


EXTREMITIES: Bilateral pedal edema. 


SKIN: No rashes, jaundice. 


NEUROLOGIC: Intubated and sedated. 





- Labs


CBC & Chem 7: 


                                 06/30/20 05:05





                                 06/30/20 18:40


Labs: 


                  Abnormal Lab Results - Last 24 Hours (Table)











  06/29/20 06/29/20 06/29/20 Range/Units





  15:43 17:30 17:34 


 


WBC  12.5 H    (3.8-10.6)  k/uL


 


RBC  2.53 L    (4.30-5.90)  m/uL


 


Hgb  8.6 L    (13.0-17.5)  gm/dL


 


Hct  27.0 L    (39.0-53.0)  %


 


MCV  106.7 H    (80.0-100.0)  fL


 


RDW  24.1 H    (11.5-15.5)  %


 


Plt Count  56 L    (150-450)  k/uL


 


Neutrophils #     (1.3-7.7)  k/uL


 


Macrocytosis  Marked A    


 


PT     (9.0-12.0)  sec


 


INR     (<1.2)  


 


ABG pH     (7.35-7.45)  


 


ABG pCO2    32 L  (35-45)  mmHg


 


ABG pO2    66 L  ()  mmHg


 


ABG HCO3    20 L  (21-25)  mmol/L


 


ABG O2 Saturation    92.9 L  (94-97)  %


 


Potassium   3.2 L   (3.5-5.1)  mmol/L


 


Chloride   113 H   ()  mmol/L


 


Carbon Dioxide   20 L   (22-30)  mmol/L


 


BUN   25 H   (9-20)  mg/dL


 


Glucose   100 H   (74-99)  mg/dL


 


POC Glucose (mg/dL)     (75-99)  mg/dL


 


Calcium   7.1 L   (8.4-10.2)  mg/dL


 


Total Bilirubin     (0.2-1.3)  mg/dL


 


AST     (17-59)  U/L


 


Ammonia     (<30)  umol/L


 


Albumin     (3.5-5.0)  g/dL














  06/30/20 06/30/20 06/30/20 Range/Units





  00:04 05:05 05:05 


 


WBC   14.7 H   (3.8-10.6)  k/uL


 


RBC   2.46 L   (4.30-5.90)  m/uL


 


Hgb   8.6 L   (13.0-17.5)  gm/dL


 


Hct   26.0 L   (39.0-53.0)  %


 


MCV   105.5 H   (80.0-100.0)  fL


 


RDW   24.2 H   (11.5-15.5)  %


 


Plt Count   66 L   (150-450)  k/uL


 


Neutrophils #   12.0 H   (1.3-7.7)  k/uL


 


Macrocytosis   Marked A   


 


PT     (9.0-12.0)  sec


 


INR     (<1.2)  


 


ABG pH     (7.35-7.45)  


 


ABG pCO2     (35-45)  mmHg


 


ABG pO2     ()  mmHg


 


ABG HCO3     (21-25)  mmol/L


 


ABG O2 Saturation     (94-97)  %


 


Potassium    3.3 L  (3.5-5.1)  mmol/L


 


Chloride    113 H  ()  mmol/L


 


Carbon Dioxide    19 L  (22-30)  mmol/L


 


BUN    25 H  (9-20)  mg/dL


 


Glucose    130 H  (74-99)  mg/dL


 


POC Glucose (mg/dL)  203 H    (75-99)  mg/dL


 


Calcium    7.2 L  (8.4-10.2)  mg/dL


 


Total Bilirubin    11.3 H  (0.2-1.3)  mg/dL


 


AST    132 H  (17-59)  U/L


 


Ammonia     (<30)  umol/L


 


Albumin    2.3 L  (3.5-5.0)  g/dL














  06/30/20 06/30/20 06/30/20 Range/Units





  05:30 05:30 06:59 


 


WBC     (3.8-10.6)  k/uL


 


RBC     (4.30-5.90)  m/uL


 


Hgb     (13.0-17.5)  gm/dL


 


Hct     (39.0-53.0)  %


 


MCV     (80.0-100.0)  fL


 


RDW     (11.5-15.5)  %


 


Plt Count     (150-450)  k/uL


 


Neutrophils #     (1.3-7.7)  k/uL


 


Macrocytosis     


 


PT   20.8 H   (9.0-12.0)  sec


 


INR   2.1 H   (<1.2)  


 


ABG pH    7.33 L  (7.35-7.45)  


 


ABG pCO2     (35-45)  mmHg


 


ABG pO2    67 L  ()  mmHg


 


ABG HCO3     (21-25)  mmol/L


 


ABG O2 Saturation    92.0 L  (94-97)  %


 


Potassium     (3.5-5.1)  mmol/L


 


Chloride     ()  mmol/L


 


Carbon Dioxide     (22-30)  mmol/L


 


BUN     (9-20)  mg/dL


 


Glucose     (74-99)  mg/dL


 


POC Glucose (mg/dL)     (75-99)  mg/dL


 


Calcium     (8.4-10.2)  mg/dL


 


Total Bilirubin     (0.2-1.3)  mg/dL


 


AST     (17-59)  U/L


 


Ammonia  80 H    (<30)  umol/L


 


Albumin     (3.5-5.0)  g/dL














  06/30/20 Range/Units





  12:01 


 


WBC   (3.8-10.6)  k/uL


 


RBC   (4.30-5.90)  m/uL


 


Hgb   (13.0-17.5)  gm/dL


 


Hct   (39.0-53.0)  %


 


MCV   (80.0-100.0)  fL


 


RDW   (11.5-15.5)  %


 


Plt Count   (150-450)  k/uL


 


Neutrophils #   (1.3-7.7)  k/uL


 


Macrocytosis   


 


PT   (9.0-12.0)  sec


 


INR   (<1.2)  


 


ABG pH   (7.35-7.45)  


 


ABG pCO2   (35-45)  mmHg


 


ABG pO2   ()  mmHg


 


ABG HCO3   (21-25)  mmol/L


 


ABG O2 Saturation   (94-97)  %


 


Potassium   (3.5-5.1)  mmol/L


 


Chloride   ()  mmol/L


 


Carbon Dioxide   (22-30)  mmol/L


 


BUN   (9-20)  mg/dL


 


Glucose   (74-99)  mg/dL


 


POC Glucose (mg/dL)  130 H  (75-99)  mg/dL


 


Calcium   (8.4-10.2)  mg/dL


 


Total Bilirubin   (0.2-1.3)  mg/dL


 


AST   (17-59)  U/L


 


Ammonia   (<30)  umol/L


 


Albumin   (3.5-5.0)  g/dL








                      Microbiology - Last 24 Hours (Table)











 06/29/20 03:46 Gram Stain - Preliminary





 Sputum Sputum Culture - Preliminary


 


 06/28/20 14:13 Blood Culture - Preliminary





 Blood    No Growth after 24 hours














Assessment and Plan


(1) Acute alcoholic hepatitis


Narrative/Plan: 


54-year-old male with known history of alcohol abuse presenting to the hospital 

with altered mental status.  Likely multifactorial given suspected underlying 

decompensated cirrhosis, acute alcoholic hepatitis and concern for aspiration 

pneumonia and sepsis.  Currently receiving treatment in the ICU, he remains on 

broad-spectrum antibiotic therapy and mechanically ventilated.


Current Visit: Yes   Status: Acute   Code(s): K70.10 - ALCOHOLIC HEPATITIS 

WITHOUT ASCITES   SNOMED Code(s): 8484226


   





(2) Liver cirrhosis


Current Visit: Yes   Status: Acute   Code(s): K74.60 - UNSPECIFIED CIRRHOSIS OF 

LIVER   SNOMED Code(s): 96432866


   





(3) Ascites


Current Visit: Yes   Status: Acute   Code(s): R18.8 - OTHER ASCITES   SNOMED 

Code(s): 212820602


   





(4) Hepatic encephalopathy


Current Visit: Yes   Status: Acute   Code(s): K72.90 - HEPATIC FAILURE, 

UNSPECIFIED WITHOUT COMA   SNOMED Code(s): 84522636


   


Plan: 





Supportive care


Nothing by mouth


Continue management per intensivist service, patient currently intubated and 

sedated


Continue respiratory antibiotic therapy


Continue lactulose 3 times a day


Can consider paracentesis when patient is medically stable


No evidence/signs or symptoms of GI bleeding at this time with anemia likely 

multifactorial and secondary to myelosuppression from alcohol, we'll continue to

monitor for signs or symptoms of GI bleeding


Plan is for diuretic therapy and sodium restricted diet after extubation


Alcohol abstinence


Thank you for allowing us to participate in the care of the patient we will 

continue to follow

## 2020-07-01 NOTE — P.CNOR
History of Present Illness





- HPI


Consult date: 20


Consult reason: other


History of present illness: 


Patient is a 54 year old male seen at bedside this morning in ICU regarding 

swelling at the foot. PMH is obtained through nurse and documentation as he is 

intubated and unresponsive. He has a history of polysubstance abuse including 

alcohol abuse , falls with recent rib fractures, legally blind, osteoarthritis 

and multiple other medical issues. He presented to the ER with changes in mental

status 2 days. He was admitted 20 for altered mental status, suspected 

aspiration pneumonia and sepsis. No trauma to right foot was reported. However, 

he had swelling at the right foot and xrays were obtained. Xrays of the right 

foot were negative for fracture. He has continued swelling and bruising at the 

dorsum of the right foot. Further HPI and ROS unobtainable








Review of Systems


ROS unobtainable: due to endotracheal tube





Past Medical History


Past Medical History: Asthma, Eye Disorder, Osteoarthritis (OA), Renal Disease


Additional Past Medical History / Comment(s): Pt admitted to [facility] on 20 with

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 

opiate withdrawal/alcohol abuse possible DTs/coagulopathy, elevated LFTs, venous

insufficiency.     Other Hx:  Unsteady gait/falls, ETOH abuse with withdrawal 

symptoms in the past pt states-tremors, bilateral leg pain/numbness, chronic 

renal disease stage I, pericarditis, bilateral macular degeneration/legally 

blind, chronic back pain, RLS, bilateral carpal tunnel syndrome, past infected 

sebaceous cyst on back


History of Any Multi-Drug Resistant Organisms: None Reported


Past Surgical History: Orthopedic Surgery


Additional Past Surgical History / Comment(s): Right hand surgery d/t injury


Past Anesthesia/Blood Transfusion Reactions: No Reported Reaction


Past Psychological History: Anxiety, Depression


Additional Psychological History / Comment(s): Pt resides with his channing.  He 

has been using her walker on occasion.  He is legally blind/has no 's 

license, his fredie drives.


Smoking Status: Light tobacco smoker


Past Alcohol Use History: Abuse, Daily


Additional Past Alcohol Use History / Comment(s): Pt started smoking in  and

states that he has recently cut down to a pack lasting 4-5 weeks.  Pt states he 

has hx of alcohol abuse-8 beers a day but has not drank in a few weeks.


Past Drug Use History: None Reported


Additional Drug Use History / Comment(s): Pt smoked marijuana as a teen.





- Past Family History


  ** Father


Family Medical History: Cancer


Additional Family Medical History / Comment(s): Father  of esophageal 

cancer.





  ** Mother


Family Medical History: No Reported History


Additional Family Medical History / Comment(s): Mopther is healthy





Medications and Allergies


                                Home Medications











 Medication  Instructions  Recorded  Confirmed  Type


 


Cetirizine HCl [Zyrtec] 10 mg PO DAILY 20 History


 


Omeprazole 20 mg PO DAILY 20 History


 


Folic Acid 1 mg PO DAILY #30 tablet 20 Rx


 


Multivitamins, Thera [Multivitamin 1 tab PO DAILY #30 tablet 20 

Rx





(formulary)]    


 


Thiamine [Vitamin B-1] 100 mg PO DAILY #30 tab 20 Rx


 


Albuterol Inhaler [Ventolin Hfa 1 - 2 puff INHALATION RT-Q4H PRN 20 History





Inhaler]    








                                    Allergies











Allergy/AdvReac Type Severity Reaction Status Date / Time


 


No Known Allergies Allergy   Verified 20 14:27














Physical Examination


Inspection of the right foot and ankle shows no bony deformity. There is 

echymosis diffusely across the dorsum of the foot and toes. There is an area of 

swelling/ fluid collection at the dorsum of the distal 3, 4, 5 metatatarsals. 

There are no wounds. There is no erythema. It is not hot to touch. The fluid is 

compressible. No crepitance. Free passive ROM of the foot, ankle and toes. There

is adequate perfusion at the foot and toes with 2+ DP pulse and less than 2 sec 

cap refill in all toes. Motor and sensation is not able to be assessed. 








Results


Xrays of the right foot from  show no evidence of fracture. there is soft 

tissue swelling. No gas.








- Labs


Labs: 


                  Abnormal Lab Results - Last 24 Hours (Table)











  20 Range/Units





  00:00 04:30 04:30 


 


WBC     (3.8-10.6)  k/uL


 


RBC     (4.30-5.90)  m/uL


 


Hgb     (13.0-17.5)  gm/dL


 


Hct     (39.0-53.0)  %


 


MCV     (80.0-100.0)  fL


 


MCH     (25.0-35.0)  pg


 


RDW     (11.5-15.5)  %


 


Plt Count     (150-450)  k/uL


 


Neutrophils #     (1.3-7.7)  k/uL


 


Macrocytosis     


 


PT     (9.0-12.0)  sec


 


INR     (<1.2)  


 


ABG pO2     ()  mmHg


 


ABG O2 Saturation     (94-97)  %


 


Potassium    3.4 L  (3.5-5.1)  mmol/L


 


Chloride    116 H  ()  mmol/L


 


Carbon Dioxide    19 L  (22-30)  mmol/L


 


BUN    27 H  (9-20)  mg/dL


 


Creatinine    1.32 H  (0.66-1.25)  mg/dL


 


Glucose    121 H  (74-99)  mg/dL


 


POC Glucose (mg/dL)  130 H    (75-99)  mg/dL


 


Calcium    7.5 L  (8.4-10.2)  mg/dL


 


Total Bilirubin    8.9 H  (0.2-1.3)  mg/dL


 


AST    164 H  (17-59)  U/L


 


Ammonia   89 H   (<30)  umol/L


 


Albumin    2.3 L  (3.5-5.0)  g/dL














  20 Range/Units





  04:30 04:30 07:05 


 


WBC   14.1 H   (3.8-10.6)  k/uL


 


RBC   2.49 L   (4.30-5.90)  m/uL


 


Hgb   9.4 L   (13.0-17.5)  gm/dL


 


Hct   26.7 L   (39.0-53.0)  %


 


MCV   107.0 H   (80.0-100.0)  fL


 


MCH   37.7 H   (25.0-35.0)  pg


 


RDW   23.8 H   (11.5-15.5)  %


 


Plt Count   75 L   (150-450)  k/uL


 


Neutrophils #   9.8 H   (1.3-7.7)  k/uL


 


Macrocytosis   Marked A   


 


PT  17.1 H    (9.0-12.0)  sec


 


INR  1.7 H    (<1.2)  


 


ABG pO2    65 L  ()  mmHg


 


ABG O2 Saturation    92.2 L  (94-97)  %


 


Potassium     (3.5-5.1)  mmol/L


 


Chloride     ()  mmol/L


 


Carbon Dioxide     (22-30)  mmol/L


 


BUN     (9-20)  mg/dL


 


Creatinine     (0.66-1.25)  mg/dL


 


Glucose     (74-99)  mg/dL


 


POC Glucose (mg/dL)     (75-99)  mg/dL


 


Calcium     (8.4-10.2)  mg/dL


 


Total Bilirubin     (0.2-1.3)  mg/dL


 


AST     (17-59)  U/L


 


Ammonia     (<30)  umol/L


 


Albumin     (3.5-5.0)  g/dL














  20 Range/Units





  12:16 


 


WBC   (3.8-10.6)  k/uL


 


RBC   (4.30-5.90)  m/uL


 


Hgb   (13.0-17.5)  gm/dL


 


Hct   (39.0-53.0)  %


 


MCV   (80.0-100.0)  fL


 


MCH   (25.0-35.0)  pg


 


RDW   (11.5-15.5)  %


 


Plt Count   (150-450)  k/uL


 


Neutrophils #   (1.3-7.7)  k/uL


 


Macrocytosis   


 


PT   (9.0-12.0)  sec


 


INR   (<1.2)  


 


ABG pO2   ()  mmHg


 


ABG O2 Saturation   (94-97)  %


 


Potassium   (3.5-5.1)  mmol/L


 


Chloride   ()  mmol/L


 


Carbon Dioxide   (22-30)  mmol/L


 


BUN   (9-20)  mg/dL


 


Creatinine   (0.66-1.25)  mg/dL


 


Glucose   (74-99)  mg/dL


 


POC Glucose (mg/dL)  165 H  (75-99)  mg/dL


 


Calcium   (8.4-10.2)  mg/dL


 


Total Bilirubin   (0.2-1.3)  mg/dL


 


AST   (17-59)  U/L


 


Ammonia   (<30)  umol/L


 


Albumin   (3.5-5.0)  g/dL








                      Microbiology - Last 24 Hours (Table)











 20 14:13 Blood Culture - Preliminary





 Blood    No Growth after 48 hours


 


 20 03:46 Gram Stain - Preliminary





 Sputum Sputum Culture - Preliminary








                                      H & H











  0620 Range/Units





  14:13 19:19 22:50 


 


Hgb  8.3 L  8.1 L  7.0 L  (13.0-17.5)  gm/dL


 


Hct  24.3 L  23.8 L  21.6 L  (39.0-53.0)  %














  20 Range/Units





  06:09 11:04 15:43 


 


Hgb  9.2 L D  8.5 L  8.6 L  (13.0-17.5)  gm/dL


 


Hct  27.2 L  25.7 L  27.0 L  (39.0-53.0)  %














  20 Range/Units





  05:05 04:30 


 


Hgb  8.6 L  9.4 L  (13.0-17.5)  gm/dL


 


Hct  26.0 L  26.7 L  (39.0-53.0)  %








                                   Coagulation











  20 Range/Units





  14:13 06:09 11:04 


 


INR  3.7 H  1.9 H  2.2 H  (<1.2)  














  20 Range/Units





  05:30 04:30 


 


INR  2.1 H  1.7 H  (<1.2)  











Result Diagrams: 


                                 20 04:30





                                 20 16:48





- Diagnostic results


Ankle/Foot x-ray: report reviewed, image reviewed





Assessment and Plan


(1) Foot swelling


Narrative/Plan: 


Patient has been reviewed with Dr. Farley. There are no plans for immediate 

surgical intervention. Recommend continued elevation and monitoring the focal 

area of swelling. Will continue to follow and make further recommendations as 

appropriate. Will consider further testing including repeat xrays, US or other 

based on his clinical course. Thank you





Current Visit: Yes   Status: Acute   Priority: Medium   Code(s): M79.89 - OTHER 

SPECIFIED SOFT TISSUE DISORDERS   SNOMED Code(s): 718084496


   


Time with Patient: Less than 30

## 2020-07-01 NOTE — P.PN
Subjective


Progress Note Date: 07/01/20


Principal diagnosis: 





Alcoholic hepatitis, alcoholic cirrhosis of the liver with ascites, hepatic 

encephalopathy, elevated liver enzymes





Patient is seen in the intensive care unit where he is currently intubated and 

sedated.  Patient is not having appropriate bowel movements with lactulose 

therapy with plan to increase today.  No acute events.





Objective





- Vital Signs


Vital signs: 


                                   Vital Signs











Temp  97.2 F L  07/01/20 08:00


 


Pulse  86   07/01/20 13:38


 


Resp  25 H  07/01/20 11:00


 


BP  101/61   07/01/20 11:00


 


Pulse Ox  94 L  07/01/20 11:00








                                 Intake & Output











 06/30/20 07/01/20 07/01/20





 18:59 06:59 18:59


 


Intake Total 5915.492 6612.038 835.988


 


Output Total 493 670 575


 


Balance 8107.732 9434.038 260.988


 


Weight 87.2 kg 89 kg 


 


Intake:   


 


  IV 1177.5 700 362


 


    0.9 130  


 


    Normal Saline Pressure   12





    Bag   


 


    Octreotide 500 mcg In 37.5  





    Sodium Chloride 0.9% 250   





    ml @ 25 MCG/HR 12.5 mls/   





    hr IV .Q20H [facility] Rx#:   





    [id number]   


 


    Piperacillin-Tazobactam 3 200 100 100





    .375 gm In Sodium   





    Chloride 0.9% 100 ml @ 25   





    mls/hr IVPB Q8HR [facility] Rx#   





    :[id number]   


 


    Potassium Chloride 20 meq 100  





    In Water For Injection 1   





    100ml.bag @ 50 mls/hr   





    IVPB Q2H [facility] Rx#:   





    [id number]   


 


    Sodium Chloride 0.9% 1, 710 600 250





    000 ml @ 50 mls/hr IV .   





    Q20H [facility] Rx#:[id number]   


 


  Intake, IV Titration 482.014 967.038 253.988





  Amount   


 


    Cisatracurium 200 mg In 48.521  





    Sodium Chloride 0.9% 180   





    ml @ 1 MCG/KG/MIN 4.812   





    mls/hr IV .Q24H [facility] Rx#:   





    [id number]   


 


    Norepinephrine 4 mg In 226.691 417.800 53.988





    Sodium Chloride 0.9% 250   





    ml @ 0.05 MCG/KG/MIN 15.   





    338 mls/hr IV .[id number]   





    [facility] Rx#:[id number]   


 


    Potassium Chloride 20 meq  100 100





    In Water For Injection 1   





    100ml.bag @ 50 mls/hr   





    IVPB Q2H [facility] Rx#:   





    [id number]   


 


    Propofol 1,000 mg In 206.802 358.155 100.000





    Empty Bag 1 bag @ Titrate   





    IV .Q0M [facility] Rx#:   





    [id number]   


 


    fentaNYL (PF) 1,000 mcg  91.083 





    In Sodium Chloride 0.9%   





    80 ml @ 50 MCG/HR 5 mls/   





    hr IV .Q20H [facility] Rx#:   





    [id number]   


 


  Oral 30  


 


  Tube Feeding  180 130


 


  Other  30 90


 


Output:   


 


  Urine 493 670 575


 


Other:   


 


  Voiding Method Indwelling Catheter Indwelling Catheter Indwelling Catheter








                       ABP, PAP, CO, CI - Last Documented











Arterial Blood Pressure        116/58

















- Exam





On physical examination, patient appears comfortable in no apparent distress. 


HEAD: Normocephalic, atraumatic. 


EYES: Scleral icterus. No conjunctival injection. 


MOUTH: No lesions, tongue midline, endotracheal tube in place. 


NECK: Trachea midline, no gross abnormalities. 


CHEST: Coarse respiratory noises in all lung fields on mechanical ventilation.


ABDOMEN: Soft, obese. Bowel sounds are positive. No organomegaly.  No guarding 

or rigidity.


EXTREMITIES: Bilateral pedal edema. 


SKIN: No rashes, jaundice. 


NEUROLOGIC: Intubated and sedated. 





- Labs


CBC & Chem 7: 


                                 07/01/20 04:30





                                 07/01/20 20:15


Labs: 


                  Abnormal Lab Results - Last 24 Hours (Table)











  07/01/20 07/01/20 07/01/20 Range/Units





  00:00 04:30 04:30 


 


WBC     (3.8-10.6)  k/uL


 


RBC     (4.30-5.90)  m/uL


 


Hgb     (13.0-17.5)  gm/dL


 


Hct     (39.0-53.0)  %


 


MCV     (80.0-100.0)  fL


 


MCH     (25.0-35.0)  pg


 


RDW     (11.5-15.5)  %


 


Plt Count     (150-450)  k/uL


 


Neutrophils #     (1.3-7.7)  k/uL


 


Macrocytosis     


 


PT     (9.0-12.0)  sec


 


INR     (<1.2)  


 


ABG pO2     ()  mmHg


 


ABG O2 Saturation     (94-97)  %


 


Potassium    3.4 L  (3.5-5.1)  mmol/L


 


Chloride    116 H  ()  mmol/L


 


Carbon Dioxide    19 L  (22-30)  mmol/L


 


BUN    27 H  (9-20)  mg/dL


 


Creatinine    1.32 H  (0.66-1.25)  mg/dL


 


Glucose    121 H  (74-99)  mg/dL


 


POC Glucose (mg/dL)  130 H    (75-99)  mg/dL


 


Calcium    7.5 L  (8.4-10.2)  mg/dL


 


Total Bilirubin    8.9 H  (0.2-1.3)  mg/dL


 


AST    164 H  (17-59)  U/L


 


Ammonia   89 H   (<30)  umol/L


 


Albumin    2.3 L  (3.5-5.0)  g/dL














  07/01/20 07/01/20 07/01/20 Range/Units





  04:30 04:30 07:05 


 


WBC   14.1 H   (3.8-10.6)  k/uL


 


RBC   2.49 L   (4.30-5.90)  m/uL


 


Hgb   9.4 L   (13.0-17.5)  gm/dL


 


Hct   26.7 L   (39.0-53.0)  %


 


MCV   107.0 H   (80.0-100.0)  fL


 


MCH   37.7 H   (25.0-35.0)  pg


 


RDW   23.8 H   (11.5-15.5)  %


 


Plt Count   75 L   (150-450)  k/uL


 


Neutrophils #   9.8 H   (1.3-7.7)  k/uL


 


Macrocytosis   Marked A   


 


PT  17.1 H    (9.0-12.0)  sec


 


INR  1.7 H    (<1.2)  


 


ABG pO2    65 L  ()  mmHg


 


ABG O2 Saturation    92.2 L  (94-97)  %


 


Potassium     (3.5-5.1)  mmol/L


 


Chloride     ()  mmol/L


 


Carbon Dioxide     (22-30)  mmol/L


 


BUN     (9-20)  mg/dL


 


Creatinine     (0.66-1.25)  mg/dL


 


Glucose     (74-99)  mg/dL


 


POC Glucose (mg/dL)     (75-99)  mg/dL


 


Calcium     (8.4-10.2)  mg/dL


 


Total Bilirubin     (0.2-1.3)  mg/dL


 


AST     (17-59)  U/L


 


Ammonia     (<30)  umol/L


 


Albumin     (3.5-5.0)  g/dL














  07/01/20 Range/Units





  12:16 


 


WBC   (3.8-10.6)  k/uL


 


RBC   (4.30-5.90)  m/uL


 


Hgb   (13.0-17.5)  gm/dL


 


Hct   (39.0-53.0)  %


 


MCV   (80.0-100.0)  fL


 


MCH   (25.0-35.0)  pg


 


RDW   (11.5-15.5)  %


 


Plt Count   (150-450)  k/uL


 


Neutrophils #   (1.3-7.7)  k/uL


 


Macrocytosis   


 


PT   (9.0-12.0)  sec


 


INR   (<1.2)  


 


ABG pO2   ()  mmHg


 


ABG O2 Saturation   (94-97)  %


 


Potassium   (3.5-5.1)  mmol/L


 


Chloride   ()  mmol/L


 


Carbon Dioxide   (22-30)  mmol/L


 


BUN   (9-20)  mg/dL


 


Creatinine   (0.66-1.25)  mg/dL


 


Glucose   (74-99)  mg/dL


 


POC Glucose (mg/dL)  165 H  (75-99)  mg/dL


 


Calcium   (8.4-10.2)  mg/dL


 


Total Bilirubin   (0.2-1.3)  mg/dL


 


AST   (17-59)  U/L


 


Ammonia   (<30)  umol/L


 


Albumin   (3.5-5.0)  g/dL








                      Microbiology - Last 24 Hours (Table)











 06/28/20 14:13 Blood Culture - Preliminary





 Blood    No Growth after 48 hours


 


 06/29/20 03:46 Gram Stain - Preliminary





 Sputum Sputum Culture - Preliminary














Assessment and Plan


(1) Acute alcoholic hepatitis


Narrative/Plan: 


54-year-old male with known history of alcohol abuse presenting to the hospital 

with altered mental status.  Likely multifactorial given suspected underlying de

compensated cirrhosis, acute alcoholic hepatitis and concern for aspiration 

pneumonia and sepsis.  Currently receiving treatment in the ICU, he remains on 

broad-spectrum antibiotic therapy and mechanically ventilated.


Current Visit: Yes   Status: Acute   Code(s): K70.10 - ALCOHOLIC HEPATITIS 

WITHOUT ASCITES   SNOMED Code(s): 3485575


   





(2) Liver cirrhosis


Current Visit: Yes   Status: Acute   Code(s): K74.60 - UNSPECIFIED CIRRHOSIS OF 

LIVER   SNOMED Code(s): 76885430


   





(3) Ascites


Current Visit: Yes   Status: Acute   Code(s): R18.8 - OTHER ASCITES   SNOMED 

Code(s): 580861927


   





(4) Hepatic encephalopathy


Current Visit: Yes   Status: Acute   Code(s): K72.90 - HEPATIC FAILURE, 

UNSPECIFIED WITHOUT COMA   SNOMED Code(s): 80795214


   


Plan: 





Supportive care


Nothing by mouth, okay for tube feeds as tolerated


Continue management per intensivist service, patient currently intubated and 

sedated


Continue respiratory antibiotic therapy


Continue lactulose which was increased today to 4 times a day


Xifaxan twice a day ordered


paracentesis when patient is medically stable


Alcohol abstinence


Thank you for allowing us to participate in the care of the patient we will 

continue to follow

## 2020-07-01 NOTE — P.PN
Subjective


Progress Note Date: 07/01/20





This is a 54-year-old gentleman, history of polysubstance abuse including 

alcohol abuse , falls with recent rib fractures, legally blind, osteoarthritis 

and multiple other medical issues presented to the ER with changes in mental 

status 2 days.  Drug screen positive for tricyclics and benzos, serum alcohol 

less than 10.  UA reported dark brown cloudy urine with occasional bacteria, 

hyaline casts, 2 WBCs, trace leukocytes, 4+ bilirubin, negative for nitrates. 

Ammonia level 91, T bili 14.9, currently 15.8 , elevated LFTs .Gallbladder 

ultrasound reporting abdominal ascites, abdominal x-ray nonacute, right foot x-

ray reported no fracture, soft tissue swelling, EKG reported sinus tachycardia, 

troponin normal, , echo reported normal LV function, EF 60-65% ,lactic 

acid 2.1 now down to 1.7, BUN 25, creatinine 0.8 chest x-ray reporting moderate 

pulmonary interstitial and airspace edema significantly worse than yesterday, 

pulmonary edema.  ABGs noted.  INR on admission 3.7, down to 2.2 Sepsis 

protocol, Received IV fluid resuscitation, IV antibiotics, cultures drawn.  

Developed respiratory failure, requiring intubation during the night.  Currently

on Sandostatin drip.  3 maroon stools during the night.  Received 4 units of FFP

and 2 units of packed RBCs to date.  Hemoglobin currently 8.5.  And had required

pressor support with Levophed off since 06 100.  Maintained on IV fluid 

resuscitation.  Telemetry sinus rhythm.  Potassium 3.2, magnesium 1.7. 








06/30/2020 chest x-ray reporting persistent bilateral scattered airspace 

infiltrates, pleural effusion unchanged.  Ventilator dependent, on FiO2 of 50 

with PEEP increased to 12.  Continues on lactulose with ammonia down to 80.  T 

bili decreased to 11.3, LFTs improving.  No further maroon stools.  Small black 

stool this morning.  Hemoglobin 8.6.  Telemetry sinus rhythm to sinus tach.  

Sandostatin drip discontinued this morning.  Last night patient asynchronous 

with vent, stacking breaths, Nimbex drip initiated.  This morning Nimbex 

discontinued, changed to fentanyl drip.  Requiring pressor support, Levophed 

resumed.  Received vitamin K yesterday, INR 2.1.  Marginal urine output, IV 

fluids decreased, and Lasix IV push added to med regime.  Tube feedings ordered.

 Afebrile, WBC 14.7.  Sputum culture pending, preliminary blood cultures 

negative at 48 hours.  ABGs noted.








07/01/2020  yesterday Lasix and Aldactone initiated, creatinine mildly worsened 

up to 1.32.  Ammonia increased up to 89, lactulose increased.  No further maroon

stools, hemoglobin increased to 9.4, platelets increased to 75.  T bili trending

down, 8.9, LFTs improving.  Potassium 3.4.  Chest x-ray reporting improving left

lower lobe aeration.  Right foot evaluated by orthopedic surgery, possible fluid

collection, seroma with potential x-ray tomorrow.  Tolerating tube feeds with 

minimal to no residuals, currently at 30 mls per hour with goal of 45.  IV 

steroids added to med regimen with blood sugars increasing.  Remains vent 

dependent with FiO2 at 50/+12 of PEEP.  Continues on Levophed, fentanyl, 

diprovan drips.  Afebrile, T-max 99.5, WBC 14.1.





Objective





- Vital Signs


Vital signs: 


                                   Vital Signs











Temp  97.2 F L  07/01/20 08:00


 


Pulse  93   07/01/20 11:00


 


Resp  25 H  07/01/20 11:00


 


BP  101/61   07/01/20 11:00


 


Pulse Ox  94 L  07/01/20 11:00








                                 Intake & Output











 06/30/20 07/01/20 07/01/20





 18:59 06:59 18:59


 


Intake Total 4266.406 3535.038 835.988


 


Output Total 493 670 575


 


Balance 6696.965 8392.038 260.988


 


Weight 87.2 kg 89 kg 


 


Intake:   


 


  IV 1177.5 700 362


 


    0.9 130  


 


    Normal Saline Pressure   12





    Bag   


 


    Octreotide 500 mcg In 37.5  





    Sodium Chloride 0.9% 250   





    ml @ 25 MCG/HR 12.5 mls/   





    hr IV .Q20H [facility] Rx#:   





    [id number]   


 


    Piperacillin-Tazobactam 3 200 100 100





    .375 gm In Sodium   





    Chloride 0.9% 100 ml @ 25   





    mls/hr IVPB Q8HR [facility] Rx#   





    :[id number]   


 


    Potassium Chloride 20 meq 100  





    In Water For Injection 1   





    100ml.bag @ 50 mls/hr   





    IVPB Q2H [facility] Rx#:   





    [id number]   


 


    Sodium Chloride 0.9% 1, 710 600 250





    000 ml @ 50 mls/hr IV .   





    Q20H [facility] Rx#:[id number]   


 


  Intake, IV Titration 482.014 967.038 253.988





  Amount   


 


    Cisatracurium 200 mg In 48.521  





    Sodium Chloride 0.9% 180   





    ml @ 1 MCG/KG/MIN 4.812   





    mls/hr IV .Q24H [facility] Rx#:   





    [id number]   


 


    Norepinephrine 4 mg In 226.691 417.800 53.988





    Sodium Chloride 0.9% 250   





    ml @ 0.05 MCG/KG/MIN 15.   





    338 mls/hr IV .[id number]   





    [facility] Rx#:[id number]   


 


    Potassium Chloride 20 meq  100 100





    In Water For Injection 1   





    100ml.bag @ 50 mls/hr   





    IVPB Q2H [facility] Rx#:   





    [id number]   


 


    Propofol 1,000 mg In 206.802 358.155 100.000





    Empty Bag 1 bag @ Titrate   





    IV .Q0M [facility] Rx#:   





    [id number]   


 


    fentaNYL (PF) 1,000 mcg  91.083 





    In Sodium Chloride 0.9%   





    80 ml @ 50 MCG/HR 5 mls/   





    hr IV .Q20H [facility] Rx#:   





    [id number]   


 


  Oral 30  


 


  Tube Feeding  180 130


 


  Other  30 90


 


Output:   


 


  Urine 493 670 575


 


Other:   


 


  Voiding Method Indwelling Catheter Indwelling Catheter Indwelling Catheter








                       ABP, PAP, CO, CI - Last Documented











Arterial Blood Pressure        116/58

















- Exam


VITAL SIGNS: As above


GENERAL: Sitting up in bed, intubated, sedated


HEENT:  Conjunctivae normal.  Positive icterus, jaundice, NG,OG tubes present.


NECK:  No JVD. No thyroid enlargement. No LNs


CARDIOVASCULAR:  S1, S2 regular.  Systolic murmur


RESPIRATION: Coarse Breath sounds diminished in the bases with scattered 

bilateral rhonchi and crackles


ABDOMEN:  Soft, distended, positive ascites,  positive bowel sounds


Extremities: Positive upper and lower extremity edema, no clubbing, no cyanosis.

 Right foot dorsal edema , significant ecchymosis -orthopedic evaluation in 

progress


NERVOUS SYSTEM: Unable to assess, sedated and intubated.


Skin: Warm and dry, no rash 








- Labs


CBC & Chem 7: 


                                 07/01/20 04:30





                                 07/01/20 04:30


Labs: 


                  Abnormal Lab Results - Last 24 Hours (Table)











  07/01/20 07/01/20 07/01/20 Range/Units





  00:00 04:30 04:30 


 


WBC     (3.8-10.6)  k/uL


 


RBC     (4.30-5.90)  m/uL


 


Hgb     (13.0-17.5)  gm/dL


 


Hct     (39.0-53.0)  %


 


MCV     (80.0-100.0)  fL


 


MCH     (25.0-35.0)  pg


 


RDW     (11.5-15.5)  %


 


Plt Count     (150-450)  k/uL


 


Neutrophils #     (1.3-7.7)  k/uL


 


Macrocytosis     


 


PT     (9.0-12.0)  sec


 


INR     (<1.2)  


 


ABG pO2     ()  mmHg


 


ABG O2 Saturation     (94-97)  %


 


Potassium    3.4 L  (3.5-5.1)  mmol/L


 


Chloride    116 H  ()  mmol/L


 


Carbon Dioxide    19 L  (22-30)  mmol/L


 


BUN    27 H  (9-20)  mg/dL


 


Creatinine    1.32 H  (0.66-1.25)  mg/dL


 


Glucose    121 H  (74-99)  mg/dL


 


POC Glucose (mg/dL)  130 H    (75-99)  mg/dL


 


Calcium    7.5 L  (8.4-10.2)  mg/dL


 


Total Bilirubin    8.9 H  (0.2-1.3)  mg/dL


 


AST    164 H  (17-59)  U/L


 


Ammonia   89 H   (<30)  umol/L


 


Albumin    2.3 L  (3.5-5.0)  g/dL














  07/01/20 07/01/20 07/01/20 Range/Units





  04:30 04:30 07:05 


 


WBC   14.1 H   (3.8-10.6)  k/uL


 


RBC   2.49 L   (4.30-5.90)  m/uL


 


Hgb   9.4 L   (13.0-17.5)  gm/dL


 


Hct   26.7 L   (39.0-53.0)  %


 


MCV   107.0 H   (80.0-100.0)  fL


 


MCH   37.7 H   (25.0-35.0)  pg


 


RDW   23.8 H   (11.5-15.5)  %


 


Plt Count   75 L   (150-450)  k/uL


 


Neutrophils #   9.8 H   (1.3-7.7)  k/uL


 


Macrocytosis   Marked A   


 


PT  17.1 H    (9.0-12.0)  sec


 


INR  1.7 H    (<1.2)  


 


ABG pO2    65 L  ()  mmHg


 


ABG O2 Saturation    92.2 L  (94-97)  %


 


Potassium     (3.5-5.1)  mmol/L


 


Chloride     ()  mmol/L


 


Carbon Dioxide     (22-30)  mmol/L


 


BUN     (9-20)  mg/dL


 


Creatinine     (0.66-1.25)  mg/dL


 


Glucose     (74-99)  mg/dL


 


POC Glucose (mg/dL)     (75-99)  mg/dL


 


Calcium     (8.4-10.2)  mg/dL


 


Total Bilirubin     (0.2-1.3)  mg/dL


 


AST     (17-59)  U/L


 


Ammonia     (<30)  umol/L


 


Albumin     (3.5-5.0)  g/dL














  07/01/20 Range/Units





  12:16 


 


WBC   (3.8-10.6)  k/uL


 


RBC   (4.30-5.90)  m/uL


 


Hgb   (13.0-17.5)  gm/dL


 


Hct   (39.0-53.0)  %


 


MCV   (80.0-100.0)  fL


 


MCH   (25.0-35.0)  pg


 


RDW   (11.5-15.5)  %


 


Plt Count   (150-450)  k/uL


 


Neutrophils #   (1.3-7.7)  k/uL


 


Macrocytosis   


 


PT   (9.0-12.0)  sec


 


INR   (<1.2)  


 


ABG pO2   ()  mmHg


 


ABG O2 Saturation   (94-97)  %


 


Potassium   (3.5-5.1)  mmol/L


 


Chloride   ()  mmol/L


 


Carbon Dioxide   (22-30)  mmol/L


 


BUN   (9-20)  mg/dL


 


Creatinine   (0.66-1.25)  mg/dL


 


Glucose   (74-99)  mg/dL


 


POC Glucose (mg/dL)  165 H  (75-99)  mg/dL


 


Calcium   (8.4-10.2)  mg/dL


 


Total Bilirubin   (0.2-1.3)  mg/dL


 


AST   (17-59)  U/L


 


Ammonia   (<30)  umol/L


 


Albumin   (3.5-5.0)  g/dL








                      Microbiology - Last 24 Hours (Table)











 06/28/20 14:13 Blood Culture - Preliminary





 Blood    No Growth after 48 hours


 


 06/29/20 03:46 Gram Stain - Preliminary





 Sputum Sputum Culture - Preliminary














Assessment and Plan


Assessment: 


Sepsis secondary to aspiration bilateral pneumonia, possibly abdominal source,


Acute hypoxic respiratory failure,  related to the above,  ventilator dependent


Septic and hypovolemic shock multifactorial, secondary to bilateral aspiration 

pneumonia, alcoholic liver disease


Hypotension, secondary to the above, pressor support dependent secondary to 

sepsis


Acute renal failure multifactorial, secondary to all the above, diuretics


Acute GI bleed, possible esophageal varices in a patient with significant 

alcohol dependence, on Sandostatin


Acute blood loss anemia, status post multiple transfusions of both FFP, packed 

RBCs


Acute Hepatic encephalopathy


Alcoholic liver disease with ascites


Hyperammonia


Recent posterior lateral rib fractures of 9, 10 and 11 status post fall


History of pericarditis


Chronic back pain


Polysubstance abuse, per record review; patient reports alcohol, marijuana use 

currently.


History of nicotine dependence


COPD, emphysema


Legally blind


Obesity, BMI 30.7


Osteoarthritis


Venous insufficiency


Anxiety, depression, history of, currently controlled


Hyperglycemia, steroid-induced


Moderate protein calorie malnutrition














Plan: Continue on current medication regime ,monitoring and symptomatic 

treatment.  ICU management/Vent support as per intensivist.   Lactulose 

increased,empiric antibiotics .cultures in progress .Close monitoring of coags. 

orthopedic surgery evaluation of right foot in progress.Prognosis guarded given 

multiple complex medical issues.














The impression and plan of care has been dictated as directed.





:


I performed a history and examination of this patient,  discussed the same with 

the dictator.  I agree with the dictator's note ,documented as a scribe.  Any 

additional findings or plans will be noted.

## 2020-07-01 NOTE — P.PN
Subjective


Progress Note Date: 07/01/20


Principal diagnosis: 





Acute hypoxic respiratory failure secondary to aspiration pneumonia, and acute 

sepsis.








This is a 54-year-old white male, history of alcohol abuse, patient was brought 

into the ER with change in mental status according to EMS.  Patient could not 

give any history, he was a very poor historian upon arrival to the ER.  Patient 

is supposedly a daily drinker, however from my review of the chart, I saw this 

patient back on 3/17/20, patient presented back then with multiple posterior 

lateral rib fractures on the right.  Apparently he fell in his bathroom, and 

landed on the side of the top sustaining multiple rib fractures.  Patient 

sustained rib fractures of 910 and 11 ribs.  Patient is also known to have 

history of severe emphysema/COPD.  He is legally blind.  He has history of 

pericarditis, anxiety, and history of degenerative joint disease.  Patient was 

discharged home on 3/18/20.  Drug screen in the ER was positive for tricyclic 

antidepressants and positive for benzodiazepines.  Rest of the drug screen was 

basically negative.  ABG in the ER showed a pO2 of 63 pCO2 of 29 pH of 7.49.  

Patient was also noted to have elevated INR of 3.7.  Low hemoglobin of 7.0 

although his baseline hemoglobin from 2 months ago was 15.1.  Ammonia level was 

91.  Total bilirubin was 14.9, and his liver enzymes were a bit elevated.  

Normal amylase and lipase were noted.  Gallbladder ultrasound showed abdominal 

ascites.  Chest x-ray showed bilateral diffuse infiltrates.  Patient was 

presumed septic upon presentation, received fluid boluses of 2500 ML's.  Patient

was found to have hepatic encephalopathy liver failure, bilateral pneumonia, GI 

hemorrhage and sepsis.  Early this morning, patient was intubated, placed on 

mechanical ventilation, and he was aware of the patient since admission.  

Presently the patient is on assist control rate of 28 tidal volume is 500 FiO2 

is 100% PEEP of 5.  ABG showed a pO2 of 170 pCO2 of 31 pH of 7.46.  Hence I cut 

down the FiO2 to 50%, increased the PEEP to 8, cut down the tidal volume to 450,

and decrease the respiratory rate to 26.  Patient is on Sandostatin for his GI 

bleeding, and he is yet to be seen by gastroenterology on consultation is also 

on Protonix.  He is on lactulose for his elevated ammonia level.  And he is 

empirically on Zosyn for presumptive aspiration pneumonia.  Echocardiogram 

showed evidence of good LV function.  And mild valvular heart disease.  BNP 

level was borderline elevated at 910





Patient was reevaluated today on 6/30/20, remains on mechanical ventilation.  He

is presently on assist control rate of 26 tidal volume is 450 FiO2 of 60% and 

PEEP of 8.  However I increased his PEEP to 12 and cut down his FiO2 to 50%.  At

night the patient developed worsening agitation, and Stacking his breaths, was 

not synchronous with mechanical ventilation, hence had to place the patient on 

Nimbex.  Today I plan to discontinue Nimbex and place him on fentanyl 25 g per 

hour.  Patient will be started on tube feeding, and I cut down his IV fluid to 

50 MLS per hour.  Remains on propofol at 60 mcg/kg/m, Nimbex which will be 

discontinued, but creatinine which was discontinued this morning, and no

repinephrine at 0.06 mcg/kg/m.  Enteral feeding to be started today by 

nutritionist.  Patient remains on antibiotics, remains on lactulose for his 

elevated ammonia level which is 80 today.  And he remains on Protonix.  Chest x-

ray showed more consolidation noted bilaterally in both lungs more so in the 

left lung.  Bilateraldisease is noted consistent with aspiration pneumonia.  

Sputum cultures and blood cultures so far remain on diagnostic.  ABG today 

showed a pO2 of 67 pCO2 of 39 pH of 7.33.  WBC count is 14.7 hemoglobin is 8.6 

platelets are 66,000 and INR is 2.1.  Electrolytes are basically normal renal 

profile showed beer of 25 creatinine 0.94.





Patient was reevaluated today on 7/1/20, remains in the intensive care unit, 

remains on mechanical ventilation.  His ventilator settings are assist control 

rate of 26, tidal volume is 450 FiO2 is 50% and PEEP is at 12.  ABG showed a pO2

of 65 pCO2 of 37 pH of 7.36, hence no change was made in the ventilator 

settings.  Patient remains on norepinephrine at 0.09 mcg/kg/m, propofol at 45 

mcg/kg/m, fentanyl at 0.5 mcg/kg/h.  Remains on enteral feeding at 30 MLS per 

hour goal is 45.  Remains on Zosyn for empiric coverage of aspiration pneumonia.

 Remains on lactulose and the dose was increased to 45 ML 4 times a day, and 

added Xifaxan at 550 mg twice a day.  His lactulose does not seem to be inducing

enough diarrhea did get his ammonia level down.  Continues to have significantly

elevated ammonia level in spite of lactulose for the last 2 days.  Patient 

continues to have ascites.  And no plans for paracentesis as recommended by 

gastroenterology at this point.  Chest x-ray showed very minimal improvement if 

any in his bilateral infiltrates.  The bilateral infiltrates are suggestive of 

aspiration pneumonia.  Obviously the patient is known to have history of 

alcoholism, and he presented with alcoholic liver hepatitis, and hepatic en

cephalopathy.  Labs today were all reviewed WBC is 14.1 hemoglobin is 9.4 

platelets are 75,000 INR is 1.7.  Electrolytes are unremarkable except for low 

potassium of 3.4 BUN of 27 creatinine 1.32.  Liver enzymes are borderline 

elevated.  Total bilirubin is improving down to 8.9 today.  Albumin is 2.3








Objective





- Vital Signs


Vital signs: 


                                   Vital Signs











Temp  97.2 F L  07/01/20 08:00


 


Pulse  93   07/01/20 11:00


 


Resp  25 H  07/01/20 11:00


 


BP  101/61   07/01/20 11:00


 


Pulse Ox  94 L  07/01/20 11:00








                                 Intake & Output











 06/30/20 07/01/20 07/01/20





 18:59 06:59 18:59


 


Intake Total 1830.684 7473.038 835.988


 


Output Total 493 670 575


 


Balance 0117.356 2467.038 260.988


 


Weight 87.2 kg 89 kg 


 


Intake:   


 


  IV 1177.5 700 362


 


    0.9 130  


 


    Normal Saline Pressure   12





    Bag   


 


    Octreotide 500 mcg In 37.5  





    Sodium Chloride 0.9% 250   





    ml @ 25 MCG/HR 12.5 mls/   





    hr IV .Q20H [facility] Rx#:   





    [id number]   


 


    Piperacillin-Tazobactam 3 200 100 100





    .375 gm In Sodium   





    Chloride 0.9% 100 ml @ 25   





    mls/hr IVPB Q8HR [facility] Rx#   





    :[id number]   


 


    Potassium Chloride 20 meq 100  





    In Water For Injection 1   





    100ml.bag @ 50 mls/hr   





    IVPB Q2H [facility] Rx#:   





    [id number]   


 


    Sodium Chloride 0.9% 1, 710 600 250





    000 ml @ 50 mls/hr IV .   





    Q20H [facility] Rx#:[id number]   


 


  Intake, IV Titration 482.014 967.038 253.988





  Amount   


 


    Cisatracurium 200 mg In 48.521  





    Sodium Chloride 0.9% 180   





    ml @ 1 MCG/KG/MIN 4.812   





    mls/hr IV .Q24H [facility] Rx#:   





    [id number]   


 


    Norepinephrine 4 mg In 226.691 417.800 53.988





    Sodium Chloride 0.9% 250   





    ml @ 0.05 MCG/KG/MIN 15.   





    338 mls/hr IV .[id number]   





    [facility] Rx#:[id number]   


 


    Potassium Chloride 20 meq  100 100





    In Water For Injection 1   





    100ml.bag @ 50 mls/hr   





    IVPB Q2H [facility] Rx#:   





    [id number]   


 


    Propofol 1,000 mg In 206.802 358.155 100.000





    Empty Bag 1 bag @ Titrate   





    IV .Q0M [facility] Rx#:   





    [id number]   


 


    fentaNYL (PF) 1,000 mcg  91.083 





    In Sodium Chloride 0.9%   





    80 ml @ 50 MCG/HR 5 mls/   





    hr IV .Q20H [facility] Rx#:   





    [id number]   


 


  Oral 30  


 


  Tube Feeding  180 130


 


  Other  30 90


 


Output:   


 


  Urine 493 670 575


 


Other:   


 


  Voiding Method Indwelling Catheter Indwelling Catheter Indwelling Catheter








                       ABP, PAP, CO, CI - Last Documented











Arterial Blood Pressure        116/58

















- Exam





Physical Exam: Revealed 54-year-old white male on mechanical ventilation, 

sedated, on propofol, and on fentanyl, off Nimbex for the last 24 hours


Head: Atraumatic, normocephalic.


HEENT:[PERRLA, EOMI, positive icterus.  No neck masses, no JVD, no stridor, 

endotracheal tube and orogastric tube are intact.


Chest: Crackles and rhonchi bilaterally.  No wheezes.  Symmetrical chest 

expansion.]


Cardiac Exam: [Normal S1 and S2, no S3 gallop, 2/6 systolic murmur thought the 

precordium.]


Abdomen: [Soft, nontender, suspect positive ascites, no rebound, no guarding, 

positive bowel sounds.


Extremities: [No clubbing, trace of bipedal edema, no cyanosis.]  Deformity 

noted on the dorsal aspect of the right foot, hence I recommended orthopedic 

evaluation.  This is related to trauma.


Neurological Exam: Cannot be assessed, patient is fully sedated, on mechanical 

ventilation.


Psychiatric: Could not be assessed.


Skin: No rashes.











- Labs


CBC & Chem 7: 


                                 07/01/20 04:30





                                 07/01/20 04:30


Labs: 


                  Abnormal Lab Results - Last 24 Hours (Table)











  07/01/20 07/01/20 07/01/20 Range/Units





  00:00 04:30 04:30 


 


WBC     (3.8-10.6)  k/uL


 


RBC     (4.30-5.90)  m/uL


 


Hgb     (13.0-17.5)  gm/dL


 


Hct     (39.0-53.0)  %


 


MCV     (80.0-100.0)  fL


 


MCH     (25.0-35.0)  pg


 


RDW     (11.5-15.5)  %


 


Plt Count     (150-450)  k/uL


 


Neutrophils #     (1.3-7.7)  k/uL


 


Macrocytosis     


 


PT     (9.0-12.0)  sec


 


INR     (<1.2)  


 


ABG pO2     ()  mmHg


 


ABG O2 Saturation     (94-97)  %


 


Potassium    3.4 L  (3.5-5.1)  mmol/L


 


Chloride    116 H  ()  mmol/L


 


Carbon Dioxide    19 L  (22-30)  mmol/L


 


BUN    27 H  (9-20)  mg/dL


 


Creatinine    1.32 H  (0.66-1.25)  mg/dL


 


Glucose    121 H  (74-99)  mg/dL


 


POC Glucose (mg/dL)  130 H    (75-99)  mg/dL


 


Calcium    7.5 L  (8.4-10.2)  mg/dL


 


Total Bilirubin    8.9 H  (0.2-1.3)  mg/dL


 


AST    164 H  (17-59)  U/L


 


Ammonia   89 H   (<30)  umol/L


 


Albumin    2.3 L  (3.5-5.0)  g/dL














  07/01/20 07/01/20 07/01/20 Range/Units





  04:30 04:30 07:05 


 


WBC   14.1 H   (3.8-10.6)  k/uL


 


RBC   2.49 L   (4.30-5.90)  m/uL


 


Hgb   9.4 L   (13.0-17.5)  gm/dL


 


Hct   26.7 L   (39.0-53.0)  %


 


MCV   107.0 H   (80.0-100.0)  fL


 


MCH   37.7 H   (25.0-35.0)  pg


 


RDW   23.8 H   (11.5-15.5)  %


 


Plt Count   75 L   (150-450)  k/uL


 


Neutrophils #   9.8 H   (1.3-7.7)  k/uL


 


Macrocytosis   Marked A   


 


PT  17.1 H    (9.0-12.0)  sec


 


INR  1.7 H    (<1.2)  


 


ABG pO2    65 L  ()  mmHg


 


ABG O2 Saturation    92.2 L  (94-97)  %


 


Potassium     (3.5-5.1)  mmol/L


 


Chloride     ()  mmol/L


 


Carbon Dioxide     (22-30)  mmol/L


 


BUN     (9-20)  mg/dL


 


Creatinine     (0.66-1.25)  mg/dL


 


Glucose     (74-99)  mg/dL


 


POC Glucose (mg/dL)     (75-99)  mg/dL


 


Calcium     (8.4-10.2)  mg/dL


 


Total Bilirubin     (0.2-1.3)  mg/dL


 


AST     (17-59)  U/L


 


Ammonia     (<30)  umol/L


 


Albumin     (3.5-5.0)  g/dL














  07/01/20 Range/Units





  12:16 


 


WBC   (3.8-10.6)  k/uL


 


RBC   (4.30-5.90)  m/uL


 


Hgb   (13.0-17.5)  gm/dL


 


Hct   (39.0-53.0)  %


 


MCV   (80.0-100.0)  fL


 


MCH   (25.0-35.0)  pg


 


RDW   (11.5-15.5)  %


 


Plt Count   (150-450)  k/uL


 


Neutrophils #   (1.3-7.7)  k/uL


 


Macrocytosis   


 


PT   (9.0-12.0)  sec


 


INR   (<1.2)  


 


ABG pO2   ()  mmHg


 


ABG O2 Saturation   (94-97)  %


 


Potassium   (3.5-5.1)  mmol/L


 


Chloride   ()  mmol/L


 


Carbon Dioxide   (22-30)  mmol/L


 


BUN   (9-20)  mg/dL


 


Creatinine   (0.66-1.25)  mg/dL


 


Glucose   (74-99)  mg/dL


 


POC Glucose (mg/dL)  165 H  (75-99)  mg/dL


 


Calcium   (8.4-10.2)  mg/dL


 


Total Bilirubin   (0.2-1.3)  mg/dL


 


AST   (17-59)  U/L


 


Ammonia   (<30)  umol/L


 


Albumin   (3.5-5.0)  g/dL








                      Microbiology - Last 24 Hours (Table)











 06/28/20 14:13 Blood Culture - Preliminary





 Blood    No Growth after 48 hours


 


 06/29/20 03:46 Gram Stain - Preliminary





 Sputum Sputum Culture - Preliminary














Assessment and Plan


Assessment: 





Impression:





Acute hypoxic respiratory failure, suspect aspiration pneumonia.


Acute sepsis, likely source is aspiration pneumonia, although abdominal source 

is not entirely ruled out.  Blood cultures and sputum cultures remain 

nondiagnostic


Alcohol liver disease with ascites.  Jaundice, and elevated liver enzymes.


Blood loss anemia, most likely the patient has acute or subacute GI bleeding, GI

is following.  


Acute hepatic encephalopathy with significantly elevated ammonia level.  Remains

on lactulose.  Dose was increased today, and I added Xifaxan


Recent history of fall about 2 months ago, and multiple right-sided rib 

fractures.


History of alcoholic neuropathy.


History of pericarditis


Chronic back pain


History of restless leg syndrome.





Recommendation:


Continue ventilatory support.


Continue propofol, and fentanyl.


Continue hemodynamic support, patient is presently on norepinephrine.  At 0.09 

mcg/kg/m.


Empiric antibiotics/Zosyn.


Monitor cultures including blood and sputum, so far nondiagnostic.


Monitor coagulopathy and liver profile.


Transfuse to a hemoglobin of above 7. 


Prognosis remains extremely poor and guarded.


Critical care time is 34 minutes.


Time with Patient: Greater than 30

## 2020-07-02 LAB
ANION GAP SERPL CALC-SCNC: 4 MMOL/L
BASOPHILS # BLD AUTO: 0.1 K/UL (ref 0–0.2)
BASOPHILS NFR BLD AUTO: 1 %
BUN SERPL-SCNC: 33 MG/DL (ref 9–20)
CALCIUM SPEC-MCNC: 7.7 MG/DL (ref 8.4–10.2)
CHLORIDE SERPL-SCNC: 118 MMOL/L (ref 98–107)
CO2 BLDA-SCNC: 23 MMOL/L (ref 19–24)
CO2 SERPL-SCNC: 21 MMOL/L (ref 22–30)
EOSINOPHIL # BLD AUTO: 0 K/UL (ref 0–0.7)
EOSINOPHIL NFR BLD AUTO: 0 %
ERYTHROCYTE [DISTWIDTH] IN BLOOD BY AUTOMATED COUNT: 2.44 M/UL (ref 4.3–5.9)
ERYTHROCYTE [DISTWIDTH] IN BLOOD: 23.3 % (ref 11.5–15.5)
GLUCOSE BLD-MCNC: 148 MG/DL (ref 75–99)
GLUCOSE BLD-MCNC: 150 MG/DL (ref 75–99)
GLUCOSE BLD-MCNC: 151 MG/DL (ref 75–99)
GLUCOSE BLD-MCNC: 183 MG/DL (ref 75–99)
GLUCOSE SERPL-MCNC: 164 MG/DL (ref 74–99)
HBA1C MFR BLD: 4.2 % (ref 4–6)
HCO3 BLDA-SCNC: 22 MMOL/L (ref 21–25)
HCT VFR BLD AUTO: 26.2 % (ref 39–53)
HGB BLD-MCNC: 8.5 GM/DL (ref 13–17.5)
INR PPP: 1.7 (ref ?–1.2)
LYMPHOCYTES # SPEC AUTO: 1.3 K/UL (ref 1–4.8)
LYMPHOCYTES NFR SPEC AUTO: 12 %
MCH RBC QN AUTO: 34.6 PG (ref 25–35)
MCHC RBC AUTO-ENTMCNC: 32.3 G/DL (ref 31–37)
MCV RBC AUTO: 107.3 FL (ref 80–100)
MONOCYTES # BLD AUTO: 0.5 K/UL (ref 0–1)
MONOCYTES NFR BLD AUTO: 5 %
NEUTROPHILS # BLD AUTO: 8.9 K/UL (ref 1.3–7.7)
NEUTROPHILS NFR BLD AUTO: 81 %
PCO2 BLDA: 38 MMHG (ref 35–45)
PH BLDA: 7.38 [PH] (ref 7.35–7.45)
PLATELET # BLD AUTO: 75 K/UL (ref 150–450)
PO2 BLDA: 68 MMHG (ref 83–108)
POTASSIUM SERPL-SCNC: 3.7 MMOL/L (ref 3.5–5.1)
PT BLD: 16.5 SEC (ref 9–12)
SODIUM SERPL-SCNC: 143 MMOL/L (ref 137–145)
WBC # BLD AUTO: 11 K/UL (ref 3.8–10.6)

## 2020-07-02 RX ADMIN — MINERAL OIL AND PETROLATUM SCH: 150; 830 OINTMENT OPHTHALMIC at 20:45

## 2020-07-02 RX ADMIN — DEXTRAN 70 AND HYPROMELLOSE 2910 SCH: 1; 3 SOLUTION/ DROPS OPHTHALMIC at 20:45

## 2020-07-02 RX ADMIN — LACTULOSE SCH GM: 20 SOLUTION ORAL at 10:31

## 2020-07-02 RX ADMIN — SPIRONOLACTONE SCH MG: 25 TABLET, FILM COATED ORAL at 16:06

## 2020-07-02 RX ADMIN — RIFAXIMIN SCH MG: 550 TABLET ORAL at 09:57

## 2020-07-02 RX ADMIN — SPIRONOLACTONE SCH MG: 25 TABLET, FILM COATED ORAL at 21:11

## 2020-07-02 RX ADMIN — INSULIN ASPART SCH UNIT: 100 INJECTION, SOLUTION INTRAVENOUS; SUBCUTANEOUS at 00:34

## 2020-07-02 RX ADMIN — SPIRONOLACTONE SCH MG: 25 TABLET, FILM COATED ORAL at 08:59

## 2020-07-02 RX ADMIN — METHYLPREDNISOLONE SODIUM SUCCINATE SCH MG: 40 INJECTION, POWDER, FOR SOLUTION INTRAMUSCULAR; INTRAVENOUS at 16:06

## 2020-07-02 RX ADMIN — METHYLPREDNISOLONE SODIUM SUCCINATE SCH MG: 40 INJECTION, POWDER, FOR SOLUTION INTRAMUSCULAR; INTRAVENOUS at 09:00

## 2020-07-02 RX ADMIN — INSULIN ASPART SCH UNIT: 100 INJECTION, SOLUTION INTRAVENOUS; SUBCUTANEOUS at 18:57

## 2020-07-02 RX ADMIN — INSULIN ASPART SCH UNIT: 100 INJECTION, SOLUTION INTRAVENOUS; SUBCUTANEOUS at 23:45

## 2020-07-02 RX ADMIN — NOREPINEPHRINE BITARTRATE SCH MLS/HR: 1 INJECTION, SOLUTION, CONCENTRATE INTRAVENOUS at 19:46

## 2020-07-02 RX ADMIN — INSULIN ASPART SCH UNIT: 100 INJECTION, SOLUTION INTRAVENOUS; SUBCUTANEOUS at 07:01

## 2020-07-02 RX ADMIN — IPRATROPIUM BROMIDE AND ALBUTEROL SULFATE SCH ML: .5; 3 SOLUTION RESPIRATORY (INHALATION) at 11:57

## 2020-07-02 RX ADMIN — CHLORHEXIDINE GLUCONATE SCH ML: 1.2 RINSE ORAL at 21:11

## 2020-07-02 RX ADMIN — IPRATROPIUM BROMIDE AND ALBUTEROL SULFATE SCH ML: .5; 3 SOLUTION RESPIRATORY (INHALATION) at 09:00

## 2020-07-02 RX ADMIN — LACTULOSE SCH GM: 20 SOLUTION ORAL at 21:14

## 2020-07-02 RX ADMIN — INSULIN ASPART SCH UNIT: 100 INJECTION, SOLUTION INTRAVENOUS; SUBCUTANEOUS at 16:33

## 2020-07-02 RX ADMIN — PROPOFOL SCH MLS/HR: 10 INJECTION, EMULSION INTRAVENOUS at 09:01

## 2020-07-02 RX ADMIN — IPRATROPIUM BROMIDE AND ALBUTEROL SULFATE SCH ML: .5; 3 SOLUTION RESPIRATORY (INHALATION) at 15:18

## 2020-07-02 RX ADMIN — PIPERACILLIN AND TAZOBACTAM SCH MLS/HR: 3; .375 INJECTION, POWDER, FOR SOLUTION INTRAVENOUS at 00:34

## 2020-07-02 RX ADMIN — RIFAXIMIN SCH MG: 550 TABLET ORAL at 21:11

## 2020-07-02 RX ADMIN — DEXTRAN 70 AND HYPROMELLOSE 2910 SCH DROPS: 1; 3 SOLUTION/ DROPS OPHTHALMIC at 00:35

## 2020-07-02 RX ADMIN — PIPERACILLIN AND TAZOBACTAM SCH MLS/HR: 3; .375 INJECTION, POWDER, FOR SOLUTION INTRAVENOUS at 23:46

## 2020-07-02 RX ADMIN — FUROSEMIDE SCH MG: 10 INJECTION, SOLUTION INTRAMUSCULAR; INTRAVENOUS at 16:06

## 2020-07-02 RX ADMIN — IPRATROPIUM BROMIDE AND ALBUTEROL SULFATE SCH ML: .5; 3 SOLUTION RESPIRATORY (INHALATION) at 19:36

## 2020-07-02 RX ADMIN — MINERAL OIL AND PETROLATUM SCH APPLIC: 150; 830 OINTMENT OPHTHALMIC at 00:35

## 2020-07-02 RX ADMIN — PROPOFOL SCH MLS/HR: 10 INJECTION, EMULSION INTRAVENOUS at 21:11

## 2020-07-02 RX ADMIN — PROPOFOL SCH MLS/HR: 10 INJECTION, EMULSION INTRAVENOUS at 23:46

## 2020-07-02 RX ADMIN — METHYLPREDNISOLONE SODIUM SUCCINATE SCH MG: 40 INJECTION, POWDER, FOR SOLUTION INTRAMUSCULAR; INTRAVENOUS at 23:47

## 2020-07-02 RX ADMIN — PIPERACILLIN AND TAZOBACTAM SCH MLS/HR: 3; .375 INJECTION, POWDER, FOR SOLUTION INTRAVENOUS at 16:06

## 2020-07-02 RX ADMIN — POTASSIUM BICARBONATE SCH: 782 TABLET, EFFERVESCENT ORAL at 22:17

## 2020-07-02 RX ADMIN — CEFAZOLIN SCH MLS/HR: 330 INJECTION, POWDER, FOR SOLUTION INTRAMUSCULAR; INTRAVENOUS at 05:46

## 2020-07-02 RX ADMIN — SODIUM CHLORIDE SCH: 900 INJECTION, SOLUTION INTRAVENOUS at 23:47

## 2020-07-02 RX ADMIN — CHLORHEXIDINE GLUCONATE SCH ML: 1.2 RINSE ORAL at 09:00

## 2020-07-02 RX ADMIN — METHYLPREDNISOLONE SODIUM SUCCINATE SCH MG: 40 INJECTION, POWDER, FOR SOLUTION INTRAMUSCULAR; INTRAVENOUS at 00:34

## 2020-07-02 RX ADMIN — DEXTRAN 70 AND HYPROMELLOSE 2910 SCH DROPS: 1; 3 SOLUTION/ DROPS OPHTHALMIC at 04:38

## 2020-07-02 RX ADMIN — LACTULOSE SCH: 20 SOLUTION ORAL at 16:01

## 2020-07-02 RX ADMIN — PIPERACILLIN AND TAZOBACTAM SCH MLS/HR: 3; .375 INJECTION, POWDER, FOR SOLUTION INTRAVENOUS at 08:59

## 2020-07-02 RX ADMIN — FUROSEMIDE SCH MG: 10 INJECTION, SOLUTION INTRAMUSCULAR; INTRAVENOUS at 23:47

## 2020-07-02 RX ADMIN — MINERAL OIL AND PETROLATUM SCH APPLIC: 150; 830 OINTMENT OPHTHALMIC at 04:38

## 2020-07-02 RX ADMIN — FUROSEMIDE SCH MG: 10 INJECTION, SOLUTION INTRAMUSCULAR; INTRAVENOUS at 09:00

## 2020-07-02 RX ADMIN — LACTULOSE SCH GM: 20 SOLUTION ORAL at 18:57

## 2020-07-02 RX ADMIN — PANTOPRAZOLE SODIUM SCH MG: 40 INJECTION, POWDER, FOR SOLUTION INTRAVENOUS at 08:59

## 2020-07-02 RX ADMIN — POTASSIUM BICARBONATE SCH MEQ: 782 TABLET, EFFERVESCENT ORAL at 21:11

## 2020-07-02 RX ADMIN — PANTOPRAZOLE SODIUM SCH MG: 40 INJECTION, POWDER, FOR SOLUTION INTRAVENOUS at 21:11

## 2020-07-02 NOTE — P.PN
Subjective


Progress Note Date: 07/02/20


Principal diagnosis: 





Alcoholic hepatitis, alcoholic cirrhosis of the liver with ascites, hepatic 

encephalopathy, elevated liver enzymes





Patient is seen in the intensive care unit where he is currently intubated and 

sedated.  Patient was given a sedation on the day today but came to And 

agitated.





Objective





- Vital Signs


Vital signs: 


                                   Vital Signs











Temp  98.1 F   07/02/20 04:00


 


Pulse  86   07/02/20 12:13


 


Resp  42 H  07/02/20 11:00


 


BP  114/58   07/02/20 07:00


 


Pulse Ox  98   07/02/20 11:00








                                 Intake & Output











 07/01/20 07/02/20 07/02/20





 18:59 06:59 18:59


 


Intake Total 2095.945 1416 749.171


 


Output Total 1115 710 395


 


Balance 980.945 706 354.171


 


Weight  91 kg 


 


Intake:   


 


   736 365


 


    Normal Saline Pressure 33 36 15





    Bag   


 


    Piperacillin-Tazobactam 3 200 100 100





    .375 gm In Sodium   





    Chloride 0.9% 100 ml @ 25   





    mls/hr IVPB Q8HR [facility] Rx#   





    :[id number]   


 


    Sodium Chloride 0.9% 1, 600 600 250





    000 ml @ 50 mls/hr IV .   





    Q20H [facility] Rx#:[id number]   


 


  Intake, IV Titration 632.945 200 164.171





  Amount   


 


    Norepinephrine 4 mg In 223.317  30.683





    Sodium Chloride 0.9% 250   





    ml @ 0.05 MCG/KG/MIN 15.   





    338 mls/hr IV .[id number]   





    [facility] Rx#:[id number]   


 


    Potassium Chloride 20 meq 100  





    In Water For Injection 1   





    100ml.bag @ 50 mls/hr   





    IVPB Q2H [facility] Rx#:   





    [id number]   


 


    Propofol 1,000 mg In 268.211 200 33.488





    Empty Bag 1 bag @ Titrate   





    IV .Q0M [facility] Rx#:   





    [id number]   


 


    fentaNYL (PF) 1,000 mcg 41.417  100





    In Sodium Chloride 0.9%   





    80 ml @ 50 MCG/HR 5 mls/   





    hr IV .Q20H [facility] Rx#:   





    [id number]   


 


  Tube Feeding 360 480 160


 


  Other 270  60


 


Output:   


 


  Urine 1115 710 395


 


Other:   


 


  Voiding Method Indwelling Catheter Indwelling Catheter Indwelling Catheter








                       ABP, PAP, CO, CI - Last Documented











Arterial Blood Pressure        121/59

















- Exam





On physical examination, patient appears comfortable in no apparent distress. 


HEAD: Normocephalic, atraumatic. 


EYES: Scleral icterus. No conjunctival injection. 


MOUTH: No lesions, tongue midline, endotracheal tube in place. 


NECK: Trachea midline, no gross abnormalities. 


CHEST: Coarse respiratory noises in all lung fields on mechanical ventilation.


ABDOMEN: Soft, obese. Bowel sounds are positive. No organomegaly.  No guarding 

or rigidity.


EXTREMITIES: Bilateral pedal edema. 


SKIN: No rashes, jaundice. 


NEUROLOGIC: Intubated and sedated. 





- Labs


CBC & Chem 7: 


                                 07/02/20 04:45





                                 07/02/20 19:49


Labs: 


                  Abnormal Lab Results - Last 24 Hours (Table)











  07/01/20 07/02/20 07/02/20 Range/Units





  16:49 00:25 04:45 


 


WBC     (3.8-10.6)  k/uL


 


RBC     (4.30-5.90)  m/uL


 


Hgb     (13.0-17.5)  gm/dL


 


Hct     (39.0-53.0)  %


 


MCV     (80.0-100.0)  fL


 


RDW     (11.5-15.5)  %


 


Plt Count     (150-450)  k/uL


 


Neutrophils #     (1.3-7.7)  k/uL


 


Macrocytosis     


 


PT     (9.0-12.0)  sec


 


INR     (<1.2)  


 


ABG pO2     ()  mmHg


 


ABG O2 Saturation     (94-97)  %


 


Chloride     ()  mmol/L


 


Carbon Dioxide     (22-30)  mmol/L


 


BUN     (9-20)  mg/dL


 


Creatinine     (0.66-1.25)  mg/dL


 


Glucose     (74-99)  mg/dL


 


POC Glucose (mg/dL)  178 H  183 H   (75-99)  mg/dL


 


Calcium     (8.4-10.2)  mg/dL


 


Ammonia    115 H  (<30)  umol/L














  07/02/20 07/02/20 07/02/20 Range/Units





  04:45 04:45 04:45 


 


WBC   11.0 H   (3.8-10.6)  k/uL


 


RBC   2.44 L   (4.30-5.90)  m/uL


 


Hgb   8.5 L   (13.0-17.5)  gm/dL


 


Hct   26.2 L   (39.0-53.0)  %


 


MCV   107.3 H   (80.0-100.0)  fL


 


RDW   23.3 H   (11.5-15.5)  %


 


Plt Count   75 L   (150-450)  k/uL


 


Neutrophils #   8.9 H   (1.3-7.7)  k/uL


 


Macrocytosis   Marked A   


 


PT  16.5 H    (9.0-12.0)  sec


 


INR  1.7 H    (<1.2)  


 


ABG pO2     ()  mmHg


 


ABG O2 Saturation     (94-97)  %


 


Chloride    118 H  ()  mmol/L


 


Carbon Dioxide    21 L  (22-30)  mmol/L


 


BUN    33 H  (9-20)  mg/dL


 


Creatinine    1.40 H  (0.66-1.25)  mg/dL


 


Glucose    164 H  (74-99)  mg/dL


 


POC Glucose (mg/dL)     (75-99)  mg/dL


 


Calcium    7.7 L  (8.4-10.2)  mg/dL


 


Ammonia     (<30)  umol/L














  07/02/20 Range/Units





  08:43 


 


WBC   (3.8-10.6)  k/uL


 


RBC   (4.30-5.90)  m/uL


 


Hgb   (13.0-17.5)  gm/dL


 


Hct   (39.0-53.0)  %


 


MCV   (80.0-100.0)  fL


 


RDW   (11.5-15.5)  %


 


Plt Count   (150-450)  k/uL


 


Neutrophils #   (1.3-7.7)  k/uL


 


Macrocytosis   


 


PT   (9.0-12.0)  sec


 


INR   (<1.2)  


 


ABG pO2  68 L  ()  mmHg


 


ABG O2 Saturation  93.1 L  (94-97)  %


 


Chloride   ()  mmol/L


 


Carbon Dioxide   (22-30)  mmol/L


 


BUN   (9-20)  mg/dL


 


Creatinine   (0.66-1.25)  mg/dL


 


Glucose   (74-99)  mg/dL


 


POC Glucose (mg/dL)   (75-99)  mg/dL


 


Calcium   (8.4-10.2)  mg/dL


 


Ammonia   (<30)  umol/L








                      Microbiology - Last 24 Hours (Table)











 06/28/20 14:13 Blood Culture - Preliminary





 Blood    No Growth after 72 hours


 


 06/29/20 03:46 Gram Stain - Final





 Sputum Sputum Culture - Final














Assessment and Plan


(1) Acute alcoholic hepatitis


Narrative/Plan: 


54-year-old male with known history of alcohol abuse presenting to the hospital 

with altered mental status.  Likely multifactorial given suspected underlying 

decompensated cirrhosis, acute alcoholic hepatitis and concern for aspiration 

pneumonia and sepsis.  Currently receiving treatment in the ICU, he remains on 

broad-spectrum antibiotic therapy and mechanically ventilated.


Current Visit: Yes   Status: Acute   Code(s): K70.10 - ALCOHOLIC HEPATITIS 

WITHOUT ASCITES   SNOMED Code(s): 0627648


   





(2) Liver cirrhosis


Current Visit: Yes   Status: Acute   Code(s): K74.60 - UNSPECIFIED CIRRHOSIS OF 

LIVER   SNOMED Code(s): 26926888


   





(3) Ascites


Current Visit: Yes   Status: Acute   Code(s): R18.8 - OTHER ASCITES   SNOMED 

Code(s): 086843071


   





(4) Hepatic encephalopathy


Current Visit: Yes   Status: Acute   Code(s): K72.90 - HEPATIC FAILURE, 

UNSPECIFIED WITHOUT COMA   SNOMED Code(s): 20632717


   





(5) Bicytopenia


Narrative/Plan: 


Patient anemic and thrombocytopenic likely related to chronic alcohol use.  

Stool testing was positive for blood but patient has no signs or symptoms of GI 

bleeding with normal brown stool noted and fecal management system.  Hemoglobin 

has remained stable.


Current Visit: Yes   Status: Acute   Code(s): D75.89 - OTHER SPECIFIED DISEASES 

OF BLOOD AND BLOOD-FORMING ORGANS   SNOMED Code(s): 61984095


   


Plan: 





Supportive care


Nothing by mouth, okay for tube feeds as tolerated, was contacted tonight about 

a episode of vomiting of tube feed and instructed nursing to hold to feeds for 

tonight and restart in the a.m., however if further vomiting would benefit from 

abdominal x-ray to rule out ileus


Continue management per intensivist service, patient currently intubated and 

sedated


Continue broad-spectrum antibiotic therapy


Continue lactulose 4 times a day


Xifaxan twice daily


Can consider paracentesis when patient is medically stable


Patient is on diuretic therapy with Lasix 3 times a day and Aldactone 3 times a 

day 


Alcohol abstinence


Thank you for allowing us to participate in the care of the patient we will 

continue to follow

## 2020-07-02 NOTE — P.PN
Subjective


Progress Note Date: 07/02/20


Principal diagnosis: 





Acute hypoxic respiratory failure secondary to aspiration pneumonia, and acute 

sepsis.








This is a 54-year-old white male, history of alcohol abuse, patient was brought 

into the ER with change in mental status according to EMS.  Patient could not 

give any history, he was a very poor historian upon arrival to the ER.  Patient 

is supposedly a daily drinker, however from my review of the chart, I saw this 

patient back on 3/17/20, patient presented back then with multiple posterior 

lateral rib fractures on the right.  Apparently he fell in his bathroom, and 

landed on the side of the top sustaining multiple rib fractures.  Patient 

sustained rib fractures of 910 and 11 ribs.  Patient is also known to have 

history of severe emphysema/COPD.  He is legally blind.  He has history of 

pericarditis, anxiety, and history of degenerative joint disease.  Patient was 

discharged home on 3/18/20.  Drug screen in the ER was positive for tricyclic 

antidepressants and positive for benzodiazepines.  Rest of the drug screen was 

basically negative.  ABG in the ER showed a pO2 of 63 pCO2 of 29 pH of 7.49.  

Patient was also noted to have elevated INR of 3.7.  Low hemoglobin of 7.0 

although his baseline hemoglobin from 2 months ago was 15.1.  Ammonia level was 

91.  Total bilirubin was 14.9, and his liver enzymes were a bit elevated.  

Normal amylase and lipase were noted.  Gallbladder ultrasound showed abdominal 

ascites.  Chest x-ray showed bilateral diffuse infiltrates.  Patient was 

presumed septic upon presentation, received fluid boluses of 2500 ML's.  Patient

was found to have hepatic encephalopathy liver failure, bilateral pneumonia, GI 

hemorrhage and sepsis.  Early this morning, patient was intubated, placed on 

mechanical ventilation, and he was aware of the patient since admission.  

Presently the patient is on assist control rate of 28 tidal volume is 500 FiO2 

is 100% PEEP of 5.  ABG showed a pO2 of 170 pCO2 of 31 pH of 7.46.  Hence I cut 

down the FiO2 to 50%, increased the PEEP to 8, cut down the tidal volume to 450,

and decrease the respiratory rate to 26.  Patient is on Sandostatin for his GI 

bleeding, and he is yet to be seen by gastroenterology on consultation is also 

on Protonix.  He is on lactulose for his elevated ammonia level.  And he is 

empirically on Zosyn for presumptive aspiration pneumonia.  Echocardiogram 

showed evidence of good LV function.  And mild valvular heart disease.  BNP 

level was borderline elevated at 910





Patient was reevaluated today on 6/30/20, remains on mechanical ventilation.  He

is presently on assist control rate of 26 tidal volume is 450 FiO2 of 60% and 

PEEP of 8.  However I increased his PEEP to 12 and cut down his FiO2 to 50%.  At

night the patient developed worsening agitation, and Stacking his breaths, was 

not synchronous with mechanical ventilation, hence had to place the patient on 

Nimbex.  Today I plan to discontinue Nimbex and place him on fentanyl 25 g per 

hour.  Patient will be started on tube feeding, and I cut down his IV fluid to 

50 MLS per hour.  Remains on propofol at 60 mcg/kg/m, Nimbex which will be 

discontinued, but creatinine which was discontinued this morning, and no

repinephrine at 0.06 mcg/kg/m.  Enteral feeding to be started today by 

nutritionist.  Patient remains on antibiotics, remains on lactulose for his 

elevated ammonia level which is 80 today.  And he remains on Protonix.  Chest x-

ray showed more consolidation noted bilaterally in both lungs more so in the 

left lung.  Bilateraldisease is noted consistent with aspiration pneumonia.  

Sputum cultures and blood cultures so far remain on diagnostic.  ABG today 

showed a pO2 of 67 pCO2 of 39 pH of 7.33.  WBC count is 14.7 hemoglobin is 8.6 

platelets are 66,000 and INR is 2.1.  Electrolytes are basically normal renal 

profile showed beer of 25 creatinine 0.94.





Patient was reevaluated today on 7/1/20, remains in the intensive care unit, 

remains on mechanical ventilation.  His ventilator settings are assist control 

rate of 26, tidal volume is 450 FiO2 is 50% and PEEP is at 12.  ABG showed a pO2

of 65 pCO2 of 37 pH of 7.36, hence no change was made in the ventilator 

settings.  Patient remains on norepinephrine at 0.09 mcg/kg/m, propofol at 45 

mcg/kg/m, fentanyl at 0.5 mcg/kg/h.  Remains on enteral feeding at 30 MLS per 

hour goal is 45.  Remains on Zosyn for empiric coverage of aspiration pneumonia.

 Remains on lactulose and the dose was increased to 45 ML 4 times a day, and 

added Xifaxan at 550 mg twice a day.  His lactulose does not seem to be inducing

enough diarrhea did get his ammonia level down.  Continues to have significantly

elevated ammonia level in spite of lactulose for the last 2 days.  Patient 

continues to have ascites.  And no plans for paracentesis as recommended by 

gastroenterology at this point.  Chest x-ray showed very minimal improvement if 

any in his bilateral infiltrates.  The bilateral infiltrates are suggestive of 

aspiration pneumonia.  Obviously the patient is known to have history of 

alcoholism, and he presented with alcoholic liver hepatitis, and hepatic en

cephalopathy.  Labs today were all reviewed WBC is 14.1 hemoglobin is 9.4 

platelets are 75,000 INR is 1.7.  Electrolytes are unremarkable except for low 

potassium of 3.4 BUN of 27 creatinine 1.32.  Liver enzymes are borderline 

elevated.  Total bilirubin is improving down to 8.9 today.  Albumin is 2.3





Patient was reevaluated today on 7/2/20, remains in the ICU, intubated, 

mechanically ventilated.  Patient is on assist control rate of 26 tidal volume 

450 FiO2 50% and PEEP is 12.  Chest x-ray is showing definite improvement, 

however his ABG is basically about the same with a pO2 of 68 pCO2 of 38 and pH 

of 7.38.  His ammonia level is on the rise in spite of the fact that the patient

is on relatively high dose of lactulose and he is also on Xifaxan.  Patient 

remains on propofol at 40 mcg/kg/m, fentanyl at 0.5 mcg/kg/h, he is on very 

small dose of norepinephrine at 0.01 mcg/kg/m, IV fluid is at KVO, and he is 

also on Zosyn for presumptive aspiration pneumonia.  Remains on diuretics in the

form of Lasix, Aldactone.  His Lasix was increased to 40 mg 3 times a day, and 

he is on Aldactone via nasogastric tube.  Patient was taken off sedation today, 

however he became extremely agitated, tachypneic, tachycardic, hence we had to 

place him back on sedation and continued assist control mode of mechanical 

ventilation.  Chest x-ray again showed improvement.  WBC count today is 11 

hemoglobin is 8.5 his INR is 1.7 basic metabolic profile is improving, 

creatinine however is up to 1.40, and the patient obviously developed acute 

kidney injury.  This could very well be related to his sepsis, and related to 

his hypotension requiring norepinephrine.  Cultures including blood cultures and

sputum cultures have been negative.








Objective





- Vital Signs


Vital signs: 


                                   Vital Signs











Temp  98.1 F   07/02/20 04:00


 


Pulse  86   07/02/20 12:13


 


Resp  42 H  07/02/20 11:00


 


BP  114/58   07/02/20 07:00


 


Pulse Ox  98   07/02/20 11:00








                                 Intake & Output











 07/01/20 07/02/20 07/02/20





 18:59 06:59 18:59


 


Intake Total 2095.945 1416 749.171


 


Output Total 1115 710 395


 


Balance 980.945 706 354.171


 


Weight  91 kg 


 


Intake:   


 


   736 365


 


    Normal Saline Pressure 33 36 15





    Bag   


 


    Piperacillin-Tazobactam 3 200 100 100





    .375 gm In Sodium   





    Chloride 0.9% 100 ml @ 25   





    mls/hr IVPB Q8HR [facility] Rx#   





    :[id number]   


 


    Sodium Chloride 0.9% 1, 600 600 250





    000 ml @ 50 mls/hr IV .   





    Q20H [facility] Rx#:[id number]   


 


  Intake, IV Titration 632.945 200 164.171





  Amount   


 


    Norepinephrine 4 mg In 223.317  30.683





    Sodium Chloride 0.9% 250   





    ml @ 0.05 MCG/KG/MIN 15.   





    338 mls/hr IV .[id number]   





    [facility] Rx#:[id number]   


 


    Potassium Chloride 20 meq 100  





    In Water For Injection 1   





    100ml.bag @ 50 mls/hr   





    IVPB Q2H [facility] Rx#:   





    [id number]   


 


    Propofol 1,000 mg In 268.211 200 33.488





    Empty Bag 1 bag @ Titrate   





    IV .Q0M [facility] Rx#:   





    [id number]   


 


    fentaNYL (PF) 1,000 mcg 41.417  100





    In Sodium Chloride 0.9%   





    80 ml @ 50 MCG/HR 5 mls/   





    hr IV .Q20H [facility] Rx#:   





    [id number]   


 


  Tube Feeding 360 480 160


 


  Other 270  60


 


Output:   


 


  Urine 1115 710 395


 


Other:   


 


  Voiding Method Indwelling Catheter Indwelling Catheter Indwelling Catheter








                       ABP, PAP, CO, CI - Last Documented











Arterial Blood Pressure        121/59

















- Exam





Physical Exam: Revealed 54-year-old white male on mechanical ventilation, 

sedated, on propofol, and on fentanyl,


Head: Atraumatic, normocephalic.


HEENT:[PERRLA, EOMI, positive icterus.  No neck masses, no JVD, no stridor, 

endotracheal tube and orogastric tube are intact.


Chest: Crackles and rhonchi bilaterally.  No wheezes.  Symmetrical chest 

expansion.]


Cardiac Exam: [Normal S1 and S2, no S3 gallop, 2/6 systolic murmur thought the 

precordium.]


Abdomen: [Soft, nontender, suspect positive ascites, no rebound, no guarding, 

positive bowel sounds.


Extremities: [No clubbing, 2+ bipedal edema, no cyanosis.]  Deformity noted on 

the dorsal aspect of the right foot, hence I recommended orthopedic evaluation. 

This is related to trauma.


Neurological Exam: Cannot be assessed, patient is fully sedated, on mechanical 

ventilation.


Psychiatric: Could not be assessed.


Skin: No rashes.











- Labs


CBC & Chem 7: 


                                 07/02/20 04:45





                                 07/02/20 04:45


Labs: 


                  Abnormal Lab Results - Last 24 Hours (Table)











  07/01/20 07/02/20 07/02/20 Range/Units





  16:49 00:25 04:45 


 


WBC     (3.8-10.6)  k/uL


 


RBC     (4.30-5.90)  m/uL


 


Hgb     (13.0-17.5)  gm/dL


 


Hct     (39.0-53.0)  %


 


MCV     (80.0-100.0)  fL


 


RDW     (11.5-15.5)  %


 


Plt Count     (150-450)  k/uL


 


Neutrophils #     (1.3-7.7)  k/uL


 


Macrocytosis     


 


PT     (9.0-12.0)  sec


 


INR     (<1.2)  


 


ABG pO2     ()  mmHg


 


ABG O2 Saturation     (94-97)  %


 


Chloride     ()  mmol/L


 


Carbon Dioxide     (22-30)  mmol/L


 


BUN     (9-20)  mg/dL


 


Creatinine     (0.66-1.25)  mg/dL


 


Glucose     (74-99)  mg/dL


 


POC Glucose (mg/dL)  178 H  183 H   (75-99)  mg/dL


 


Calcium     (8.4-10.2)  mg/dL


 


Ammonia    115 H  (<30)  umol/L














  07/02/20 07/02/20 07/02/20 Range/Units





  04:45 04:45 04:45 


 


WBC   11.0 H   (3.8-10.6)  k/uL


 


RBC   2.44 L   (4.30-5.90)  m/uL


 


Hgb   8.5 L   (13.0-17.5)  gm/dL


 


Hct   26.2 L   (39.0-53.0)  %


 


MCV   107.3 H   (80.0-100.0)  fL


 


RDW   23.3 H   (11.5-15.5)  %


 


Plt Count   75 L   (150-450)  k/uL


 


Neutrophils #   8.9 H   (1.3-7.7)  k/uL


 


Macrocytosis   Marked A   


 


PT  16.5 H    (9.0-12.0)  sec


 


INR  1.7 H    (<1.2)  


 


ABG pO2     ()  mmHg


 


ABG O2 Saturation     (94-97)  %


 


Chloride    118 H  ()  mmol/L


 


Carbon Dioxide    21 L  (22-30)  mmol/L


 


BUN    33 H  (9-20)  mg/dL


 


Creatinine    1.40 H  (0.66-1.25)  mg/dL


 


Glucose    164 H  (74-99)  mg/dL


 


POC Glucose (mg/dL)     (75-99)  mg/dL


 


Calcium    7.7 L  (8.4-10.2)  mg/dL


 


Ammonia     (<30)  umol/L














  07/02/20 Range/Units





  08:43 


 


WBC   (3.8-10.6)  k/uL


 


RBC   (4.30-5.90)  m/uL


 


Hgb   (13.0-17.5)  gm/dL


 


Hct   (39.0-53.0)  %


 


MCV   (80.0-100.0)  fL


 


RDW   (11.5-15.5)  %


 


Plt Count   (150-450)  k/uL


 


Neutrophils #   (1.3-7.7)  k/uL


 


Macrocytosis   


 


PT   (9.0-12.0)  sec


 


INR   (<1.2)  


 


ABG pO2  68 L  ()  mmHg


 


ABG O2 Saturation  93.1 L  (94-97)  %


 


Chloride   ()  mmol/L


 


Carbon Dioxide   (22-30)  mmol/L


 


BUN   (9-20)  mg/dL


 


Creatinine   (0.66-1.25)  mg/dL


 


Glucose   (74-99)  mg/dL


 


POC Glucose (mg/dL)   (75-99)  mg/dL


 


Calcium   (8.4-10.2)  mg/dL


 


Ammonia   (<30)  umol/L








                      Microbiology - Last 24 Hours (Table)











 06/28/20 14:13 Blood Culture - Preliminary





 Blood    No Growth after 72 hours


 


 06/29/20 03:46 Gram Stain - Final





 Sputum Sputum Culture - Final














Assessment and Plan


Assessment: 





Impression:





Acute hypoxic respiratory failure, suspect aspiration pneumonia.


Acute sepsis, possible septic shock, patient required pressors and remains on 

norepinephrine today.  likely source is aspiration pneumonia, although abdominal

source is not entirely ruled out.  Blood cultures and sputum cultures remain 

negative.


Alcohol liver disease with ascites.  Jaundice, and elevated liver enzymes.


Blood loss anemia, most likely the patient has acute or subacute GI bleeding, GI

is following.  


Acute hepatic encephalopathy with significantly elevated ammonia level.  

Continue lactulose and Xifaxan.


Recent history of fall about 2 months ago, and multiple right-sided rib 

fractures.


History of alcoholic neuropathy.


History of pericarditis


Chronic back pain


History of restless leg syndrome.


Acute kidney injury secondary to sepsis and septic shock.  Strongly doubt 

hepatorenal syndrome.





Recommendation:


Continue ventilatory support.  Patient is not ready for any form of weaning at 

this point.


Continue propofol, and fentanyl.  Will be given sedation holidays on a daily 

basis just at least addressed mental status and assessment.


Continue hemodynamic support, patient is presently on norepinephrine.  Titrate 

accordingly.


Empiric antibiotics/Zosyn.


Monitor cultures including blood and sputum, so far nondiagnostic.


Monitor coagulopathy and liver profile.  Bilirubin seems to be trending down, IN

R was 1.7 today.


Transfuse to a hemoglobin of above 7.  If needed.


Prognosis remains extremely poor and guarded.


Critical care time is 32 minutes.


Time with Patient: Greater than 30

## 2020-07-02 NOTE — XR
EXAMINATION TYPE: XR chest 1V portable

 

DATE OF EXAM: 7/2/2020

 

Comparison: 7/1/2020

 

Clinical History: 54-year-old male Tube placement

 

Findings:

ETT tube tip at the level of the medial clavicular heads. NG tube courses below the diaphragm. Low ignacio
ng volumes are similar. Heart upper limits of normal in size. Diffuse interstitial densities persist 
more focal patchy left infrahilar and left midlung opacities. Slight improved aeration at the right m
id to lower lung.

 

 

Impression:

Interstitial densities persist. Correlate for pulmonary vascular congestion. Aeration slightly improv
ed on the right. Persistent patchy areas on the left could represent atelectasis, infiltrate, or more
 confluent areas of pulmonary edema.

## 2020-07-02 NOTE — P.PN
Subjective


Progress Note Date: 07/02/20





This is a 54-year-old gentleman, history of polysubstance abuse including 

alcohol abuse , falls with recent rib fractures, legally blind, osteoarthritis 

and multiple other medical issues presented to the ER with changes in mental 

status 2 days.  Drug screen positive for tricyclics and benzos, serum alcohol 

less than 10.  UA reported dark brown cloudy urine with occasional bacteria, 

hyaline casts, 2 WBCs, trace leukocytes, 4+ bilirubin, negative for nitrates. 

Ammonia level 91, T bili 14.9, currently 15.8 , elevated LFTs .Gallbladder 

ultrasound reporting abdominal ascites, abdominal x-ray nonacute, right foot x-

ray reported no fracture, soft tissue swelling, EKG reported sinus tachycardia, 

troponin normal, , echo reported normal LV function, EF 60-65% ,lactic 

acid 2.1 now down to 1.7, BUN 25, creatinine 0.8 chest x-ray reporting moderate 

pulmonary interstitial and airspace edema significantly worse than yesterday, 

pulmonary edema.  ABGs noted.  INR on admission 3.7, down to 2.2 Sepsis 

protocol, Received IV fluid resuscitation, IV antibiotics, cultures drawn.  

Developed respiratory failure, requiring intubation during the night.  Currently

on Sandostatin drip.  3 maroon stools during the night.  Received 4 units of FFP

and 2 units of packed RBCs to date.  Hemoglobin currently 8.5.  And had required

pressor support with Levophed off since 06 100.  Maintained on IV fluid 

resuscitation.  Telemetry sinus rhythm.  Potassium 3.2, magnesium 1.7. 








06/30/2020 chest x-ray reporting persistent bilateral scattered airspace 

infiltrates, pleural effusion unchanged.  Ventilator dependent, on FiO2 of 50 

with PEEP increased to 12.  Continues on lactulose with ammonia down to 80.  T 

bili decreased to 11.3, LFTs improving.  No further maroon stools.  Small black 

stool this morning.  Hemoglobin 8.6.  Telemetry sinus rhythm to sinus tach.  

Sandostatin drip discontinued this morning.  Last night patient asynchronous 

with vent, stacking breaths, Nimbex drip initiated.  This morning Nimbex 

discontinued, changed to fentanyl drip.  Requiring pressor support, Levophed 

resumed.  Received vitamin K yesterday, INR 2.1.  Marginal urine output, IV 

fluids decreased, and Lasix IV push added to med regime.  Tube feedings ordered.

 Afebrile, WBC 14.7.  Sputum culture pending, preliminary blood cultures 

negative at 48 hours.  ABGs noted.








07/01/2020  yesterday Lasix and Aldactone initiated, creatinine mildly worsened 

up to 1.32.  Ammonia increased up to 89, lactulose increased.  No further maroon

stools, hemoglobin increased to 9.4, platelets increased to 75.  T bili trending

down, 8.9, LFTs improving.  Potassium 3.4.  Chest x-ray reporting improving left

lower lobe aeration.  Right foot evaluated by orthopedic surgery, possible fluid

collection, seroma with potential x-ray tomorrow.  Tolerating tube feeds with 

minimal to no residuals, currently at 30 mls per hour with goal of 45.  IV 

steroids added to med regimen with blood sugars increasing.  Remains vent 

dependent with FiO2 at 50/+12 of PEEP.  Continues on Levophed, fentanyl, 

diprovan drips.  Afebrile, T-max 99.5, WBC 14.1.











07/02/2020 ABGs unchanged,remains vent dependent, FiO2 50/+12 PEEP.  Chest x-ray

reporting improvement.  Maintained on both Xifaxan & Lactulose, ammonia 

increasing, beginning to stool.  Creatinine trending up 1.4.  Hemoglobin 

decreased to 8.5.  INR 1.7 Levophed weaned off this morning.  Attempted a 

sedation holiday for about an hour this morning; patient did not wake up, became

agitated, tachycardic, tachypneic with respiratory rate up into the 40s, 

diprovan/fentanyl drips resumed.  Tolerating tube feeds at 40 with goal of 

45/minimal to no residual.  Orthopedics discussing x-ray and foot possibly 

tomorrow.  Afebrile WBC down to 11.





Objective





- Vital Signs


Vital signs: 


                                   Vital Signs











Temp  98.1 F   07/02/20 04:00


 


Pulse  89   07/02/20 11:00


 


Resp  42 H  07/02/20 11:00


 


BP  114/58   07/02/20 07:00


 


Pulse Ox  98   07/02/20 11:00








                                 Intake & Output











 07/01/20 07/02/20 07/02/20





 18:59 06:59 18:59


 


Intake Total 2095.945 1416 749.171


 


Output Total 1115 710 395


 


Balance 980.945 706 354.171


 


Weight  91 kg 


 


Intake:   


 


   736 365


 


    Normal Saline Pressure 33 36 15





    Bag   


 


    Piperacillin-Tazobactam 3 200 100 100





    .375 gm In Sodium   





    Chloride 0.9% 100 ml @ 25   





    mls/hr IVPB Q8HR [facility] Rx#   





    :[id number]   


 


    Sodium Chloride 0.9% 1, 600 600 250





    000 ml @ 50 mls/hr IV .   





    Q20H [facility] Rx#:[id number]   


 


  Intake, IV Titration 632.945 200 164.171





  Amount   


 


    Norepinephrine 4 mg In 223.317  30.683





    Sodium Chloride 0.9% 250   





    ml @ 0.05 MCG/KG/MIN 15.   





    338 mls/hr IV .[id number]   





    [facility] Rx#:[id number]   


 


    Potassium Chloride 20 meq 100  





    In Water For Injection 1   





    100ml.bag @ 50 mls/hr   





    IVPB Q2H [facility] Rx#:   





    [id number]   


 


    Propofol 1,000 mg In 268.211 200 33.488





    Empty Bag 1 bag @ Titrate   





    IV .Q0M [facility] Rx#:   





    [id number]   


 


    fentaNYL (PF) 1,000 mcg 41.417  100





    In Sodium Chloride 0.9%   





    80 ml @ 50 MCG/HR 5 mls/   





    hr IV .Q20H [facility] Rx#:   





    [id number]   


 


  Tube Feeding 360 480 160


 


  Other 270  60


 


Output:   


 


  Urine 1115 710 395


 


Other:   


 


  Voiding Method Indwelling Catheter Indwelling Catheter Indwelling Catheter








                       ABP, PAP, CO, CI - Last Documented











Arterial Blood Pressure        121/59

















- Exam


VITAL SIGNS: As above


GENERAL: Sitting up in bed, intubated, sedated on both diprovan and fentanyl 

drips


HEENT:  Conjunctivae normal.  Positive icterus, jaundice, NG,OG tubes present.


NECK:  No JVD. No thyroid enlargement. No LNs


CARDIOVASCULAR:  S1, S2 regular.  Systolic murmur


RESPIRATION: Coarse Breath sounds diminished in the bases with scattered bi

lateral rhonchi and crackles, no wheezing


ABDOMEN:  Soft, distended, positive ascites,  positive bowel sounds


Extremities: Positive upper and lower extremity edema, no clubbing, no cyanosis.

 Right foot dorsal edema , significant ecchymosis 


NERVOUS SYSTEM: Unable to assess, sedated and intubated.


Skin: Warm and dry, no rash 








- Labs


CBC & Chem 7: 


                                 07/02/20 04:45





                                 07/02/20 04:45


Labs: 


                  Abnormal Lab Results - Last 24 Hours (Table)











  07/01/20 07/01/20 07/02/20 Range/Units





  12:16 16:49 00:25 


 


WBC     (3.8-10.6)  k/uL


 


RBC     (4.30-5.90)  m/uL


 


Hgb     (13.0-17.5)  gm/dL


 


Hct     (39.0-53.0)  %


 


MCV     (80.0-100.0)  fL


 


RDW     (11.5-15.5)  %


 


Plt Count     (150-450)  k/uL


 


Neutrophils #     (1.3-7.7)  k/uL


 


Macrocytosis     


 


PT     (9.0-12.0)  sec


 


INR     (<1.2)  


 


ABG pO2     ()  mmHg


 


ABG O2 Saturation     (94-97)  %


 


Chloride     ()  mmol/L


 


Carbon Dioxide     (22-30)  mmol/L


 


BUN     (9-20)  mg/dL


 


Creatinine     (0.66-1.25)  mg/dL


 


Glucose     (74-99)  mg/dL


 


POC Glucose (mg/dL)  165 H  178 H  183 H  (75-99)  mg/dL


 


Calcium     (8.4-10.2)  mg/dL


 


Ammonia     (<30)  umol/L














  07/02/20 07/02/20 07/02/20 Range/Units





  04:45 04:45 04:45 


 


WBC    11.0 H  (3.8-10.6)  k/uL


 


RBC    2.44 L  (4.30-5.90)  m/uL


 


Hgb    8.5 L  (13.0-17.5)  gm/dL


 


Hct    26.2 L  (39.0-53.0)  %


 


MCV    107.3 H  (80.0-100.0)  fL


 


RDW    23.3 H  (11.5-15.5)  %


 


Plt Count    75 L  (150-450)  k/uL


 


Neutrophils #    8.9 H  (1.3-7.7)  k/uL


 


Macrocytosis    Marked A  


 


PT   16.5 H   (9.0-12.0)  sec


 


INR   1.7 H   (<1.2)  


 


ABG pO2     ()  mmHg


 


ABG O2 Saturation     (94-97)  %


 


Chloride     ()  mmol/L


 


Carbon Dioxide     (22-30)  mmol/L


 


BUN     (9-20)  mg/dL


 


Creatinine     (0.66-1.25)  mg/dL


 


Glucose     (74-99)  mg/dL


 


POC Glucose (mg/dL)     (75-99)  mg/dL


 


Calcium     (8.4-10.2)  mg/dL


 


Ammonia  115 H    (<30)  umol/L














  07/02/20 07/02/20 Range/Units





  04:45 08:43 


 


WBC    (3.8-10.6)  k/uL


 


RBC    (4.30-5.90)  m/uL


 


Hgb    (13.0-17.5)  gm/dL


 


Hct    (39.0-53.0)  %


 


MCV    (80.0-100.0)  fL


 


RDW    (11.5-15.5)  %


 


Plt Count    (150-450)  k/uL


 


Neutrophils #    (1.3-7.7)  k/uL


 


Macrocytosis    


 


PT    (9.0-12.0)  sec


 


INR    (<1.2)  


 


ABG pO2   68 L  ()  mmHg


 


ABG O2 Saturation   93.1 L  (94-97)  %


 


Chloride  118 H   ()  mmol/L


 


Carbon Dioxide  21 L   (22-30)  mmol/L


 


BUN  33 H   (9-20)  mg/dL


 


Creatinine  1.40 H   (0.66-1.25)  mg/dL


 


Glucose  164 H   (74-99)  mg/dL


 


POC Glucose (mg/dL)    (75-99)  mg/dL


 


Calcium  7.7 L   (8.4-10.2)  mg/dL


 


Ammonia    (<30)  umol/L








                      Microbiology - Last 24 Hours (Table)











 06/28/20 14:13 Blood Culture - Preliminary





 Blood    No Growth after 72 hours


 


 06/29/20 03:46 Gram Stain - Final





 Sputum Sputum Culture - Final














Assessment and Plan


Assessment: 


Sepsis secondary to aspiration bilateral pneumonia, possibly abdominal source,


Acute hypoxic respiratory failure,  related to the above,  ventilator dependent


Septic and hypovolemic shock multifactorial, secondary to bilateral aspiration 

pneumonia, alcoholic liver disease


Hypotension, secondary to the above, pressor support dependent secondary to 

sepsis


Acute renal failure multifactorial, secondary to all the above, diuretics


Acute GI bleed, possible esophageal varices in a patient with significant 

alcohol dependence, on Sandostatin


Acute blood loss anemia, status post multiple transfusions of both FFP, packed 

RBCs


Acute Hepatic encephalopathy


Alcoholic liver disease with ascites


Hyperammonia


Recent posterior lateral rib fractures of 9, 10 and 11 status post fall


History of pericarditis


Chronic back pain


Polysubstance abuse, per record review; patient reports alcohol, marijuana use 

currently.


History of nicotine dependence


COPD, emphysema


Legally blind


Obesity, BMI 30.7


Osteoarthritis


Venous insufficiency


Anxiety, depression, history of, currently controlled


Hyperglycemia, steroid-induced


Moderate protein calorie malnutrition














Plan: Continue on current medication regime ,monitoring and symptomatic 

treatment.  ICU management/Vent support as per intensivist.  Diuretics increased

today.  Lactulose , Xifaxan, empiric antibiotics .Close monitoring of coags. 

orthopedic surgery reporting no evident fractures per x-rays or abscess 

evaluation of right foot in progress. Prognosis guarded given multiple complex 

medical issues.














The impression and plan of care has been dictated as directed.





:


I performed a history and examination of this patient,  discussed the same with 

the dictator.  I agree with the dictator's note ,documented as a scribe.  Any 

additional findings or plans will be noted.

## 2020-07-02 NOTE — P.PN
Subjective


Progress Note Date: 07/02/20


Principal diagnosis: 


Right foot swelling/bruising





Seen at bedside this am. We are following his right foot swelling and bruising. 

No fractures were evident on xrays and no signs of infection or abcess at the 

foot. No new reported associated findings regarding his foot. He remains 

intubated. 








Objective





- Vital Signs


Vital signs: 


                                   Vital Signs











Temp  98.1 F   07/02/20 04:00


 


Pulse  89   07/02/20 11:00


 


Resp  42 H  07/02/20 11:00


 


BP  114/58   07/02/20 07:00


 


Pulse Ox  98   07/02/20 11:00








                                 Intake & Output











 07/01/20 07/02/20 07/02/20





 18:59 06:59 18:59


 


Intake Total 2095.945 1416 749.171


 


Output Total 1115 710 395


 


Balance 980.945 706 354.171


 


Weight  91 kg 


 


Intake:   


 


   736 365


 


    Normal Saline Pressure 33 36 15





    Bag   


 


    Piperacillin-Tazobactam 3 200 100 100





    .375 gm In Sodium   





    Chloride 0.9% 100 ml @ 25   





    mls/hr IVPB Q8HR [facility] Rx#   





    :[id number]   


 


    Sodium Chloride 0.9% 1, 600 600 250





    000 ml @ 50 mls/hr IV .   





    Q20H [facility] Rx#:[id number]   


 


  Intake, IV Titration 632.945 200 164.171





  Amount   


 


    Norepinephrine 4 mg In 223.317  30.683





    Sodium Chloride 0.9% 250   





    ml @ 0.05 MCG/KG/MIN 15.   





    338 mls/hr IV .[id number]   





    [facility] Rx#:[id number]   


 


    Potassium Chloride 20 meq 100  





    In Water For Injection 1   





    100ml.bag @ 50 mls/hr   





    IVPB Q2H [facility] Rx#:   





    [id number]   


 


    Propofol 1,000 mg In 268.211 200 33.488





    Empty Bag 1 bag @ Titrate   





    IV .Q0M [facility] Rx#:   





    [id number]   


 


    fentaNYL (PF) 1,000 mcg 41.417  100





    In Sodium Chloride 0.9%   





    80 ml @ 50 MCG/HR 5 mls/   





    hr IV .Q20H [facility] Rx#:   





    [id number]   


 


  Tube Feeding 360 480 160


 


  Other 270  60


 


Output:   


 


  Urine 1115 710 395


 


Other:   


 


  Voiding Method Indwelling Catheter Indwelling Catheter Indwelling Catheter








                       ABP, PAP, CO, CI - Last Documented











Arterial Blood Pressure        121/59

















- Exam


There is resolving diffuse echymosis at the dorsum of distal right foot. There 

is swelling as well at the dorsum of the foot distally that appears stable or 

improved. There is no focal erythema. It is not hot to touch. No deformity. 

Vascular status remains intact








- Constitutional


General appearance: Present: no acute distress





- Labs


CBC & Chem 7: 


                                 07/02/20 04:45





                                 07/02/20 04:45


Labs: 


                  Abnormal Lab Results - Last 24 Hours (Table)











  07/01/20 07/01/20 07/02/20 Range/Units





  12:16 16:49 00:25 


 


WBC     (3.8-10.6)  k/uL


 


RBC     (4.30-5.90)  m/uL


 


Hgb     (13.0-17.5)  gm/dL


 


Hct     (39.0-53.0)  %


 


MCV     (80.0-100.0)  fL


 


RDW     (11.5-15.5)  %


 


Plt Count     (150-450)  k/uL


 


Neutrophils #     (1.3-7.7)  k/uL


 


Macrocytosis     


 


PT     (9.0-12.0)  sec


 


INR     (<1.2)  


 


ABG pO2     ()  mmHg


 


ABG O2 Saturation     (94-97)  %


 


Chloride     ()  mmol/L


 


Carbon Dioxide     (22-30)  mmol/L


 


BUN     (9-20)  mg/dL


 


Creatinine     (0.66-1.25)  mg/dL


 


Glucose     (74-99)  mg/dL


 


POC Glucose (mg/dL)  165 H  178 H  183 H  (75-99)  mg/dL


 


Calcium     (8.4-10.2)  mg/dL


 


Ammonia     (<30)  umol/L














  07/02/20 07/02/20 07/02/20 Range/Units





  04:45 04:45 04:45 


 


WBC    11.0 H  (3.8-10.6)  k/uL


 


RBC    2.44 L  (4.30-5.90)  m/uL


 


Hgb    8.5 L  (13.0-17.5)  gm/dL


 


Hct    26.2 L  (39.0-53.0)  %


 


MCV    107.3 H  (80.0-100.0)  fL


 


RDW    23.3 H  (11.5-15.5)  %


 


Plt Count    75 L  (150-450)  k/uL


 


Neutrophils #    8.9 H  (1.3-7.7)  k/uL


 


Macrocytosis    Marked A  


 


PT   16.5 H   (9.0-12.0)  sec


 


INR   1.7 H   (<1.2)  


 


ABG pO2     ()  mmHg


 


ABG O2 Saturation     (94-97)  %


 


Chloride     ()  mmol/L


 


Carbon Dioxide     (22-30)  mmol/L


 


BUN     (9-20)  mg/dL


 


Creatinine     (0.66-1.25)  mg/dL


 


Glucose     (74-99)  mg/dL


 


POC Glucose (mg/dL)     (75-99)  mg/dL


 


Calcium     (8.4-10.2)  mg/dL


 


Ammonia  115 H    (<30)  umol/L














  07/02/20 07/02/20 Range/Units





  04:45 08:43 


 


WBC    (3.8-10.6)  k/uL


 


RBC    (4.30-5.90)  m/uL


 


Hgb    (13.0-17.5)  gm/dL


 


Hct    (39.0-53.0)  %


 


MCV    (80.0-100.0)  fL


 


RDW    (11.5-15.5)  %


 


Plt Count    (150-450)  k/uL


 


Neutrophils #    (1.3-7.7)  k/uL


 


Macrocytosis    


 


PT    (9.0-12.0)  sec


 


INR    (<1.2)  


 


ABG pO2   68 L  ()  mmHg


 


ABG O2 Saturation   93.1 L  (94-97)  %


 


Chloride  118 H   ()  mmol/L


 


Carbon Dioxide  21 L   (22-30)  mmol/L


 


BUN  33 H   (9-20)  mg/dL


 


Creatinine  1.40 H   (0.66-1.25)  mg/dL


 


Glucose  164 H   (74-99)  mg/dL


 


POC Glucose (mg/dL)    (75-99)  mg/dL


 


Calcium  7.7 L   (8.4-10.2)  mg/dL


 


Ammonia    (<30)  umol/L








                      Microbiology - Last 24 Hours (Table)











 06/28/20 14:13 Blood Culture - Preliminary





 Blood    No Growth after 72 hours


 


 06/29/20 03:46 Gram Stain - Final





 Sputum Sputum Culture - Final














Assessment and Plan


(1) Foot swelling


Narrative/Plan: 


Patient has been reviewed with Dr. Farley. There continues to be no plans for 

immediate surgical intervention. Recommend continued elevation and monitoring t

he right foot. Will continue to follow and make further recommendations as 

appropriate. Will consider further testing including repeat xrays, US or other 

based on his clinical course. Thank you





Current Visit: Yes   Status: Acute   Priority: Medium   Code(s): M79.89 - OTHER 

SPECIFIED SOFT TISSUE DISORDERS   SNOMED Code(s): 579658071


   


Time with Patient: Less than 30

## 2020-07-03 LAB
ALBUMIN SERPL-MCNC: 2.3 G/DL (ref 3.5–5)
ALP SERPL-CCNC: 111 U/L (ref 38–126)
ALT SERPL-CCNC: 37 U/L (ref 4–49)
ANION GAP SERPL CALC-SCNC: 4 MMOL/L
AST SERPL-CCNC: 130 U/L (ref 17–59)
BUN SERPL-SCNC: 49 MG/DL (ref 9–20)
CALCIUM SPEC-MCNC: 7.8 MG/DL (ref 8.4–10.2)
CHLORIDE SERPL-SCNC: 120 MMOL/L (ref 98–107)
CO2 BLDA-SCNC: 23 MMOL/L (ref 19–24)
CO2 SERPL-SCNC: 22 MMOL/L (ref 22–30)
ERYTHROCYTE [DISTWIDTH] IN BLOOD BY AUTOMATED COUNT: 2.43 M/UL (ref 4.3–5.9)
ERYTHROCYTE [DISTWIDTH] IN BLOOD: 23.7 % (ref 11.5–15.5)
GLUCOSE BLD-MCNC: 145 MG/DL (ref 75–99)
GLUCOSE BLD-MCNC: 152 MG/DL (ref 75–99)
GLUCOSE BLD-MCNC: 158 MG/DL (ref 75–99)
GLUCOSE BLD-MCNC: 160 MG/DL (ref 75–99)
GLUCOSE BLD-MCNC: 189 MG/DL (ref 75–99)
GLUCOSE SERPL-MCNC: 133 MG/DL (ref 74–99)
HCO3 BLDA-SCNC: 22 MMOL/L (ref 21–25)
HCT VFR BLD AUTO: 26.2 % (ref 39–53)
HGB BLD-MCNC: 8.4 GM/DL (ref 13–17.5)
MAGNESIUM SPEC-SCNC: 1.9 MG/DL (ref 1.6–2.3)
MCH RBC QN AUTO: 34.7 PG (ref 25–35)
MCHC RBC AUTO-ENTMCNC: 32.2 G/DL (ref 31–37)
MCV RBC AUTO: 108 FL (ref 80–100)
PCO2 BLDA: 34 MMHG (ref 35–45)
PH BLDA: 7.42 [PH] (ref 7.35–7.45)
PLATELET # BLD AUTO: 71 K/UL (ref 150–450)
PO2 BLDA: 119 MMHG (ref 83–108)
POTASSIUM SERPL-SCNC: 3.7 MMOL/L (ref 3.5–5.1)
PROT SERPL-MCNC: 6.5 G/DL (ref 6.3–8.2)
SODIUM SERPL-SCNC: 146 MMOL/L (ref 137–145)
WBC # BLD AUTO: 10.2 K/UL (ref 3.8–10.6)

## 2020-07-03 RX ADMIN — CHLORHEXIDINE GLUCONATE SCH ML: 1.2 RINSE ORAL at 21:28

## 2020-07-03 RX ADMIN — SODIUM CHLORIDE SCH: 900 INJECTION, SOLUTION INTRAVENOUS at 18:45

## 2020-07-03 RX ADMIN — IPRATROPIUM BROMIDE AND ALBUTEROL SULFATE SCH ML: .5; 3 SOLUTION RESPIRATORY (INHALATION) at 15:16

## 2020-07-03 RX ADMIN — PIPERACILLIN AND TAZOBACTAM SCH MLS/HR: 3; .375 INJECTION, POWDER, FOR SOLUTION INTRAVENOUS at 08:00

## 2020-07-03 RX ADMIN — INSULIN ASPART SCH UNIT: 100 INJECTION, SOLUTION INTRAVENOUS; SUBCUTANEOUS at 08:00

## 2020-07-03 RX ADMIN — SPIRONOLACTONE SCH MG: 25 TABLET, FILM COATED ORAL at 08:02

## 2020-07-03 RX ADMIN — SPIRONOLACTONE SCH MG: 25 TABLET, FILM COATED ORAL at 17:13

## 2020-07-03 RX ADMIN — FUROSEMIDE SCH MG: 10 INJECTION, SOLUTION INTRAMUSCULAR; INTRAVENOUS at 23:53

## 2020-07-03 RX ADMIN — CEFAZOLIN SCH MLS/HR: 330 INJECTION, POWDER, FOR SOLUTION INTRAMUSCULAR; INTRAVENOUS at 03:53

## 2020-07-03 RX ADMIN — METHYLPREDNISOLONE SODIUM SUCCINATE SCH MG: 40 INJECTION, POWDER, FOR SOLUTION INTRAMUSCULAR; INTRAVENOUS at 17:12

## 2020-07-03 RX ADMIN — RIFAXIMIN SCH MG: 550 TABLET ORAL at 08:01

## 2020-07-03 RX ADMIN — PANTOPRAZOLE SODIUM SCH MG: 40 INJECTION, POWDER, FOR SOLUTION INTRAVENOUS at 21:28

## 2020-07-03 RX ADMIN — LACTULOSE SCH GM: 20 SOLUTION ORAL at 21:28

## 2020-07-03 RX ADMIN — RIFAXIMIN SCH MG: 550 TABLET ORAL at 21:29

## 2020-07-03 RX ADMIN — RIFAXIMIN SCH MG: 550 TABLET ORAL at 21:30

## 2020-07-03 RX ADMIN — INSULIN ASPART SCH UNIT: 100 INJECTION, SOLUTION INTRAVENOUS; SUBCUTANEOUS at 13:11

## 2020-07-03 RX ADMIN — METHYLPREDNISOLONE SODIUM SUCCINATE SCH MG: 40 INJECTION, POWDER, FOR SOLUTION INTRAMUSCULAR; INTRAVENOUS at 08:01

## 2020-07-03 RX ADMIN — PROPOFOL SCH MLS/HR: 10 INJECTION, EMULSION INTRAVENOUS at 13:47

## 2020-07-03 RX ADMIN — PROPOFOL SCH MLS/HR: 10 INJECTION, EMULSION INTRAVENOUS at 05:45

## 2020-07-03 RX ADMIN — LACTULOSE SCH GM: 20 SOLUTION ORAL at 13:12

## 2020-07-03 RX ADMIN — PIPERACILLIN AND TAZOBACTAM SCH MLS/HR: 3; .375 INJECTION, POWDER, FOR SOLUTION INTRAVENOUS at 23:54

## 2020-07-03 RX ADMIN — IPRATROPIUM BROMIDE AND ALBUTEROL SULFATE SCH ML: .5; 3 SOLUTION RESPIRATORY (INHALATION) at 11:21

## 2020-07-03 RX ADMIN — PANTOPRAZOLE SODIUM SCH MG: 40 INJECTION, POWDER, FOR SOLUTION INTRAVENOUS at 08:00

## 2020-07-03 RX ADMIN — CEFAZOLIN SCH MLS/HR: 330 INJECTION, POWDER, FOR SOLUTION INTRAMUSCULAR; INTRAVENOUS at 21:29

## 2020-07-03 RX ADMIN — METHYLPREDNISOLONE SODIUM SUCCINATE SCH MG: 40 INJECTION, POWDER, FOR SOLUTION INTRAMUSCULAR; INTRAVENOUS at 23:53

## 2020-07-03 RX ADMIN — FUROSEMIDE SCH MG: 10 INJECTION, SOLUTION INTRAMUSCULAR; INTRAVENOUS at 17:12

## 2020-07-03 RX ADMIN — IPRATROPIUM BROMIDE AND ALBUTEROL SULFATE SCH ML: .5; 3 SOLUTION RESPIRATORY (INHALATION) at 19:52

## 2020-07-03 RX ADMIN — FUROSEMIDE SCH MG: 10 INJECTION, SOLUTION INTRAMUSCULAR; INTRAVENOUS at 08:01

## 2020-07-03 RX ADMIN — INSULIN ASPART SCH UNIT: 100 INJECTION, SOLUTION INTRAVENOUS; SUBCUTANEOUS at 23:54

## 2020-07-03 RX ADMIN — PROPOFOL SCH MLS/HR: 10 INJECTION, EMULSION INTRAVENOUS at 08:36

## 2020-07-03 RX ADMIN — CHLORHEXIDINE GLUCONATE SCH ML: 1.2 RINSE ORAL at 08:01

## 2020-07-03 RX ADMIN — INSULIN ASPART SCH UNIT: 100 INJECTION, SOLUTION INTRAVENOUS; SUBCUTANEOUS at 17:12

## 2020-07-03 RX ADMIN — PROPOFOL SCH MLS/HR: 10 INJECTION, EMULSION INTRAVENOUS at 23:55

## 2020-07-03 RX ADMIN — MORPHINE SULFATE PRN MG: 2 INJECTION, SOLUTION INTRAMUSCULAR; INTRAVENOUS at 21:25

## 2020-07-03 RX ADMIN — PIPERACILLIN AND TAZOBACTAM SCH MLS/HR: 3; .375 INJECTION, POWDER, FOR SOLUTION INTRAVENOUS at 17:11

## 2020-07-03 RX ADMIN — LACTULOSE SCH GM: 20 SOLUTION ORAL at 08:01

## 2020-07-03 RX ADMIN — IPRATROPIUM BROMIDE AND ALBUTEROL SULFATE SCH ML: .5; 3 SOLUTION RESPIRATORY (INHALATION) at 08:17

## 2020-07-03 RX ADMIN — SPIRONOLACTONE SCH MG: 25 TABLET, FILM COATED ORAL at 21:28

## 2020-07-03 RX ADMIN — LACTULOSE SCH GM: 20 SOLUTION ORAL at 17:11

## 2020-07-03 NOTE — XR
Right ankle and right foot

 

HISTORY: Ecchymosis

 

3 views of the right ankle, 3 views of the right foot submitted.

 

Soft tissue swelling is noted about the right ankle extending into the right foot. Bone mineralizatio
n is reduced, joint spaces and alignment are maintained. No fracture or dislocation.

 

IMPRESSION: Osteopenia and soft tissue swelling.

## 2020-07-03 NOTE — XR
EXAMINATION TYPE: XR chest 1V portable

 

DATE OF EXAM: 7/3/2020

 

COMPARISON: Prior chest x-ray 7/2/2020

 

HISTORY: Intubated

 

TECHNIQUE: Single frontal view of the chest is obtained.

 

FINDINGS:  Endotracheal tube and NG tube are overlying appropriate position, side-port of the NG tube
 is near the level of the gastroesophageal junction. Is no pneumothorax or pleural effusion. Probable
 subsegmental basilar atelectatic change present on the left versus scarring, difficult to exclude pn
eumonia in aeration appears somewhat improved. There are overlying artifacts. Heart size is stable. 

 

IMPRESSION:  There is some improved aeration as compared to prior exam.

## 2020-07-03 NOTE — P.PN
Subjective


Progress Note Date: 07/03/20


This patient is a 54-year-old male that orthopedics is following for left foot 

swelling, ecchymosis.  Foot x-rays taken on 6/28/20 showed no signs of fracture 

or dislocation. 


 Patient is examined bedside this morning.  Fiance and nursing is also at 

bedside.  Patient is currently intubated.  Per the patient's fiance, the 

patient dropped a large vacuum box on the foot recently.  She states this the 

origin of the swelling and  ecchymosis of his foot.  She states he was limping 

due to the pain.  She states she is unaware of any additional trauma to the 

foot.  There have been no overnight changes with the left foot at this time.








Objective





- Vital Signs


Vital signs: 


                                   Vital Signs











Temp  97.6 F   07/03/20 12:00


 


Pulse  90   07/03/20 14:00


 


Resp  26 H  07/03/20 14:00


 


BP  106/59   07/03/20 14:00


 


Pulse Ox  100   07/03/20 14:00








                                 Intake & Output











 07/02/20 07/03/20 07/03/20





 18:59 06:59 18:59


 


Intake Total 7599.394 5077.975 712.269


 


Output Total 980 1841 475


 


Balance 580.171 -779.025 237.269


 


Weight 91 kg 91.8 kg 


 


Intake:   


 


   736 324


 


    Normal Saline Pressure 36 36 24





    Bag   


 


    Piperacillin-Tazobactam 3 100 100 





    .375 gm In Sodium   





    Chloride 0.9% 100 ml @ 25   





    mls/hr IVPB Q8HR [facility] Rx#   





    :[id number]   


 


    Sodium Chloride 0.9% 1, 600 600 300





    000 ml @ 50 mls/hr IV .   





    Q20H [facility] Rx#:[id number]   


 


  Intake, IV Titration 264.171 265.975 168.269





  Amount   


 


    Norepinephrine 4 mg In 30.683 28.938 





    Sodium Chloride 0.9% 250   





    ml @ 0.05 MCG/KG/MIN 15.   





    338 mls/hr IV .[id number]   





    [facility] Rx#:[id number]   


 


    Piperacillin-Tazobactam 3 100  





    .375 gm In Sodium   





    Chloride 0.9% 100 ml @ 25   





    mls/hr IVPB Q8HR [facility] Rx#   





    :[id number]   


 


    Propofol 1,000 mg In 33.488 237.037 168.269





    Empty Bag 1 bag @ Titrate   





    IV .Q0M [facility] Rx#:   





    [id number]   


 


    fentaNYL (PF) 1,000 mcg 100  





    In Sodium Chloride 0.9%   





    80 ml @ 50 MCG/HR 5 mls/   





    hr IV .Q20H [facility] Rx#:   





    [id number]   


 


  Tube Feeding 440 0 40


 


  Other 120 60 180


 


Output:   


 


  Urine 980 641 475


 


  Stool  1200 


 


Other:   


 


  Voiding Method Indwelling Catheter Indwelling Catheter 








                       ABP, PAP, CO, CI - Last Documented











Arterial Blood Pressure        115/53

















- Exam


on examination, the patient is currently intubated.  On inspection of the left 

foot, there is diffuse swelling and ecchymosis of the foot and ankle.  This 

appears to be resolving and stable.  The left foot is warm and well-perfused.  

there are no areas of fluctuance, erythema, significant warmth.  No signs of 

infection. No swelling or erythema about the calf.








- Labs


CBC & Chem 7: 


                                 07/03/20 04:40





                                 07/03/20 10:10


Labs: 


                  Abnormal Lab Results - Last 24 Hours (Table)











  07/02/20 07/02/20 07/02/20 Range/Units





  16:16 18:57 23:41 


 


RBC     (4.30-5.90)  m/uL


 


Hgb     (13.0-17.5)  gm/dL


 


Hct     (39.0-53.0)  %


 


MCV     (80.0-100.0)  fL


 


RDW     (11.5-15.5)  %


 


Plt Count     (150-450)  k/uL


 


Macrocytosis     


 


ABG pCO2     (35-45)  mmHg


 


ABG pO2     ()  mmHg


 


ABG O2 Saturation     (94-97)  %


 


Sodium     (137-145)  mmol/L


 


Chloride     ()  mmol/L


 


BUN     (9-20)  mg/dL


 


Creatinine     (0.66-1.25)  mg/dL


 


Glucose     (74-99)  mg/dL


 


POC Glucose (mg/dL)  151 H  150 H  148 H  (75-99)  mg/dL


 


Calcium     (8.4-10.2)  mg/dL


 


Phosphorus     (2.5-4.5)  mg/dL


 


Total Bilirubin     (0.2-1.3)  mg/dL


 


AST     (17-59)  U/L


 


Ammonia     (<30)  umol/L


 


Albumin     (3.5-5.0)  g/dL














  07/03/20 07/03/20 07/03/20 Range/Units





  04:40 04:40 07:42 


 


RBC  2.43 L    (4.30-5.90)  m/uL


 


Hgb  8.4 L    (13.0-17.5)  gm/dL


 


Hct  26.2 L    (39.0-53.0)  %


 


MCV  108.0 H    (80.0-100.0)  fL


 


RDW  23.7 H    (11.5-15.5)  %


 


Plt Count  71 L    (150-450)  k/uL


 


Macrocytosis  Marked A    


 


ABG pCO2     (35-45)  mmHg


 


ABG pO2     ()  mmHg


 


ABG O2 Saturation     (94-97)  %


 


Sodium   146 H   (137-145)  mmol/L


 


Chloride   120 H   ()  mmol/L


 


BUN   49 H   (9-20)  mg/dL


 


Creatinine   1.46 H   (0.66-1.25)  mg/dL


 


Glucose   133 H   (74-99)  mg/dL


 


POC Glucose (mg/dL)    152 H  (75-99)  mg/dL


 


Calcium   7.8 L   (8.4-10.2)  mg/dL


 


Phosphorus   4.6 H   (2.5-4.5)  mg/dL


 


Total Bilirubin   5.8 H   (0.2-1.3)  mg/dL


 


AST   130 H   (17-59)  U/L


 


Ammonia     (<30)  umol/L


 


Albumin   2.3 L   (3.5-5.0)  g/dL














  07/03/20 07/03/20 07/03/20 Range/Units





  07:59 08:30 12:56 


 


RBC     (4.30-5.90)  m/uL


 


Hgb     (13.0-17.5)  gm/dL


 


Hct     (39.0-53.0)  %


 


MCV     (80.0-100.0)  fL


 


RDW     (11.5-15.5)  %


 


Plt Count     (150-450)  k/uL


 


Macrocytosis     


 


ABG pCO2  34 L    (35-45)  mmHg


 


ABG pO2  119 H    ()  mmHg


 


ABG O2 Saturation  99.2 H    (94-97)  %


 


Sodium     (137-145)  mmol/L


 


Chloride     ()  mmol/L


 


BUN     (9-20)  mg/dL


 


Creatinine     (0.66-1.25)  mg/dL


 


Glucose     (74-99)  mg/dL


 


POC Glucose (mg/dL)    145 H  (75-99)  mg/dL


 


Calcium     (8.4-10.2)  mg/dL


 


Phosphorus     (2.5-4.5)  mg/dL


 


Total Bilirubin     (0.2-1.3)  mg/dL


 


AST     (17-59)  U/L


 


Ammonia   69 H   (<30)  umol/L


 


Albumin     (3.5-5.0)  g/dL








                      Microbiology - Last 24 Hours (Table)











 06/28/20 14:13 Blood Culture - Preliminary





 Blood    No Growth after 96 hours














Assessment and Plan


Assessment: 


Left foot swelling, ecchymosis 





Plan: 


- Continue with conservative treatment at this time. Continue monitoring of the 

foot and elevation for swelling control.  No plans for immediate surgical 

intervention. 


 - We will repeat left foot and ankle x-rays today. 


 - We will continue to follow patient while he remains inpatient and make 

recommendations as needed.

## 2020-07-03 NOTE — P.PN
Subjective


Progress Note Date: 07/03/20


Principal diagnosis: 





Acute hypoxic respiratory failure secondary to aspiration pneumonia, and acute 

sepsis.








This is a 54-year-old white male, history of alcohol abuse, patient was brought 

into the ER with change in mental status according to EMS.  Patient could not 

give any history, he was a very poor historian upon arrival to the ER.  Patient 

is supposedly a daily drinker, however from my review of the chart, I saw this 

patient back on 3/17/20, patient presented back then with multiple posterior 

lateral rib fractures on the right.  Apparently he fell in his bathroom, and 

landed on the side of the top sustaining multiple rib fractures.  Patient 

sustained rib fractures of 910 and 11 ribs.  Patient is also known to have 

history of severe emphysema/COPD.  He is legally blind.  He has history of 

pericarditis, anxiety, and history of degenerative joint disease.  Patient was 

discharged home on 3/18/20.  Drug screen in the ER was positive for tricyclic 

antidepressants and positive for benzodiazepines.  Rest of the drug screen was 

basically negative.  ABG in the ER showed a pO2 of 63 pCO2 of 29 pH of 7.49.  

Patient was also noted to have elevated INR of 3.7.  Low hemoglobin of 7.0 

although his baseline hemoglobin from 2 months ago was 15.1.  Ammonia level was 

91.  Total bilirubin was 14.9, and his liver enzymes were a bit elevated.  

Normal amylase and lipase were noted.  Gallbladder ultrasound showed abdominal 

ascites.  Chest x-ray showed bilateral diffuse infiltrates.  Patient was 

presumed septic upon presentation, received fluid boluses of 2500 ML's.  Patient

was found to have hepatic encephalopathy liver failure, bilateral pneumonia, GI 

hemorrhage and sepsis.  Early this morning, patient was intubated, placed on 

mechanical ventilation, and he was aware of the patient since admission.  

Presently the patient is on assist control rate of 28 tidal volume is 500 FiO2 

is 100% PEEP of 5.  ABG showed a pO2 of 170 pCO2 of 31 pH of 7.46.  Hence I cut 

down the FiO2 to 50%, increased the PEEP to 8, cut down the tidal volume to 450,

and decrease the respiratory rate to 26.  Patient is on Sandostatin for his GI 

bleeding, and he is yet to be seen by gastroenterology on consultation is also 

on Protonix.  He is on lactulose for his elevated ammonia level.  And he is 

empirically on Zosyn for presumptive aspiration pneumonia.  Echocardiogram 

showed evidence of good LV function.  And mild valvular heart disease.  BNP 

level was borderline elevated at 910





Patient was reevaluated today on 6/30/20, remains on mechanical ventilation.  He

is presently on assist control rate of 26 tidal volume is 450 FiO2 of 60% and 

PEEP of 8.  However I increased his PEEP to 12 and cut down his FiO2 to 50%.  At

night the patient developed worsening agitation, and Stacking his breaths, was 

not synchronous with mechanical ventilation, hence had to place the patient on 

Nimbex.  Today I plan to discontinue Nimbex and place him on fentanyl 25 g per 

hour.  Patient will be started on tube feeding, and I cut down his IV fluid to 

50 MLS per hour.  Remains on propofol at 60 mcg/kg/m, Nimbex which will be 

discontinued, but creatinine which was discontinued this morning, and no

repinephrine at 0.06 mcg/kg/m.  Enteral feeding to be started today by 

nutritionist.  Patient remains on antibiotics, remains on lactulose for his 

elevated ammonia level which is 80 today.  And he remains on Protonix.  Chest x-

ray showed more consolidation noted bilaterally in both lungs more so in the 

left lung.  Bilateraldisease is noted consistent with aspiration pneumonia.  

Sputum cultures and blood cultures so far remain on diagnostic.  ABG today 

showed a pO2 of 67 pCO2 of 39 pH of 7.33.  WBC count is 14.7 hemoglobin is 8.6 

platelets are 66,000 and INR is 2.1.  Electrolytes are basically normal renal 

profile showed beer of 25 creatinine 0.94.





Patient was reevaluated today on 7/1/20, remains in the intensive care unit, 

remains on mechanical ventilation.  His ventilator settings are assist control 

rate of 26, tidal volume is 450 FiO2 is 50% and PEEP is at 12.  ABG showed a pO2

of 65 pCO2 of 37 pH of 7.36, hence no change was made in the ventilator 

settings.  Patient remains on norepinephrine at 0.09 mcg/kg/m, propofol at 45 

mcg/kg/m, fentanyl at 0.5 mcg/kg/h.  Remains on enteral feeding at 30 MLS per 

hour goal is 45.  Remains on Zosyn for empiric coverage of aspiration pneumonia.

 Remains on lactulose and the dose was increased to 45 ML 4 times a day, and 

added Xifaxan at 550 mg twice a day.  His lactulose does not seem to be inducing

enough diarrhea did get his ammonia level down.  Continues to have significantly

elevated ammonia level in spite of lactulose for the last 2 days.  Patient 

continues to have ascites.  And no plans for paracentesis as recommended by 

gastroenterology at this point.  Chest x-ray showed very minimal improvement if 

any in his bilateral infiltrates.  The bilateral infiltrates are suggestive of 

aspiration pneumonia.  Obviously the patient is known to have history of 

alcoholism, and he presented with alcoholic liver hepatitis, and hepatic en

cephalopathy.  Labs today were all reviewed WBC is 14.1 hemoglobin is 9.4 

platelets are 75,000 INR is 1.7.  Electrolytes are unremarkable except for low 

potassium of 3.4 BUN of 27 creatinine 1.32.  Liver enzymes are borderline 

elevated.  Total bilirubin is improving down to 8.9 today.  Albumin is 2.3





Patient was reevaluated today on 7/2/20, remains in the ICU, intubated, 

mechanically ventilated.  Patient is on assist control rate of 26 tidal volume 

450 FiO2 50% and PEEP is 12.  Chest x-ray is showing definite improvement, 

however his ABG is basically about the same with a pO2 of 68 pCO2 of 38 and pH 

of 7.38.  His ammonia level is on the rise in spite of the fact that the patient

is on relatively high dose of lactulose and he is also on Xifaxan.  Patient 

remains on propofol at 40 mcg/kg/m, fentanyl at 0.5 mcg/kg/h, he is on very 

small dose of norepinephrine at 0.01 mcg/kg/m, IV fluid is at KVO, and he is 

also on Zosyn for presumptive aspiration pneumonia.  Remains on diuretics in the

form of Lasix, Aldactone.  His Lasix was increased to 40 mg 3 times a day, and 

he is on Aldactone via nasogastric tube.  Patient was taken off sedation today, 

however he became extremely agitated, tachypneic, tachycardic, hence we had to 

place him back on sedation and continued assist control mode of mechanical 

ventilation.  Chest x-ray again showed improvement.  WBC count today is 11 

hemoglobin is 8.5 his INR is 1.7 basic metabolic profile is improving, 

creatinine however is up to 1.40, and the patient obviously developed acute 

kidney injury.  This could very well be related to his sepsis, and related to 

his hypotension requiring norepinephrine.  Cultures including blood cultures and

sputum cultures have been negative.





Patient was reevaluated today in the ICU, remains intubated and mechanically 

ventilated.  His ventilator settings are assist control rate of 26 tidal volume 

is 450 FiO2 is 50% and PEEP is at 12 chest x-ray showed dramatic improvement.  

His ABG is significantly improved hence I recommended cutting down the PEEP to 

8.  His ammonia level is significantly improved today, it is down to 69.  Sodium

seems to be creeping up a little bit, and I recommended free water flushes via 

orogastric tube.  Patient is on propofol at 50 mcg/kg/m, his tube feeding is 

presently on hold because of increased residual.  Patient was given a sedation 

holiday, however when he went off propofol, the patient became extremely 

agitated, restless, biting on the endotracheal tube, tachycardic, tachypneic, 

and was extremely agitated.  Hence I recommended placing him back on propofol, 

and not quite ready for weaning.  Labs were all reviewed today.  And addressed 

accordingly including his elevated sodium and elevated BUN of 49 creatinine 

1.46.  Chest x-ray showed significant improvement in his bilateral 

infiltrates/aspiration pneumonia.








Objective





- Vital Signs


Vital signs: 


                                   Vital Signs











Temp  98.2 F   07/03/20 08:00


 


Pulse  79   07/03/20 11:27


 


Resp  26 H  07/03/20 11:00


 


BP  108/60   07/03/20 11:00


 


Pulse Ox  99   07/03/20 11:00








                                 Intake & Output











 07/02/20 07/03/20 07/03/20





 18:59 06:59 18:59


 


Intake Total 6319.763 0538.975 473.336


 


Output Total 980 1841 290


 


Balance 580.171 -779.025 183.336


 


Weight 91 kg 91.8 kg 


 


Intake:   


 


   736 165


 


    Normal Saline Pressure 36 36 15





    Bag   


 


    Piperacillin-Tazobactam 3 100 100 





    .375 gm In Sodium   





    Chloride 0.9% 100 ml @ 25   





    mls/hr IVPB Q8HR [facility] Rx#   





    :[id number]   


 


    Sodium Chloride 0.9% 1, 600 600 150





    000 ml @ 50 mls/hr IV .   





    Q20H [facility] Rx#:[id number]   


 


  Intake, IV Titration 264.171 265.975 128.336





  Amount   


 


    Norepinephrine 4 mg In 30.683 28.938 





    Sodium Chloride 0.9% 250   





    ml @ 0.05 MCG/KG/MIN 15.   





    338 mls/hr IV .[id number]   





    [facility] Rx#:[id number]   


 


    Piperacillin-Tazobactam 3 100  





    .375 gm In Sodium   





    Chloride 0.9% 100 ml @ 25   





    mls/hr IVPB Q8HR [facility] Rx#   





    :[id number]   


 


    Propofol 1,000 mg In 33.488 237.037 128.336





    Empty Bag 1 bag @ Titrate   





    IV .Q0M [facility] Rx#:   





    [id number]   


 


    fentaNYL (PF) 1,000 mcg 100  





    In Sodium Chloride 0.9%   





    80 ml @ 50 MCG/HR 5 mls/   





    hr IV .Q20H [facility] Rx#:   





    [id number]   


 


  Tube Feeding 440 0 0


 


  Other 120 60 180


 


Output:   


 


  Urine 980 641 290


 


  Stool  1200 


 


Other:   


 


  Voiding Method Indwelling Catheter Indwelling Catheter 








                       ABP, PAP, CO, CI - Last Documented











Arterial Blood Pressure        83/38

















- Exam





Physical Exam: Revealed 54-year-old white male on mechanical ventilation, 

sedated, on propofol, and on fentanyl,


Head: Atraumatic, normocephalic.


HEENT:[PERRLA, EOMI, positive icterus.  No neck masses, no JVD, no stridor, 

endotracheal tube and orogastric tube are intact.


Chest: Crackles and rhonchi bilaterally.  No wheezes.  Symmetrical chest 

expansion.]


Cardiac Exam: [Normal S1 and S2, no S3 gallop, 2/6 systolic murmur thought the 

precordium.]


Abdomen: [Soft, nontender, suspect positive ascites, no rebound, no guarding, 

positive bowel sounds.


Extremities: [No clubbing, 2+ bipedal edema, no cyanosis.]  Deformity noted on 

the dorsal aspect of the right foot, secondary to trauma


Neurological Exam: Cannot be assessed, patient is fully sedated, on mechanical 

ventilation.


Psychiatric: Could not be assessed.


Skin: No rashes.











- Labs


CBC & Chem 7: 


                                 07/03/20 04:40





                                 07/03/20 10:10


Labs: 


                  Abnormal Lab Results - Last 24 Hours (Table)











  07/02/20 07/02/20 07/02/20 Range/Units





  16:16 18:57 23:41 


 


RBC     (4.30-5.90)  m/uL


 


Hgb     (13.0-17.5)  gm/dL


 


Hct     (39.0-53.0)  %


 


MCV     (80.0-100.0)  fL


 


RDW     (11.5-15.5)  %


 


Plt Count     (150-450)  k/uL


 


Macrocytosis     


 


ABG pCO2     (35-45)  mmHg


 


ABG pO2     ()  mmHg


 


ABG O2 Saturation     (94-97)  %


 


Sodium     (137-145)  mmol/L


 


Chloride     ()  mmol/L


 


BUN     (9-20)  mg/dL


 


Creatinine     (0.66-1.25)  mg/dL


 


Glucose     (74-99)  mg/dL


 


POC Glucose (mg/dL)  151 H  150 H  148 H  (75-99)  mg/dL


 


Calcium     (8.4-10.2)  mg/dL


 


Phosphorus     (2.5-4.5)  mg/dL


 


Total Bilirubin     (0.2-1.3)  mg/dL


 


AST     (17-59)  U/L


 


Ammonia     (<30)  umol/L


 


Albumin     (3.5-5.0)  g/dL














  07/03/20 07/03/20 07/03/20 Range/Units





  04:40 04:40 07:42 


 


RBC  2.43 L    (4.30-5.90)  m/uL


 


Hgb  8.4 L    (13.0-17.5)  gm/dL


 


Hct  26.2 L    (39.0-53.0)  %


 


MCV  108.0 H    (80.0-100.0)  fL


 


RDW  23.7 H    (11.5-15.5)  %


 


Plt Count  71 L    (150-450)  k/uL


 


Macrocytosis  Marked A    


 


ABG pCO2     (35-45)  mmHg


 


ABG pO2     ()  mmHg


 


ABG O2 Saturation     (94-97)  %


 


Sodium   146 H   (137-145)  mmol/L


 


Chloride   120 H   ()  mmol/L


 


BUN   49 H   (9-20)  mg/dL


 


Creatinine   1.46 H   (0.66-1.25)  mg/dL


 


Glucose   133 H   (74-99)  mg/dL


 


POC Glucose (mg/dL)    152 H  (75-99)  mg/dL


 


Calcium   7.8 L   (8.4-10.2)  mg/dL


 


Phosphorus   4.6 H   (2.5-4.5)  mg/dL


 


Total Bilirubin   5.8 H   (0.2-1.3)  mg/dL


 


AST   130 H   (17-59)  U/L


 


Ammonia     (<30)  umol/L


 


Albumin   2.3 L   (3.5-5.0)  g/dL














  07/03/20 07/03/20 Range/Units





  07:59 08:30 


 


RBC    (4.30-5.90)  m/uL


 


Hgb    (13.0-17.5)  gm/dL


 


Hct    (39.0-53.0)  %


 


MCV    (80.0-100.0)  fL


 


RDW    (11.5-15.5)  %


 


Plt Count    (150-450)  k/uL


 


Macrocytosis    


 


ABG pCO2  34 L   (35-45)  mmHg


 


ABG pO2  119 H   ()  mmHg


 


ABG O2 Saturation  99.2 H   (94-97)  %


 


Sodium    (137-145)  mmol/L


 


Chloride    ()  mmol/L


 


BUN    (9-20)  mg/dL


 


Creatinine    (0.66-1.25)  mg/dL


 


Glucose    (74-99)  mg/dL


 


POC Glucose (mg/dL)    (75-99)  mg/dL


 


Calcium    (8.4-10.2)  mg/dL


 


Phosphorus    (2.5-4.5)  mg/dL


 


Total Bilirubin    (0.2-1.3)  mg/dL


 


AST    (17-59)  U/L


 


Ammonia   69 H  (<30)  umol/L


 


Albumin    (3.5-5.0)  g/dL








                      Microbiology - Last 24 Hours (Table)











 06/28/20 14:13 Blood Culture - Preliminary





 Blood    No Growth after 96 hours














Assessment and Plan


Assessment: 





Impression:





Acute hypoxic respiratory failure, suspect aspiration pneumonia.  Dramatic 

improvement noted on the chest x-ray today.


Acute sepsis, possible septic shock, patient required pressors and remains on n

orepinephrine today.  likely source is aspiration pneumonia, although abdominal 

source is not entirely ruled out.  Blood cultures and sputum cultures remain 

negative.


Alcohol liver disease with ascites.  Jaundice, and elevated liver enzymes.


Blood loss anemia, most likely the patient has acute or subacute GI bleeding, GI

is following.  


Acute hepatic encephalopathy with significantly elevated ammonia level.  Ammonia

level today is down to 69


Recent history of fall about 2 months ago, and multiple right-sided rib 

fractures.


History of alcoholic neuropathy.


History of pericarditis


Chronic back pain


History of restless leg syndrome.


Acute kidney injury secondary to sepsis and septic shock.  Strongly doubt 

hepatorenal syndrome.





Recommendation:


Continue ventilatory support.  Brief sedation holiday was given earlier today, 

but I was called from the nurse that the patient became agitated and he was 

placed back on sedation.


Continue propofol, and fentanyl.  


Continue hemodynamic support, as needed, patient is off norepinephrine today.


Empiric antibiotics/Zosyn.  Continue Solu-Medrol.


Monitor cultures including blood and sputum, so far nondiagnostic.


Monitor coagulopathy and liver profile.  Liver enzymes are improving including 

improved bilirubin and his ammonia is significantly improved today, but remains 

elevated.


Prognosis remains extremely poor and guarded.


Critical care time is 33 minutes.


Time with Patient: Greater than 30

## 2020-07-03 NOTE — P.PN
Subjective


Progress Note Date: 07/03/20


Principal diagnosis: 





acute hypoxic respiratory failure secondary to aspiration pneumonia and acute 

sepsis





patient evaluated today, in the intensive care unit, intubated and mechanically 

ventilated.  This morning's chest x-ray showing continued improvement.  Ammonia 

level is 69 which actually is down compared to previous results.





Objective





- Vital Signs


Vital signs: 


                                   Vital Signs











Temp  98.2 F   07/03/20 08:00


 


Pulse  89   07/03/20 10:00


 


Resp  23   07/03/20 10:00


 


BP  108/60   07/03/20 10:00


 


Pulse Ox  100   07/03/20 10:00








                                 Intake & Output











 07/02/20 07/03/20 07/03/20





 18:59 06:59 18:59


 


Intake Total 1052.509 3186.975 385.177


 


Output Total 980 1841 290


 


Balance 580.171 -779.025 95.177


 


Weight 91 kg 91.8 kg 


 


Intake:   


 


   736 112


 


    Normal Saline Pressure 36 36 12





    Bag   


 


    Piperacillin-Tazobactam 3 100 100 





    .375 gm In Sodium   





    Chloride 0.9% 100 ml @ 25   





    mls/hr IVPB Q8HR [facility] Rx#   





    :[id number]   


 


    Sodium Chloride 0.9% 1, 600 600 100





    000 ml @ 50 mls/hr IV .   





    Q20H [facility] Rx#:[id number]   


 


  Intake, IV Titration 264.171 265.975 93.177





  Amount   


 


    Norepinephrine 4 mg In 30.683 28.938 





    Sodium Chloride 0.9% 250   





    ml @ 0.05 MCG/KG/MIN 15.   





    338 mls/hr IV .[id number]   





    [facility] Rx#:[id number]   


 


    Piperacillin-Tazobactam 3 100  





    .375 gm In Sodium   





    Chloride 0.9% 100 ml @ 25   





    mls/hr IVPB Q8HR [facility] Rx#   





    :[id number]   


 


    Propofol 1,000 mg In 33.488 237.037 93.177





    Empty Bag 1 bag @ Titrate   





    IV .Q0M [facility] Rx#:   





    [id number]   


 


    fentaNYL (PF) 1,000 mcg 100  





    In Sodium Chloride 0.9%   





    80 ml @ 50 MCG/HR 5 mls/   





    hr IV .Q20H [facility] Rx#:   





    [id number]   


 


  Tube Feeding 440 0 0


 


  Other 120 60 180


 


Output:   


 


  Urine 980 641 290


 


  Stool  1200 


 


Other:   


 


  Voiding Method Indwelling Catheter Indwelling Catheter 








                       ABP, PAP, CO, CI - Last Documented











Arterial Blood Pressure        113/53

















- Exam





General: intubated,mechanical ventilation


HEENT: mild scleral icterus


endotracheal tube oral gastric tube is intact


Neck: [No adenopathy.]


Cardiac: [Heart regular in rate and rhythm.  normal S1 and S2, regular 6 

systolic murmur


Lungs: crackles and/or rhonchi bilateral


Abdomen: [No mass.  No organomegaly.  Bowel sounds presnt and normoactive in all

 4 quadrants.]


Extremes: 2+ bipedal edema, no cyanosis, deformity noted dorsal aspect right 

foot


: []


Musculoskeletal: [No joint erythema, edema or tenderness.]


Skin: [No rash.]


Neurologic: cannot be assessed, fully sedated, on mechanical ventilation


Lymphatic: [No adenopathy.]





- Labs


CBC & Chem 7: 


                                 07/03/20 04:40





                                 07/03/20 04:40


Labs: 


                  Abnormal Lab Results - Last 24 Hours (Table)











  07/02/20 07/02/20 07/02/20 Range/Units





  16:16 18:57 23:41 


 


RBC     (4.30-5.90)  m/uL


 


Hgb     (13.0-17.5)  gm/dL


 


Hct     (39.0-53.0)  %


 


MCV     (80.0-100.0)  fL


 


RDW     (11.5-15.5)  %


 


Plt Count     (150-450)  k/uL


 


Macrocytosis     


 


ABG pCO2     (35-45)  mmHg


 


ABG pO2     ()  mmHg


 


ABG O2 Saturation     (94-97)  %


 


Sodium     (137-145)  mmol/L


 


Chloride     ()  mmol/L


 


BUN     (9-20)  mg/dL


 


Creatinine     (0.66-1.25)  mg/dL


 


Glucose     (74-99)  mg/dL


 


POC Glucose (mg/dL)  151 H  150 H  148 H  (75-99)  mg/dL


 


Calcium     (8.4-10.2)  mg/dL


 


Phosphorus     (2.5-4.5)  mg/dL


 


Total Bilirubin     (0.2-1.3)  mg/dL


 


AST     (17-59)  U/L


 


Ammonia     (<30)  umol/L


 


Albumin     (3.5-5.0)  g/dL














  07/03/20 07/03/20 07/03/20 Range/Units





  04:40 04:40 07:42 


 


RBC  2.43 L    (4.30-5.90)  m/uL


 


Hgb  8.4 L    (13.0-17.5)  gm/dL


 


Hct  26.2 L    (39.0-53.0)  %


 


MCV  108.0 H    (80.0-100.0)  fL


 


RDW  23.7 H    (11.5-15.5)  %


 


Plt Count  71 L    (150-450)  k/uL


 


Macrocytosis  Marked A    


 


ABG pCO2     (35-45)  mmHg


 


ABG pO2     ()  mmHg


 


ABG O2 Saturation     (94-97)  %


 


Sodium   146 H   (137-145)  mmol/L


 


Chloride   120 H   ()  mmol/L


 


BUN   49 H   (9-20)  mg/dL


 


Creatinine   1.46 H   (0.66-1.25)  mg/dL


 


Glucose   133 H   (74-99)  mg/dL


 


POC Glucose (mg/dL)    152 H  (75-99)  mg/dL


 


Calcium   7.8 L   (8.4-10.2)  mg/dL


 


Phosphorus   4.6 H   (2.5-4.5)  mg/dL


 


Total Bilirubin   5.8 H   (0.2-1.3)  mg/dL


 


AST   130 H   (17-59)  U/L


 


Ammonia     (<30)  umol/L


 


Albumin   2.3 L   (3.5-5.0)  g/dL














  07/03/20 07/03/20 Range/Units





  07:59 08:30 


 


RBC    (4.30-5.90)  m/uL


 


Hgb    (13.0-17.5)  gm/dL


 


Hct    (39.0-53.0)  %


 


MCV    (80.0-100.0)  fL


 


RDW    (11.5-15.5)  %


 


Plt Count    (150-450)  k/uL


 


Macrocytosis    


 


ABG pCO2  34 L   (35-45)  mmHg


 


ABG pO2  119 H   ()  mmHg


 


ABG O2 Saturation  99.2 H   (94-97)  %


 


Sodium    (137-145)  mmol/L


 


Chloride    ()  mmol/L


 


BUN    (9-20)  mg/dL


 


Creatinine    (0.66-1.25)  mg/dL


 


Glucose    (74-99)  mg/dL


 


POC Glucose (mg/dL)    (75-99)  mg/dL


 


Calcium    (8.4-10.2)  mg/dL


 


Phosphorus    (2.5-4.5)  mg/dL


 


Total Bilirubin    (0.2-1.3)  mg/dL


 


AST    (17-59)  U/L


 


Ammonia   69 H  (<30)  umol/L


 


Albumin    (3.5-5.0)  g/dL








                      Microbiology - Last 24 Hours (Table)











 06/28/20 14:13 Blood Culture - Preliminary





 Blood    No Growth after 96 hours














Assessment and Plan


(1) Sepsis with acute hypoxic respiratory failure and septic shock


Current Visit: Yes   Status: Acute   Code(s): A41.9 - SEPSIS, UNSPECIFIED 

ORGANISM; R65.21 - SEVERE SEPSIS WITH SEPTIC SHOCK; J96.01 - ACUTE RESPIRATORY 

FAILURE WITH HYPOXIA   SNOMED Code(s): 668029007


   





(2) Serum ammonia increased


Current Visit: Yes   Status: Acute   Code(s): E72.20 - DISORDER OF UREA CYCLE 

METABOLISM, UNSPECIFIED   SNOMED Code(s): 3973220


   





(3) Anemia due to blood loss, acute


Current Visit: Yes   Status: Acute   Code(s): D62 - ACUTE POSTHEMORRHAGIC ANEMIA

  SNOMED Code(s): 035451328


   





(4) Alcoholic peripheral neuropathy


Current Visit: Yes   Status: Acute   Code(s): G62.1 - ALCOHOLIC POLYNEUROPATHY  

SNOMED Code(s): 4307855


   





(5) Acute alcoholic hepatitis


Current Visit: Yes   Status: Acute   Code(s): K70.10 - ALCOHOLIC HEPATITIS 

WITHOUT ASCITES   SNOMED Code(s): 1616905


   





(6) Ascites


Current Visit: Yes   Status: Acute   Code(s): R18.8 - OTHER ASCITES   SNOMED 

Code(s): 977723721


   





(7) Bicytopenia


Current Visit: Yes   Status: Acute   Code(s): D75.89 - OTHER SPECIFIED DISEASES 

OF BLOOD AND BLOOD-FORMING ORGANS   SNOMED Code(s): 28873820


   





(8) Foot swelling


Current Visit: Yes   Status: Acute   Priority: Medium   Code(s): M79.89 - OTHER 

SPECIFIED SOFT TISSUE DISORDERS   SNOMED Code(s): 547223160


   





(9) GI hemorrhage


Current Visit: Yes   Status: Acute   Code(s): K92.2 - GASTROINTESTINAL 

HEMORRHAGE, UNSPECIFIED   SNOMED Code(s): 53060940


   





(10) Hepatic encephalopathy


Current Visit: Yes   Status: Acute   Code(s): K72.90 - HEPATIC FAILURE, 

UNSPECIFIED WITHOUT COMA   SNOMED Code(s): 18298952


   





(11) Liver cirrhosis


Current Visit: Yes   Status: Acute   Code(s): K74.60 - UNSPECIFIED CIRRHOSIS OF 

LIVER   SNOMED Code(s): 66278716


   





(12) Pneumonia


Current Visit: Yes   Status: Acute   Code(s): J18.9 - PNEUMONIA, UNSPECIFIED 

ORGANISM   SNOMED Code(s): 608293105


   





(13) Sepsis


Current Visit: Yes   Status: Acute   Code(s): A41.9 - SEPSIS, UNSPECIFIED 

ORGANISM   SNOMED Code(s): 01310763


   


Plan: 





continue ventilatory support


Continue sedation


Patient is presently on norepinephrine


Patient has on empiric IV antibiotics Zosyn


Monitoring ammonia levels which is recently improving


Monitoring coagulopathy and liver profile


Prognosis is poor


Time with Patient: Greater than 30

## 2020-07-04 LAB
ALBUMIN SERPL-MCNC: 2.2 G/DL (ref 3.5–5)
ALP SERPL-CCNC: 96 U/L (ref 38–126)
ALT SERPL-CCNC: 37 U/L (ref 4–49)
ANION GAP SERPL CALC-SCNC: 4 MMOL/L
AST SERPL-CCNC: 116 U/L (ref 17–59)
BUN SERPL-SCNC: 61 MG/DL (ref 9–20)
CALCIUM SPEC-MCNC: 7.9 MG/DL (ref 8.4–10.2)
CHLORIDE SERPL-SCNC: 123 MMOL/L (ref 98–107)
CO2 BLDA-SCNC: 25 MMOL/L (ref 19–24)
CO2 SERPL-SCNC: 23 MMOL/L (ref 22–30)
ERYTHROCYTE [DISTWIDTH] IN BLOOD BY AUTOMATED COUNT: 2.29 M/UL (ref 4.3–5.9)
ERYTHROCYTE [DISTWIDTH] IN BLOOD: 24.2 % (ref 11.5–15.5)
GLUCOSE BLD-MCNC: 156 MG/DL (ref 75–99)
GLUCOSE BLD-MCNC: 185 MG/DL (ref 75–99)
GLUCOSE BLD-MCNC: 196 MG/DL (ref 75–99)
GLUCOSE SERPL-MCNC: 153 MG/DL (ref 74–99)
HCO3 BLDA-SCNC: 24 MMOL/L (ref 21–25)
HCT VFR BLD AUTO: 25.4 % (ref 39–53)
HGB BLD-MCNC: 8.4 GM/DL (ref 13–17.5)
INR PPP: 1.5 (ref ?–1.2)
MCH RBC QN AUTO: 36.5 PG (ref 25–35)
MCHC RBC AUTO-ENTMCNC: 33 G/DL (ref 31–37)
MCV RBC AUTO: 110.7 FL (ref 80–100)
PCO2 BLDA: 35 MMHG (ref 35–45)
PH BLDA: 7.45 [PH] (ref 7.35–7.45)
PLATELET # BLD AUTO: 69 K/UL (ref 150–450)
PO2 BLDA: 102 MMHG (ref 83–108)
POTASSIUM SERPL-SCNC: 2.8 MMOL/L (ref 3.5–5.1)
PROT SERPL-MCNC: 6 G/DL (ref 6.3–8.2)
PT BLD: 14.4 SEC (ref 9–12)
SODIUM SERPL-SCNC: 150 MMOL/L (ref 137–145)
WBC # BLD AUTO: 8.9 K/UL (ref 3.8–10.6)

## 2020-07-04 RX ADMIN — PANTOPRAZOLE SODIUM SCH MG: 40 INJECTION, POWDER, FOR SOLUTION INTRAVENOUS at 08:50

## 2020-07-04 RX ADMIN — INSULIN ASPART SCH UNIT: 100 INJECTION, SOLUTION INTRAVENOUS; SUBCUTANEOUS at 18:16

## 2020-07-04 RX ADMIN — PROPOFOL SCH MLS/HR: 10 INJECTION, EMULSION INTRAVENOUS at 07:14

## 2020-07-04 RX ADMIN — PANTOPRAZOLE SODIUM SCH MG: 40 INJECTION, POWDER, FOR SOLUTION INTRAVENOUS at 21:06

## 2020-07-04 RX ADMIN — INSULIN ASPART SCH UNIT: 100 INJECTION, SOLUTION INTRAVENOUS; SUBCUTANEOUS at 12:27

## 2020-07-04 RX ADMIN — CEFAZOLIN SCH MLS/HR: 330 INJECTION, POWDER, FOR SOLUTION INTRAMUSCULAR; INTRAVENOUS at 03:35

## 2020-07-04 RX ADMIN — SPIRONOLACTONE SCH MG: 25 TABLET, FILM COATED ORAL at 21:07

## 2020-07-04 RX ADMIN — LACTULOSE SCH GM: 20 SOLUTION ORAL at 18:26

## 2020-07-04 RX ADMIN — IPRATROPIUM BROMIDE AND ALBUTEROL SULFATE SCH ML: .5; 3 SOLUTION RESPIRATORY (INHALATION) at 07:37

## 2020-07-04 RX ADMIN — METHYLPREDNISOLONE SODIUM SUCCINATE SCH MG: 40 INJECTION, POWDER, FOR SOLUTION INTRAMUSCULAR; INTRAVENOUS at 08:49

## 2020-07-04 RX ADMIN — CHLORHEXIDINE GLUCONATE SCH ML: 1.2 RINSE ORAL at 21:06

## 2020-07-04 RX ADMIN — NOREPINEPHRINE BITARTRATE SCH: 1 INJECTION, SOLUTION, CONCENTRATE INTRAVENOUS at 19:30

## 2020-07-04 RX ADMIN — POTASSIUM BICARBONATE SCH MEQ: 782 TABLET, EFFERVESCENT ORAL at 05:42

## 2020-07-04 RX ADMIN — SPIRONOLACTONE SCH MG: 25 TABLET, FILM COATED ORAL at 08:55

## 2020-07-04 RX ADMIN — CHLORHEXIDINE GLUCONATE SCH ML: 1.2 RINSE ORAL at 08:53

## 2020-07-04 RX ADMIN — PROPOFOL SCH MLS/HR: 10 INJECTION, EMULSION INTRAVENOUS at 21:10

## 2020-07-04 RX ADMIN — FUROSEMIDE SCH MG: 10 INJECTION, SOLUTION INTRAMUSCULAR; INTRAVENOUS at 08:42

## 2020-07-04 RX ADMIN — PIPERACILLIN AND TAZOBACTAM SCH MLS/HR: 3; .375 INJECTION, POWDER, FOR SOLUTION INTRAVENOUS at 18:18

## 2020-07-04 RX ADMIN — PIPERACILLIN AND TAZOBACTAM SCH MLS/HR: 3; .375 INJECTION, POWDER, FOR SOLUTION INTRAVENOUS at 08:49

## 2020-07-04 RX ADMIN — MORPHINE SULFATE PRN MG: 2 INJECTION, SOLUTION INTRAMUSCULAR; INTRAVENOUS at 00:46

## 2020-07-04 RX ADMIN — IPRATROPIUM BROMIDE AND ALBUTEROL SULFATE SCH ML: .5; 3 SOLUTION RESPIRATORY (INHALATION) at 15:26

## 2020-07-04 RX ADMIN — SODIUM CHLORIDE SCH: 900 INJECTION, SOLUTION INTRAVENOUS at 13:11

## 2020-07-04 RX ADMIN — FUROSEMIDE SCH MG: 10 INJECTION, SOLUTION INTRAMUSCULAR; INTRAVENOUS at 18:16

## 2020-07-04 RX ADMIN — POTASSIUM BICARBONATE SCH MEQ: 782 TABLET, EFFERVESCENT ORAL at 05:43

## 2020-07-04 RX ADMIN — CEFAZOLIN SCH MLS/HR: 330 INJECTION, POWDER, FOR SOLUTION INTRAMUSCULAR; INTRAVENOUS at 20:49

## 2020-07-04 RX ADMIN — INSULIN ASPART SCH UNIT: 100 INJECTION, SOLUTION INTRAVENOUS; SUBCUTANEOUS at 05:43

## 2020-07-04 RX ADMIN — LACTULOSE SCH GM: 20 SOLUTION ORAL at 12:27

## 2020-07-04 RX ADMIN — RIFAXIMIN SCH MG: 550 TABLET ORAL at 21:07

## 2020-07-04 RX ADMIN — NOREPINEPHRINE BITARTRATE SCH: 1 INJECTION, SOLUTION, CONCENTRATE INTRAVENOUS at 03:12

## 2020-07-04 RX ADMIN — SPIRONOLACTONE SCH MG: 25 TABLET, FILM COATED ORAL at 18:26

## 2020-07-04 RX ADMIN — LACTULOSE SCH GM: 20 SOLUTION ORAL at 21:06

## 2020-07-04 RX ADMIN — IPRATROPIUM BROMIDE AND ALBUTEROL SULFATE SCH ML: .5; 3 SOLUTION RESPIRATORY (INHALATION) at 11:14

## 2020-07-04 RX ADMIN — PROPOFOL SCH MLS/HR: 10 INJECTION, EMULSION INTRAVENOUS at 03:34

## 2020-07-04 RX ADMIN — IPRATROPIUM BROMIDE AND ALBUTEROL SULFATE SCH ML: .5; 3 SOLUTION RESPIRATORY (INHALATION) at 19:12

## 2020-07-04 RX ADMIN — LACTULOSE SCH GM: 20 SOLUTION ORAL at 08:54

## 2020-07-04 RX ADMIN — METHYLPREDNISOLONE SODIUM SUCCINATE SCH MG: 40 INJECTION, POWDER, FOR SOLUTION INTRAMUSCULAR; INTRAVENOUS at 18:26

## 2020-07-04 NOTE — P.PN
Subjective


Progress Note Date: 07/04/20


Principal diagnosis: 





Alcoholic hepatitis, alcoholic cirrhosis of the liver with ascites, hepatic 

encephalopathy, elevated liver enzymes





Patient is seen in the intensive care unit where he is currently intubated and 

sedated.  The patient has not been able to be weaned from the vent yet.  Good 

stool output with nonbloody non-melanotic stool noted.





Objective





- Vital Signs


Vital signs: 


                                   Vital Signs











Temp  98.2 F   07/04/20 08:00


 


Pulse  93   07/04/20 10:00


 


Resp  17   07/04/20 10:00


 


BP  114/65   07/04/20 10:00


 


Pulse Ox  100   07/04/20 10:00








                                 Intake & Output











 07/03/20 07/04/20 07/04/20





 18:59 06:59 18:59


 


Intake Total 4494.326 4758.148 787.477


 


Output Total 810 1665 330


 


Balance 677.269 474.148 457.477


 


Weight  89.1 kg 


 


Intake:   


 


   748 312


 


    Normal Saline Pressure 39 33 12





    Bag   


 


    Piperacillin-Tazobactam 3 100 200 100





    .375 gm In Sodium   





    Chloride 0.9% 100 ml @ 25   





    mls/hr IVPB Q8HR [facility] Rx#   





    :[id number]   


 


    Sodium Chloride 0.9% 1, 520 515 200





    000 ml @ 50 mls/hr IV .   





    Q20H [facility] Rx#:[id number]   


 


  Intake, IV Titration 168.269 176.148 140.477





  Amount   


 


    Propofol 1,000 mg In 168.269 176.148 140.477





    Empty Bag 1 bag @ Titrate   





    IV .Q0M [facility] Rx#:   





    [id number]   


 


  Tube Feeding 280 615 135


 


  Other 380 600 200


 


Output:   


 


  Urine 810 1665 330


 


Other:   


 


  Voiding Method Indwelling Catheter Indwelling Catheter Indwelling Catheter








                       ABP, PAP, CO, CI - Last Documented











Arterial Blood Pressure        138/53

















- Exam





On physical examination, patient appears comfortable in no apparent distress. 


HEAD: Normocephalic, atraumatic. 


EYES: Scleral icterus. No conjunctival injection. 


MOUTH: No lesions, tongue midline, endotracheal tube in place. 


NECK: Trachea midline, no gross abnormalities. 


CHEST: Coarse respiratory noises in all lung fields on mechanical ventilation.


ABDOMEN: Soft, obese. Bowel sounds are positive. No organomegaly.  No guarding 

or rigidity.


EXTREMITIES: Bilateral pedal edema. 


SKIN: No rashes, jaundice. 


NEUROLOGIC: Intubated and sedated. 





- Labs


CBC & Chem 7: 


                                 07/04/20 04:40





                                 07/04/20 08:35


Labs: 


                  Abnormal Lab Results - Last 24 Hours (Table)











  07/03/20 07/03/20 07/03/20 Range/Units





  12:56 17:04 17:53 


 


RBC     (4.30-5.90)  m/uL


 


Hgb     (13.0-17.5)  gm/dL


 


Hct     (39.0-53.0)  %


 


MCV     (80.0-100.0)  fL


 


MCH     (25.0-35.0)  pg


 


RDW     (11.5-15.5)  %


 


Plt Count     (150-450)  k/uL


 


Macrocytosis     


 


PT     (9.0-12.0)  sec


 


INR     (<1.2)  


 


ABG Total CO2     (19-24)  mmol/L


 


ABG O2 Saturation     (94-97)  %


 


Sodium     (137-145)  mmol/L


 


Potassium     (3.5-5.1)  mmol/L


 


Chloride     ()  mmol/L


 


BUN     (9-20)  mg/dL


 


Creatinine     (0.66-1.25)  mg/dL


 


Glucose     (74-99)  mg/dL


 


POC Glucose (mg/dL)  145 H  158 H  160 H  (75-99)  mg/dL


 


Calcium     (8.4-10.2)  mg/dL


 


Total Bilirubin     (0.2-1.3)  mg/dL


 


AST     (17-59)  U/L


 


Ammonia     (<30)  umol/L


 


Total Protein     (6.3-8.2)  g/dL


 


Albumin     (3.5-5.0)  g/dL














  07/03/20 07/04/20 07/04/20 Range/Units





  23:49 04:40 04:40 


 


RBC   2.29 L   (4.30-5.90)  m/uL


 


Hgb   8.4 L   (13.0-17.5)  gm/dL


 


Hct   25.4 L   (39.0-53.0)  %


 


MCV   110.7 H   (80.0-100.0)  fL


 


MCH   36.5 H   (25.0-35.0)  pg


 


RDW   24.2 H   (11.5-15.5)  %


 


Plt Count   69 L   (150-450)  k/uL


 


Macrocytosis   Marked A   


 


PT     (9.0-12.0)  sec


 


INR     (<1.2)  


 


ABG Total CO2     (19-24)  mmol/L


 


ABG O2 Saturation     (94-97)  %


 


Sodium    150 H  (137-145)  mmol/L


 


Potassium    2.8 L  (3.5-5.1)  mmol/L


 


Chloride    123 H  ()  mmol/L


 


BUN    61 H  (9-20)  mg/dL


 


Creatinine    1.43 H  (0.66-1.25)  mg/dL


 


Glucose    153 H  (74-99)  mg/dL


 


POC Glucose (mg/dL)  189 H    (75-99)  mg/dL


 


Calcium    7.9 L  (8.4-10.2)  mg/dL


 


Total Bilirubin    5.5 H  (0.2-1.3)  mg/dL


 


AST    116 H  (17-59)  U/L


 


Ammonia     (<30)  umol/L


 


Total Protein    6.0 L  (6.3-8.2)  g/dL


 


Albumin    2.2 L  (3.5-5.0)  g/dL














  07/04/20 07/04/20 07/04/20 Range/Units





  04:40 05:37 07:23 


 


RBC     (4.30-5.90)  m/uL


 


Hgb     (13.0-17.5)  gm/dL


 


Hct     (39.0-53.0)  %


 


MCV     (80.0-100.0)  fL


 


MCH     (25.0-35.0)  pg


 


RDW     (11.5-15.5)  %


 


Plt Count     (150-450)  k/uL


 


Macrocytosis     


 


PT     (9.0-12.0)  sec


 


INR     (<1.2)  


 


ABG Total CO2    25 H  (19-24)  mmol/L


 


ABG O2 Saturation    98.2 H  (94-97)  %


 


Sodium     (137-145)  mmol/L


 


Potassium     (3.5-5.1)  mmol/L


 


Chloride     ()  mmol/L


 


BUN     (9-20)  mg/dL


 


Creatinine     (0.66-1.25)  mg/dL


 


Glucose     (74-99)  mg/dL


 


POC Glucose (mg/dL)   185 H   (75-99)  mg/dL


 


Calcium     (8.4-10.2)  mg/dL


 


Total Bilirubin     (0.2-1.3)  mg/dL


 


AST     (17-59)  U/L


 


Ammonia  73 H    (<30)  umol/L


 


Total Protein     (6.3-8.2)  g/dL


 


Albumin     (3.5-5.0)  g/dL














  07/04/20 Range/Units





  08:35 


 


RBC   (4.30-5.90)  m/uL


 


Hgb   (13.0-17.5)  gm/dL


 


Hct   (39.0-53.0)  %


 


MCV   (80.0-100.0)  fL


 


MCH   (25.0-35.0)  pg


 


RDW   (11.5-15.5)  %


 


Plt Count   (150-450)  k/uL


 


Macrocytosis   


 


PT  14.4 H  (9.0-12.0)  sec


 


INR  1.5 H  (<1.2)  


 


ABG Total CO2   (19-24)  mmol/L


 


ABG O2 Saturation   (94-97)  %


 


Sodium   (137-145)  mmol/L


 


Potassium   (3.5-5.1)  mmol/L


 


Chloride   ()  mmol/L


 


BUN   (9-20)  mg/dL


 


Creatinine   (0.66-1.25)  mg/dL


 


Glucose   (74-99)  mg/dL


 


POC Glucose (mg/dL)   (75-99)  mg/dL


 


Calcium   (8.4-10.2)  mg/dL


 


Total Bilirubin   (0.2-1.3)  mg/dL


 


AST   (17-59)  U/L


 


Ammonia   (<30)  umol/L


 


Total Protein   (6.3-8.2)  g/dL


 


Albumin   (3.5-5.0)  g/dL








                      Microbiology - Last 24 Hours (Table)











 06/28/20 14:13 Blood Culture - Preliminary





 Blood    No Growth after 120 hours














Assessment and Plan


(1) Acute alcoholic hepatitis


Narrative/Plan: 


54-year-old male with known history of alcohol abuse presenting to the hospital 

with altered mental status.  Likely multifactorial given suspected underlying 

decompensated cirrhosis, acute alcoholic hepatitis and concern for aspiration 

pneumonia and sepsis.  Currently receiving treatment in the ICU, he remains on 

broad-spectrum antibiotic therapy and mechanically ventilated.


Current Visit: Yes   Status: Acute   Code(s): K70.10 - ALCOHOLIC HEPATITIS 

WITHOUT ASCITES   SNOMED Code(s): 6323592


   





(2) Liver cirrhosis


Current Visit: Yes   Status: Acute   Code(s): K74.60 - UNSPECIFIED CIRRHOSIS OF 

LIVER   SNOMED Code(s): 48498372


   





(3) Ascites


Current Visit: Yes   Status: Acute   Code(s): R18.8 - OTHER ASCITES   SNOMED C

ode(s): 886463422


   





(4) Hepatic encephalopathy


Current Visit: Yes   Status: Acute   Code(s): K72.90 - HEPATIC FAILURE, 

UNSPECIFIED WITHOUT COMA   SNOMED Code(s): 26195990


   





(5) Bicytopenia


Narrative/Plan: 


Patient anemic and thrombocytopenic likely related to chronic alcohol use.  

Stool testing was positive for blood but patient has no signs or symptoms of GI 

bleeding with normal brown stool noted and fecal management system.  Hemoglobin 

has remained stable.


Current Visit: Yes   Status: Acute   Code(s): D75.89 - OTHER SPECIFIED DISEASES 

OF BLOOD AND BLOOD-FORMING ORGANS   SNOMED Code(s): 57711164


   


Plan: 





Supportive care


Nothing by mouth, okay for tube feeds as tolerated


Continue management per intensivist service, patient currently intubated and 

sedated


Continue broad-spectrum antibiotic therapy


Continue lactulose 4 times a day


Xifaxan twice daily


Can consider paracentesis when patient is medically stable


Patient is on diuretic therapy with Lasix 3 times a day and Aldactone 3 times a 

day 


Alcohol abstinence


Thank you for allowing us to participate in the care of the patient we will con

tinue to follow

## 2020-07-04 NOTE — P.PN
Subjective


Progress Note Date: 07/04/20


Principal diagnosis: 





acute hypoxic respiratory failure secondary to aspiration pneumonia and acute 

sepsis, with subsequent alcoholic liver failure





patient evaluated today, in the intensive care unit, intubated and mechanically 

ventilated.  This morning's chest x-ray showing continued improvement.  Ammonia 

level is 69 which actually is down compared to previous results.


Chest x-ray was reviewed this morning significant improvement was noted.  Blood 

gases also showed improvement in pulmonary, decreased PEEP to 8 little ammonia 

level was down to 69


Patient is currently being sedated P1 49 creatinine 1.46





Objective





- Vital Signs


Vital signs: 


                                   Vital Signs











Temp  97.5 F L  07/04/20 04:00


 


Pulse  92   07/04/20 07:55


 


Resp  26 H  07/04/20 07:00


 


BP  110/62   07/04/20 07:00


 


Pulse Ox  100   07/04/20 07:00








                                 Intake & Output











 07/03/20 07/04/20 07/04/20





 18:59 06:59 18:59


 


Intake Total 6261.760 1822.148 178.784


 


Output Total 810 1665 45


 


Balance 677.269 474.148 133.784


 


Weight  89.1 kg 


 


Intake:   


 


   748 53


 


    Normal Saline Pressure 39 33 3





    Bag   


 


    Piperacillin-Tazobactam 3 100 200 





    .375 gm In Sodium   





    Chloride 0.9% 100 ml @ 25   





    mls/hr IVPB Q8HR [facility] Rx#   





    :[id number]   


 


    Sodium Chloride 0.9% 1, 520 515 50





    000 ml @ 50 mls/hr IV .   





    Q20H [facility] Rx#:[id number]   


 


  Intake, IV Titration 168.269 176.148 80.784





  Amount   


 


    Propofol 1,000 mg In 168.269 176.148 80.784





    Empty Bag 1 bag @ Titrate   





    IV .Q0M [facility] Rx#:   





    [id number]   


 


  Tube Feeding 280 615 45


 


  Other 380 600 


 


Output:   


 


  Urine 810 1665 45


 


Other:   


 


  Voiding Method Indwelling Catheter Indwelling Catheter 








                       ABP, PAP, CO, CI - Last Documented











Arterial Blood Pressure        135/48

















- Exam





General: intubated,mechanical ventilation


HEENT: mild scleral icterus


endotracheal tube oral gastric tube is intact


Neck: [No adenopathy.]


Cardiac: [Heart regular in rate and rhythm.  normal S1 and S2, regular 6 

systolic murmur


Lungs: crackles and/or rhonchi bilateral


Abdomen: [No mass.  No organomegaly.  Bowel sounds presnt and normoactive in all

 4 quadrants.]


Extremes: 2+ bipedal edema, no cyanosis, deformity noted dorsal aspect right 

foot


: []


Musculoskeletal: [No joint erythema, edema or tenderness.]


Skin: [No rash.]


Neurologic: cannot be assessed, fully sedated, on mechanical ventilation


Lymphatic: [No adenopathy.]





- Labs


CBC & Chem 7: 


                                 07/04/20 04:40





                                 07/04/20 04:40


Labs: 


                  Abnormal Lab Results - Last 24 Hours (Table)











  07/03/20 07/03/20 07/03/20 Range/Units





  08:30 12:56 17:04 


 


RBC     (4.30-5.90)  m/uL


 


Hgb     (13.0-17.5)  gm/dL


 


Hct     (39.0-53.0)  %


 


MCV     (80.0-100.0)  fL


 


MCH     (25.0-35.0)  pg


 


RDW     (11.5-15.5)  %


 


Plt Count     (150-450)  k/uL


 


Macrocytosis     


 


ABG Total CO2     (19-24)  mmol/L


 


ABG O2 Saturation     (94-97)  %


 


Sodium     (137-145)  mmol/L


 


Potassium     (3.5-5.1)  mmol/L


 


Chloride     ()  mmol/L


 


BUN     (9-20)  mg/dL


 


Creatinine     (0.66-1.25)  mg/dL


 


Glucose     (74-99)  mg/dL


 


POC Glucose (mg/dL)   145 H  158 H  (75-99)  mg/dL


 


Calcium     (8.4-10.2)  mg/dL


 


Total Bilirubin     (0.2-1.3)  mg/dL


 


AST     (17-59)  U/L


 


Ammonia  69 H    (<30)  umol/L


 


Total Protein     (6.3-8.2)  g/dL


 


Albumin     (3.5-5.0)  g/dL














  07/03/20 07/03/20 07/04/20 Range/Units





  17:53 23:49 04:40 


 


RBC    2.29 L  (4.30-5.90)  m/uL


 


Hgb    8.4 L  (13.0-17.5)  gm/dL


 


Hct    25.4 L  (39.0-53.0)  %


 


MCV    110.7 H  (80.0-100.0)  fL


 


MCH    36.5 H  (25.0-35.0)  pg


 


RDW    24.2 H  (11.5-15.5)  %


 


Plt Count    69 L  (150-450)  k/uL


 


Macrocytosis    Marked A  


 


ABG Total CO2     (19-24)  mmol/L


 


ABG O2 Saturation     (94-97)  %


 


Sodium     (137-145)  mmol/L


 


Potassium     (3.5-5.1)  mmol/L


 


Chloride     ()  mmol/L


 


BUN     (9-20)  mg/dL


 


Creatinine     (0.66-1.25)  mg/dL


 


Glucose     (74-99)  mg/dL


 


POC Glucose (mg/dL)  160 H  189 H   (75-99)  mg/dL


 


Calcium     (8.4-10.2)  mg/dL


 


Total Bilirubin     (0.2-1.3)  mg/dL


 


AST     (17-59)  U/L


 


Ammonia     (<30)  umol/L


 


Total Protein     (6.3-8.2)  g/dL


 


Albumin     (3.5-5.0)  g/dL














  07/04/20 07/04/20 07/04/20 Range/Units





  04:40 04:40 05:37 


 


RBC     (4.30-5.90)  m/uL


 


Hgb     (13.0-17.5)  gm/dL


 


Hct     (39.0-53.0)  %


 


MCV     (80.0-100.0)  fL


 


MCH     (25.0-35.0)  pg


 


RDW     (11.5-15.5)  %


 


Plt Count     (150-450)  k/uL


 


Macrocytosis     


 


ABG Total CO2     (19-24)  mmol/L


 


ABG O2 Saturation     (94-97)  %


 


Sodium  150 H    (137-145)  mmol/L


 


Potassium  2.8 L    (3.5-5.1)  mmol/L


 


Chloride  123 H    ()  mmol/L


 


BUN  61 H    (9-20)  mg/dL


 


Creatinine  1.43 H    (0.66-1.25)  mg/dL


 


Glucose  153 H    (74-99)  mg/dL


 


POC Glucose (mg/dL)    185 H  (75-99)  mg/dL


 


Calcium  7.9 L    (8.4-10.2)  mg/dL


 


Total Bilirubin  5.5 H    (0.2-1.3)  mg/dL


 


AST  116 H    (17-59)  U/L


 


Ammonia   73 H   (<30)  umol/L


 


Total Protein  6.0 L    (6.3-8.2)  g/dL


 


Albumin  2.2 L    (3.5-5.0)  g/dL














  07/04/20 Range/Units





  07:23 


 


RBC   (4.30-5.90)  m/uL


 


Hgb   (13.0-17.5)  gm/dL


 


Hct   (39.0-53.0)  %


 


MCV   (80.0-100.0)  fL


 


MCH   (25.0-35.0)  pg


 


RDW   (11.5-15.5)  %


 


Plt Count   (150-450)  k/uL


 


Macrocytosis   


 


ABG Total CO2  25 H  (19-24)  mmol/L


 


ABG O2 Saturation  98.2 H  (94-97)  %


 


Sodium   (137-145)  mmol/L


 


Potassium   (3.5-5.1)  mmol/L


 


Chloride   ()  mmol/L


 


BUN   (9-20)  mg/dL


 


Creatinine   (0.66-1.25)  mg/dL


 


Glucose   (74-99)  mg/dL


 


POC Glucose (mg/dL)   (75-99)  mg/dL


 


Calcium   (8.4-10.2)  mg/dL


 


Total Bilirubin   (0.2-1.3)  mg/dL


 


AST   (17-59)  U/L


 


Ammonia   (<30)  umol/L


 


Total Protein   (6.3-8.2)  g/dL


 


Albumin   (3.5-5.0)  g/dL








                      Microbiology - Last 24 Hours (Table)











 06/28/20 14:13 Blood Culture - Preliminary





 Blood    No Growth after 120 hours














Assessment and Plan


(1) Sepsis with acute hypoxic respiratory failure and septic shock


Current Visit: Yes   Status: Acute   Code(s): A41.9 - SEPSIS, UNSPECIFIED 

ORGANISM; R65.21 - SEVERE SEPSIS WITH SEPTIC SHOCK; J96.01 - ACUTE RESPIRATORY 

FAILURE WITH HYPOXIA   SNOMED Code(s): 090468780


   





(2) Serum ammonia increased


Current Visit: Yes   Status: Acute   Code(s): E72.20 - DISORDER OF UREA CYCLE 

METABOLISM, UNSPECIFIED   SNOMED Code(s): 9406620


   





(3) Anemia due to blood loss, acute


Current Visit: Yes   Status: Acute   Code(s): D62 - ACUTE POSTHEMORRHAGIC ANEMIA

  SNOMED Code(s): 427250795


   





(4) Alcoholic peripheral neuropathy


Current Visit: Yes   Status: Acute   Code(s): G62.1 - ALCOHOLIC POLYNEUROPATHY  

SNOMED Code(s): 6401907


   





(5) Acute alcoholic hepatitis


Current Visit: Yes   Status: Acute   Code(s): K70.10 - ALCOHOLIC HEPATITIS 

WITHOUT ASCITES   SNOMED Code(s): 1580139


   





(6) Ascites


Current Visit: Yes   Status: Acute   Code(s): R18.8 - OTHER ASCITES   SNOMED 

Code(s): 188804221


   





(7) Bicytopenia


Current Visit: Yes   Status: Acute   Code(s): D75.89 - OTHER SPECIFIED DISEASES 

OF BLOOD AND BLOOD-FORMING ORGANS   SNOMED Code(s): 38132823


   





(8) Foot swelling


Current Visit: Yes   Status: Acute   Priority: Medium   Code(s): M79.89 - OTHER 

SPECIFIED SOFT TISSUE DISORDERS   SNOMED Code(s): 219821763


   





(9) GI hemorrhage


Current Visit: Yes   Status: Acute   Code(s): K92.2 - GASTROINTESTINAL 

HEMORRHAGE, UNSPECIFIED   SNOMED Code(s): 19325049


   





(10) Hepatic encephalopathy


Current Visit: Yes   Status: Acute   Code(s): K72.90 - HEPATIC FAILURE, 

UNSPECIFIED WITHOUT COMA   SNOMED Code(s): 20025405


   





(11) Liver cirrhosis


Current Visit: Yes   Status: Acute   Code(s): K74.60 - UNSPECIFIED CIRRHOSIS OF 

LIVER   SNOMED Code(s): 52114305


   





(12) Pneumonia


Current Visit: Yes   Status: Acute   Code(s): J18.9 - PNEUMONIA, UNSPECIFIED 

ORGANISM   SNOMED Code(s): 176598188


   





(13) Sepsis


Current Visit: Yes   Status: Acute   Code(s): A41.9 - SEPSIS, UNSPECIFIED 

ORGANISM   SNOMED Code(s): 09776231


   


Plan: 





continue ventilatory support


Continue sedation


Patient is presently on norepinephrine


Patient has on empiric IV antibiotics Zosyn


Monitoring ammonia levels which is recently improving


Monitoring coagulopathy and liver profile


Orthopedics reevaluated right foot and ankle


Patient appears improved today however prognosis is still guarded,





Time with Patient: Greater than 30

## 2020-07-04 NOTE — P.PN
Subjective


Progress Note Date: 07/04/20


Principal diagnosis: 





Acute hypoxic respiratory failure secondary to aspiration pneumonia, and acute 

sepsis.








This is a 54-year-old white male, history of alcohol abuse, patient was brought 

into the ER with change in mental status according to EMS.  Patient could not 

give any history, he was a very poor historian upon arrival to the ER.  Patient 

is supposedly a daily drinker, however from my review of the chart, I saw this 

patient back on 3/17/20, patient presented back then with multiple posterior 

lateral rib fractures on the right.  Apparently he fell in his bathroom, and 

landed on the side of the top sustaining multiple rib fractures.  Patient 

sustained rib fractures of 910 and 11 ribs.  Patient is also known to have 

history of severe emphysema/COPD.  He is legally blind.  He has history of 

pericarditis, anxiety, and history of degenerative joint disease.  Patient was 

discharged home on 3/18/20.  Drug screen in the ER was positive for tricyclic 

antidepressants and positive for benzodiazepines.  Rest of the drug screen was 

basically negative.  ABG in the ER showed a pO2 of 63 pCO2 of 29 pH of 7.49.  

Patient was also noted to have elevated INR of 3.7.  Low hemoglobin of 7.0 

although his baseline hemoglobin from 2 months ago was 15.1.  Ammonia level was 

91.  Total bilirubin was 14.9, and his liver enzymes were a bit elevated.  

Normal amylase and lipase were noted.  Gallbladder ultrasound showed abdominal 

ascites.  Chest x-ray showed bilateral diffuse infiltrates.  Patient was 

presumed septic upon presentation, received fluid boluses of 2500 ML's.  Patient

was found to have hepatic encephalopathy liver failure, bilateral pneumonia, GI 

hemorrhage and sepsis.  Early this morning, patient was intubated, placed on 

mechanical ventilation, and he was aware of the patient since admission.  

Presently the patient is on assist control rate of 28 tidal volume is 500 FiO2 

is 100% PEEP of 5.  ABG showed a pO2 of 170 pCO2 of 31 pH of 7.46.  Hence I cut 

down the FiO2 to 50%, increased the PEEP to 8, cut down the tidal volume to 450,

and decrease the respiratory rate to 26.  Patient is on Sandostatin for his GI 

bleeding, and he is yet to be seen by gastroenterology on consultation is also 

on Protonix.  He is on lactulose for his elevated ammonia level.  And he is 

empirically on Zosyn for presumptive aspiration pneumonia.  Echocardiogram 

showed evidence of good LV function.  And mild valvular heart disease.  BNP 

level was borderline elevated at 910





Patient was reevaluated today on 6/30/20, remains on mechanical ventilation.  He

is presently on assist control rate of 26 tidal volume is 450 FiO2 of 60% and 

PEEP of 8.  However I increased his PEEP to 12 and cut down his FiO2 to 50%.  At

night the patient developed worsening agitation, and Stacking his breaths, was 

not synchronous with mechanical ventilation, hence had to place the patient on 

Nimbex.  Today I plan to discontinue Nimbex and place him on fentanyl 25 g per 

hour.  Patient will be started on tube feeding, and I cut down his IV fluid to 

50 MLS per hour.  Remains on propofol at 60 mcg/kg/m, Nimbex which will be 

discontinued, but creatinine which was discontinued this morning, and no

repinephrine at 0.06 mcg/kg/m.  Enteral feeding to be started today by 

nutritionist.  Patient remains on antibiotics, remains on lactulose for his 

elevated ammonia level which is 80 today.  And he remains on Protonix.  Chest x-

ray showed more consolidation noted bilaterally in both lungs more so in the 

left lung.  Bilateraldisease is noted consistent with aspiration pneumonia.  

Sputum cultures and blood cultures so far remain on diagnostic.  ABG today 

showed a pO2 of 67 pCO2 of 39 pH of 7.33.  WBC count is 14.7 hemoglobin is 8.6 

platelets are 66,000 and INR is 2.1.  Electrolytes are basically normal renal 

profile showed beer of 25 creatinine 0.94.





Patient was reevaluated today on 7/1/20, remains in the intensive care unit, 

remains on mechanical ventilation.  His ventilator settings are assist control 

rate of 26, tidal volume is 450 FiO2 is 50% and PEEP is at 12.  ABG showed a pO2

of 65 pCO2 of 37 pH of 7.36, hence no change was made in the ventilator 

settings.  Patient remains on norepinephrine at 0.09 mcg/kg/m, propofol at 45 

mcg/kg/m, fentanyl at 0.5 mcg/kg/h.  Remains on enteral feeding at 30 MLS per 

hour goal is 45.  Remains on Zosyn for empiric coverage of aspiration pneumonia.

 Remains on lactulose and the dose was increased to 45 ML 4 times a day, and 

added Xifaxan at 550 mg twice a day.  His lactulose does not seem to be inducing

enough diarrhea did get his ammonia level down.  Continues to have significantly

elevated ammonia level in spite of lactulose for the last 2 days.  Patient 

continues to have ascites.  And no plans for paracentesis as recommended by 

gastroenterology at this point.  Chest x-ray showed very minimal improvement if 

any in his bilateral infiltrates.  The bilateral infiltrates are suggestive of 

aspiration pneumonia.  Obviously the patient is known to have history of 

alcoholism, and he presented with alcoholic liver hepatitis, and hepatic en

cephalopathy.  Labs today were all reviewed WBC is 14.1 hemoglobin is 9.4 

platelets are 75,000 INR is 1.7.  Electrolytes are unremarkable except for low 

potassium of 3.4 BUN of 27 creatinine 1.32.  Liver enzymes are borderline 

elevated.  Total bilirubin is improving down to 8.9 today.  Albumin is 2.3





Patient was reevaluated today on 7/2/20, remains in the ICU, intubated, 

mechanically ventilated.  Patient is on assist control rate of 26 tidal volume 

450 FiO2 50% and PEEP is 12.  Chest x-ray is showing definite improvement, 

however his ABG is basically about the same with a pO2 of 68 pCO2 of 38 and pH 

of 7.38.  His ammonia level is on the rise in spite of the fact that the patient

is on relatively high dose of lactulose and he is also on Xifaxan.  Patient 

remains on propofol at 40 mcg/kg/m, fentanyl at 0.5 mcg/kg/h, he is on very 

small dose of norepinephrine at 0.01 mcg/kg/m, IV fluid is at KVO, and he is 

also on Zosyn for presumptive aspiration pneumonia.  Remains on diuretics in the

form of Lasix, Aldactone.  His Lasix was increased to 40 mg 3 times a day, and 

he is on Aldactone via nasogastric tube.  Patient was taken off sedation today, 

however he became extremely agitated, tachypneic, tachycardic, hence we had to 

place him back on sedation and continued assist control mode of mechanical 

ventilation.  Chest x-ray again showed improvement.  WBC count today is 11 

hemoglobin is 8.5 his INR is 1.7 basic metabolic profile is improving, 

creatinine however is up to 1.40, and the patient obviously developed acute 

kidney injury.  This could very well be related to his sepsis, and related to 

his hypotension requiring norepinephrine.  Cultures including blood cultures and

sputum cultures have been negative.





Patient was reevaluated today in the ICU, remains intubated and mechanically 

ventilated.  His ventilator settings are assist control rate of 26 tidal volume 

is 450 FiO2 is 50% and PEEP is at 12 chest x-ray showed dramatic improvement.  

His ABG is significantly improved hence I recommended cutting down the PEEP to 

8.  His ammonia level is significantly improved today, it is down to 69.  Sodium

seems to be creeping up a little bit, and I recommended free water flushes via 

orogastric tube.  Patient is on propofol at 50 mcg/kg/m, his tube feeding is 

presently on hold because of increased residual.  Patient was given a sedation 

holiday, however when he went off propofol, the patient became extremely 

agitated, restless, biting on the endotracheal tube, tachycardic, tachypneic, 

and was extremely agitated.  Hence I recommended placing him back on propofol, 

and not quite ready for weaning.  Labs were all reviewed today.  And addressed 

accordingly including his elevated sodium and elevated BUN of 49 creatinine 

1.46.  Chest x-ray showed significant improvement in his bilateral 

infiltrates/aspiration pneumonia.





Patient was reevaluated today on 7/4/20, remains in the ICU, intubated and 

mechanically ventilated.  His ventilator settings are assist control rate of 26 

FiO2 50% tidal volume of 450 PEEP is at 8 and today I cut down his FiO2 to 45% 

and kept him on a PEEP of 8.  ABG this morning showed a pO2 of 102 pCO2 of 35 pH

of 7.45.  Patient has significantly elevated sodium today of 150 potassium is 

low at 2.8 ammonia level remains high at 73.  Patient is not requiring any 

pressors, he is on propofol at 34 mcg/kg/m.  Patient is on enteral feeding.  I 

do plan to wean the patient off propofol sometime today, and assess mental 

status again.  This was attempted yesterday, but the patient didn't do well, I 

have also recommended free water flushes to correct his hyper natremia.








Objective





- Vital Signs


Vital signs: 


                                   Vital Signs











Temp  98.2 F   07/04/20 12:00


 


Pulse  99   07/04/20 13:00


 


Resp  8 L  07/04/20 13:00


 


BP  127/78   07/04/20 13:00


 


Pulse Ox  100   07/04/20 13:00








                                 Intake & Output











 07/03/20 07/04/20 07/04/20





 18:59 06:59 18:59


 


Intake Total 0372.423 8881.148 1301.405


 


Output Total 810 1665 1785


 


Balance 677.269 474.148 -483.595


 


Weight  89.1 kg 


 


Intake:   


 


   748 468


 


    Normal Saline Pressure 39 33 68





    Bag   


 


    Piperacillin-Tazobactam 3 100 200 100





    .375 gm In Sodium   





    Chloride 0.9% 100 ml @ 25   





    mls/hr IVPB Q8HR [facility] Rx#   





    :[id number]   


 


    Sodium Chloride 0.9% 1, 520 515 300





    000 ml @ 50 mls/hr IV .   





    Q20H [facility] Rx#:[id number]   


 


  Intake, IV Titration 168.269 176.148 153.405





  Amount   


 


    Propofol 1,000 mg In 168.269 176.148 153.405





    Empty Bag 1 bag @ Titrate   





    IV .Q0M [facility] Rx#:   





    [id number]   


 


  Tube Feeding 280 615 180


 


  Other 380 600 500


 


Output:   


 


  Urine 810 1665 585


 


  Stool   1200


 


Other:   


 


  Voiding Method Indwelling Catheter Indwelling Catheter Indwelling Catheter








                       ABP, PAP, CO, CI - Last Documented











Arterial Blood Pressure        148/57

















- Exam





Physical Exam: Revealed 54-year-old white male on mechanical ventilation, 

sedated, on propofol, and on fentanyl,


Head: Atraumatic, normocephalic.


HEENT:[PERRLA, EOMI, positive icterus.  No neck masses, no JVD, no stridor, 

endotracheal tube and orogastric tube are intact.


Chest: Crackles and rhonchi bilaterally.  No wheezes.  Symmetrical chest 

expansion.]


Cardiac Exam: [Normal S1 and S2, no S3 gallop, 2/6 systolic murmur thought the 

precordium.]


Abdomen: [Soft, nontender, suspect positive ascites, no rebound, no guarding, 

positive bowel sounds.


Extremities: [No clubbing, 2+ bipedal edema, no cyanosis.]  Deformity noted on 

the dorsal aspect of the right foot, secondary to trauma


Neurological Exam: Cannot be assessed, patient is fully sedated, on mechanical 

ventilation.


Psychiatric: Could not be assessed.


Skin: No rashes.











- Labs


CBC & Chem 7: 


                                 07/04/20 04:40





                                 07/04/20 08:35


Labs: 


                  Abnormal Lab Results - Last 24 Hours (Table)











  07/03/20 07/03/20 07/03/20 Range/Units





  17:04 17:53 23:49 


 


RBC     (4.30-5.90)  m/uL


 


Hgb     (13.0-17.5)  gm/dL


 


Hct     (39.0-53.0)  %


 


MCV     (80.0-100.0)  fL


 


MCH     (25.0-35.0)  pg


 


RDW     (11.5-15.5)  %


 


Plt Count     (150-450)  k/uL


 


Macrocytosis     


 


PT     (9.0-12.0)  sec


 


INR     (<1.2)  


 


ABG Total CO2     (19-24)  mmol/L


 


ABG O2 Saturation     (94-97)  %


 


Sodium     (137-145)  mmol/L


 


Potassium     (3.5-5.1)  mmol/L


 


Chloride     ()  mmol/L


 


BUN     (9-20)  mg/dL


 


Creatinine     (0.66-1.25)  mg/dL


 


Glucose     (74-99)  mg/dL


 


POC Glucose (mg/dL)  158 H  160 H  189 H  (75-99)  mg/dL


 


Calcium     (8.4-10.2)  mg/dL


 


Total Bilirubin     (0.2-1.3)  mg/dL


 


AST     (17-59)  U/L


 


Ammonia     (<30)  umol/L


 


Total Protein     (6.3-8.2)  g/dL


 


Albumin     (3.5-5.0)  g/dL














  07/04/20 07/04/20 07/04/20 Range/Units





  04:40 04:40 04:40 


 


RBC  2.29 L    (4.30-5.90)  m/uL


 


Hgb  8.4 L    (13.0-17.5)  gm/dL


 


Hct  25.4 L    (39.0-53.0)  %


 


MCV  110.7 H    (80.0-100.0)  fL


 


MCH  36.5 H    (25.0-35.0)  pg


 


RDW  24.2 H    (11.5-15.5)  %


 


Plt Count  69 L    (150-450)  k/uL


 


Macrocytosis  Marked A    


 


PT     (9.0-12.0)  sec


 


INR     (<1.2)  


 


ABG Total CO2     (19-24)  mmol/L


 


ABG O2 Saturation     (94-97)  %


 


Sodium   150 H   (137-145)  mmol/L


 


Potassium   2.8 L   (3.5-5.1)  mmol/L


 


Chloride   123 H   ()  mmol/L


 


BUN   61 H   (9-20)  mg/dL


 


Creatinine   1.43 H   (0.66-1.25)  mg/dL


 


Glucose   153 H   (74-99)  mg/dL


 


POC Glucose (mg/dL)     (75-99)  mg/dL


 


Calcium   7.9 L   (8.4-10.2)  mg/dL


 


Total Bilirubin   5.5 H   (0.2-1.3)  mg/dL


 


AST   116 H   (17-59)  U/L


 


Ammonia    73 H  (<30)  umol/L


 


Total Protein   6.0 L   (6.3-8.2)  g/dL


 


Albumin   2.2 L   (3.5-5.0)  g/dL














  07/04/20 07/04/20 07/04/20 Range/Units





  05:37 07:23 08:35 


 


RBC     (4.30-5.90)  m/uL


 


Hgb     (13.0-17.5)  gm/dL


 


Hct     (39.0-53.0)  %


 


MCV     (80.0-100.0)  fL


 


MCH     (25.0-35.0)  pg


 


RDW     (11.5-15.5)  %


 


Plt Count     (150-450)  k/uL


 


Macrocytosis     


 


PT    14.4 H  (9.0-12.0)  sec


 


INR    1.5 H  (<1.2)  


 


ABG Total CO2   25 H   (19-24)  mmol/L


 


ABG O2 Saturation   98.2 H   (94-97)  %


 


Sodium     (137-145)  mmol/L


 


Potassium     (3.5-5.1)  mmol/L


 


Chloride     ()  mmol/L


 


BUN     (9-20)  mg/dL


 


Creatinine     (0.66-1.25)  mg/dL


 


Glucose     (74-99)  mg/dL


 


POC Glucose (mg/dL)  185 H    (75-99)  mg/dL


 


Calcium     (8.4-10.2)  mg/dL


 


Total Bilirubin     (0.2-1.3)  mg/dL


 


AST     (17-59)  U/L


 


Ammonia     (<30)  umol/L


 


Total Protein     (6.3-8.2)  g/dL


 


Albumin     (3.5-5.0)  g/dL














  07/04/20 Range/Units





  12:02 


 


RBC   (4.30-5.90)  m/uL


 


Hgb   (13.0-17.5)  gm/dL


 


Hct   (39.0-53.0)  %


 


MCV   (80.0-100.0)  fL


 


MCH   (25.0-35.0)  pg


 


RDW   (11.5-15.5)  %


 


Plt Count   (150-450)  k/uL


 


Macrocytosis   


 


PT   (9.0-12.0)  sec


 


INR   (<1.2)  


 


ABG Total CO2   (19-24)  mmol/L


 


ABG O2 Saturation   (94-97)  %


 


Sodium   (137-145)  mmol/L


 


Potassium   (3.5-5.1)  mmol/L


 


Chloride   ()  mmol/L


 


BUN   (9-20)  mg/dL


 


Creatinine   (0.66-1.25)  mg/dL


 


Glucose   (74-99)  mg/dL


 


POC Glucose (mg/dL)  156 H  (75-99)  mg/dL


 


Calcium   (8.4-10.2)  mg/dL


 


Total Bilirubin   (0.2-1.3)  mg/dL


 


AST   (17-59)  U/L


 


Ammonia   (<30)  umol/L


 


Total Protein   (6.3-8.2)  g/dL


 


Albumin   (3.5-5.0)  g/dL








                      Microbiology - Last 24 Hours (Table)











 06/28/20 14:13 Blood Culture - Preliminary





 Blood    No Growth after 120 hours














Assessment and Plan


Assessment: 





Impression:





Acute hypoxic respiratory failure, suspect aspiration pneumonia.  Chest x-ray 

has shown improvement over the last 2 days, and this is also reflected in the 

improvement noted in the ABG.


Acute sepsis, possible septic shock, patient required pressors and remains on 

norepinephrine today.  likely source is aspiration pneumonia, although abdominal

source is not entirely ruled out.  Blood cultures and sputum cultures remain 

negative.


Alcohol liver disease with ascites.  Jaundice, and elevated liver enzymes.


Blood loss anemia, most likely the patient has acute or subacute GI bleeding, GI

is following.  


Acute hepatic encephalopathy with significantly elevated ammonia level.  

Slightly increased today compared to yesterday, it is 73.


Recent history of fall about 2 months ago, and multiple right-sided rib 

fractures.


History of alcoholic neuropathy.


History of pericarditis


Chronic back pain


History of restless leg syndrome.


Acute kidney injury secondary to sepsis and septic shock.  Strongly doubt 

hepatorenal syndrome.





Recommendation:


Continue ventilatory support.  Sedation holiday today, and assessment of mental 

status.


Continue propofol, and fentanyl.  Brief sedation holiday today, and assessment 

of mental status.


Continue nutritional support.


Hemodynamic support if needed using compresses.


Empiric antibiotics/Zosyn.  Continue Solu-Medrol.


Monitor cultures including blood and sputum, so far nondiagnostic.


Monitor coagulopathy and liver profile.  


Prognosis remains extremely poor and guarded.


Critical care time is 32 minutes.


Time with Patient: Greater than 30

## 2020-07-04 NOTE — P.PN
Subjective


Progress Note Date: 07/04/20


This patient is a 54-year-old male that orthopedics is following for right foot 

swelling, ecchymosis.  Foot x-rays taken on 6/28/20 showed no signs of fracture 

or dislocation. X-rays of the right foot and ankle were repeated 7/3/20 with no 

evidence of fracture or dislocation. There have also been no signs of infection.




 Patient is examined bedside this morning.  He remains intubated. There have 

been no reported changes in the right foot. 








Objective





- Vital Signs


Vital signs: 


                                   Vital Signs











Temp  98.2 F   07/04/20 08:00


 


Pulse  90   07/04/20 09:00


 


Resp  28 H  07/04/20 09:00


 


BP  113/67   07/04/20 09:00


 


Pulse Ox  100   07/04/20 09:00








                                 Intake & Output











 07/03/20 07/04/20 07/04/20





 18:59 06:59 18:59


 


Intake Total 1473.208 4187.148 663.566


 


Output Total 810 1665 170


 


Balance 677.269 474.148 493.566


 


Weight  89.1 kg 


 


Intake:   


 


   748 259


 


    Normal Saline Pressure 39 33 9





    Bag   


 


    Piperacillin-Tazobactam 3 100 200 100





    .375 gm In Sodium   





    Chloride 0.9% 100 ml @ 25   





    mls/hr IVPB Q8HR [facility] Rx#   





    :[id number]   


 


    Sodium Chloride 0.9% 1, 520 515 150





    000 ml @ 50 mls/hr IV .   





    Q20H [facility] Rx#:[id number]   


 


  Intake, IV Titration 168.269 176.148 114.566





  Amount   


 


    Propofol 1,000 mg In 168.269 176.148 114.566





    Empty Bag 1 bag @ Titrate   





    IV .Q0M [facility] Rx#:   





    [id number]   


 


  Tube Feeding 280 615 90


 


  Other 380 600 200


 


Output:   


 


  Urine 810 1665 170


 


Other:   


 


  Voiding Method Indwelling Catheter Indwelling Catheter Indwelling Catheter








                       ABP, PAP, CO, CI - Last Documented











Arterial Blood Pressure        130/49

















- Exam


On examination, the patient is currently intubated.  Bilateral feet are 

currently in padded boots. On inspection of the right foot, there is diffuse 

swelling and ecchymosis of the foot and ankle. This appears to be resolving and 

stable.  There is also localized swelling at the dorsum of the foot at the 

location of the 3-5th metatarsal heads. No wounds and skin is intact. No 

erythema or warmth of the foot. No signs of infection. The right foot is warm 

and well-perfused, dorsalis pedis pulse +2.   No swelling or erythema about the 

calf.


 No issues with PROM of the toes or ankle. 





Motor and sensory function unable to be assessed. 











- Labs


CBC & Chem 7: 


                                 07/04/20 04:40





                                 07/04/20 08:35


Labs: 


                  Abnormal Lab Results - Last 24 Hours (Table)











  07/03/20 07/03/20 07/03/20 Range/Units





  12:56 17:04 17:53 


 


RBC     (4.30-5.90)  m/uL


 


Hgb     (13.0-17.5)  gm/dL


 


Hct     (39.0-53.0)  %


 


MCV     (80.0-100.0)  fL


 


MCH     (25.0-35.0)  pg


 


RDW     (11.5-15.5)  %


 


Plt Count     (150-450)  k/uL


 


Macrocytosis     


 


PT     (9.0-12.0)  sec


 


INR     (<1.2)  


 


ABG Total CO2     (19-24)  mmol/L


 


ABG O2 Saturation     (94-97)  %


 


Sodium     (137-145)  mmol/L


 


Potassium     (3.5-5.1)  mmol/L


 


Chloride     ()  mmol/L


 


BUN     (9-20)  mg/dL


 


Creatinine     (0.66-1.25)  mg/dL


 


Glucose     (74-99)  mg/dL


 


POC Glucose (mg/dL)  145 H  158 H  160 H  (75-99)  mg/dL


 


Calcium     (8.4-10.2)  mg/dL


 


Total Bilirubin     (0.2-1.3)  mg/dL


 


AST     (17-59)  U/L


 


Ammonia     (<30)  umol/L


 


Total Protein     (6.3-8.2)  g/dL


 


Albumin     (3.5-5.0)  g/dL














  07/03/20 07/04/20 07/04/20 Range/Units





  23:49 04:40 04:40 


 


RBC   2.29 L   (4.30-5.90)  m/uL


 


Hgb   8.4 L   (13.0-17.5)  gm/dL


 


Hct   25.4 L   (39.0-53.0)  %


 


MCV   110.7 H   (80.0-100.0)  fL


 


MCH   36.5 H   (25.0-35.0)  pg


 


RDW   24.2 H   (11.5-15.5)  %


 


Plt Count   69 L   (150-450)  k/uL


 


Macrocytosis   Marked A   


 


PT     (9.0-12.0)  sec


 


INR     (<1.2)  


 


ABG Total CO2     (19-24)  mmol/L


 


ABG O2 Saturation     (94-97)  %


 


Sodium    150 H  (137-145)  mmol/L


 


Potassium    2.8 L  (3.5-5.1)  mmol/L


 


Chloride    123 H  ()  mmol/L


 


BUN    61 H  (9-20)  mg/dL


 


Creatinine    1.43 H  (0.66-1.25)  mg/dL


 


Glucose    153 H  (74-99)  mg/dL


 


POC Glucose (mg/dL)  189 H    (75-99)  mg/dL


 


Calcium    7.9 L  (8.4-10.2)  mg/dL


 


Total Bilirubin    5.5 H  (0.2-1.3)  mg/dL


 


AST    116 H  (17-59)  U/L


 


Ammonia     (<30)  umol/L


 


Total Protein    6.0 L  (6.3-8.2)  g/dL


 


Albumin    2.2 L  (3.5-5.0)  g/dL














  07/04/20 07/04/20 07/04/20 Range/Units





  04:40 05:37 07:23 


 


RBC     (4.30-5.90)  m/uL


 


Hgb     (13.0-17.5)  gm/dL


 


Hct     (39.0-53.0)  %


 


MCV     (80.0-100.0)  fL


 


MCH     (25.0-35.0)  pg


 


RDW     (11.5-15.5)  %


 


Plt Count     (150-450)  k/uL


 


Macrocytosis     


 


PT     (9.0-12.0)  sec


 


INR     (<1.2)  


 


ABG Total CO2    25 H  (19-24)  mmol/L


 


ABG O2 Saturation    98.2 H  (94-97)  %


 


Sodium     (137-145)  mmol/L


 


Potassium     (3.5-5.1)  mmol/L


 


Chloride     ()  mmol/L


 


BUN     (9-20)  mg/dL


 


Creatinine     (0.66-1.25)  mg/dL


 


Glucose     (74-99)  mg/dL


 


POC Glucose (mg/dL)   185 H   (75-99)  mg/dL


 


Calcium     (8.4-10.2)  mg/dL


 


Total Bilirubin     (0.2-1.3)  mg/dL


 


AST     (17-59)  U/L


 


Ammonia  73 H    (<30)  umol/L


 


Total Protein     (6.3-8.2)  g/dL


 


Albumin     (3.5-5.0)  g/dL














  07/04/20 Range/Units





  08:35 


 


RBC   (4.30-5.90)  m/uL


 


Hgb   (13.0-17.5)  gm/dL


 


Hct   (39.0-53.0)  %


 


MCV   (80.0-100.0)  fL


 


MCH   (25.0-35.0)  pg


 


RDW   (11.5-15.5)  %


 


Plt Count   (150-450)  k/uL


 


Macrocytosis   


 


PT  14.4 H  (9.0-12.0)  sec


 


INR  1.5 H  (<1.2)  


 


ABG Total CO2   (19-24)  mmol/L


 


ABG O2 Saturation   (94-97)  %


 


Sodium   (137-145)  mmol/L


 


Potassium   (3.5-5.1)  mmol/L


 


Chloride   ()  mmol/L


 


BUN   (9-20)  mg/dL


 


Creatinine   (0.66-1.25)  mg/dL


 


Glucose   (74-99)  mg/dL


 


POC Glucose (mg/dL)   (75-99)  mg/dL


 


Calcium   (8.4-10.2)  mg/dL


 


Total Bilirubin   (0.2-1.3)  mg/dL


 


AST   (17-59)  U/L


 


Ammonia   (<30)  umol/L


 


Total Protein   (6.3-8.2)  g/dL


 


Albumin   (3.5-5.0)  g/dL








                      Microbiology - Last 24 Hours (Table)











 06/28/20 14:13 Blood Culture - Preliminary





 Blood    No Growth after 120 hours














Assessment and Plan


Assessment: 


Right foot swelling, ecchymosis 





Plan: 


- Continue with conservative treatment at this time. Continue monitoring of the 

right foot and elevation for swelling control.  No plans for immediate surgical 

intervention. 


 - We will continue to follow patient while he remains inpatient and make 

recommendations as needed.

## 2020-07-04 NOTE — XR
EXAMINATION TYPE: XR chest 1V portable

 

DATE OF EXAM: 7/4/2020

 

COMPARISON: 7/3/2020

 

INDICATION: Tube placement

 

TECHNIQUE: Single frontal view of the chest is obtained.

 

FINDINGS:  

The heart size is normal.  

The pulmonary vasculature is prominent.  

There is perihilar infiltrates. Correlate for atelectasis.  

M overload may be present. There is some mild elevation of the left diaphragm. Consider some atelecta
sis.

 

An endotracheal tube is present with the tip above the clive. Nasogastric tube transverses the thora
x.

 

IMPRESSION:  

1. Increasing pulmonary vascular markings with perihilar infiltrates. Correlate for volume overload a
nd atelectasis.

## 2020-07-05 LAB
ALBUMIN SERPL-MCNC: 2.2 G/DL (ref 3.5–5)
ALP SERPL-CCNC: 98 U/L (ref 38–126)
ALT SERPL-CCNC: 42 U/L (ref 4–49)
ANION GAP SERPL CALC-SCNC: 3 MMOL/L
AST SERPL-CCNC: 111 U/L (ref 17–59)
BUN SERPL-SCNC: 69 MG/DL (ref 9–20)
CALCIUM SPEC-MCNC: 8.1 MG/DL (ref 8.4–10.2)
CHLORIDE SERPL-SCNC: 127 MMOL/L (ref 98–107)
CO2 BLDA-SCNC: 27 MMOL/L (ref 19–24)
CO2 SERPL-SCNC: 26 MMOL/L (ref 22–30)
ERYTHROCYTE [DISTWIDTH] IN BLOOD BY AUTOMATED COUNT: 2.28 M/UL (ref 4.3–5.9)
ERYTHROCYTE [DISTWIDTH] IN BLOOD: 23.7 % (ref 11.5–15.5)
GLUCOSE BLD-MCNC: 175 MG/DL (ref 75–99)
GLUCOSE BLD-MCNC: 197 MG/DL (ref 75–99)
GLUCOSE BLD-MCNC: 205 MG/DL (ref 75–99)
GLUCOSE BLD-MCNC: 230 MG/DL (ref 75–99)
GLUCOSE SERPL-MCNC: 157 MG/DL (ref 74–99)
HCO3 BLDA-SCNC: 26 MMOL/L (ref 21–25)
HCT VFR BLD AUTO: 25.4 % (ref 39–53)
HGB BLD-MCNC: 8.4 GM/DL (ref 13–17.5)
MCH RBC QN AUTO: 36.9 PG (ref 25–35)
MCHC RBC AUTO-ENTMCNC: 33 G/DL (ref 31–37)
MCV RBC AUTO: 111.7 FL (ref 80–100)
PCO2 BLDA: 35 MMHG (ref 35–45)
PH BLDA: 7.47 [PH] (ref 7.35–7.45)
PLATELET # BLD AUTO: 58 K/UL (ref 150–450)
PO2 BLDA: 93 MMHG (ref 83–108)
POTASSIUM SERPL-SCNC: 2.8 MMOL/L (ref 3.5–5.1)
PROT SERPL-MCNC: 6 G/DL (ref 6.3–8.2)
SODIUM SERPL-SCNC: 156 MMOL/L (ref 137–145)
WBC # BLD AUTO: 10.8 K/UL (ref 3.8–10.6)

## 2020-07-05 RX ADMIN — INSULIN ASPART SCH UNIT: 100 INJECTION, SOLUTION INTRAVENOUS; SUBCUTANEOUS at 18:07

## 2020-07-05 RX ADMIN — PIPERACILLIN AND TAZOBACTAM SCH MLS/HR: 3; .375 INJECTION, POWDER, FOR SOLUTION INTRAVENOUS at 00:06

## 2020-07-05 RX ADMIN — INSULIN ASPART SCH UNIT: 100 INJECTION, SOLUTION INTRAVENOUS; SUBCUTANEOUS at 12:02

## 2020-07-05 RX ADMIN — CHLORHEXIDINE GLUCONATE SCH: 1.2 RINSE ORAL at 19:49

## 2020-07-05 RX ADMIN — POTASSIUM BICARBONATE SCH MEQ: 782 TABLET, EFFERVESCENT ORAL at 13:56

## 2020-07-05 RX ADMIN — PROPOFOL SCH MLS/HR: 10 INJECTION, EMULSION INTRAVENOUS at 13:57

## 2020-07-05 RX ADMIN — FUROSEMIDE SCH MG: 10 INJECTION, SOLUTION INTRAMUSCULAR; INTRAVENOUS at 00:06

## 2020-07-05 RX ADMIN — LACTULOSE SCH GM: 20 SOLUTION ORAL at 12:02

## 2020-07-05 RX ADMIN — SPIRONOLACTONE SCH MG: 25 TABLET, FILM COATED ORAL at 21:32

## 2020-07-05 RX ADMIN — MORPHINE SULFATE PRN MG: 2 INJECTION, SOLUTION INTRAMUSCULAR; INTRAVENOUS at 01:41

## 2020-07-05 RX ADMIN — RIFAXIMIN SCH MG: 550 TABLET ORAL at 20:33

## 2020-07-05 RX ADMIN — METHYLPREDNISOLONE SODIUM SUCCINATE SCH MG: 40 INJECTION, POWDER, FOR SOLUTION INTRAMUSCULAR; INTRAVENOUS at 08:11

## 2020-07-05 RX ADMIN — LACTULOSE SCH GM: 20 SOLUTION ORAL at 17:57

## 2020-07-05 RX ADMIN — INSULIN ASPART SCH UNIT: 100 INJECTION, SOLUTION INTRAVENOUS; SUBCUTANEOUS at 06:01

## 2020-07-05 RX ADMIN — NOREPINEPHRINE BITARTRATE SCH: 1 INJECTION, SOLUTION, CONCENTRATE INTRAVENOUS at 08:12

## 2020-07-05 RX ADMIN — SPIRONOLACTONE SCH MG: 25 TABLET, FILM COATED ORAL at 16:06

## 2020-07-05 RX ADMIN — LACTULOSE SCH GM: 20 SOLUTION ORAL at 21:32

## 2020-07-05 RX ADMIN — PIPERACILLIN AND TAZOBACTAM SCH MLS/HR: 3; .375 INJECTION, POWDER, FOR SOLUTION INTRAVENOUS at 08:12

## 2020-07-05 RX ADMIN — INSULIN ASPART SCH UNIT: 100 INJECTION, SOLUTION INTRAVENOUS; SUBCUTANEOUS at 00:06

## 2020-07-05 RX ADMIN — PIPERACILLIN AND TAZOBACTAM SCH MLS/HR: 3; .375 INJECTION, POWDER, FOR SOLUTION INTRAVENOUS at 16:06

## 2020-07-05 RX ADMIN — FUROSEMIDE SCH MG: 10 INJECTION, SOLUTION INTRAMUSCULAR; INTRAVENOUS at 08:11

## 2020-07-05 RX ADMIN — LACTULOSE SCH GM: 20 SOLUTION ORAL at 08:18

## 2020-07-05 RX ADMIN — IPRATROPIUM BROMIDE AND ALBUTEROL SULFATE SCH ML: .5; 3 SOLUTION RESPIRATORY (INHALATION) at 19:00

## 2020-07-05 RX ADMIN — POTASSIUM CHLORIDE SCH MLS/HR: 14.9 INJECTION, SOLUTION INTRAVENOUS at 10:36

## 2020-07-05 RX ADMIN — METHYLPREDNISOLONE SODIUM SUCCINATE SCH MG: 40 INJECTION, POWDER, FOR SOLUTION INTRAMUSCULAR; INTRAVENOUS at 00:06

## 2020-07-05 RX ADMIN — METHYLPREDNISOLONE SODIUM SUCCINATE SCH MG: 40 INJECTION, POWDER, FOR SOLUTION INTRAMUSCULAR; INTRAVENOUS at 16:06

## 2020-07-05 RX ADMIN — SODIUM CHLORIDE SCH: 900 INJECTION, SOLUTION INTRAVENOUS at 10:37

## 2020-07-05 RX ADMIN — CHLORHEXIDINE GLUCONATE SCH ML: 1.2 RINSE ORAL at 08:11

## 2020-07-05 RX ADMIN — RIFAXIMIN SCH MG: 550 TABLET ORAL at 08:11

## 2020-07-05 RX ADMIN — IPRATROPIUM BROMIDE AND ALBUTEROL SULFATE SCH ML: .5; 3 SOLUTION RESPIRATORY (INHALATION) at 07:30

## 2020-07-05 RX ADMIN — PANTOPRAZOLE SODIUM SCH MG: 40 INJECTION, POWDER, FOR SOLUTION INTRAVENOUS at 08:11

## 2020-07-05 RX ADMIN — SPIRONOLACTONE SCH MG: 25 TABLET, FILM COATED ORAL at 08:11

## 2020-07-05 RX ADMIN — PROPOFOL SCH MLS/HR: 10 INJECTION, EMULSION INTRAVENOUS at 01:37

## 2020-07-05 RX ADMIN — POTASSIUM BICARBONATE SCH MEQ: 782 TABLET, EFFERVESCENT ORAL at 06:00

## 2020-07-05 RX ADMIN — POTASSIUM BICARBONATE SCH MEQ: 782 TABLET, EFFERVESCENT ORAL at 16:06

## 2020-07-05 RX ADMIN — IPRATROPIUM BROMIDE AND ALBUTEROL SULFATE SCH ML: .5; 3 SOLUTION RESPIRATORY (INHALATION) at 15:34

## 2020-07-05 RX ADMIN — PANTOPRAZOLE SODIUM SCH MG: 40 INJECTION, POWDER, FOR SOLUTION INTRAVENOUS at 20:33

## 2020-07-05 RX ADMIN — FUROSEMIDE SCH MG: 10 INJECTION, SOLUTION INTRAMUSCULAR; INTRAVENOUS at 16:06

## 2020-07-05 RX ADMIN — IPRATROPIUM BROMIDE AND ALBUTEROL SULFATE SCH ML: .5; 3 SOLUTION RESPIRATORY (INHALATION) at 12:23

## 2020-07-05 RX ADMIN — PROPOFOL SCH MLS/HR: 10 INJECTION, EMULSION INTRAVENOUS at 05:26

## 2020-07-05 NOTE — P.PN
Subjective


Progress Note Date: 07/05/20


Principal diagnosis: 





Acute hypoxic respiratory failure secondary to acute aspiration pneumonia, acute

sepsis





This is a 54-year-old white male, history of alcohol abuse, patient was brought 

into the ER with change in mental status according to EMS.  Patient could not 

give any history, he was a very poor historian upon arrival to the ER.  Patient 

is supposedly a daily drinker, however from my review of the chart, I saw this 

patient back on 3/17/20, patient presented back then with multiple posterior 

lateral rib fractures on the right.  Apparently he fell in his bathroom, and 

landed on the side of the top sustaining multiple rib fractures.  Patient 

sustained rib fractures of 910 and 11 ribs.  Patient is also known to have 

history of severe emphysema/COPD.  He is legally blind.  He has history of 

pericarditis, anxiety, and history of degenerative joint disease.  Patient was 

discharged home on 3/18/20.  Drug screen in the ER was positive for tricyclic 

antidepressants and positive for benzodiazepines.  Rest of the drug screen was 

basically negative.  ABG in the ER showed a pO2 of 63 pCO2 of 29 pH of 7.49.  

Patient was also noted to have elevated INR of 3.7.  Low hemoglobin of 7.0 

although his baseline hemoglobin from 2 months ago was 15.1.  Ammonia level was 

91.  Total bilirubin was 14.9, and his liver enzymes were a bit elevated.  

Normal amylase and lipase were noted.  Gallbladder ultrasound showed abdominal 

ascites.  Chest x-ray showed bilateral diffuse infiltrates.  Patient was 

presumed septic upon presentation, received fluid boluses of 2500 ML's.  Patient

was found to have hepatic encephalopathy liver failure, bilateral pneumonia, GI 

hemorrhage and sepsis.  Early this morning, patient was intubated, placed on 

mechanical ventilation, and he was aware of the patient since admission.  

Presently the patient is on assist control rate of 28 tidal volume is 500 FiO2 

is 100% PEEP of 5.  ABG showed a pO2 of 170 pCO2 of 31 pH of 7.46.  Hence I cut 

down the FiO2 to 50%, increased the PEEP to 8, cut down the tidal volume to 450,

and decrease the respiratory rate to 26.  Patient is on Sandostatin for his GI 

bleeding, and he is yet to be seen by gastroenterology on consultation is also 

on Protonix.  He is on lactulose for his elevated ammonia level.  And he is 

empirically on Zosyn for presumptive aspiration pneumonia.  Echocardiogram 

showed evidence of good LV function.  And mild valvular heart disease.  BNP 

level was borderline elevated at 910





Patient was reevaluated today on 6/30/20, remains on mechanical ventilation.  He

is presently on assist control rate of 26 tidal volume is 450 FiO2 of 60% and 

PEEP of 8.  However I increased his PEEP to 12 and cut down his FiO2 to 50%.  At

night the patient developed worsening agitation, and Stacking his breaths, was 

not synchronous with mechanical ventilation, hence had to place the patient on 

Nimbex.  Today I plan to discontinue Nimbex and place him on fentanyl 25 g per 

hour.  Patient will be started on tube feeding, and I cut down his IV fluid to 

50 MLS per hour.  Remains on propofol at 60 mcg/kg/m, Nimbex which will be 

discontinued, but creatinine which was discontinued this morning, and no

repinephrine at 0.06 mcg/kg/m.  Enteral feeding to be started today by 

nutritionist.  Patient remains on antibiotics, remains on lactulose for his 

elevated ammonia level which is 80 today.  And he remains on Protonix.  Chest x-

ray showed more consolidation noted bilaterally in both lungs more so in the 

left lung.  Bilateraldisease is noted consistent with aspiration pneumonia.  

Sputum cultures and blood cultures so far remain on diagnostic.  ABG today 

showed a pO2 of 67 pCO2 of 39 pH of 7.33.  WBC count is 14.7 hemoglobin is 8.6 

platelets are 66,000 and INR is 2.1.  Electrolytes are basically normal renal 

profile showed beer of 25 creatinine 0.94.





Patient was reevaluated today on 7/1/20, remains in the intensive care unit, 

remains on mechanical ventilation.  His ventilator settings are assist control 

rate of 26, tidal volume is 450 FiO2 is 50% and PEEP is at 12.  ABG showed a pO2

of 65 pCO2 of 37 pH of 7.36, hence no change was made in the ventilator 

settings.  Patient remains on norepinephrine at 0.09 mcg/kg/m, propofol at 45 

mcg/kg/m, fentanyl at 0.5 mcg/kg/h.  Remains on enteral feeding at 30 MLS per 

hour goal is 45.  Remains on Zosyn for empiric coverage of aspiration pneumonia.

 Remains on lactulose and the dose was increased to 45 ML 4 times a day, and 

added Xifaxan at 550 mg twice a day.  His lactulose does not seem to be inducing

enough diarrhea did get his ammonia level down.  Continues to have significantly

elevated ammonia level in spite of lactulose for the last 2 days.  Patient 

continues to have ascites.  And no plans for paracentesis as recommended by 

gastroenterology at this point.  Chest x-ray showed very minimal improvement if 

any in his bilateral infiltrates.  The bilateral infiltrates are suggestive of 

aspiration pneumonia.  Obviously the patient is known to have history of 

alcoholism, and he presented with alcoholic liver hepatitis, and hepatic en

cephalopathy.  Labs today were all reviewed WBC is 14.1 hemoglobin is 9.4 

platelets are 75,000 INR is 1.7.  Electrolytes are unremarkable except for low 

potassium of 3.4 BUN of 27 creatinine 1.32.  Liver enzymes are borderline 

elevated.  Total bilirubin is improving down to 8.9 today.  Albumin is 2.3





Patient was reevaluated today on 7/2/20, remains in the ICU, intubated, 

mechanically ventilated.  Patient is on assist control rate of 26 tidal volume 

450 FiO2 50% and PEEP is 12.  Chest x-ray is showing definite improvement, 

however his ABG is basically about the same with a pO2 of 68 pCO2 of 38 and pH 

of 7.38.  His ammonia level is on the rise in spite of the fact that the patient

is on relatively high dose of lactulose and he is also on Xifaxan.  Patient 

remains on propofol at 40 mcg/kg/m, fentanyl at 0.5 mcg/kg/h, he is on very 

small dose of norepinephrine at 0.01 mcg/kg/m, IV fluid is at KVO, and he is 

also on Zosyn for presumptive aspiration pneumonia.  Remains on diuretics in the

form of Lasix, Aldactone.  His Lasix was increased to 40 mg 3 times a day, and 

he is on Aldactone via nasogastric tube.  Patient was taken off sedation today, 

however he became extremely agitated, tachypneic, tachycardic, hence we had to 

place him back on sedation and continued assist control mode of mechanical 

ventilation.  Chest x-ray again showed improvement.  WBC count today is 11 

hemoglobin is 8.5 his INR is 1.7 basic metabolic profile is improving, 

creatinine however is up to 1.40, and the patient obviously developed acute 

kidney injury.  This could very well be related to his sepsis, and related to 

his hypotension requiring norepinephrine.  Cultures including blood cultures and

sputum cultures have been negative.





Patient was reevaluated today in the ICU, remains intubated and mechanically 

ventilated.  His ventilator settings are assist control rate of 26 tidal volume 

is 450 FiO2 is 50% and PEEP is at 12 chest x-ray showed dramatic improvement.  

His ABG is significantly improved hence I recommended cutting down the PEEP to 

8.  His ammonia level is significantly improved today, it is down to 69.  Sodium

seems to be creeping up a little bit, and I recommended free water flushes via 

orogastric tube.  Patient is on propofol at 50 mcg/kg/m, his tube feeding is 

presently on hold because of increased residual.  Patient was given a sedation 

holiday, however when he went off propofol, the patient became extremely 

agitated, restless, biting on the endotracheal tube, tachycardic, tachypneic, 

and was extremely agitated.  Hence I recommended placing him back on propofol, 

and not quite ready for weaning.  Labs were all reviewed today.  And addressed 

accordingly including his elevated sodium and elevated BUN of 49 creatinine 

1.46.  Chest x-ray showed significant improvement in his bilateral 

infiltrates/aspiration pneumonia.





Patient was reevaluated today on 7/4/20, remains in the ICU, intubated and 

mechanically ventilated.  His ventilator settings are assist control rate of 26 

FiO2 50% tidal volume of 450 PEEP is at 8 and today I cut down his FiO2 to 45% 

and kept him on a PEEP of 8.  ABG this morning showed a pO2 of 102 pCO2 of 35 pH

of 7.45.  Patient has significantly elevated sodium today of 150 potassium is 

low at 2.8 ammonia level remains high at 73.  Patient is not requiring any 

pressors, he is on propofol at 34 mcg/kg/m.  Patient is on enteral feeding.  I 

do plan to wean the patient off propofol sometime today, and assess mental 

status again.  This was attempted yesterday, but the patient didn't do well, I 

have also recommended free water flushes to correct his hyper natremia.





The patient is seen today 07/05/2020 in follow-up in the intensive care unit.  

He remains intubated on mechanical vent.  Current settings are assist control at

a rate of 26, FiO2 45% tidal volume 450 and a PEEP of 8.  Morning blood gases 

reveal a P O2 of 93, pCO2 of 35 and a pH of 7.47.  Chest x-ray reveals stable 

bilateral atelectasis/infiltrate.  He is sedated on propofol at 50 mcg/kg/m.  

Antibiotics in the form of Zosyn.  He remains on IV diuretics.  He is tolerating

his tube feedings.  He remains hypernatremic despite free water.  IVs changed to

D5W at 75 ML's per hour.  Sodium 156.  Potassium 2.8.  Creatinine 1.32.  Glucose

157.  White count 10.8.  Hemoglobin 8.4.  Platelet count 58,000.





Objective





- Vital Signs


Vital signs: 


                                   Vital Signs











Temp  98.8 F   07/05/20 12:00


 


Pulse  109 H  07/05/20 13:00


 


Resp  10 L  07/05/20 13:00


 


BP  108/60   07/05/20 07:00


 


Pulse Ox  100   07/05/20 13:00








                                 Intake & Output











 07/04/20 07/05/20 07/05/20





 18:59 06:59 18:59


 


Intake Total 2091.405 2182.161 1138


 


Output Total 2215 2955 750


 


Balance -123.595 -772.839 388


 


Weight  87 kg 


 


Intake:   


 


   756 368


 


    Dextrose 5% in Water 1,   150





    000 ml @ 75 mls/hr IV .   





    [id number] [facility] Rx#:[id number]   


 


    Normal Saline Pressure 83 36 18





    Bag   


 


    Piperacillin-Tazobactam 3 200 200 





    .375 gm In Sodium   





    Chloride 0.9% 100 ml @ 25   





    mls/hr IVPB Q8HR [facility] Rx#   





    :[id number]   


 


    Sodium Chloride 0.9% 1, 550 520 200





    000 ml @ 50 mls/hr IV .   





    Q20H [facility] Rx#:[id number]   


 


  Intake, IV Titration 153.405 151.161 100





  Amount   


 


    Piperacillin-Tazobactam 3   100





    .375 gm In Sodium   





    Chloride 0.9% 100 ml @ 25   





    mls/hr IVPB Q8HR [facility] Rx#   





    :[id number]   


 


    Propofol 1,000 mg In 153.405 151.161 





    Empty Bag 1 bag @ Titrate   





    IV .Q0M [facility] Rx#:   





    [id number]   


 


  Tube Feeding 405 675 270


 


  Other 700 600 400


 


Output:   


 


  Urine 1015 1755 750


 


  Stool 1200 1200 


 


Other:   


 


  Voiding Method Indwelling Catheter Indwelling Catheter Indwelling Catheter








                       ABP, PAP, CO, CI - Last Documented











Arterial Blood Pressure        161/70

















- Exam





GENERAL EXAM: Intubated, sedated 54-year-old gentleman, comfortable in no 

apparent distress.


HEAD: Normocephalic.


EYES: Normal reaction of pupils, equal size.


NOSE: Clear with pink turbinates.


THROAT: Oral endotracheal tube and gastric tube secured in place No erythema or 

exudates.


NECK: No masses, no JVD.


CHEST: No chest wall deformity.


LUNGS: Equal air entry with crackles at the bilateral posterior bases.


CVS: S1 and S2 normal with no audible murmur, regular rhythm.


ABDOMEN: No hepatosplenomegaly, normal bowel sounds, no guarding or rigidity.


SPINE: No scoliosis or deformity


SKIN: No rashes


CENTRAL NERVOUS SYSTEM: Sedated, tone is normal in all 4 extremities.


EXTREMITIES: There is 1-2+ peripheral edema.  No clubbing, no cyanosis.  

Peripheral pulses are intact.





- Labs


CBC & Chem 7: 


                                 07/05/20 04:40





                                 07/05/20 11:10


Labs: 


                  Abnormal Lab Results - Last 24 Hours (Table)











  07/04/20 07/05/20 07/05/20 Range/Units





  18:10 00:01 04:40 


 


WBC    10.8 H  (3.8-10.6)  k/uL


 


RBC    2.28 L  (4.30-5.90)  m/uL


 


Hgb    8.4 L  (13.0-17.5)  gm/dL


 


Hct    25.4 L  (39.0-53.0)  %


 


MCV    111.7 H  (80.0-100.0)  fL


 


MCH    36.9 H  (25.0-35.0)  pg


 


RDW    23.7 H  (11.5-15.5)  %


 


Plt Count    58 L  (150-450)  k/uL


 


Macrocytosis    Marked A  


 


ABG pH     (7.35-7.45)  


 


ABG HCO3     (21-25)  mmol/L


 


ABG Total CO2     (19-24)  mmol/L


 


ABG O2 Saturation     (94-97)  %


 


Sodium     (137-145)  mmol/L


 


Potassium     (3.5-5.1)  mmol/L


 


Chloride     ()  mmol/L


 


BUN     (9-20)  mg/dL


 


Creatinine     (0.66-1.25)  mg/dL


 


Glucose     (74-99)  mg/dL


 


POC Glucose (mg/dL)  196 H  205 H   (75-99)  mg/dL


 


Calcium     (8.4-10.2)  mg/dL


 


Total Bilirubin     (0.2-1.3)  mg/dL


 


AST     (17-59)  U/L


 


Ammonia     (<30)  umol/L


 


Total Protein     (6.3-8.2)  g/dL


 


Albumin     (3.5-5.0)  g/dL














  07/05/20 07/05/20 07/05/20 Range/Units





  04:40 04:40 05:55 


 


WBC     (3.8-10.6)  k/uL


 


RBC     (4.30-5.90)  m/uL


 


Hgb     (13.0-17.5)  gm/dL


 


Hct     (39.0-53.0)  %


 


MCV     (80.0-100.0)  fL


 


MCH     (25.0-35.0)  pg


 


RDW     (11.5-15.5)  %


 


Plt Count     (150-450)  k/uL


 


Macrocytosis     


 


ABG pH     (7.35-7.45)  


 


ABG HCO3     (21-25)  mmol/L


 


ABG Total CO2     (19-24)  mmol/L


 


ABG O2 Saturation     (94-97)  %


 


Sodium  156 H    (137-145)  mmol/L


 


Potassium  2.8 L    (3.5-5.1)  mmol/L


 


Chloride  127 H    ()  mmol/L


 


BUN  69 H    (9-20)  mg/dL


 


Creatinine  1.32 H    (0.66-1.25)  mg/dL


 


Glucose  157 H    (74-99)  mg/dL


 


POC Glucose (mg/dL)    175 H  (75-99)  mg/dL


 


Calcium  8.1 L    (8.4-10.2)  mg/dL


 


Total Bilirubin  6.1 H    (0.2-1.3)  mg/dL


 


AST  111 H    (17-59)  U/L


 


Ammonia   64 H   (<30)  umol/L


 


Total Protein  6.0 L    (6.3-8.2)  g/dL


 


Albumin  2.2 L    (3.5-5.0)  g/dL














  07/05/20 07/05/20 Range/Units





  07:19 11:58 


 


WBC    (3.8-10.6)  k/uL


 


RBC    (4.30-5.90)  m/uL


 


Hgb    (13.0-17.5)  gm/dL


 


Hct    (39.0-53.0)  %


 


MCV    (80.0-100.0)  fL


 


MCH    (25.0-35.0)  pg


 


RDW    (11.5-15.5)  %


 


Plt Count    (150-450)  k/uL


 


Macrocytosis    


 


ABG pH  7.47 H   (7.35-7.45)  


 


ABG HCO3  26 H   (21-25)  mmol/L


 


ABG Total CO2  27 H   (19-24)  mmol/L


 


ABG O2 Saturation  97.4 H   (94-97)  %


 


Sodium    (137-145)  mmol/L


 


Potassium    (3.5-5.1)  mmol/L


 


Chloride    ()  mmol/L


 


BUN    (9-20)  mg/dL


 


Creatinine    (0.66-1.25)  mg/dL


 


Glucose    (74-99)  mg/dL


 


POC Glucose (mg/dL)   197 H  (75-99)  mg/dL


 


Calcium    (8.4-10.2)  mg/dL


 


Total Bilirubin    (0.2-1.3)  mg/dL


 


AST    (17-59)  U/L


 


Ammonia    (<30)  umol/L


 


Total Protein    (6.3-8.2)  g/dL


 


Albumin    (3.5-5.0)  g/dL








                      Microbiology - Last 24 Hours (Table)











 06/28/20 14:13 Blood Culture - Final





 Blood    No Growth after 144 hours














Assessment and Plan


Assessment: 





Acute hypoxic respiratory failure, suspect aspiration pneumonia.  Chest x-ray 

has shown improvement over the last 2 days, and this is also reflected in the 

improvement noted in the ABG.





Acute sepsis, possible septic shock, patient required pressors and remains on 

norepinephrine today.  likely source is aspiration pneumonia, although abdominal

source is not entirely ruled out.  Blood cultures and sputum cultures remain 

negative.





Alcohol liver disease with ascites.  Jaundice, and elevated liver enzymes.





Blood loss anemia, most likely the patient has acute or subacute GI bleeding, GI

is following.  





Acute hepatic encephalopathy with significantly elevated ammonia level.  

Slightly increased today compared to yesterday, it is 73.





Recent history of fall about 2 months ago, and multiple right-sided rib 

fractures.





History of alcoholic neuropathy.





History of pericarditis





Chronic back pain





History of restless leg syndrome.





Acute kidney injury secondary to sepsis and septic shock.  Strongly doubt 

hepatorenal syndrome.





Plan





The patient was seen and evaluated by Dr. Mann


Chest x-ray, ABGs and labs reviewed


Continue with daily interruption of sedation and CPAP trials


Continue nutritional support


Continue the current medications


Condition remains quite guarded and poor


We'll continue to follow





Critical care time 35 minutes





I, the cosigning physician, performed a history & physical examination of the 

patient. Lungs sounds with crackles in the bilateral posterior bases.  

Maintaining good O2 saturations in the 90s on 45% FiO2 via the mechanical 

ventilator.  I discussed the assessment and plan of care with my nurse 

practitioner, Asmita Ramsay. I attest to the above note as dictated by her.

## 2020-07-05 NOTE — XR
EXAMINATION TYPE: XR chest 1V

 

DATE OF EXAM: 7/5/2020

 

COMPARISON: 7/4/2020

 

HISTORY: Abnormal x-ray

 

TECHNIQUE: Single frontal view of the chest is obtained.

 

FINDINGS:  ET and NG tube stable. No sizable pneumothorax. Limited inspiration with elevated left hem
idiaphragm. Subsegmental areas of consolidation are noted. Stable.

 

IMPRESSION:  

1. Stable bilateral areas of atelectasis or infiltrate.

## 2020-07-05 NOTE — P.PN
Subjective


Progress Note Date: 07/05/20


Principal diagnosis: 





acute hypoxic respiratory failure secondary to aspiration pneumonia and acute 

sepsis, with subsequent alcoholic liver failure





patient evaluated today, in the intensive care unit, intubated and mechanically 

ventilated.  This morning's chest x-ray showing continued improvement.  Ammonia 

level is 64 which actually is down from 69.


Chest x-ray was reviewed this morning stable comparable to yesterday.  Blood 

gases also showed improvement in pulmonary, decreased PEEP to 5. THE ammonia 

level was down to 64


Patient is currently being sedated BUN 69 creatinine 1.32





Objective





- Vital Signs


Vital signs: 


                                   Vital Signs











Temp  97.2 F L  07/05/20 08:00


 


Pulse  93   07/05/20 09:00


 


Resp  37 H  07/05/20 09:00


 


BP  108/60   07/05/20 07:00


 


Pulse Ox  100   07/05/20 09:00








                                 Intake & Output











 07/04/20 07/05/20 07/05/20





 18:59 06:59 18:59


 


Intake Total 2091.405 2182.161 494


 


Output Total 2215 2955 125


 


Balance -123.595 -772.839 369


 


Weight  87 kg 


 


Intake:   


 


   756 159


 


    Normal Saline Pressure 83 36 9





    Bag   


 


    Piperacillin-Tazobactam 3 200 200 





    .375 gm In Sodium   





    Chloride 0.9% 100 ml @ 25   





    mls/hr IVPB Q8HR [facility] Rx#   





    :[id number]   


 


    Sodium Chloride 0.9% 1, 550 520 150





    000 ml @ 50 mls/hr IV .   





    Q20H [facility] Rx#:[id number]   


 


  Intake, IV Titration 153.405 151.161 





  Amount   


 


    Propofol 1,000 mg In 153.405 151.161 





    Empty Bag 1 bag @ Titrate   





    IV .Q0M [facility] Rx#:   





    [id number]   


 


  Tube Feeding 405 675 135


 


  Other 700 600 200


 


Output:   


 


  Urine 1015 1755 125


 


  Stool 1200 1200 


 


Other:   


 


  Voiding Method Indwelling Catheter Indwelling Catheter Indwelling Catheter








                       ABP, PAP, CO, CI - Last Documented











Arterial Blood Pressure        131/60

















- Exam





General: intubated,mechanical ventilation


HEENT: mild scleral icterus


endotracheal tube oral gastric tube is intact


Neck: [No adenopathy.]


Cardiac: [Heart regular in rate and rhythm.  normal S1 and S2, regular 6 

systolic murmur


Lungs: crackles and/or rhonchi bilateral


Abdomen: [No mass.  No organomegaly.  Bowel sounds presnt and normoactive in all

 4 quadrants.]


Extremes: 2+ bipedal edema, no cyanosis, deformity noted dorsal aspect right 

foot


: Rectal tube in place


Musculoskeletal: [No joint erythema, edema or tenderness.]


Skin: [No rash.]


Neurologic: cannot be assessed, fully sedated, on mechanical ventilation


Lymphatic: [No adenopathy.]





- Labs


CBC & Chem 7: 


                                 07/05/20 04:40





                                 07/05/20 04:40


Labs: 


                  Abnormal Lab Results - Last 24 Hours (Table)











  07/04/20 07/04/20 07/05/20 Range/Units





  12:02 18:10 00:01 


 


WBC     (3.8-10.6)  k/uL


 


RBC     (4.30-5.90)  m/uL


 


Hgb     (13.0-17.5)  gm/dL


 


Hct     (39.0-53.0)  %


 


MCV     (80.0-100.0)  fL


 


MCH     (25.0-35.0)  pg


 


RDW     (11.5-15.5)  %


 


Plt Count     (150-450)  k/uL


 


Macrocytosis     


 


ABG pH     (7.35-7.45)  


 


ABG HCO3     (21-25)  mmol/L


 


ABG Total CO2     (19-24)  mmol/L


 


ABG O2 Saturation     (94-97)  %


 


Sodium     (137-145)  mmol/L


 


Potassium     (3.5-5.1)  mmol/L


 


Chloride     ()  mmol/L


 


BUN     (9-20)  mg/dL


 


Creatinine     (0.66-1.25)  mg/dL


 


Glucose     (74-99)  mg/dL


 


POC Glucose (mg/dL)  156 H  196 H  205 H  (75-99)  mg/dL


 


Calcium     (8.4-10.2)  mg/dL


 


Total Bilirubin     (0.2-1.3)  mg/dL


 


AST     (17-59)  U/L


 


Ammonia     (<30)  umol/L


 


Total Protein     (6.3-8.2)  g/dL


 


Albumin     (3.5-5.0)  g/dL














  07/05/20 07/05/20 07/05/20 Range/Units





  04:40 04:40 04:40 


 


WBC  10.8 H    (3.8-10.6)  k/uL


 


RBC  2.28 L    (4.30-5.90)  m/uL


 


Hgb  8.4 L    (13.0-17.5)  gm/dL


 


Hct  25.4 L    (39.0-53.0)  %


 


MCV  111.7 H    (80.0-100.0)  fL


 


MCH  36.9 H    (25.0-35.0)  pg


 


RDW  23.7 H    (11.5-15.5)  %


 


Plt Count  58 L    (150-450)  k/uL


 


Macrocytosis  Marked A    


 


ABG pH     (7.35-7.45)  


 


ABG HCO3     (21-25)  mmol/L


 


ABG Total CO2     (19-24)  mmol/L


 


ABG O2 Saturation     (94-97)  %


 


Sodium   156 H   (137-145)  mmol/L


 


Potassium   2.8 L   (3.5-5.1)  mmol/L


 


Chloride   127 H   ()  mmol/L


 


BUN   69 H   (9-20)  mg/dL


 


Creatinine   1.32 H   (0.66-1.25)  mg/dL


 


Glucose   157 H   (74-99)  mg/dL


 


POC Glucose (mg/dL)     (75-99)  mg/dL


 


Calcium   8.1 L   (8.4-10.2)  mg/dL


 


Total Bilirubin   6.1 H   (0.2-1.3)  mg/dL


 


AST   111 H   (17-59)  U/L


 


Ammonia    64 H  (<30)  umol/L


 


Total Protein   6.0 L   (6.3-8.2)  g/dL


 


Albumin   2.2 L   (3.5-5.0)  g/dL














  07/05/20 07/05/20 Range/Units





  05:55 07:19 


 


WBC    (3.8-10.6)  k/uL


 


RBC    (4.30-5.90)  m/uL


 


Hgb    (13.0-17.5)  gm/dL


 


Hct    (39.0-53.0)  %


 


MCV    (80.0-100.0)  fL


 


MCH    (25.0-35.0)  pg


 


RDW    (11.5-15.5)  %


 


Plt Count    (150-450)  k/uL


 


Macrocytosis    


 


ABG pH   7.47 H  (7.35-7.45)  


 


ABG HCO3   26 H  (21-25)  mmol/L


 


ABG Total CO2   27 H  (19-24)  mmol/L


 


ABG O2 Saturation   97.4 H  (94-97)  %


 


Sodium    (137-145)  mmol/L


 


Potassium    (3.5-5.1)  mmol/L


 


Chloride    ()  mmol/L


 


BUN    (9-20)  mg/dL


 


Creatinine    (0.66-1.25)  mg/dL


 


Glucose    (74-99)  mg/dL


 


POC Glucose (mg/dL)  175 H   (75-99)  mg/dL


 


Calcium    (8.4-10.2)  mg/dL


 


Total Bilirubin    (0.2-1.3)  mg/dL


 


AST    (17-59)  U/L


 


Ammonia    (<30)  umol/L


 


Total Protein    (6.3-8.2)  g/dL


 


Albumin    (3.5-5.0)  g/dL








                      Microbiology - Last 24 Hours (Table)











 06/28/20 14:13 Blood Culture - Final





 Blood    No Growth after 144 hours














Assessment and Plan


(1) Sepsis with acute hypoxic respiratory failure and septic shock


Current Visit: Yes   Status: Acute   Code(s): A41.9 - SEPSIS, UNSPECIFIED 

ORGANISM; R65.21 - SEVERE SEPSIS WITH SEPTIC SHOCK; J96.01 - ACUTE RESPIRATORY 

FAILURE WITH HYPOXIA   SNOMED Code(s): 448728747


   





(2) Serum ammonia increased


Current Visit: Yes   Status: Acute   Code(s): E72.20 - DISORDER OF UREA CYCLE 

METABOLISM, UNSPECIFIED   SNOMED Code(s): 5632005


   





(3) Anemia due to blood loss, acute


Current Visit: Yes   Status: Acute   Code(s): D62 - ACUTE POSTHEMORRHAGIC ANEMIA

  SNOMED Code(s): 457566886


   





(4) Alcoholic peripheral neuropathy


Current Visit: Yes   Status: Acute   Code(s): G62.1 - ALCOHOLIC POLYNEUROPATHY  

SNOMED Code(s): 1769909


   





(5) Acute alcoholic hepatitis


Current Visit: Yes   Status: Acute   Code(s): K70.10 - ALCOHOLIC HEPATITIS 

WITHOUT ASCITES   SNOMED Code(s): 4034608


   





(6) Ascites


Current Visit: Yes   Status: Acute   Code(s): R18.8 - OTHER ASCITES   SNOMED 

Code(s): 354548766


   





(7) Bicytopenia


Current Visit: Yes   Status: Acute   Code(s): D75.89 - OTHER SPECIFIED DISEASES 

OF BLOOD AND BLOOD-FORMING ORGANS   SNOMED Code(s): 34120202


   





(8) Foot swelling


Current Visit: Yes   Status: Acute   Priority: Medium   Code(s): M79.89 - OTHER 

SPECIFIED SOFT TISSUE DISORDERS   SNOMED Code(s): 992324097


   





(9) GI hemorrhage


Current Visit: Yes   Status: Acute   Code(s): K92.2 - GASTROINTESTINAL 

HEMORRHAGE, UNSPECIFIED   SNOMED Code(s): 25580076


   





(10) Hepatic encephalopathy


Current Visit: Yes   Status: Acute   Code(s): K72.90 - HEPATIC FAILURE, 

UNSPECIFIED WITHOUT COMA   SNOMED Code(s): 05231324


   





(11) Liver cirrhosis


Current Visit: Yes   Status: Acute   Code(s): K74.60 - UNSPECIFIED CIRRHOSIS OF 

LIVER   SNOMED Code(s): 81434030


   





(12) Pneumonia


Current Visit: Yes   Status: Acute   Code(s): J18.9 - PNEUMONIA, UNSPECIFIED 

ORGANISM   SNOMED Code(s): 278617353


   





(13) Sepsis


Current Visit: Yes   Status: Acute   Code(s): A41.9 - SEPSIS, UNSPECIFIED 

ORGANISM   SNOMED Code(s): 69783134


   


Plan: 





continue ventilatory support


Continue sedation


Blood culture negative no growth after 144 hour


Patient has on empiric IV antibiotics Zosyn


Monitoring ammonia levels which is recently improving


Lactulose 4 times daily


GI considering paracentesis once patient is hemodynamically stable


Monitoring coagulopathy and liver profile


Orthopedics reevaluated right foot and ankle


Patient appears improved today however prognosis is still guarded,





Time with Patient: Greater than 30

## 2020-07-05 NOTE — P.PN
Subjective


Progress Note Date: 07/05/20


Principal diagnosis: 





Alcoholic hepatitis, alcoholic cirrhosis of the liver with ascites, hepatic 

encephalopathy, elevated liver enzymes





Patient is seen in the intensive care unit where he is currently intubated and 

sedated.  The patient has not been able to be weaned from the vent as of yet.  

He continues to have good nonbloody bowel movements with the nursing staff 

reporting that there emptying his fecal management system twice per day.





Objective





- Vital Signs


Vital signs: 


                                   Vital Signs











Temp  97.0 F L  07/05/20 04:00


 


Pulse  89   07/05/20 07:57


 


Resp  18   07/05/20 07:00


 


BP  108/60   07/05/20 07:00


 


Pulse Ox  100   07/05/20 07:00








                                 Intake & Output











 07/04/20 07/05/20 07/05/20





 18:59 06:59 18:59


 


Intake Total 2091.405 2182.161 396


 


Output Total 2215 2955 125


 


Balance -123.595 -772.839 271


 


Weight  87 kg 


 


Intake:   


 


   756 106


 


    Normal Saline Pressure 83 36 6





    Bag   


 


    Piperacillin-Tazobactam 3 200 200 





    .375 gm In Sodium   





    Chloride 0.9% 100 ml @ 25   





    mls/hr IVPB Q8HR [facility] Rx#   





    :[id number]   


 


    Sodium Chloride 0.9% 1, 550 520 100





    000 ml @ 50 mls/hr IV .   





    Q20H [facility] Rx#:[id number]   


 


  Intake, IV Titration 153.405 151.161 





  Amount   


 


    Propofol 1,000 mg In 153.405 151.161 





    Empty Bag 1 bag @ Titrate   





    IV .Q0M [facility] Rx#:   





    [id number]   


 


  Tube Feeding 405 675 90


 


  Other 700 600 200


 


Output:   


 


  Urine 1015 1755 125


 


  Stool 1200 1200 


 


Other:   


 


  Voiding Method Indwelling Catheter Indwelling Catheter 








                       ABP, PAP, CO, CI - Last Documented











Arterial Blood Pressure        127/63

















- Exam





On physical examination, patient appears comfortable in no apparent distress. 


HEAD: Normocephalic, atraumatic. 


EYES: Scleral icterus. No conjunctival injection. 


MOUTH: No lesions, tongue midline, endotracheal tube in place. 


NECK: Trachea midline, no gross abnormalities. 


CHEST: Coarse respiratory noises in all lung fields on mechanical ventilation.


ABDOMEN: Soft, obese. Bowel sounds are positive. No organomegaly.  No guarding 

or rigidity.


EXTREMITIES: Bilateral pedal edema. 


SKIN: No rashes, jaundice. 


NEUROLOGIC: Intubated and sedated. 





- Labs


CBC & Chem 7: 


                                 07/05/20 04:40





                                 07/05/20 11:10


Labs: 


                  Abnormal Lab Results - Last 24 Hours (Table)











  07/04/20 07/04/20 07/04/20 Range/Units





  08:35 12:02 18:10 


 


WBC     (3.8-10.6)  k/uL


 


RBC     (4.30-5.90)  m/uL


 


Hgb     (13.0-17.5)  gm/dL


 


Hct     (39.0-53.0)  %


 


MCV     (80.0-100.0)  fL


 


MCH     (25.0-35.0)  pg


 


RDW     (11.5-15.5)  %


 


Plt Count     (150-450)  k/uL


 


Macrocytosis     


 


PT  14.4 H    (9.0-12.0)  sec


 


INR  1.5 H    (<1.2)  


 


ABG pH     (7.35-7.45)  


 


ABG HCO3     (21-25)  mmol/L


 


ABG Total CO2     (19-24)  mmol/L


 


ABG O2 Saturation     (94-97)  %


 


Sodium     (137-145)  mmol/L


 


Potassium     (3.5-5.1)  mmol/L


 


Chloride     ()  mmol/L


 


BUN     (9-20)  mg/dL


 


Creatinine     (0.66-1.25)  mg/dL


 


Glucose     (74-99)  mg/dL


 


POC Glucose (mg/dL)   156 H  196 H  (75-99)  mg/dL


 


Calcium     (8.4-10.2)  mg/dL


 


Total Bilirubin     (0.2-1.3)  mg/dL


 


AST     (17-59)  U/L


 


Ammonia     (<30)  umol/L


 


Total Protein     (6.3-8.2)  g/dL


 


Albumin     (3.5-5.0)  g/dL














  07/05/20 07/05/20 07/05/20 Range/Units





  00:01 04:40 04:40 


 


WBC   10.8 H   (3.8-10.6)  k/uL


 


RBC   2.28 L   (4.30-5.90)  m/uL


 


Hgb   8.4 L   (13.0-17.5)  gm/dL


 


Hct   25.4 L   (39.0-53.0)  %


 


MCV   111.7 H   (80.0-100.0)  fL


 


MCH   36.9 H   (25.0-35.0)  pg


 


RDW   23.7 H   (11.5-15.5)  %


 


Plt Count   58 L   (150-450)  k/uL


 


Macrocytosis   Marked A   


 


PT     (9.0-12.0)  sec


 


INR     (<1.2)  


 


ABG pH     (7.35-7.45)  


 


ABG HCO3     (21-25)  mmol/L


 


ABG Total CO2     (19-24)  mmol/L


 


ABG O2 Saturation     (94-97)  %


 


Sodium    156 H  (137-145)  mmol/L


 


Potassium    2.8 L  (3.5-5.1)  mmol/L


 


Chloride    127 H  ()  mmol/L


 


BUN    69 H  (9-20)  mg/dL


 


Creatinine    1.32 H  (0.66-1.25)  mg/dL


 


Glucose    157 H  (74-99)  mg/dL


 


POC Glucose (mg/dL)  205 H    (75-99)  mg/dL


 


Calcium    8.1 L  (8.4-10.2)  mg/dL


 


Total Bilirubin    6.1 H  (0.2-1.3)  mg/dL


 


AST    111 H  (17-59)  U/L


 


Ammonia     (<30)  umol/L


 


Total Protein    6.0 L  (6.3-8.2)  g/dL


 


Albumin    2.2 L  (3.5-5.0)  g/dL














  07/05/20 07/05/20 07/05/20 Range/Units





  04:40 05:55 07:19 


 


WBC     (3.8-10.6)  k/uL


 


RBC     (4.30-5.90)  m/uL


 


Hgb     (13.0-17.5)  gm/dL


 


Hct     (39.0-53.0)  %


 


MCV     (80.0-100.0)  fL


 


MCH     (25.0-35.0)  pg


 


RDW     (11.5-15.5)  %


 


Plt Count     (150-450)  k/uL


 


Macrocytosis     


 


PT     (9.0-12.0)  sec


 


INR     (<1.2)  


 


ABG pH    7.47 H  (7.35-7.45)  


 


ABG HCO3    26 H  (21-25)  mmol/L


 


ABG Total CO2    27 H  (19-24)  mmol/L


 


ABG O2 Saturation    97.4 H  (94-97)  %


 


Sodium     (137-145)  mmol/L


 


Potassium     (3.5-5.1)  mmol/L


 


Chloride     ()  mmol/L


 


BUN     (9-20)  mg/dL


 


Creatinine     (0.66-1.25)  mg/dL


 


Glucose     (74-99)  mg/dL


 


POC Glucose (mg/dL)   175 H   (75-99)  mg/dL


 


Calcium     (8.4-10.2)  mg/dL


 


Total Bilirubin     (0.2-1.3)  mg/dL


 


AST     (17-59)  U/L


 


Ammonia  64 H    (<30)  umol/L


 


Total Protein     (6.3-8.2)  g/dL


 


Albumin     (3.5-5.0)  g/dL








                      Microbiology - Last 24 Hours (Table)











 06/28/20 14:13 Blood Culture - Final





 Blood    No Growth after 144 hours














Assessment and Plan


(1) Acute alcoholic hepatitis


Narrative/Plan: 


54-year-old male with known history of alcohol abuse presenting to the hospital 

with altered mental status.  Likely multifactorial given suspected underlying 

decompensated cirrhosis, acute alcoholic hepatitis and concern for aspiration p

neumonia and sepsis.  Currently receiving treatment in the ICU, he remains on 

broad-spectrum antibiotic therapy and mechanically ventilated.


Current Visit: Yes   Status: Acute   Code(s): K70.10 - ALCOHOLIC HEPATITIS 

WITHOUT ASCITES   SNOMED Code(s): 8009489


   





(2) Liver cirrhosis


Current Visit: Yes   Status: Acute   Code(s): K74.60 - UNSPECIFIED CIRRHOSIS OF 

LIVER   SNOMED Code(s): 89073888


   





(3) Ascites


Current Visit: Yes   Status: Acute   Code(s): R18.8 - OTHER ASCITES   SNOMED 

Code(s): 675402730


   





(4) Hepatic encephalopathy


Current Visit: Yes   Status: Acute   Code(s): K72.90 - HEPATIC FAILURE, 

UNSPECIFIED WITHOUT COMA   SNOMED Code(s): 03429325


   





(5) Bicytopenia


Narrative/Plan: 


Patient anemic and thrombocytopenic likely related to chronic alcohol use.  

Stool testing was positive for blood but patient has no signs or symptoms of GI 

bleeding with normal brown stool noted and fecal management system.  Hemoglobin 

has remained stable.


Current Visit: Yes   Status: Acute   Code(s): D75.89 - OTHER SPECIFIED DISEASES 

OF BLOOD AND BLOOD-FORMING ORGANS   SNOMED Code(s): 02214569


   


Plan: 





Supportive care


Nothing by mouth, okay for tube feeds as tolerated


Continue management per intensivist service, patient currently intubated and 

sedated


Continue broad-spectrum antibiotic therapy


Continue lactulose 4 times a day


Xifaxan twice daily


Can consider paracentesis when patient is medically stable


Patient is on diuretic therapy with Lasix 3 times a day and Aldactone 3 times a 

day 


Alcohol abstinence


Thank you for allowing us to participate in the care of the patient we will 

continue to follow

## 2020-07-05 NOTE — P.PN
Subjective


Progress Note Date: 07/05/20


This patient is a 54-year-old male that orthopedics is following for right foot 

swelling, ecchymosis.  Foot x-rays taken on 6/28/20 showed no signs of fracture 

or dislocation. X-rays of the right foot and ankle were repeated 7/3/20 with no 

evidence of fracture or dislocation. There have also been no signs of infection.




 Patient is examined bedside this morning. Elly is bedside today. He remains 

intubated. There have been no reported changes in the right foot. 








Objective





- Vital Signs


Vital signs: 


                                   Vital Signs











Temp  99.7 F H  07/05/20 16:00


 


Pulse  98   07/05/20 17:00


 


Resp  10 L  07/05/20 17:00


 


BP  108/60   07/05/20 07:00


 


Pulse Ox  100   07/05/20 17:00








                                 Intake & Output











 07/04/20 07/05/20 07/05/20





 18:59 06:59 18:59


 


Intake Total 2091.405 2182.161 2092.411


 


Output Total 2215 2955 3525


 


Balance -123.595 -772.839 -1432.589


 


Weight  87 kg 


 


Intake:   


 


   756 758


 


    Dextrose 5% in Water 1,   525





    000 ml @ 75 mls/hr IV .   





    [id number] [facility] Rx#:[id number]   


 


    Normal Saline Pressure 83 36 33





    Bag   


 


    Piperacillin-Tazobactam 3 200 200 





    .375 gm In Sodium   





    Chloride 0.9% 100 ml @ 25   





    mls/hr IVPB Q8HR [facility] Rx#   





    :[id number]   


 


    Sodium Chloride 0.9% 1, 550 520 200





    000 ml @ 50 mls/hr IV .   





    Q20H [facility] Rx#:[id number]   


 


  Intake, IV Titration 153.405 151.161 239.411





  Amount   


 


    Piperacillin-Tazobactam 3   100





    .375 gm In Sodium   





    Chloride 0.9% 100 ml @ 25   





    mls/hr IVPB Q8HR [facility] Rx#   





    :[id number]   


 


    Propofol 1,000 mg In 153.405 151.161 139.411





    Empty Bag 1 bag @ Titrate   





    IV .Q0M [facility] Rx#:   





    [id number]   


 


  Tube Feeding 405 675 495


 


  Other 700 600 600


 


Output:   


 


  Urine 1015 1755 1125


 


  Stool 1200 1200 2400


 


Other:   


 


  Voiding Method Indwelling Catheter Indwelling Catheter Indwelling Catheter








                       ABP, PAP, CO, CI - Last Documented











Arterial Blood Pressure        134/61

















- Exam


On examination, the patient is currently intubated.  Bilateral feet are 

currently in padded boots. On inspection of the right foot, there is diffuse 

swelling and ecchymosis of the foot and ankle. This appears to be resolving and 

stable.  There is also localized swelling at the dorsum of the foot at the 

location of the 3-5th metatarsal heads. No wounds and skin is intact. No 

erythema or warmth of the foot. No signs of infection. The right foot is warm 

and well-perfused, dorsalis pedis pulse +2.   No swelling or erythema about the 

calf.


 No issues with PROM of the toes or ankle. 





Motor and sensory function unable to be assessed. 











- Labs


CBC & Chem 7: 


                                 07/05/20 04:40





                                 07/05/20 11:10


Labs: 


                  Abnormal Lab Results - Last 24 Hours (Table)











  07/04/20 07/05/20 07/05/20 Range/Units





  18:10 00:01 04:40 


 


WBC    10.8 H  (3.8-10.6)  k/uL


 


RBC    2.28 L  (4.30-5.90)  m/uL


 


Hgb    8.4 L  (13.0-17.5)  gm/dL


 


Hct    25.4 L  (39.0-53.0)  %


 


MCV    111.7 H  (80.0-100.0)  fL


 


MCH    36.9 H  (25.0-35.0)  pg


 


RDW    23.7 H  (11.5-15.5)  %


 


Plt Count    58 L  (150-450)  k/uL


 


Macrocytosis    Marked A  


 


ABG pH     (7.35-7.45)  


 


ABG HCO3     (21-25)  mmol/L


 


ABG Total CO2     (19-24)  mmol/L


 


ABG O2 Saturation     (94-97)  %


 


Sodium     (137-145)  mmol/L


 


Potassium     (3.5-5.1)  mmol/L


 


Chloride     ()  mmol/L


 


BUN     (9-20)  mg/dL


 


Creatinine     (0.66-1.25)  mg/dL


 


Glucose     (74-99)  mg/dL


 


POC Glucose (mg/dL)  196 H  205 H   (75-99)  mg/dL


 


Calcium     (8.4-10.2)  mg/dL


 


Total Bilirubin     (0.2-1.3)  mg/dL


 


AST     (17-59)  U/L


 


Ammonia     (<30)  umol/L


 


Total Protein     (6.3-8.2)  g/dL


 


Albumin     (3.5-5.0)  g/dL














  07/05/20 07/05/20 07/05/20 Range/Units





  04:40 04:40 05:55 


 


WBC     (3.8-10.6)  k/uL


 


RBC     (4.30-5.90)  m/uL


 


Hgb     (13.0-17.5)  gm/dL


 


Hct     (39.0-53.0)  %


 


MCV     (80.0-100.0)  fL


 


MCH     (25.0-35.0)  pg


 


RDW     (11.5-15.5)  %


 


Plt Count     (150-450)  k/uL


 


Macrocytosis     


 


ABG pH     (7.35-7.45)  


 


ABG HCO3     (21-25)  mmol/L


 


ABG Total CO2     (19-24)  mmol/L


 


ABG O2 Saturation     (94-97)  %


 


Sodium  156 H    (137-145)  mmol/L


 


Potassium  2.8 L    (3.5-5.1)  mmol/L


 


Chloride  127 H    ()  mmol/L


 


BUN  69 H    (9-20)  mg/dL


 


Creatinine  1.32 H    (0.66-1.25)  mg/dL


 


Glucose  157 H    (74-99)  mg/dL


 


POC Glucose (mg/dL)    175 H  (75-99)  mg/dL


 


Calcium  8.1 L    (8.4-10.2)  mg/dL


 


Total Bilirubin  6.1 H    (0.2-1.3)  mg/dL


 


AST  111 H    (17-59)  U/L


 


Ammonia   64 H   (<30)  umol/L


 


Total Protein  6.0 L    (6.3-8.2)  g/dL


 


Albumin  2.2 L    (3.5-5.0)  g/dL














  07/05/20 07/05/20 Range/Units





  07:19 11:58 


 


WBC    (3.8-10.6)  k/uL


 


RBC    (4.30-5.90)  m/uL


 


Hgb    (13.0-17.5)  gm/dL


 


Hct    (39.0-53.0)  %


 


MCV    (80.0-100.0)  fL


 


MCH    (25.0-35.0)  pg


 


RDW    (11.5-15.5)  %


 


Plt Count    (150-450)  k/uL


 


Macrocytosis    


 


ABG pH  7.47 H   (7.35-7.45)  


 


ABG HCO3  26 H   (21-25)  mmol/L


 


ABG Total CO2  27 H   (19-24)  mmol/L


 


ABG O2 Saturation  97.4 H   (94-97)  %


 


Sodium    (137-145)  mmol/L


 


Potassium    (3.5-5.1)  mmol/L


 


Chloride    ()  mmol/L


 


BUN    (9-20)  mg/dL


 


Creatinine    (0.66-1.25)  mg/dL


 


Glucose    (74-99)  mg/dL


 


POC Glucose (mg/dL)   197 H  (75-99)  mg/dL


 


Calcium    (8.4-10.2)  mg/dL


 


Total Bilirubin    (0.2-1.3)  mg/dL


 


AST    (17-59)  U/L


 


Ammonia    (<30)  umol/L


 


Total Protein    (6.3-8.2)  g/dL


 


Albumin    (3.5-5.0)  g/dL








                      Microbiology - Last 24 Hours (Table)











 06/28/20 14:13 Blood Culture - Final





 Blood    No Growth after 144 hours














Assessment and Plan


Assessment: 


Right foot swelling, ecchymosis 





Plan: 


- Continue with conservative treatment at this time. Continue monitoring of the 

right foot and elevation for swelling control.  No plans for immediate surgical 

intervention. 


 - We will continue to follow patient while he remains inpatient and make 

recommendations as needed.

## 2020-07-06 LAB
ANION GAP SERPL CALC-SCNC: 4 MMOL/L
BASOPHILS # BLD AUTO: 0 K/UL (ref 0–0.2)
BASOPHILS NFR BLD AUTO: 0 %
BUN SERPL-SCNC: 78 MG/DL (ref 9–20)
CALCIUM SPEC-MCNC: 8.1 MG/DL (ref 8.4–10.2)
CHLORIDE SERPL-SCNC: 128 MMOL/L (ref 98–107)
CO2 BLDA-SCNC: 28 MMOL/L (ref 19–24)
CO2 SERPL-SCNC: 27 MMOL/L (ref 22–30)
EOSINOPHIL # BLD AUTO: 0.1 K/UL (ref 0–0.7)
EOSINOPHIL NFR BLD AUTO: 1 %
ERYTHROCYTE [DISTWIDTH] IN BLOOD BY AUTOMATED COUNT: 2.24 M/UL (ref 4.3–5.9)
ERYTHROCYTE [DISTWIDTH] IN BLOOD: 23.1 % (ref 11.5–15.5)
GLUCOSE BLD-MCNC: 184 MG/DL (ref 75–99)
GLUCOSE BLD-MCNC: 189 MG/DL (ref 75–99)
GLUCOSE BLD-MCNC: 217 MG/DL (ref 75–99)
GLUCOSE BLD-MCNC: 226 MG/DL (ref 75–99)
GLUCOSE SERPL-MCNC: 138 MG/DL (ref 74–99)
HCO3 BLDA-SCNC: 27 MMOL/L (ref 21–25)
HCT VFR BLD AUTO: 25.1 % (ref 39–53)
HGB BLD-MCNC: 8 GM/DL (ref 13–17.5)
LYMPHOCYTES # SPEC AUTO: 0.8 K/UL (ref 1–4.8)
LYMPHOCYTES NFR SPEC AUTO: 6 %
MCH RBC QN AUTO: 35.7 PG (ref 25–35)
MCHC RBC AUTO-ENTMCNC: 31.9 G/DL (ref 31–37)
MCV RBC AUTO: 112 FL (ref 80–100)
MONOCYTES # BLD AUTO: 0.7 K/UL (ref 0–1)
MONOCYTES NFR BLD AUTO: 5 %
NEUTROPHILS # BLD AUTO: 12.1 K/UL (ref 1.3–7.7)
NEUTROPHILS NFR BLD AUTO: 86 %
PCO2 BLDA: 38 MMHG (ref 35–45)
PH BLDA: 7.45 [PH] (ref 7.35–7.45)
PLATELET # BLD AUTO: 62 K/UL (ref 150–450)
PO2 BLDA: 93 MMHG (ref 83–108)
POTASSIUM SERPL-SCNC: 3.3 MMOL/L (ref 3.5–5.1)
SODIUM SERPL-SCNC: 159 MMOL/L (ref 137–145)
WBC # BLD AUTO: 14 K/UL (ref 3.8–10.6)

## 2020-07-06 RX ADMIN — PROPOFOL SCH MLS/HR: 10 INJECTION, EMULSION INTRAVENOUS at 08:02

## 2020-07-06 RX ADMIN — IPRATROPIUM BROMIDE AND ALBUTEROL SULFATE SCH ML: .5; 3 SOLUTION RESPIRATORY (INHALATION) at 12:48

## 2020-07-06 RX ADMIN — METHYLPREDNISOLONE SODIUM SUCCINATE SCH MG: 40 INJECTION, POWDER, FOR SOLUTION INTRAMUSCULAR; INTRAVENOUS at 03:04

## 2020-07-06 RX ADMIN — POTASSIUM CHLORIDE SCH MLS/HR: 14.9 INJECTION, SOLUTION INTRAVENOUS at 19:08

## 2020-07-06 RX ADMIN — RIFAXIMIN SCH MG: 550 TABLET ORAL at 08:03

## 2020-07-06 RX ADMIN — NOREPINEPHRINE BITARTRATE SCH: 1 INJECTION, SOLUTION, CONCENTRATE INTRAVENOUS at 03:07

## 2020-07-06 RX ADMIN — METHYLPREDNISOLONE SODIUM SUCCINATE SCH MG: 40 INJECTION, POWDER, FOR SOLUTION INTRAMUSCULAR; INTRAVENOUS at 23:49

## 2020-07-06 RX ADMIN — CHLORHEXIDINE GLUCONATE SCH ML: 1.2 RINSE ORAL at 21:27

## 2020-07-06 RX ADMIN — INSULIN ASPART SCH UNIT: 100 INJECTION, SOLUTION INTRAVENOUS; SUBCUTANEOUS at 23:48

## 2020-07-06 RX ADMIN — IPRATROPIUM BROMIDE AND ALBUTEROL SULFATE SCH ML: .5; 3 SOLUTION RESPIRATORY (INHALATION) at 19:21

## 2020-07-06 RX ADMIN — INSULIN ASPART SCH UNIT: 100 INJECTION, SOLUTION INTRAVENOUS; SUBCUTANEOUS at 12:30

## 2020-07-06 RX ADMIN — PANTOPRAZOLE SODIUM SCH MG: 40 INJECTION, POWDER, FOR SOLUTION INTRAVENOUS at 21:27

## 2020-07-06 RX ADMIN — LACTULOSE SCH GM: 20 SOLUTION ORAL at 08:02

## 2020-07-06 RX ADMIN — IPRATROPIUM BROMIDE AND ALBUTEROL SULFATE SCH ML: .5; 3 SOLUTION RESPIRATORY (INHALATION) at 16:12

## 2020-07-06 RX ADMIN — POTASSIUM CHLORIDE SCH MLS/HR: 14.9 INJECTION, SOLUTION INTRAVENOUS at 03:05

## 2020-07-06 RX ADMIN — LACTULOSE SCH GM: 20 SOLUTION ORAL at 13:10

## 2020-07-06 RX ADMIN — RIFAXIMIN SCH MG: 550 TABLET ORAL at 21:27

## 2020-07-06 RX ADMIN — IPRATROPIUM BROMIDE AND ALBUTEROL SULFATE PRN ML: .5; 3 SOLUTION RESPIRATORY (INHALATION) at 23:10

## 2020-07-06 RX ADMIN — CHLORHEXIDINE GLUCONATE SCH ML: 1.2 RINSE ORAL at 08:03

## 2020-07-06 RX ADMIN — FUROSEMIDE SCH MG: 10 INJECTION, SOLUTION INTRAMUSCULAR; INTRAVENOUS at 03:04

## 2020-07-06 RX ADMIN — PROPOFOL SCH MLS/HR: 10 INJECTION, EMULSION INTRAVENOUS at 20:00

## 2020-07-06 RX ADMIN — LACTULOSE SCH GM: 20 SOLUTION ORAL at 21:27

## 2020-07-06 RX ADMIN — POTASSIUM CHLORIDE SCH MLS/HR: 14.9 INJECTION, SOLUTION INTRAVENOUS at 21:27

## 2020-07-06 RX ADMIN — LACTULOSE SCH GM: 20 SOLUTION ORAL at 19:02

## 2020-07-06 RX ADMIN — PROPOFOL SCH MLS/HR: 10 INJECTION, EMULSION INTRAVENOUS at 03:07

## 2020-07-06 RX ADMIN — INSULIN ASPART SCH UNIT: 100 INJECTION, SOLUTION INTRAVENOUS; SUBCUTANEOUS at 03:05

## 2020-07-06 RX ADMIN — INSULIN ASPART SCH UNIT: 100 INJECTION, SOLUTION INTRAVENOUS; SUBCUTANEOUS at 06:44

## 2020-07-06 RX ADMIN — INSULIN ASPART SCH UNIT: 100 INJECTION, SOLUTION INTRAVENOUS; SUBCUTANEOUS at 19:03

## 2020-07-06 RX ADMIN — PANTOPRAZOLE SODIUM SCH MG: 40 INJECTION, POWDER, FOR SOLUTION INTRAVENOUS at 08:02

## 2020-07-06 RX ADMIN — POTASSIUM BICARBONATE SCH MEQ: 782 TABLET, EFFERVESCENT ORAL at 08:05

## 2020-07-06 RX ADMIN — POTASSIUM BICARBONATE SCH MEQ: 782 TABLET, EFFERVESCENT ORAL at 10:51

## 2020-07-06 RX ADMIN — PIPERACILLIN AND TAZOBACTAM SCH MLS/HR: 3; .375 INJECTION, POWDER, FOR SOLUTION INTRAVENOUS at 03:07

## 2020-07-06 RX ADMIN — METHYLPREDNISOLONE SODIUM SUCCINATE SCH MG: 40 INJECTION, POWDER, FOR SOLUTION INTRAMUSCULAR; INTRAVENOUS at 08:02

## 2020-07-06 RX ADMIN — IPRATROPIUM BROMIDE AND ALBUTEROL SULFATE SCH ML: .5; 3 SOLUTION RESPIRATORY (INHALATION) at 08:23

## 2020-07-06 RX ADMIN — METHYLPREDNISOLONE SODIUM SUCCINATE SCH MG: 40 INJECTION, POWDER, FOR SOLUTION INTRAMUSCULAR; INTRAVENOUS at 19:03

## 2020-07-06 RX ADMIN — SODIUM CHLORIDE SCH: 900 INJECTION, SOLUTION INTRAVENOUS at 06:47

## 2020-07-06 NOTE — P.GSCN
History of Present Illness


Consult date: 20


Reason for Consult: 





Respiratory failure, malnutrition


History of present illness: 





This a 54-year-old male who's been admitted to the ICU.  Patient has had a basal

lamella.  Patient is requiring prolonged ventilation.  I've being asked to see h

im him regarding tracheostomy and PEG tube placement.





Past Medical History


Past Medical History: Asthma, Eye Disorder, Osteoarthritis (OA), Renal Disease


Additional Past Medical History / Comment(s): Pt admitted to [facility] on 20 with

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 

opiate withdrawal/alcohol abuse possible DTs/coagulopathy, elevated LFTs, venous

insufficiency.     Other Hx:  Unsteady gait/falls, ETOH abuse with withdrawal 

symptoms in the past pt states-tremors, bilateral leg pain/numbness, chronic 

renal disease stage I, pericarditis, bilateral macular degeneration/legally 

blind, chronic back pain, RLS, bilateral carpal tunnel syndrome, past infected 

sebaceous cyst on back


History of Any Multi-Drug Resistant Organisms: None Reported


Past Surgical History: Orthopedic Surgery


Additional Past Surgical History / Comment(s): Right hand surgery d/t injury


Past Anesthesia/Blood Transfusion Reactions: No Reported Reaction


Past Psychological History: Anxiety, Depression


Additional Psychological History / Comment(s): Pt resides with his channing.  He 

has been using her walker on occasion.  He is legally blind/has no 's 

license, his channing drives.


Smoking Status: Light tobacco smoker


Past Alcohol Use History: Abuse, Daily


Additional Past Alcohol Use History / Comment(s): Pt started smoking in  and

states that he has recently cut down to a pack lasting 4-5 weeks.  Pt states he 

has hx of alcohol abuse-8 beers a day but has not drank in a few weeks.


Past Drug Use History: None Reported


Additional Drug Use History / Comment(s): Pt smoked marijuana as a teen.





- Past Family History


  ** Father


Family Medical History: Cancer


Additional Family Medical History / Comment(s): Father  of esophageal 

cancer.





  ** Mother


Family Medical History: No Reported History


Additional Family Medical History / Comment(s): Mopther is healthy





Medications and Allergies


                                Home Medications











 Medication  Instructions  Recorded  Confirmed  Type


 


Cetirizine HCl [Zyrtec] 10 mg PO DAILY 20 History


 


Omeprazole 20 mg PO DAILY 20 History


 


Folic Acid 1 mg PO DAILY #30 tablet 20 Rx


 


Multivitamins, Thera [Multivitamin 1 tab PO DAILY #30 tablet 20 

Rx





(formulary)]    


 


Thiamine [Vitamin B-1] 100 mg PO DAILY #30 tab 20 Rx


 


Albuterol Inhaler [Ventolin Hfa 1 - 2 puff INHALATION RT-Q4H PRN 20 History





Inhaler]    








                                    Allergies











Allergy/AdvReac Type Severity Reaction Status Date / Time


 


No Known Allergies Allergy   Verified 20 14:27














Surgical - Exam


                                   Vital Signs











Temp Pulse Resp BP Pulse Ox


 


 99.4 F   116 H  26 H  102/63   89 L


 


 20 13:54  20 13:54  20 13:54  20 13:54  20 13:54














On ventilator





- General


no distress





- Eyes


PERRL





- ENT


normal pinna





- Neck


no masses





- Respiratory


normal expansion





- Cardiovascular


Rhythm: regular





- Abdomen


Abdomen: soft, non tender





Results





- Labs





                                 20 06:15





                                 20 06:15


                  Abnormal Lab Results - Last 24 Hours (Table)











  20 Range/Units





  18:05 00:26 05:19 


 


WBC     (3.8-10.6)  k/uL


 


RBC     (4.30-5.90)  m/uL


 


Hgb     (13.0-17.5)  gm/dL


 


Hct     (39.0-53.0)  %


 


MCV     (80.0-100.0)  fL


 


MCH     (25.0-35.0)  pg


 


RDW     (11.5-15.5)  %


 


Plt Count     (150-450)  k/uL


 


Neutrophils #     (1.3-7.7)  k/uL


 


Lymphocytes #     (1.0-4.8)  k/uL


 


Macrocytosis     


 


ABG HCO3    27 H  (21-25)  mmol/L


 


ABG Total CO2    28 H  (19-24)  mmol/L


 


ABG O2 Saturation    97.4 H  (94-97)  %


 


Sodium     (137-145)  mmol/L


 


Potassium     (3.5-5.1)  mmol/L


 


Chloride     ()  mmol/L


 


BUN     (9-20)  mg/dL


 


Glucose     (74-99)  mg/dL


 


POC Glucose (mg/dL)  230 H  184 H   (75-99)  mg/dL


 


Calcium     (8.4-10.2)  mg/dL


 


Ammonia     (<30)  umol/L














  20 Range/Units





  06:15 06:15 10:00 


 


WBC  14.0 H    (3.8-10.6)  k/uL


 


RBC  2.24 L    (4.30-5.90)  m/uL


 


Hgb  8.0 L    (13.0-17.5)  gm/dL


 


Hct  25.1 L    (39.0-53.0)  %


 


MCV  112.0 H    (80.0-100.0)  fL


 


MCH  35.7 H    (25.0-35.0)  pg


 


RDW  23.1 H    (11.5-15.5)  %


 


Plt Count  62 L    (150-450)  k/uL


 


Neutrophils #  12.1 H    (1.3-7.7)  k/uL


 


Lymphocytes #  0.8 L    (1.0-4.8)  k/uL


 


Macrocytosis  Marked A    


 


ABG HCO3     (21-25)  mmol/L


 


ABG Total CO2     (19-24)  mmol/L


 


ABG O2 Saturation     (94-97)  %


 


Sodium   159 H   (137-145)  mmol/L


 


Potassium   3.3 L   (3.5-5.1)  mmol/L


 


Chloride   128 H   ()  mmol/L


 


BUN   78 H   (9-20)  mg/dL


 


Glucose   138 H   (74-99)  mg/dL


 


POC Glucose (mg/dL)     (75-99)  mg/dL


 


Calcium   8.1 L   (8.4-10.2)  mg/dL


 


Ammonia    44 H  (<30)  umol/L














  20 Range/Units





  11:55 


 


WBC   (3.8-10.6)  k/uL


 


RBC   (4.30-5.90)  m/uL


 


Hgb   (13.0-17.5)  gm/dL


 


Hct   (39.0-53.0)  %


 


MCV   (80.0-100.0)  fL


 


MCH   (25.0-35.0)  pg


 


RDW   (11.5-15.5)  %


 


Plt Count   (150-450)  k/uL


 


Neutrophils #   (1.3-7.7)  k/uL


 


Lymphocytes #   (1.0-4.8)  k/uL


 


Macrocytosis   


 


ABG HCO3   (21-25)  mmol/L


 


ABG Total CO2   (19-24)  mmol/L


 


ABG O2 Saturation   (94-97)  %


 


Sodium   (137-145)  mmol/L


 


Potassium   (3.5-5.1)  mmol/L


 


Chloride   ()  mmol/L


 


BUN   (9-20)  mg/dL


 


Glucose   (74-99)  mg/dL


 


POC Glucose (mg/dL)  217 H  (75-99)  mg/dL


 


Calcium   (8.4-10.2)  mg/dL


 


Ammonia   (<30)  umol/L








                                 Diabetes panel











  20 Range/Units





  06:15 


 


Sodium  159 H  (137-145)  mmol/L


 


Potassium  3.3 L  (3.5-5.1)  mmol/L


 


Chloride  128 H  ()  mmol/L


 


Carbon Dioxide  27  (22-30)  mmol/L


 


BUN  78 H  (9-20)  mg/dL


 


Creatinine  1.22  (0.66-1.25)  mg/dL


 


Glucose  138 H  (74-99)  mg/dL


 


Calcium  8.1 L  (8.4-10.2)  mg/dL








                                  Calcium panel











  20 Range/Units





  06:15 


 


Calcium  8.1 L  (8.4-10.2)  mg/dL








                                 Pituitary panel











  20 Range/Units





  06:15 


 


Sodium  159 H  (137-145)  mmol/L


 


Potassium  3.3 L  (3.5-5.1)  mmol/L


 


Chloride  128 H  ()  mmol/L


 


Carbon Dioxide  27  (22-30)  mmol/L


 


BUN  78 H  (9-20)  mg/dL


 


Creatinine  1.22  (0.66-1.25)  mg/dL


 


Glucose  138 H  (74-99)  mg/dL


 


Calcium  8.1 L  (8.4-10.2)  mg/dL








                                  Adrenal panel











  20 Range/Units





  06:15 


 


Sodium  159 H  (137-145)  mmol/L


 


Potassium  3.3 L  (3.5-5.1)  mmol/L


 


Chloride  128 H  ()  mmol/L


 


Carbon Dioxide  27  (22-30)  mmol/L


 


BUN  78 H  (9-20)  mg/dL


 


Creatinine  1.22  (0.66-1.25)  mg/dL


 


Glucose  138 H  (74-99)  mg/dL


 


Calcium  8.1 L  (8.4-10.2)  mg/dL














Assessment and Plan


Assessment: 





Respiratory failure.  Patient be scheduled for tracheostomy.





Malnutrition patient will be scheduled for PEG tube placement.

## 2020-07-06 NOTE — P.PN
Subjective


Progress Note Date: 07/06/20








This is a 54-year-old gentleman, history of polysubstance abuse including 

alcohol abuse , falls with recent rib fractures, legally blind, osteoarthritis 

and multiple other medical issues presented to the ER with changes in mental 

status 2 days.  Drug screen positive for tricyclics and benzos, serum alcohol 

less than 10.  UA reported dark brown cloudy urine with occasional bacteria, 

hyaline casts, 2 WBCs, trace leukocytes, 4+ bilirubin, negative for nitrates. 

Ammonia level 91, T bili 14.9, currently 15.8 , elevated LFTs .Gallbladder 

ultrasound reporting abdominal ascites, abdominal x-ray nonacute, right foot x-

ray reported no fracture, soft tissue swelling, EKG reported sinus tachycardia, 

troponin normal, , echo reported normal LV function, EF 60-65% ,lactic 

acid 2.1 now down to 1.7, BUN 25, creatinine 0.8 chest x-ray reporting moderate 

pulmonary interstitial and airspace edema significantly worse than yesterday, 

pulmonary edema.  ABGs noted.  INR on admission 3.7, down to 2.2 Sepsis 

protocol, Received IV fluid resuscitation, IV antibiotics, cultures drawn.  

Developed respiratory failure, requiring intubation during the night.  Currently

on Sandostatin drip.  3 maroon stools during the night.  Received 4 units of FFP

and 2 units of packed RBCs to date.  Hemoglobin currently 8.5.  And had required

pressor support with Levophed off since 06 100.  Maintained on IV fluid resuscit

ation.  Telemetry sinus rhythm.  Potassium 3.2, magnesium 1.7. 








06/30/2020 chest x-ray reporting persistent bilateral scattered airspace 

infiltrates, pleural effusion unchanged.  Ventilator dependent, on FiO2 of 50 

with PEEP increased to 12.  Continues on lactulose with ammonia down to 80.  T 

bili decreased to 11.3, LFTs improving.  No further maroon stools.  Small black 

stool this morning.  Hemoglobin 8.6.  Telemetry sinus rhythm to sinus tach.  

Sandostatin drip discontinued this morning.  Last night patient asynchronous 

with vent, stacking breaths, Nimbex drip initiated.  This morning Nimbex 

discontinued, changed to fentanyl drip.  Requiring pressor support, Levophed 

resumed.  Received vitamin K yesterday, INR 2.1.  Marginal urine output, IV 

fluids decreased, and Lasix IV push added to med regime.  Tube feedings ordered.

 Afebrile, WBC 14.7.  Sputum culture pending, preliminary blood cultures 

negative at 48 hours.  ABGs noted.








07/01/2020  yesterday Lasix and Aldactone initiated, creatinine mildly worsened 

up to 1.32.  Ammonia increased up to 89, lactulose increased.  No further maroon

stools, hemoglobin increased to 9.4, platelets increased to 75.  T bili trending

down, 8.9, LFTs improving.  Potassium 3.4.  Chest x-ray reporting improving left

lower lobe aeration.  Right foot evaluated by orthopedic surgery, possible fluid

collection, seroma with potential x-ray tomorrow.  Tolerating tube feeds with 

minimal to no residuals, currently at 30 mls per hour with goal of 45.  IV 

steroids added to med regimen with blood sugars increasing.  Remains vent 

dependent with FiO2 at 50/+12 of PEEP.  Continues on Levophed, fentanyl, 

diprovan drips.  Afebrile, T-max 99.5, WBC 14.1.











07/02/2020 ABGs unchanged,remains vent dependent, FiO2 50/+12 PEEP.  Chest x-ray

reporting improvement.  Maintained on both Xifaxan & Lactulose, ammonia 

increasing, beginning to stool.  Creatinine trending up 1.4.  Hemoglobin 

decreased to 8.5.  INR 1.7 Levophed weaned off this morning.  Attempted a 

sedation holiday for about an hour this morning; patient did not wake up, became

agitated, tachycardic, tachypneic with respiratory rate up into the 40s, 

diprovan/fentanyl drips resumed.  Tolerating tube feeds at 40 with goal of 

45/minimal to no residual.  Orthopedics discussing x-ray and foot possibly 

tomorrow.  Afebrile WBC down to 11.


notes 





7/6/2020 Patient remains intubated and sedated. He is scheduled for Trach and 

PEG soon. He remains Hypernatremic at 159 and anemiac @ 8.o today, Trial 

weeningand awakenings have so far failed. Ammonia level now 44. Critical care 

notes Reviewed today








Objective





- Vital Signs


Vital signs: 


                                   Vital Signs











Temp  99.5 F   07/06/20 12:00


 


Pulse  96   07/06/20 13:00


 


Resp  18   07/06/20 13:00


 


BP  108/60   07/05/20 07:00


 


Pulse Ox  100   07/06/20 13:00








                                 Intake & Output











 07/05/20 07/06/20 07/06/20





 18:59 06:59 18:59


 


Intake Total 2215.411 1221.589 827.066


 


Output Total 3525 850 1750


 


Balance -1309.589 371.589 -922.934


 


Weight  88.5 kg 88.5 kg


 


Intake:   


 


   936 362


 


    Dextrose 5% in Water 1, 600 900 350





    000 ml @ 125 mls/hr IV .   





    Q8H RISA Rx#:[id number]   


 


    Normal Saline Pressure 36 36 12





    Bag   


 


    Sodium Chloride 0.9% 1, 200  





    000 ml @ 50 mls/hr IV .   





    Q20H RISA Rx#:[id number]   


 


  Intake, IV Titration 239.411 60.589 130.066





  Amount   


 


    Piperacillin-Tazobactam 3 100  





    .375 gm In Sodium   





    Chloride 0.9% 100 ml @ 25   





    mls/hr IVPB Q8HR RISA Rx#   





    :[id number]   


 


    Propofol 1,000 mg In 139.411 60.589 130.066





    Empty Bag 1 bag @ Titrate   





    IV .Q0M RISA Rx#:   





    [id number]   


 


  Tube Feeding 540 225 135


 


  Other 600  200


 


Output:   


 


  Urine 1125 850 250


 


  Stool 2400  1500


 


Other:   


 


  Voiding Method Indwelling Catheter Indwelling Catheter Indwelling Catheter








                       ABP, PAP, CO, CI - Last Documented











Arterial Blood Pressure        132/60

















- Exam





 


GENERAL: Head of bed elevated, intubated, sedated


HEENT:  Conjunctivae normal.  Positive icterus, jaundice, NG,OG tubes present.


NECK:  No JVD. No thyroid enlargement. No LNs


CARDIOVASCULAR:  S1, S2 regular.  Systolic murmur


RESPIRATION: Coarse Breath sounds diminished in the bases with scattered 

bilateral rhonchi and crackles


ABDOMEN:  Soft, distended, positive ascites,  positive bowel sounds


Extremities: Positive upper and lower extremity edema, no clubbing, no cyanosis.

 Right foot dorsal edema , significant ecchymosis -orthopedic evaluation in 

progress


NERVOUS SYSTEM: Unable to assess, sedated and intubated.


Skin: Warm and dry, no rash 








- Labs


CBC & Chem 7: 


                                 07/06/20 06:15





                                 07/06/20 06:15


Labs: 


                  Abnormal Lab Results - Last 24 Hours (Table)











  07/05/20 07/06/20 07/06/20 Range/Units





  18:05 00:26 05:19 


 


WBC     (3.8-10.6)  k/uL


 


RBC     (4.30-5.90)  m/uL


 


Hgb     (13.0-17.5)  gm/dL


 


Hct     (39.0-53.0)  %


 


MCV     (80.0-100.0)  fL


 


MCH     (25.0-35.0)  pg


 


RDW     (11.5-15.5)  %


 


Plt Count     (150-450)  k/uL


 


Neutrophils #     (1.3-7.7)  k/uL


 


Lymphocytes #     (1.0-4.8)  k/uL


 


Macrocytosis     


 


ABG HCO3    27 H  (21-25)  mmol/L


 


ABG Total CO2    28 H  (19-24)  mmol/L


 


ABG O2 Saturation    97.4 H  (94-97)  %


 


Sodium     (137-145)  mmol/L


 


Potassium     (3.5-5.1)  mmol/L


 


Chloride     ()  mmol/L


 


BUN     (9-20)  mg/dL


 


Glucose     (74-99)  mg/dL


 


POC Glucose (mg/dL)  230 H  184 H   (75-99)  mg/dL


 


Calcium     (8.4-10.2)  mg/dL


 


Ammonia     (<30)  umol/L














  07/06/20 07/06/20 07/06/20 Range/Units





  06:15 06:15 10:00 


 


WBC  14.0 H    (3.8-10.6)  k/uL


 


RBC  2.24 L    (4.30-5.90)  m/uL


 


Hgb  8.0 L    (13.0-17.5)  gm/dL


 


Hct  25.1 L    (39.0-53.0)  %


 


MCV  112.0 H    (80.0-100.0)  fL


 


MCH  35.7 H    (25.0-35.0)  pg


 


RDW  23.1 H    (11.5-15.5)  %


 


Plt Count  62 L    (150-450)  k/uL


 


Neutrophils #  12.1 H    (1.3-7.7)  k/uL


 


Lymphocytes #  0.8 L    (1.0-4.8)  k/uL


 


Macrocytosis  Marked A    


 


ABG HCO3     (21-25)  mmol/L


 


ABG Total CO2     (19-24)  mmol/L


 


ABG O2 Saturation     (94-97)  %


 


Sodium   159 H   (137-145)  mmol/L


 


Potassium   3.3 L   (3.5-5.1)  mmol/L


 


Chloride   128 H   ()  mmol/L


 


BUN   78 H   (9-20)  mg/dL


 


Glucose   138 H   (74-99)  mg/dL


 


POC Glucose (mg/dL)     (75-99)  mg/dL


 


Calcium   8.1 L   (8.4-10.2)  mg/dL


 


Ammonia    44 H  (<30)  umol/L














  07/06/20 Range/Units





  11:55 


 


WBC   (3.8-10.6)  k/uL


 


RBC   (4.30-5.90)  m/uL


 


Hgb   (13.0-17.5)  gm/dL


 


Hct   (39.0-53.0)  %


 


MCV   (80.0-100.0)  fL


 


MCH   (25.0-35.0)  pg


 


RDW   (11.5-15.5)  %


 


Plt Count   (150-450)  k/uL


 


Neutrophils #   (1.3-7.7)  k/uL


 


Lymphocytes #   (1.0-4.8)  k/uL


 


Macrocytosis   


 


ABG HCO3   (21-25)  mmol/L


 


ABG Total CO2   (19-24)  mmol/L


 


ABG O2 Saturation   (94-97)  %


 


Sodium   (137-145)  mmol/L


 


Potassium   (3.5-5.1)  mmol/L


 


Chloride   ()  mmol/L


 


BUN   (9-20)  mg/dL


 


Glucose   (74-99)  mg/dL


 


POC Glucose (mg/dL)  217 H  (75-99)  mg/dL


 


Calcium   (8.4-10.2)  mg/dL


 


Ammonia   (<30)  umol/L














Assessment and Plan


(1) Acute alcoholic hepatitis


Current Visit: Yes   Status: Acute   Code(s): K70.10 - ALCOHOLIC HEPATITIS 

WITHOUT ASCITES   SNOMED Code(s): 4256093


   





(2) Alcoholic peripheral neuropathy


Current Visit: Yes   Status: Acute   Code(s): G62.1 - ALCOHOLIC POLYNEUROPATHY  

SNOMED Code(s): 6743282


   





(3) Anemia due to blood loss, acute


Current Visit: Yes   Status: Acute   Code(s): D62 - ACUTE POSTHEMORRHAGIC ANEMIA

  SNOMED Code(s): 025827902


   





(4) Ascites


Current Visit: Yes   Status: Acute   Code(s): R18.8 - OTHER ASCITES   SNOMED 

Code(s): 296927954


   





(5) Hepatic encephalopathy


Current Visit: Yes   Status: Acute   Code(s): K72.90 - HEPATIC FAILURE, 

UNSPECIFIED WITHOUT COMA   SNOMED Code(s): 45035280


   





(6) Liver cirrhosis


Current Visit: Yes   Status: Acute   Code(s): K74.60 - UNSPECIFIED CIRRHOSIS OF 

LIVER   SNOMED Code(s): 94826472


   





(7) Liver failure


Current Visit: Yes   Status: Acute   Code(s): K72.90 - HEPATIC FAILURE, 

UNSPECIFIED WITHOUT COMA   SNOMED Code(s): 07502683


   





(8) Sepsis with acute hypoxic respiratory failure and septic shock


Current Visit: Yes   Status: Acute   Code(s): A41.9 - SEPSIS, UNSPECIFIED 

ORGANISM; R65.21 - SEVERE SEPSIS WITH SEPTIC SHOCK; J96.01 - ACUTE RESPIRATORY 

FAILURE WITH HYPOXIA   SNOMED Code(s): 233973648


   





(9) Hypernatremia


Current Visit: Yes   Status: Acute   Code(s): E87.0 - HYPEROSMOLALITY AND 

HYPERNATREMIA   SNOMED Code(s): 529626769


   


Plan: 





Wait on upcoming PEG and trach tube placement.


Repeat labs in a.m.


Weight and further recommendations from critical care about possible extubation.


We'll reevaluate him in the next 24 hours.

## 2020-07-06 NOTE — XR
EXAMINATION TYPE: XR chest 1V portable

 

DATE OF EXAM: 7/6/2020

 

COMPARISON: 7/5/2020

 

HISTORY: SOB, Follow Up

 

FINDINGS:

 

Indwelling tubes and catheters are unchanged.

 

No change in bibasilar opacities.  

 

Stable appearance of the cardio-mediastinal structures at this time.

 

Pleural effusion unchanged.

 

IMPRESSION:

1.  Stable portable chest.  Clinical correlation and follow up until resolution is recommended.

## 2020-07-06 NOTE — PN
PROGRESS NOTE



DATE OF SERVICE:

07/06/2020



Patient is a 54-year-old white male with history of alcoholic cirrhosis with ascites

and hepatic encephalopathy, has been admitted to the hospital about 8 days ago with

altered mental status and remains on the vent.  Dr. Alvarado has been consulted for EGD

and PEG tube placement.  The patient is on the vent sedated. As per the staff, no acute

events noted overnight.  He has been receiving oral Xifaxan as well as lactulose for

hepatic encephalopathy and ammonia level has been gradually improving.



PHYSICAL EXAMINATION:

He remains sedated on the vent.  Vital signs show a blood pressure of 105/86, pulse

rate 18, temperature 96.

HEENT:  Examination unremarkable, conjunctivae are pink, sclerae nonicteric, oral

cavity no lesions.

NECK:  No JVD or lymph node enlargement.

CHEST:  Clear to auscultation.

HEART:  Regular rate and rhythm.

ABDOMEN:  Slightly distended.  Bowel sounds are positive.

EXTREMITIES:  No pedal edema.

NEURO:  He is sedated.



LABS:

Done today WBC 14.5, hemoglobin 8, platelets 62,000.  ALT, AST _____, ammonia level is

down to 44.  Sodium 159, potassium 3.4, chloride 128, CO2 is 27, BUN 78 and creatinine

1.22.



IMPRESSION:

1. Acute respiratory failure, possible aspiration on broad-spectrum antibiotics.

    Remains intubated on the vent.

2. Alcoholic liver disease with acute alcoholic hepatitis with elevated bilirubin,

    which is gradually improving.

3. Hepatic encephalopathy.  Remains on oral lactulose via the NG tube as well as

    Xifaxan and ammonia level is gradually improving.

4. Macrocytic anemia, which is a combination of underlying liver disease and may have

    a component of acute mild occult blood loss.  Clinically, no evidence of active

    bleeding.  Patient has an FMS in place that showed dark green-colored stool.

5. Elevated LFTs and jaundice secondary to alcoholic hepatitis.



RECOMMENDATION:

1. Continue with symptomatic and supportive care.

2. Continue lactulose as well as oral Xifaxan.

3. Continue broad-spectrum antibiotics.

4. Manage pain management as per intensivist.

5. Will follow with you closely.

Thank you for this consultation.





MMODL / IJN: [id number] / Job#: [id number]

## 2020-07-06 NOTE — P.PN
Subjective


Progress Note Date: 07/06/20


Principal diagnosis: 


 Acute liver failure, acute hypoxic respiratory failure





This is a 54-year-old white male, history of alcohol abuse, patient was brought 

into the ER with change in mental status according to EMS.  Patient could not 

give any history, he was a very poor historian upon arrival to the ER.  Patient 

is supposedly a daily drinker, however from my review of the chart, I saw this 

patient back on 3/17/20, patient presented back then with multiple posterior 

lateral rib fractures on the right.  Apparently he fell in his bathroom, and 

landed on the side of the top sustaining multiple rib fractures.  Patient 

sustained rib fractures of 910 and 11 ribs.  Patient is also known to have 

history of severe emphysema/COPD.  He is legally blind.  He has history of 

pericarditis, anxiety, and history of degenerative joint disease.  Patient was 

discharged home on 3/18/20.  Drug screen in the ER was positive for tricyclic 

antidepressants and positive for benzodiazepines.  Rest of the drug screen was 

basically negative.  ABG in the ER showed a pO2 of 63 pCO2 of 29 pH of 7.49.  

Patient was also noted to have elevated INR of 3.7.  Low hemoglobin of 7.0 

although his baseline hemoglobin from 2 months ago was 15.1.  Ammonia level was 

91.  Total bilirubin was 14.9, and his liver enzymes were a bit elevated.  

Normal amylase and lipase were noted.  Gallbladder ultrasound showed abdominal 

ascites.  Chest x-ray showed bilateral diffuse infiltrates.  Patient was 

presumed septic upon presentation, received fluid boluses of 2500 ML's.  Patient

was found to have hepatic encephalopathy liver failure, bilateral pneumonia, GI 

hemorrhage and sepsis.  Early this morning, patient was intubated, placed on 

mechanical ventilation, and he was aware of the patient since admission.  

Presently the patient is on assist control rate of 28 tidal volume is 500 FiO2 

is 100% PEEP of 5.  ABG showed a pO2 of 170 pCO2 of 31 pH of 7.46.  Hence I cut 

down the FiO2 to 50%, increased the PEEP to 8, cut down the tidal volume to 450,

and decrease the respiratory rate to 26.  Patient is on Sandostatin for his GI 

bleeding, and he is yet to be seen by gastroenterology on consultation is also 

on Protonix.  He is on lactulose for his elevated ammonia level.  And he is 

empirically on Zosyn for presumptive aspiration pneumonia.  Echocardiogram 

showed evidence of good LV function.  And mild valvular heart disease.  BNP 

level was borderline elevated at 910





Patient was reevaluated today on 6/30/20, remains on mechanical ventilation.  He

is presently on assist control rate of 26 tidal volume is 450 FiO2 of 60% and 

PEEP of 8.  However I increased his PEEP to 12 and cut down his FiO2 to 50%.  At

night the patient developed worsening agitation, and Stacking his breaths, was 

not synchronous with mechanical ventilation, hence had to place the patient on 

Nimbex.  Today I plan to discontinue Nimbex and place him on fentanyl 25 g per 

hour.  Patient will be started on tube feeding, and I cut down his IV fluid to 

50 MLS per hour.  Remains on propofol at 60 mcg/kg/m, Nimbex which will be disco

ntinued, but creatinine which was discontinued this morning, and norepinephrine 

at 0.06 mcg/kg/m.  Enteral feeding to be started today by nutritionist.  Patient

remains on antibiotics, remains on lactulose for his elevated ammonia level 

which is 80 today.  And he remains on Protonix.  Chest x-ray showed more 

consolidation noted bilaterally in both lungs more so in the left lung.  

Bilateraldisease is noted consistent with aspiration pneumonia.  Sputum cultures

and blood cultures so far remain on diagnostic.  ABG today showed a pO2 of 67 

pCO2 of 39 pH of 7.33.  WBC count is 14.7 hemoglobin is 8.6 platelets are 66,000

and INR is 2.1.  Electrolytes are basically normal renal profile showed beer of 

25 creatinine 0.94.





Patient was reevaluated today on 7/1/20, remains in the intensive care unit, 

remains on mechanical ventilation.  His ventilator settings are assist control 

rate of 26, tidal volume is 450 FiO2 is 50% and PEEP is at 12.  ABG showed a pO2

of 65 pCO2 of 37 pH of 7.36, hence no change was made in the ventilator 

settings.  Patient remains on norepinephrine at 0.09 mcg/kg/m, propofol at 45 

mcg/kg/m, fentanyl at 0.5 mcg/kg/h.  Remains on enteral feeding at 30 MLS per 

hour goal is 45.  Remains on Zosyn for empiric coverage of aspiration pneumonia.

 Remains on lactulose and the dose was increased to 45 ML 4 times a day, and 

added Xifaxan at 550 mg twice a day.  His lactulose does not seem to be inducing

enough diarrhea did get his ammonia level down.  Continues to have significantly

elevated ammonia level in spite of lactulose for the last 2 days.  Patient 

continues to have ascites.  And no plans for paracentesis as recommended by 

gastroenterology at this point.  Chest x-ray showed very minimal improvement if 

any in his bilateral infiltrates.  The bilateral infiltrates are suggestive of 

aspiration pneumonia.  Obviously the patient is known to have history of 

alcoholism, and he presented with alcoholic liver hepatitis, and hepatic 

encephalopathy.  Labs today were all reviewed WBC is 14.1 hemoglobin is 9.4 

platelets are 75,000 INR is 1.7.  Electrolytes are unremarkable except for low 

potassium of 3.4 BUN of 27 creatinine 1.32.  Liver enzymes are borderline elevat

ed.  Total bilirubin is improving down to 8.9 today.  Albumin is 2.3





Patient was reevaluated today on 7/2/20, remains in the ICU, intubated, 

mechanically ventilated.  Patient is on assist control rate of 26 tidal volume 

450 FiO2 50% and PEEP is 12.  Chest x-ray is showing definite improvement, flower

mayra his ABG is basically about the same with a pO2 of 68 pCO2 of 38 and pH of 

7.38.  His ammonia level is on the rise in spite of the fact that the patient is

on relatively high dose of lactulose and he is also on Xifaxan.  Patient remains

on propofol at 40 mcg/kg/m, fentanyl at 0.5 mcg/kg/h, he is on very small dose 

of norepinephrine at 0.01 mcg/kg/m, IV fluid is at KVO, and he is also on Zosyn 

for presumptive aspiration pneumonia.  Remains on diuretics in the form of 

Lasix, Aldactone.  His Lasix was increased to 40 mg 3 times a day, and he is on 

Aldactone via nasogastric tube.  Patient was taken off sedation today, however 

he became extremely agitated, tachypneic, tachycardic, hence we had to place him

back on sedation and continued assist control mode of mechanical ventilation.  

Chest x-ray again showed improvement.  WBC count today is 11 hemoglobin is 8.5 

his INR is 1.7 basic metabolic profile is improving, creatinine however is up to

1.40, and the patient obviously developed acute kidney injury.  This could very 

well be related to his sepsis, and related to his hypotension requiring 

norepinephrine.  Cultures including blood cultures and sputum cultures have been

negative.





Patient was reevaluated today in the ICU, remains intubated and mechanically 

ventilated.  His ventilator settings are assist control rate of 26 tidal volume 

is 450 FiO2 is 50% and PEEP is at 12 chest x-ray showed dramatic improvement.  

His ABG is significantly improved hence I recommended cutting down the PEEP to 

8.  His ammonia level is significantly improved today, it is down to 69.  Sodium

seems to be creeping up a little bit, and I recommended free water flushes via 

orogastric tube.  Patient is on propofol at 50 mcg/kg/m, his tube feeding is 

presently on hold because of increased residual.  Patient was given a sedation 

holiday, however when he went off propofol, the patient became extremely 

agitated, restless, biting on the endotracheal tube, tachycardic, tachypneic, 

and was extremely agitated.  Hence I recommended placing him back on propofol, 

and not quite ready for weaning.  Labs were all reviewed today.  And addressed 

accordingly including his elevated sodium and elevated BUN of 49 creatinine 

1.46.  Chest x-ray showed significant improvement in his bilateral 

infiltrates/aspiration pneumonia.





Patient was reevaluated today on 7/4/20, remains in the ICU, intubated and 

mechanically ventilated.  His ventilator settings are assist control rate of 26 

FiO2 50% tidal volume of 450 PEEP is at 8 and today I cut down his FiO2 to 45% 

and kept him on a PEEP of 8.  ABG this morning showed a pO2 of 102 pCO2 of 35 pH

of 7.45.  Patient has significantly elevated sodium today of 150 potassium is 

low at 2.8 ammonia level remains high at 73.  Patient is not requiring any 

pressors, he is on propofol at 34 mcg/kg/m.  Patient is on enteral feeding.  I 

do plan to wean the patient off propofol sometime today, and assess mental 

status again.  This was attempted yesterday, but the patient didn't do well, I 

have also recommended free water flushes to correct his hyper natremia.





The patient is seen today 07/05/2020 in follow-up in the intensive care unit.  

He remains intubated on mechanical vent.  Current settings are assist control at

a rate of 26, FiO2 45% tidal volume 450 and a PEEP of 8.  Morning blood gases 

reveal a P O2 of 93, pCO2 of 35 and a pH of 7.47.  Chest x-ray reveals stable 

bilateral atelectasis/infiltrate.  He is sedated on propofol at 50 mcg/kg/m.  

Antibiotics in the form of Zosyn.  He remains on IV diuretics.  He is tolerating

his tube feedings.  He remains hypernatremic despite free water.  IVs changed to

D5W at 75 ML's per hour.  Sodium 156.  Potassium 2.8.  Creatinine 1.32.  Glucose

157.  White count 10.8.  Hemoglobin 8.4.  Platelet count 58,000.





On 07/06/2020 patient seen in follow-up in the intensive care unit, he remains 

sedated, intubated on mechanical ventilator, current vent settings are assist 

control mode of ventilation with a rate of 12, tidal volume is 450, FiO2 45% and

PEEP of 5, this morning blood gases show pO2 of 93, pCO2 38, pH of 7.45.  

Current IV fluids include D5 W5 to 75 ML per hour, and Diprivan at 45 mics per 

kilo per minute, no vasoactive drips, tube feedings of vital high protein at a 

rate of 45 with a goal of 45.  Today's chest x-ray has been reviewed showing 

stable appearance of the bibasilar opacities.  T-max in the last 24 hours was 

99.7F, afebrile this morning, 1 sputum cultures have shown no growth, today's 

blood work has been reviewed, limits according to 14.0, hemoglobin of 8.0, 

platelet count is 62, sodium is 159 Asian is on D5W at 75 ML per hour, in 

addition to free water flushes with 200 mL every 4 hours, patient continues on 

lactulose and rifaximin, and is having lactulose induced diarrhea and he had 2.4

L in liquid stool output in the last 24 hours.  Patient apparently has failed 

spontaneous breathing trials for last 4 days.  We will proceed with a 

spontaneous awakening and spontaneous breathing trials again today, we will 

speak to the family about placement of tracheostomy and PEG tube insertion. 











Objective





- Vital Signs


Vital signs: 


                                   Vital Signs











Temp  99.3 F   07/06/20 08:00


 


Pulse  101 H  07/06/20 09:00


 


Resp  14   07/06/20 09:00


 


BP  108/60   07/05/20 07:00


 


Pulse Ox  100   07/06/20 09:00








                                 Intake & Output











 07/05/20 07/06/20 07/06/20





 18:59 06:59 18:59


 


Intake Total 2215.411 1221.589 648.490


 


Output Total 3525 850 1750


 


Balance -1309.589 371.589 -1101.510


 


Weight  88.5 kg 


 


Intake:   


 


   936 234


 


    Dextrose 5% in Water 1, 600 900 225





    000 ml @ 75 mls/hr IV .   





    [id number] [facility] Rx#:[id number]   


 


    Normal Saline Pressure 36 36 9





    Bag   


 


    Sodium Chloride 0.9% 1, 200  





    000 ml @ 50 mls/hr IV .   





    Q20H [facility] Rx#:[id number]   


 


  Intake, IV Titration 239.411 60.589 124.490





  Amount   


 


    Piperacillin-Tazobactam 3 100  





    .375 gm In Sodium   





    Chloride 0.9% 100 ml @ 25   





    mls/hr IVPB Q8HR [facility] Rx#   





    :[id number]   


 


    Propofol 1,000 mg In 139.411 60.589 124.490





    Empty Bag 1 bag @ Titrate   





    IV .Q0M [facility] Rx#:   





    [id number]   


 


  Tube Feeding 540 225 90


 


  Other 600  200


 


Output:   


 


  Urine 1125 850 250


 


  Stool 2400  1500


 


Other:   


 


  Voiding Method Indwelling Catheter Indwelling Catheter Indwelling Catheter








                       ABP, PAP, CO, CI - Last Documented











Arterial Blood Pressure        141/61

















- Exam


 GENERAL EXAM: Sedated, 54-year-old white male, on mechanical ventilator, with 

settings of assist control rate of 12, tidal volume is 450, FiO2 45% and PEEP of

8 comfortable in no apparent distress.


HEAD: Normocephalic/atraumatic.


EYES: Normal reaction of pupils, equal size.  Conjunctiva pink, sclera white.


NOSE: Clear with pink turbinates.


THROAT: No erythema or exudates.


NECK: No masses, no JVD, no thyroid enlargement, no adenopathy.


CHEST: No chest wall deformity.  Symmetrical expansion. 


LUNGS: Equal air entry with no crackles, wheeze, rhonchi or dullness.


CVS: Regular rate and rhythm, normal S1 and S2, no gallops, no murmurs, no rubs


ABDOMEN: Soft, nontender.  No hepatosplenomegaly, normal bowel sounds, no 

guarding or rigidity.


EXTREMITIES: No clubbing, no edema, no cyanosis, 2+ pulses and upper and lower 

extremities.


MUSCULOSKELETAL: Muscle strength and tone normal.


SPINE: No scoliosis or deformity


SKIN: No rashes


CENTRAL NERVOUS SYSTEM: Sedated and intubated.  No focal deficits, tone is 

normal in all 4 extremities.














- Labs


CBC & Chem 7: 


                                 07/06/20 06:15





                                 07/06/20 06:15


Labs: 


                  Abnormal Lab Results - Last 24 Hours (Table)











  07/05/20 07/05/20 07/06/20 Range/Units





  11:58 18:05 00:26 


 


WBC     (3.8-10.6)  k/uL


 


RBC     (4.30-5.90)  m/uL


 


Hgb     (13.0-17.5)  gm/dL


 


Hct     (39.0-53.0)  %


 


MCV     (80.0-100.0)  fL


 


MCH     (25.0-35.0)  pg


 


RDW     (11.5-15.5)  %


 


Plt Count     (150-450)  k/uL


 


Neutrophils #     (1.3-7.7)  k/uL


 


Lymphocytes #     (1.0-4.8)  k/uL


 


Macrocytosis     


 


ABG HCO3     (21-25)  mmol/L


 


ABG Total CO2     (19-24)  mmol/L


 


ABG O2 Saturation     (94-97)  %


 


Sodium     (137-145)  mmol/L


 


Potassium     (3.5-5.1)  mmol/L


 


Chloride     ()  mmol/L


 


BUN     (9-20)  mg/dL


 


Glucose     (74-99)  mg/dL


 


POC Glucose (mg/dL)  197 H  230 H  184 H  (75-99)  mg/dL


 


Calcium     (8.4-10.2)  mg/dL














  07/06/20 07/06/20 07/06/20 Range/Units





  05:19 06:15 06:15 


 


WBC   14.0 H   (3.8-10.6)  k/uL


 


RBC   2.24 L   (4.30-5.90)  m/uL


 


Hgb   8.0 L   (13.0-17.5)  gm/dL


 


Hct   25.1 L   (39.0-53.0)  %


 


MCV   112.0 H   (80.0-100.0)  fL


 


MCH   35.7 H   (25.0-35.0)  pg


 


RDW   23.1 H   (11.5-15.5)  %


 


Plt Count   62 L   (150-450)  k/uL


 


Neutrophils #   12.1 H   (1.3-7.7)  k/uL


 


Lymphocytes #   0.8 L   (1.0-4.8)  k/uL


 


Macrocytosis   Marked A   


 


ABG HCO3  27 H    (21-25)  mmol/L


 


ABG Total CO2  28 H    (19-24)  mmol/L


 


ABG O2 Saturation  97.4 H    (94-97)  %


 


Sodium    159 H  (137-145)  mmol/L


 


Potassium    3.3 L  (3.5-5.1)  mmol/L


 


Chloride    128 H  ()  mmol/L


 


BUN    78 H  (9-20)  mg/dL


 


Glucose    138 H  (74-99)  mg/dL


 


POC Glucose (mg/dL)     (75-99)  mg/dL


 


Calcium    8.1 L  (8.4-10.2)  mg/dL














Assessment and Plan


Plan: 


 Assessment:





Acute hypoxic respiratory failure, suspect aspiration pneumonia.  Chest x-ray 

has shown improvement over the last 2 days, and this is also reflected in the 

improvement noted in the ABG.





Acute sepsis, possible septic shock, patient required pressors and remains on 

norepinephrine today.  likely source is aspiration pneumonia, although abdominal

source is not entirely ruled out.  Blood cultures and sputum cultures remain 

negative.





Alcohol liver disease with ascites.  Jaundice, and elevated liver enzymes.





Blood loss anemia, most likely the patient has acute or subacute GI bleeding, GI

is following.  





Acute hepatic encephalopathy with significantly elevated ammonia level.  

Slightly increased today compared to yesterday, it is 73.





Recent history of fall about 2 months ago, and multiple right-sided rib 

fractures.





History of alcoholic neuropathy.





History of pericarditis





Chronic back pain





History of restless leg syndrome.





Acute kidney injury secondary to sepsis and septic shock.  Strongly doubt 

hepatorenal syndrome.  Improved





Hypernatremia, related to free water deficit.  Patient continues on D5W and free

water flushes





Plan:





We will discontinue the Lasix and Aldactone, we will increase the free water 

flushes to 400 mL every 4 hours, increase the D5W to 125 ML per hour, recheck 

electrolytes in the morning, correct hypokalemia per protocol.  We'll proceed 

with another spontaneous awakening and spontaneous breathing trial with pressure

support of 8 and CPAP.  Continue nutritional support, we'll recheck ammonia 

level, and continue with lactulose and rifaximin.  We will place a consult to 

Gen. surgery for tracheostomy and PEG tube placement.  We discussed patient's 

condition and discussed this patient's family supposed to discuss it with other 

family members and consider patient's overall very guarded prognosis, 

possibility of hospice and palliative care was recommended however we will 

continue supportively we treat the patient. 





I performed a history & physical examination of the patient and discussed their 

management with my nurse practitioner, Shagufta Booker.  I reviewed the nurse 

practitioner's note and agree with the documented findings and plan of care.  

Lung sounds are positive for diminished breath sounds.  The findings and the 

impression was discussed with the patient.  I attest to the documentation by the

nurse practitioner. 





Time with Patient: Greater than 30

## 2020-07-07 LAB
ALBUMIN SERPL-MCNC: 2.3 G/DL (ref 3.5–5)
ALP SERPL-CCNC: 94 U/L (ref 38–126)
ALT SERPL-CCNC: 61 U/L (ref 4–49)
ANION GAP SERPL CALC-SCNC: 4 MMOL/L
AST SERPL-CCNC: 127 U/L (ref 17–59)
BASOPHILS # BLD AUTO: 0 K/UL (ref 0–0.2)
BASOPHILS NFR BLD AUTO: 0 %
BUN SERPL-SCNC: 77 MG/DL (ref 9–20)
CALCIUM SPEC-MCNC: 8.1 MG/DL (ref 8.4–10.2)
CHLORIDE SERPL-SCNC: 121 MMOL/L (ref 98–107)
CO2 BLDA-SCNC: 26 MMOL/L (ref 19–24)
CO2 SERPL-SCNC: 24 MMOL/L (ref 22–30)
EOSINOPHIL # BLD AUTO: 0.1 K/UL (ref 0–0.7)
EOSINOPHIL NFR BLD AUTO: 0 %
ERYTHROCYTE [DISTWIDTH] IN BLOOD BY AUTOMATED COUNT: 2.22 M/UL (ref 4.3–5.9)
ERYTHROCYTE [DISTWIDTH] IN BLOOD: 22.7 % (ref 11.5–15.5)
GLUCOSE BLD-MCNC: 166 MG/DL (ref 75–99)
GLUCOSE BLD-MCNC: 183 MG/DL (ref 75–99)
GLUCOSE BLD-MCNC: 205 MG/DL (ref 75–99)
GLUCOSE BLD-MCNC: 208 MG/DL (ref 75–99)
GLUCOSE BLD-MCNC: 246 MG/DL (ref 75–99)
GLUCOSE SERPL-MCNC: 200 MG/DL (ref 74–99)
HCO3 BLDA-SCNC: 25 MMOL/L (ref 21–25)
HCT VFR BLD AUTO: 25.3 % (ref 39–53)
HGB BLD-MCNC: 8.1 GM/DL (ref 13–17.5)
LYMPHOCYTES # SPEC AUTO: 1 K/UL (ref 1–4.8)
LYMPHOCYTES NFR SPEC AUTO: 6 %
MCH RBC QN AUTO: 36.7 PG (ref 25–35)
MCHC RBC AUTO-ENTMCNC: 32.2 G/DL (ref 31–37)
MCV RBC AUTO: 114 FL (ref 80–100)
MONOCYTES # BLD AUTO: 0.5 K/UL (ref 0–1)
MONOCYTES NFR BLD AUTO: 3 %
NEUTROPHILS # BLD AUTO: 14.5 K/UL (ref 1.3–7.7)
NEUTROPHILS NFR BLD AUTO: 89 %
PCO2 BLDA: 34 MMHG (ref 35–45)
PH BLDA: 7.47 [PH] (ref 7.35–7.45)
PLATELET # BLD AUTO: 49 K/UL (ref 150–450)
PO2 BLDA: 113 MMHG (ref 83–108)
POTASSIUM SERPL-SCNC: 3.4 MMOL/L (ref 3.5–5.1)
PROT SERPL-MCNC: 6.1 G/DL (ref 6.3–8.2)
SODIUM SERPL-SCNC: 149 MMOL/L (ref 137–145)
WBC # BLD AUTO: 16.2 K/UL (ref 3.8–10.6)

## 2020-07-07 PROCEDURE — 0B110F4 BYPASS TRACHEA TO CUTANEOUS WITH TRACHEOSTOMY DEVICE, OPEN APPROACH: ICD-10-PCS

## 2020-07-07 PROCEDURE — 5A1955Z RESPIRATORY VENTILATION, GREATER THAN 96 CONSECUTIVE HOURS: ICD-10-PCS

## 2020-07-07 RX ADMIN — INSULIN ASPART SCH UNIT: 100 INJECTION, SOLUTION INTRAVENOUS; SUBCUTANEOUS at 18:12

## 2020-07-07 RX ADMIN — INSULIN ASPART SCH UNIT: 100 INJECTION, SOLUTION INTRAVENOUS; SUBCUTANEOUS at 11:25

## 2020-07-07 RX ADMIN — LACTULOSE SCH: 20 SOLUTION ORAL at 15:42

## 2020-07-07 RX ADMIN — SODIUM CHLORIDE SCH: 900 INJECTION, SOLUTION INTRAVENOUS at 23:16

## 2020-07-07 RX ADMIN — POTASSIUM CHLORIDE SCH MLS/HR: 14.9 INJECTION, SOLUTION INTRAVENOUS at 03:00

## 2020-07-07 RX ADMIN — IPRATROPIUM BROMIDE AND ALBUTEROL SULFATE SCH ML: .5; 3 SOLUTION RESPIRATORY (INHALATION) at 07:40

## 2020-07-07 RX ADMIN — METHYLPREDNISOLONE SODIUM SUCCINATE SCH MG: 40 INJECTION, POWDER, FOR SOLUTION INTRAMUSCULAR; INTRAVENOUS at 15:51

## 2020-07-07 RX ADMIN — LACTULOSE SCH: 20 SOLUTION ORAL at 20:57

## 2020-07-07 RX ADMIN — CHLORHEXIDINE GLUCONATE SCH ML: 1.2 RINSE ORAL at 08:02

## 2020-07-07 RX ADMIN — PROPOFOL SCH MLS/HR: 10 INJECTION, EMULSION INTRAVENOUS at 15:00

## 2020-07-07 RX ADMIN — RIFAXIMIN SCH MG: 550 TABLET ORAL at 21:43

## 2020-07-07 RX ADMIN — PANTOPRAZOLE SODIUM SCH MG: 40 INJECTION, POWDER, FOR SOLUTION INTRAVENOUS at 21:42

## 2020-07-07 RX ADMIN — PROPOFOL SCH MLS/HR: 10 INJECTION, EMULSION INTRAVENOUS at 09:08

## 2020-07-07 RX ADMIN — METHYLPREDNISOLONE SODIUM SUCCINATE SCH MG: 40 INJECTION, POWDER, FOR SOLUTION INTRAMUSCULAR; INTRAVENOUS at 23:45

## 2020-07-07 RX ADMIN — PROPOFOL SCH MLS/HR: 10 INJECTION, EMULSION INTRAVENOUS at 01:07

## 2020-07-07 RX ADMIN — IPRATROPIUM BROMIDE AND ALBUTEROL SULFATE SCH: .5; 3 SOLUTION RESPIRATORY (INHALATION) at 11:52

## 2020-07-07 RX ADMIN — PROPOFOL SCH MLS/HR: 10 INJECTION, EMULSION INTRAVENOUS at 23:16

## 2020-07-07 RX ADMIN — CHLORHEXIDINE GLUCONATE SCH ML: 1.2 RINSE ORAL at 21:42

## 2020-07-07 RX ADMIN — METHYLPREDNISOLONE SODIUM SUCCINATE SCH MG: 40 INJECTION, POWDER, FOR SOLUTION INTRAMUSCULAR; INTRAVENOUS at 08:02

## 2020-07-07 RX ADMIN — POTASSIUM CHLORIDE SCH MLS/HR: 14.9 INJECTION, SOLUTION INTRAVENOUS at 20:55

## 2020-07-07 RX ADMIN — INSULIN ASPART SCH UNIT: 100 INJECTION, SOLUTION INTRAVENOUS; SUBCUTANEOUS at 23:45

## 2020-07-07 RX ADMIN — SODIUM CHLORIDE SCH: 900 INJECTION, SOLUTION INTRAVENOUS at 03:09

## 2020-07-07 RX ADMIN — POTASSIUM CHLORIDE SCH MLS/HR: 14.9 INJECTION, SOLUTION INTRAVENOUS at 11:25

## 2020-07-07 RX ADMIN — PANTOPRAZOLE SODIUM SCH MG: 40 INJECTION, POWDER, FOR SOLUTION INTRAVENOUS at 08:03

## 2020-07-07 RX ADMIN — IPRATROPIUM BROMIDE AND ALBUTEROL SULFATE PRN ML: .5; 3 SOLUTION RESPIRATORY (INHALATION) at 23:30

## 2020-07-07 RX ADMIN — POTASSIUM CHLORIDE SCH MLS/HR: 14.9 INJECTION, SOLUTION INTRAVENOUS at 08:02

## 2020-07-07 RX ADMIN — IPRATROPIUM BROMIDE AND ALBUTEROL SULFATE PRN ML: .5; 3 SOLUTION RESPIRATORY (INHALATION) at 03:19

## 2020-07-07 RX ADMIN — POTASSIUM CHLORIDE SCH MLS/HR: 14.9 INJECTION, SOLUTION INTRAVENOUS at 05:50

## 2020-07-07 RX ADMIN — INSULIN ASPART SCH UNIT: 100 INJECTION, SOLUTION INTRAVENOUS; SUBCUTANEOUS at 05:51

## 2020-07-07 RX ADMIN — RIFAXIMIN SCH MG: 550 TABLET ORAL at 08:03

## 2020-07-07 RX ADMIN — IPRATROPIUM BROMIDE AND ALBUTEROL SULFATE SCH ML: .5; 3 SOLUTION RESPIRATORY (INHALATION) at 19:58

## 2020-07-07 RX ADMIN — PROPOFOL SCH MLS/HR: 10 INJECTION, EMULSION INTRAVENOUS at 04:39

## 2020-07-07 RX ADMIN — IPRATROPIUM BROMIDE AND ALBUTEROL SULFATE SCH ML: .5; 3 SOLUTION RESPIRATORY (INHALATION) at 15:23

## 2020-07-07 RX ADMIN — PROPOFOL SCH MLS/HR: 10 INJECTION, EMULSION INTRAVENOUS at 19:35

## 2020-07-07 RX ADMIN — LACTULOSE SCH GM: 20 SOLUTION ORAL at 08:11

## 2020-07-07 NOTE — P.OP
Date of Procedure: 07/07/20


Preoperative Diagnosis: 


Respiratory failure


Postoperative Diagnosis: 


Respiratory failure


Procedure(s) Performed: 


Tracheostomy


Anesthesia: JA


Surgeon: Brown Alvarado


Estimated Blood Loss (ml): 10


Pathology: none sent


Condition: stable


Disposition: PACU


Description of Procedure: 


The patient's placed on the operative table in the supine position.  He received

general anesthesia.  His neck was prepped and draped usual sterile fashion.  A 

standard Coggon incision made approximately 2 cm above the sternal notch.  Using 

the cautery the subcu tissue and platysma was divided.  The strap muscle divided

midline.





We lateral tract with wound.  The trachea was exposed.  The thyroid was divided 

midline using left cautery.  Following this a material tube was placed into the 

right mainstem bronchus and insufflated.  The tracheotomy was then performed and

the tubing the second and third tracheal rings.  The NG tube brought back under 

direct vision and then #8 Shiley fenestrated tracheostomy was placed into the 

trachea under direct vision.  End tidal CO2 was confirmed.  Patient has a 

ventilator and good tidal lysed.  The skin was closed 3-0 nylon.  Patient 

tolerated procedure well the tracheostomy ties were placed he was sent to the 

ICU in stable condition.

## 2020-07-07 NOTE — PN
PROGRESS NOTE



DATE OF SERVICE:

07/07/2020



Patient is a 54-year-old pleasant white male with history of alcohol abuse and

alcoholic cirrhosis of the liver, admitted to the hospital with altered mental status

and acute respiratory failure/aspiration pneumonia.  Presently remains on the vent and

he underwent tracheostomy this morning.  As per the nursing staff, no acute events

noted overnight.  He has been having severe diarrhea through the FMS.  He is receiving

lactulose 45 mL q.8 hours.



PHYSICAL EXAMINATION:

Remains on the vent.  Apparently, patient failed attempts at extubation.

VITAL SIGNS:  Blood pressure 115/51, pulse rate 90, temperature 98.3.

HEENT: Examination unremarkable, conjunctivae are pink, sclerae icteric, oral cavity no

lesions.

NECK:  No JVD.

CHEST:  Clear to auscultation.

HEART:  Regular rate and rhythm.

ABDOMEN:  Slightly distended.  Bowel sounds positive.

EXTREMITIES:  No pedal edema.

NEURO:  Sedated.



LABS:

WBC 16.2, hemoglobin 8.1, platelets 49,000.  Bilirubin is 6.3, , ALT 61.

Ammonia is 47.



IMPRESSION:

1. Acute respiratory failure/aspiration pneumonia remains on the vent, intubated,

    status post tracheostomy today.

2. Alcoholic cirrhosis of the liver with acute alcoholic hepatitis with gradually

    improving bilirubin.

3. Macrocytic anemia secondary to underlying chronic liver disease.  Clinically, no

    evidence of active bleeding.

4. Hepatic encephalopathy with improving ammonia.  Remains on lactulose and Xifaxan.

5. Electrolyte abnormalities.



RECOMMENDATIONS:

1. Continue with symptomatic and supportive care.

2. Management per intensivist.

3. Continue Xifaxan and lactulose but cut down the lactulose to titrate that he has

    about 500-600 mL of stool every day.

4. Continue broad-spectrum antibiotics.

5. Will follow with you closely.

Thank you for this consultation.





MMODL / IJN: [id number] / Job#: [id number]

## 2020-07-07 NOTE — P.CNNES
History of Present Illness


Consult date: 20


Requesting physician: Reid Stromberg


Reason for Consult: Poor neuro status on mechanical ventilator, not waking up on

holidays


History of Present Illness: 





Patient is a 54-year-old male who came to the hospital on 2020 for altered

mental status.  Patient has history of alcoholism.  Patient was diagnosed with 

hepatic encephalopathy with ammonia of 91, liver failure, pneumonia, GI 

hemorrhage and sepsis.  Patient subsequently developed respiratory failure and 

was placed on mechanical ventilation on 2020.  Patient continues to be 

very severely encephalopathic.  Patient's ammonia level has improved to 47, but 

he is not waking up on sedation holiday.  He has failed CPAP mode ventilation.  

This prompted neurology consultation.  Patient had undergone tracheostomy today.

 Patient currently is sedated with propofol 35 g.





No seizures have been reported.  Patient's blood test shows WBC 16.2 hemoglobin 

8.1, platelets 49.  ESR is 48.  INR 1.5.  ABG with pH 7.47, pCO2 34 and pO2 113.

 Saturation 98.9.  Sodium 149 potassium 3.9.  BUN 77, creatinine 1.07.  

Hemoglobin A1c 4.2.  Albumin is low 2.3.  Hepatitis panel negative.





Patient also has history of alcoholic neuropathy, pericarditis chronic back 

pain, restless leg syndrome.





Review of Systems


ROS unobtainable: due to endotracheal tube, due to mental status





Past Medical History


Past Medical History: Asthma, Eye Disorder, Osteoarthritis (OA), Renal Disease


Additional Past Medical History / Comment(s): Pt admitted to [facility] on 20 with

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 

opiate withdrawal/alcohol abuse possible DTs/coagulopathy, elevated LFTs, venous

insufficiency.     Other Hx:  Unsteady gait/falls, ETOH abuse with withdrawal 

symptoms in the past pt states-tremors, bilateral leg pain/numbness, chronic 

renal disease stage I, pericarditis, bilateral macular degeneration/legally 

blind, chronic back pain, RLS, bilateral carpal tunnel syndrome, past infected 

sebaceous cyst on back


History of Any Multi-Drug Resistant Organisms: None Reported


Past Surgical History: Orthopedic Surgery


Additional Past Surgical History / Comment(s): Right hand surgery d/t injury


Past Anesthesia/Blood Transfusion Reactions: No Reported Reaction


Past Psychological History: Anxiety, Depression


Additional Psychological History / Comment(s): Pt resides with his fiancee.  He 

has been using her walker on occasion.  He is legally blind/has no 's 

license, his fiancee drives.


Smoking Status: Light tobacco smoker


Past Alcohol Use History: Abuse, Daily


Additional Past Alcohol Use History / Comment(s): Pt started smoking in  and

states that he has recently cut down to a pack lasting 4-5 weeks.  Pt states he 

has hx of alcohol abuse-8 beers a day but has not drank in a few weeks.


Past Drug Use History: None Reported


Additional Drug Use History / Comment(s): Pt smoked marijuana as a teen.





- Past Family History


  ** Father


Family Medical History: Cancer


Additional Family Medical History / Comment(s): Father  of esophageal 

cancer.





  ** Mother


Family Medical History: No Reported History


Additional Family Medical History / Comment(s): Mopther is healthy





Medications and Allergies


                                Home Medications











 Medication  Instructions  Recorded  Confirmed  Type


 


Cetirizine HCl [Zyrtec] 10 mg PO DAILY 20 History


 


Omeprazole 20 mg PO DAILY 20 History


 


Folic Acid 1 mg PO DAILY #30 tablet 20 Rx


 


Multivitamins, Thera [Multivitamin 1 tab PO DAILY #30 tablet 20 

Rx





(formulary)]    


 


Thiamine [Vitamin B-1] 100 mg PO DAILY #30 tab 20 Rx


 


Albuterol Inhaler [Ventolin Hfa 1 - 2 puff INHALATION RT-Q4H PRN 20 History





Inhaler]    








                                    Allergies











Allergy/AdvReac Type Severity Reaction Status Date / Time


 


No Known Allergies Allergy   Verified 20 14:27














Physical Examination





- Vital Signs


Vital Signs: 


                                   Vital Signs











  Temp Pulse Resp BP Pulse Ox


 


 20 15:33   76   


 


 20 15:24   71   


 


 20 15:00   80  15   100


 


 20 14:00   86  12   100


 


 20 13:00  98.3 F  90  12  123/66  100


 


 20 11:00   80  14   100


 


 20 10:00   83  18   100


 


 20 09:00   95  18   100


 


 20 08:00  99.1 F  97  12   100


 


 20 07:52   92   


 


 20 07:41   92   


 


 20 07:00   96  21   100


 


 20 06:00   86  20   100


 


 20 05:00   96  19   100


 


 20 04:00  99.1 F  105 H  21   100


 


 20 03:34   100   


 


 20 03:20   89   


 


 20 03:00   89  20   100


 


 20 02:00   91  21   100


 


 20 01:00   92  21   100


 


 20 00:00  98 F  86  24   100


 


 20 23:22   101 H   


 


 20 23:11   101 H   


 


 20 23:00   99  22   100


 


 20 22:00   101 H  22   100


 


 20 21:00   101 H  21   100


 


 20 20:00  98.7 F  102 H  20   100


 


 20 19:35   103 H   


 


 20 19:21   100   


 


 20 19:00   93  14   100


 


 20 18:00   92  12   100


 


 20 17:00   99  16   100


 


 20 16:21   100   








                                Intake and Output











 20





 06:59 14:59 22:59


 


Intake Total 2393.298 1535.955 223.388


 


Output Total 2550 450 100


 


Balance -023.276 5326.955 123.388


 


Intake:   


 


  IV 1024 1324 128


 


    Dextrose 5% in Water 1, 1000 1000 125





    000 ml @ 125 mls/hr IV .   





    Q8H RISA Rx#:[id number]   


 


    Normal Saline Pressure 24 24 3





    Bag   


 


  Intake, IV Titration 209.298 211.955 55.388





  Amount   


 


    Potassium Chloride 20 meq  100 





    In Water For Injection 1   





    100ml.bag @ 50 mls/hr   





    IVPB Q2H RISA Rx#:   





    [id number]   


 


    Propofol 1,000 mg In 209.298 111.955 55.388





    Empty Bag 1 bag @ Titrate   





    IV .Q0M RISA Rx#:   





    [id number]   


 


  Tube Feeding 360  40


 


  Other 800  


 


Output:   


 


  Urine 550 440 100


 


  Stool 2000  


 


  Estimated Blood Loss  10 


 


Other:   


 


  Voiding Method Indwelling Catheter Indwelling Catheter 


 


  Weight 91.7 kg  








                         ABP, PAP, CO, CI - Last 8 Hours











Arterial Blood Pressure        153/69


 


Arterial Blood Pressure        147/67


 


Arterial Blood Pressure        115/51


 


Arterial Blood Pressure        105/46


 


Arterial Blood Pressure        103/46


 


Arterial Blood Pressure        108/49

















On examination patient is a middle aged  male, appears older than his 

age.  He is clearly jaundiced, sedated at this time with propofol 35 g.  

Patient does not respond to calling his name.  Patient is sedated, obtunded.  

Pupils are round and reacting to light.  Oculocephalics are present.  Corneals 

absent.  The colostomy is in place.  Patient has anasarca.  Tone is equal 

bilaterally.  Patient did not cooperate with testing of his motor, sensory or 

cerebellar function testing.  No obvious seizure activity noted.  Reflexes are 

absent.  Plantars are flat.  Gait cannot be tested.  Patient has some crackles. 

S1 and S2 audible.





Results





- Laboratory Findings


CBC and BMP: 


                                 20 04:30





                                 20 11:15


Abnormal Lab Findings: 


                                  Abnormal Labs











  20





  14:10 14:13 14:13


 


WBC   14.5 H 


 


RBC   2.17 L 


 


Hgb   8.3 L 


 


Hct   24.3 L 


 


MCV   111.7 H 


 


MCH   38.3 H 


 


RDW   20.6 H 


 


Plt Count   86 L 


 


Neutrophils #   


 


Neutrophils # (Manual)   12.33 H 


 


Lymphocytes #   


 


Macrocytosis   Marked A 


 


PT    35.9 H


 


INR    3.7 H


 


APTT    46.0 H


 


ABG pH   


 


ABG pCO2   


 


ABG pO2   


 


ABG HCO3   


 


ABG Total CO2   


 


ABG O2 Saturation   


 


Sodium   


 


Potassium   


 


Chloride   


 


Carbon Dioxide   


 


BUN   


 


Creatinine   


 


Glucose   


 


POC Glucose (mg/dL)   


 


Osmolality   


 


Plasma Lactic Acid Gagandeep   


 


Calcium   


 


Phosphorus   


 


Total Bilirubin   


 


AST   


 


ALT   


 


Alkaline Phosphatase   


 


Ammonia   


 


Total Protein   


 


Albumin   


 


Amylase   


 


Urine Protein   


 


Urine Ketones   


 


Urine Bilirubin   


 


Ur Leukocyte Esterase   


 


Amorphous Sediment   


 


Urine Bacteria   


 


Urine Mucus   


 


U Tricyclic Antidepress   


 


U Benzodiazepines Scrn   


 


Crossmatch  See Detail  














  20





  14:13 14:13 14:13


 


WBC   


 


RBC   


 


Hgb   


 


Hct   


 


MCV   


 


MCH   


 


RDW   


 


Plt Count   


 


Neutrophils #   


 


Neutrophils # (Manual)   


 


Lymphocytes #   


 


Macrocytosis   


 


PT   


 


INR   


 


APTT   


 


ABG pH   


 


ABG pCO2   


 


ABG pO2   


 


ABG HCO3   


 


ABG Total CO2   


 


ABG O2 Saturation   


 


Sodium   131 L 


 


Potassium   


 


Chloride   


 


Carbon Dioxide   


 


BUN   29 H 


 


Creatinine   


 


Glucose   101 H 


 


POC Glucose (mg/dL)   


 


Osmolality   


 


Plasma Lactic Acid Gagandeep    2.6 H*


 


Calcium   7.4 L 


 


Phosphorus   


 


Total Bilirubin   16.1 H* 


 


AST   137 H 


 


ALT   


 


Alkaline Phosphatase   138 H 


 


Ammonia    91 H


 


Total Protein   


 


Albumin   2.3 L 


 


Amylase   <30 L 


 


Urine Protein  1+ H  


 


Urine Ketones  1+ H  


 


Urine Bilirubin  4+ H  


 


Ur Leukocyte Esterase  Trace H  


 


Amorphous Sediment  Moderate H  


 


Urine Bacteria  Occasional H  


 


Urine Mucus  Few H  


 


U Tricyclic Antidepress  Detected H  


 


U Benzodiazepines Scrn  Detected H  


 


Crossmatch   














  20





  16:50 17:24 19:19


 


WBC    14.8 H


 


RBC    2.08 L


 


Hgb    8.1 L


 


Hct    23.8 L


 


MCV    114.0 H


 


MCH    38.8 H


 


RDW    21.0 H


 


Plt Count    75 L


 


Neutrophils #    11.6 H


 


Neutrophils # (Manual)   


 


Lymphocytes #   


 


Macrocytosis    Marked A


 


PT   


 


INR   


 


APTT   


 


ABG pH   


 


ABG pCO2   


 


ABG pO2   


 


ABG HCO3   


 


ABG Total CO2   


 


ABG O2 Saturation   


 


Sodium   


 


Potassium   


 


Chloride   


 


Carbon Dioxide   


 


BUN   


 


Creatinine   


 


Glucose   


 


POC Glucose (mg/dL)  193 H  


 


Osmolality   


 


Plasma Lactic Acid Gagandeep   3.1 H* 


 


Calcium   


 


Phosphorus   


 


Total Bilirubin   


 


AST   


 


ALT   


 


Alkaline Phosphatase   


 


Ammonia   


 


Total Protein   


 


Albumin   


 


Amylase   


 


Urine Protein   


 


Urine Ketones   


 


Urine Bilirubin   


 


Ur Leukocyte Esterase   


 


Amorphous Sediment   


 


Urine Bacteria   


 


Urine Mucus   


 


U Tricyclic Antidepress   


 


U Benzodiazepines Scrn   


 


Crossmatch   














  20





  19:19 20:03 20:45


 


WBC   


 


RBC   


 


Hgb   


 


Hct   


 


MCV   


 


MCH   


 


RDW   


 


Plt Count   


 


Neutrophils #   


 


Neutrophils # (Manual)   


 


Lymphocytes #   


 


Macrocytosis   


 


PT   


 


INR   


 


APTT   


 


ABG pH    7.49 H


 


ABG pCO2    29 L


 


ABG pO2    63 L


 


ABG HCO3   


 


ABG Total CO2   


 


ABG O2 Saturation    92.0 L


 


Sodium  134 L  


 


Potassium   


 


Chloride  108 H  


 


Carbon Dioxide  19 L  


 


BUN  29 H  


 


Creatinine   


 


Glucose  111 H  


 


POC Glucose (mg/dL)   


 


Osmolality   


 


Plasma Lactic Acid Gagandeep   2.2 H* 


 


Calcium  7.3 L  


 


Phosphorus   


 


Total Bilirubin  14.9 H  


 


AST  139 H  


 


ALT   


 


Alkaline Phosphatase  137 H  


 


Ammonia   


 


Total Protein   


 


Albumin  2.3 L  


 


Amylase   


 


Urine Protein   


 


Urine Ketones   


 


Urine Bilirubin   


 


Ur Leukocyte Esterase   


 


Amorphous Sediment   


 


Urine Bacteria   


 


Urine Mucus   


 


U Tricyclic Antidepress   


 


U Benzodiazepines Scrn   


 


Crossmatch   














  20





  22:50 22:50 22:50


 


WBC   13.4 H 


 


RBC   1.89 L 


 


Hgb   7.0 L 


 


Hct   21.6 L 


 


MCV   114.4 H 


 


MCH   37.3 H 


 


RDW   20.9 H 


 


Plt Count   72 L 


 


Neutrophils #   10.2 H 


 


Neutrophils # (Manual)   


 


Lymphocytes #   


 


Macrocytosis   Marked A 


 


PT   


 


INR   


 


APTT   


 


ABG pH   


 


ABG pCO2   


 


ABG pO2   


 


ABG HCO3   


 


ABG Total CO2   


 


ABG O2 Saturation   


 


Sodium    135 L


 


Potassium   


 


Chloride   


 


Carbon Dioxide    20 L


 


BUN    26 H


 


Creatinine   


 


Glucose   


 


POC Glucose (mg/dL)   


 


Osmolality   


 


Plasma Lactic Acid Gagandeep  2.1 H*  


 


Calcium    6.5 L


 


Phosphorus   


 


Total Bilirubin    14.9 H


 


AST    127 H


 


ALT   


 


Alkaline Phosphatase   


 


Ammonia   


 


Total Protein   


 


Albumin    2.5 L


 


Amylase   


 


Urine Protein   


 


Urine Ketones   


 


Urine Bilirubin   


 


Ur Leukocyte Esterase   


 


Amorphous Sediment   


 


Urine Bacteria   


 


Urine Mucus   


 


U Tricyclic Antidepress   


 


U Benzodiazepines Scrn   


 


Crossmatch   














  20





  00:00 04:38 06:09


 


WBC    15.2 H


 


RBC    2.55 L


 


Hgb    9.2 L D


 


Hct    27.2 L


 


MCV    107.0 H D


 


MCH    36.2 H


 


RDW    23.8 H


 


Plt Count    79 L


 


Neutrophils #    13.0 H


 


Neutrophils # (Manual)   


 


Lymphocytes #   


 


Macrocytosis    Marked A


 


PT   


 


INR   


 


APTT   


 


ABG pH   7.25 L 


 


ABG pCO2   52 H 


 


ABG pO2   79 L 


 


ABG HCO3   


 


ABG Total CO2   


 


ABG O2 Saturation   92.6 L 


 


Sodium   


 


Potassium   


 


Chloride   


 


Carbon Dioxide   


 


BUN   


 


Creatinine   


 


Glucose   


 


POC Glucose (mg/dL)  100 H  


 


Osmolality   


 


Plasma Lactic Acid Gagandeep   


 


Calcium   


 


Phosphorus   


 


Total Bilirubin   


 


AST   


 


ALT   


 


Alkaline Phosphatase   


 


Ammonia   


 


Total Protein   


 


Albumin   


 


Amylase   


 


Urine Protein   


 


Urine Ketones   


 


Urine Bilirubin   


 


Ur Leukocyte Esterase   


 


Amorphous Sediment   


 


Urine Bacteria   


 


Urine Mucus   


 


U Tricyclic Antidepress   


 


U Benzodiazepines Scrn   


 


Crossmatch   














  20





  06:09 06:09 07:54


 


WBC   


 


RBC   


 


Hgb   


 


Hct   


 


MCV   


 


MCH   


 


RDW   


 


Plt Count   


 


Neutrophils #   


 


Neutrophils # (Manual)   


 


Lymphocytes #   


 


Macrocytosis   


 


PT  18.5 H  


 


INR  1.9 H  


 


APTT   


 


ABG pH    7.46 H


 


ABG pCO2    31 L


 


ABG pO2    170 H


 


ABG HCO3   


 


ABG Total CO2   


 


ABG O2 Saturation    99.8 H


 


Sodium   


 


Potassium   5.5 H 


 


Chloride   112 H 


 


Carbon Dioxide   18 L 


 


BUN   27 H 


 


Creatinine   


 


Glucose   


 


POC Glucose (mg/dL)   


 


Osmolality   


 


Plasma Lactic Acid Gagandeep   


 


Calcium   7.1 L 


 


Phosphorus   


 


Total Bilirubin   15.8 H* 


 


AST   176 H 


 


ALT   


 


Alkaline Phosphatase   129 H 


 


Ammonia   


 


Total Protein   


 


Albumin   3.1 L 


 


Amylase   


 


Urine Protein   


 


Urine Ketones   


 


Urine Bilirubin   


 


Ur Leukocyte Esterase   


 


Amorphous Sediment   


 


Urine Bacteria   


 


Urine Mucus   


 


U Tricyclic Antidepress   


 


U Benzodiazepines Scrn   


 


Crossmatch   














  20





  11:04 11:04 11:59


 


WBC  12.5 H  


 


RBC  2.46 L  


 


Hgb  8.5 L  


 


Hct  25.7 L  


 


MCV  104.6 H  


 


MCH   


 


RDW  23.9 H  


 


Plt Count  64 L  


 


Neutrophils #   


 


Neutrophils # (Manual)   


 


Lymphocytes #   


 


Macrocytosis  Marked A  


 


PT   21.1 H 


 


INR   2.2 H 


 


APTT   


 


ABG pH   


 


ABG pCO2   


 


ABG pO2   


 


ABG HCO3   


 


ABG Total CO2   


 


ABG O2 Saturation   


 


Sodium   


 


Potassium   


 


Chloride   


 


Carbon Dioxide   


 


BUN   


 


Creatinine   


 


Glucose   


 


POC Glucose (mg/dL)    124 H


 


Osmolality   


 


Plasma Lactic Acid Gagandeep   


 


Calcium   


 


Phosphorus   


 


Total Bilirubin   


 


AST   


 


ALT   


 


Alkaline Phosphatase   


 


Ammonia   


 


Total Protein   


 


Albumin   


 


Amylase   


 


Urine Protein   


 


Urine Ketones   


 


Urine Bilirubin   


 


Ur Leukocyte Esterase   


 


Amorphous Sediment   


 


Urine Bacteria   


 


Urine Mucus   


 


U Tricyclic Antidepress   


 


U Benzodiazepines Scrn   


 


Crossmatch   














  20





  15:43 17:30 17:34


 


WBC  12.5 H  


 


RBC  2.53 L  


 


Hgb  8.6 L  


 


Hct  27.0 L  


 


MCV  106.7 H  


 


MCH   


 


RDW  24.1 H  


 


Plt Count  56 L  


 


Neutrophils #   


 


Neutrophils # (Manual)   


 


Lymphocytes #   


 


Macrocytosis  Marked A  


 


PT   


 


INR   


 


APTT   


 


ABG pH   


 


ABG pCO2    32 L


 


ABG pO2    66 L


 


ABG HCO3    20 L


 


ABG Total CO2   


 


ABG O2 Saturation    92.9 L


 


Sodium   


 


Potassium   3.2 L 


 


Chloride   113 H 


 


Carbon Dioxide   20 L 


 


BUN   25 H 


 


Creatinine   


 


Glucose   100 H 


 


POC Glucose (mg/dL)   


 


Osmolality   


 


Plasma Lactic Acid Gagandeep   


 


Calcium   7.1 L 


 


Phosphorus   


 


Total Bilirubin   


 


AST   


 


ALT   


 


Alkaline Phosphatase   


 


Ammonia   


 


Total Protein   


 


Albumin   


 


Amylase   


 


Urine Protein   


 


Urine Ketones   


 


Urine Bilirubin   


 


Ur Leukocyte Esterase   


 


Amorphous Sediment   


 


Urine Bacteria   


 


Urine Mucus   


 


U Tricyclic Antidepress   


 


U Benzodiazepines Scrn   


 


Crossmatch   














  20





  00:04 05:05 05:05


 


WBC   14.7 H 


 


RBC   2.46 L 


 


Hgb   8.6 L 


 


Hct   26.0 L 


 


MCV   105.5 H 


 


MCH   


 


RDW   24.2 H 


 


Plt Count   66 L 


 


Neutrophils #   12.0 H 


 


Neutrophils # (Manual)   


 


Lymphocytes #   


 


Macrocytosis   Marked A 


 


PT   


 


INR   


 


APTT   


 


ABG pH   


 


ABG pCO2   


 


ABG pO2   


 


ABG HCO3   


 


ABG Total CO2   


 


ABG O2 Saturation   


 


Sodium   


 


Potassium    3.3 L


 


Chloride    113 H


 


Carbon Dioxide    19 L


 


BUN    25 H


 


Creatinine   


 


Glucose    130 H


 


POC Glucose (mg/dL)  203 H  


 


Osmolality   


 


Plasma Lactic Acid Gagandeep   


 


Calcium    7.2 L


 


Phosphorus   


 


Total Bilirubin    11.3 H


 


AST    132 H


 


ALT   


 


Alkaline Phosphatase   


 


Ammonia   


 


Total Protein   


 


Albumin    2.3 L


 


Amylase   


 


Urine Protein   


 


Urine Ketones   


 


Urine Bilirubin   


 


Ur Leukocyte Esterase   


 


Amorphous Sediment   


 


Urine Bacteria   


 


Urine Mucus   


 


U Tricyclic Antidepress   


 


U Benzodiazepines Scrn   


 


Crossmatch   














  20





  05:30 05:30 06:59


 


WBC   


 


RBC   


 


Hgb   


 


Hct   


 


MCV   


 


MCH   


 


RDW   


 


Plt Count   


 


Neutrophils #   


 


Neutrophils # (Manual)   


 


Lymphocytes #   


 


Macrocytosis   


 


PT   20.8 H 


 


INR   2.1 H 


 


APTT   


 


ABG pH    7.33 L


 


ABG pCO2   


 


ABG pO2    67 L


 


ABG HCO3   


 


ABG Total CO2   


 


ABG O2 Saturation    92.0 L


 


Sodium   


 


Potassium   


 


Chloride   


 


Carbon Dioxide   


 


BUN   


 


Creatinine   


 


Glucose   


 


POC Glucose (mg/dL)   


 


Osmolality   


 


Plasma Lactic Acid Gagandeep   


 


Calcium   


 


Phosphorus   


 


Total Bilirubin   


 


AST   


 


ALT   


 


Alkaline Phosphatase   


 


Ammonia  80 H  


 


Total Protein   


 


Albumin   


 


Amylase   


 


Urine Protein   


 


Urine Ketones   


 


Urine Bilirubin   


 


Ur Leukocyte Esterase   


 


Amorphous Sediment   


 


Urine Bacteria   


 


Urine Mucus   


 


U Tricyclic Antidepress   


 


U Benzodiazepines Scrn   


 


Crossmatch   














  20





  12:01 00:00 04:30


 


WBC   


 


RBC   


 


Hgb   


 


Hct   


 


MCV   


 


MCH   


 


RDW   


 


Plt Count   


 


Neutrophils #   


 


Neutrophils # (Manual)   


 


Lymphocytes #   


 


Macrocytosis   


 


PT   


 


INR   


 


APTT   


 


ABG pH   


 


ABG pCO2   


 


ABG pO2   


 


ABG HCO3   


 


ABG Total CO2   


 


ABG O2 Saturation   


 


Sodium   


 


Potassium   


 


Chloride   


 


Carbon Dioxide   


 


BUN   


 


Creatinine   


 


Glucose   


 


POC Glucose (mg/dL)  130 H  130 H 


 


Osmolality   


 


Plasma Lactic Acid Gagandeep   


 


Calcium   


 


Phosphorus   


 


Total Bilirubin   


 


AST   


 


ALT   


 


Alkaline Phosphatase   


 


Ammonia    89 H


 


Total Protein   


 


Albumin   


 


Amylase   


 


Urine Protein   


 


Urine Ketones   


 


Urine Bilirubin   


 


Ur Leukocyte Esterase   


 


Amorphous Sediment   


 


Urine Bacteria   


 


Urine Mucus   


 


U Tricyclic Antidepress   


 


U Benzodiazepines Scrn   


 


Crossmatch   














  20





  04:30 04:30 04:30


 


WBC    14.1 H


 


RBC    2.49 L


 


Hgb    9.4 L


 


Hct    26.7 L


 


MCV    107.0 H


 


MCH    37.7 H


 


RDW    23.8 H


 


Plt Count    75 L


 


Neutrophils #    9.8 H


 


Neutrophils # (Manual)   


 


Lymphocytes #   


 


Macrocytosis    Marked A


 


PT   17.1 H 


 


INR   1.7 H 


 


APTT   


 


ABG pH   


 


ABG pCO2   


 


ABG pO2   


 


ABG HCO3   


 


ABG Total CO2   


 


ABG O2 Saturation   


 


Sodium   


 


Potassium  3.4 L  


 


Chloride  116 H  


 


Carbon Dioxide  19 L  


 


BUN  27 H  


 


Creatinine  1.32 H  


 


Glucose  121 H  


 


POC Glucose (mg/dL)   


 


Osmolality   


 


Plasma Lactic Acid Gagandeep   


 


Calcium  7.5 L  


 


Phosphorus   


 


Total Bilirubin  8.9 H  


 


AST  164 H  


 


ALT   


 


Alkaline Phosphatase   


 


Ammonia   


 


Total Protein   


 


Albumin  2.3 L  


 


Amylase   


 


Urine Protein   


 


Urine Ketones   


 


Urine Bilirubin   


 


Ur Leukocyte Esterase   


 


Amorphous Sediment   


 


Urine Bacteria   


 


Urine Mucus   


 


U Tricyclic Antidepress   


 


U Benzodiazepines Scrn   


 


Crossmatch   














  2020





  07:05 12:16 16:49


 


WBC   


 


RBC   


 


Hgb   


 


Hct   


 


MCV   


 


MCH   


 


RDW   


 


Plt Count   


 


Neutrophils #   


 


Neutrophils # (Manual)   


 


Lymphocytes #   


 


Macrocytosis   


 


PT   


 


INR   


 


APTT   


 


ABG pH   


 


ABG pCO2   


 


ABG pO2  65 L  


 


ABG HCO3   


 


ABG Total CO2   


 


ABG O2 Saturation  92.2 L  


 


Sodium   


 


Potassium   


 


Chloride   


 


Carbon Dioxide   


 


BUN   


 


Creatinine   


 


Glucose   


 


POC Glucose (mg/dL)   165 H  178 H


 


Osmolality   


 


Plasma Lactic Acid Gagandeep   


 


Calcium   


 


Phosphorus   


 


Total Bilirubin   


 


AST   


 


ALT   


 


Alkaline Phosphatase   


 


Ammonia   


 


Total Protein   


 


Albumin   


 


Amylase   


 


Urine Protein   


 


Urine Ketones   


 


Urine Bilirubin   


 


Ur Leukocyte Esterase   


 


Amorphous Sediment   


 


Urine Bacteria   


 


Urine Mucus   


 


U Tricyclic Antidepress   


 


U Benzodiazepines Scrn   


 


Crossmatch   














  20





  00:25 04:45 04:45


 


WBC   


 


RBC   


 


Hgb   


 


Hct   


 


MCV   


 


MCH   


 


RDW   


 


Plt Count   


 


Neutrophils #   


 


Neutrophils # (Manual)   


 


Lymphocytes #   


 


Macrocytosis   


 


PT    16.5 H


 


INR    1.7 H


 


APTT   


 


ABG pH   


 


ABG pCO2   


 


ABG pO2   


 


ABG HCO3   


 


ABG Total CO2   


 


ABG O2 Saturation   


 


Sodium   


 


Potassium   


 


Chloride   


 


Carbon Dioxide   


 


BUN   


 


Creatinine   


 


Glucose   


 


POC Glucose (mg/dL)  183 H  


 


Osmolality   


 


Plasma Lactic Acid Gagandeep   


 


Calcium   


 


Phosphorus   


 


Total Bilirubin   


 


AST   


 


ALT   


 


Alkaline Phosphatase   


 


Ammonia   115 H 


 


Total Protein   


 


Albumin   


 


Amylase   


 


Urine Protein   


 


Urine Ketones   


 


Urine Bilirubin   


 


Ur Leukocyte Esterase   


 


Amorphous Sediment   


 


Urine Bacteria   


 


Urine Mucus   


 


U Tricyclic Antidepress   


 


U Benzodiazepines Scrn   


 


Crossmatch   














  20





  04:45 04:45 08:43


 


WBC  11.0 H  


 


RBC  2.44 L  


 


Hgb  8.5 L  


 


Hct  26.2 L  


 


MCV  107.3 H  


 


MCH   


 


RDW  23.3 H  


 


Plt Count  75 L  


 


Neutrophils #  8.9 H  


 


Neutrophils # (Manual)   


 


Lymphocytes #   


 


Macrocytosis  Marked A  


 


PT   


 


INR   


 


APTT   


 


ABG pH   


 


ABG pCO2   


 


ABG pO2    68 L


 


ABG HCO3   


 


ABG Total CO2   


 


ABG O2 Saturation    93.1 L


 


Sodium   


 


Potassium   


 


Chloride   118 H 


 


Carbon Dioxide   21 L 


 


BUN   33 H 


 


Creatinine   1.40 H 


 


Glucose   164 H 


 


POC Glucose (mg/dL)   


 


Osmolality   


 


Plasma Lactic Acid Gagandeep   


 


Calcium   7.7 L 


 


Phosphorus   


 


Total Bilirubin   


 


AST   


 


ALT   


 


Alkaline Phosphatase   


 


Ammonia   


 


Total Protein   


 


Albumin   


 


Amylase   


 


Urine Protein   


 


Urine Ketones   


 


Urine Bilirubin   


 


Ur Leukocyte Esterase   


 


Amorphous Sediment   


 


Urine Bacteria   


 


Urine Mucus   


 


U Tricyclic Antidepress   


 


U Benzodiazepines Scrn   


 


Crossmatch   














  20





  16:16 18:57 23:41


 


WBC   


 


RBC   


 


Hgb   


 


Hct   


 


MCV   


 


MCH   


 


RDW   


 


Plt Count   


 


Neutrophils #   


 


Neutrophils # (Manual)   


 


Lymphocytes #   


 


Macrocytosis   


 


PT   


 


INR   


 


APTT   


 


ABG pH   


 


ABG pCO2   


 


ABG pO2   


 


ABG HCO3   


 


ABG Total CO2   


 


ABG O2 Saturation   


 


Sodium   


 


Potassium   


 


Chloride   


 


Carbon Dioxide   


 


BUN   


 


Creatinine   


 


Glucose   


 


POC Glucose (mg/dL)  151 H  150 H  148 H


 


Osmolality   


 


Plasma Lactic Acid Gagandeep   


 


Calcium   


 


Phosphorus   


 


Total Bilirubin   


 


AST   


 


ALT   


 


Alkaline Phosphatase   


 


Ammonia   


 


Total Protein   


 


Albumin   


 


Amylase   


 


Urine Protein   


 


Urine Ketones   


 


Urine Bilirubin   


 


Ur Leukocyte Esterase   


 


Amorphous Sediment   


 


Urine Bacteria   


 


Urine Mucus   


 


U Tricyclic Antidepress   


 


U Benzodiazepines Scrn   


 


Crossmatch   














  20





  04:40 04:40 07:42


 


WBC   


 


RBC  2.43 L  


 


Hgb  8.4 L  


 


Hct  26.2 L  


 


MCV  108.0 H  


 


MCH   


 


RDW  23.7 H  


 


Plt Count  71 L  


 


Neutrophils #   


 


Neutrophils # (Manual)   


 


Lymphocytes #   


 


Macrocytosis  Marked A  


 


PT   


 


INR   


 


APTT   


 


ABG pH   


 


ABG pCO2   


 


ABG pO2   


 


ABG HCO3   


 


ABG Total CO2   


 


ABG O2 Saturation   


 


Sodium   146 H 


 


Potassium   


 


Chloride   120 H 


 


Carbon Dioxide   


 


BUN   49 H 


 


Creatinine   1.46 H 


 


Glucose   133 H 


 


POC Glucose (mg/dL)    152 H


 


Osmolality   


 


Plasma Lactic Acid Gagandeep   


 


Calcium   7.8 L 


 


Phosphorus   4.6 H 


 


Total Bilirubin   5.8 H 


 


AST   130 H 


 


ALT   


 


Alkaline Phosphatase   


 


Ammonia   


 


Total Protein   


 


Albumin   2.3 L 


 


Amylase   


 


Urine Protein   


 


Urine Ketones   


 


Urine Bilirubin   


 


Ur Leukocyte Esterase   


 


Amorphous Sediment   


 


Urine Bacteria   


 


Urine Mucus   


 


U Tricyclic Antidepress   


 


U Benzodiazepines Scrn   


 


Crossmatch   














  20





  07:59 08:30 12:56


 


WBC   


 


RBC   


 


Hgb   


 


Hct   


 


MCV   


 


MCH   


 


RDW   


 


Plt Count   


 


Neutrophils #   


 


Neutrophils # (Manual)   


 


Lymphocytes #   


 


Macrocytosis   


 


PT   


 


INR   


 


APTT   


 


ABG pH   


 


ABG pCO2  34 L  


 


ABG pO2  119 H  


 


ABG HCO3   


 


ABG Total CO2   


 


ABG O2 Saturation  99.2 H  


 


Sodium   


 


Potassium   


 


Chloride   


 


Carbon Dioxide   


 


BUN   


 


Creatinine   


 


Glucose   


 


POC Glucose (mg/dL)    145 H


 


Osmolality   


 


Plasma Lactic Acid Gagandeep   


 


Calcium   


 


Phosphorus   


 


Total Bilirubin   


 


AST   


 


ALT   


 


Alkaline Phosphatase   


 


Ammonia   69 H 


 


Total Protein   


 


Albumin   


 


Amylase   


 


Urine Protein   


 


Urine Ketones   


 


Urine Bilirubin   


 


Ur Leukocyte Esterase   


 


Amorphous Sediment   


 


Urine Bacteria   


 


Urine Mucus   


 


U Tricyclic Antidepress   


 


U Benzodiazepines Scrn   


 


Crossmatch   














  20





  17:04 17:53 23:49


 


WBC   


 


RBC   


 


Hgb   


 


Hct   


 


MCV   


 


MCH   


 


RDW   


 


Plt Count   


 


Neutrophils #   


 


Neutrophils # (Manual)   


 


Lymphocytes #   


 


Macrocytosis   


 


PT   


 


INR   


 


APTT   


 


ABG pH   


 


ABG pCO2   


 


ABG pO2   


 


ABG HCO3   


 


ABG Total CO2   


 


ABG O2 Saturation   


 


Sodium   


 


Potassium   


 


Chloride   


 


Carbon Dioxide   


 


BUN   


 


Creatinine   


 


Glucose   


 


POC Glucose (mg/dL)  158 H  160 H  189 H


 


Osmolality   


 


Plasma Lactic Acid Gagandeep   


 


Calcium   


 


Phosphorus   


 


Total Bilirubin   


 


AST   


 


ALT   


 


Alkaline Phosphatase   


 


Ammonia   


 


Total Protein   


 


Albumin   


 


Amylase   


 


Urine Protein   


 


Urine Ketones   


 


Urine Bilirubin   


 


Ur Leukocyte Esterase   


 


Amorphous Sediment   


 


Urine Bacteria   


 


Urine Mucus   


 


U Tricyclic Antidepress   


 


U Benzodiazepines Scrn   


 


Crossmatch   














  20





  04:40 04:40 04:40


 


WBC   


 


RBC  2.29 L  


 


Hgb  8.4 L  


 


Hct  25.4 L  


 


MCV  110.7 H  


 


MCH  36.5 H  


 


RDW  24.2 H  


 


Plt Count  69 L  


 


Neutrophils #   


 


Neutrophils # (Manual)   


 


Lymphocytes #   


 


Macrocytosis  Marked A  


 


PT   


 


INR   


 


APTT   


 


ABG pH   


 


ABG pCO2   


 


ABG pO2   


 


ABG HCO3   


 


ABG Total CO2   


 


ABG O2 Saturation   


 


Sodium   150 H 


 


Potassium   2.8 L 


 


Chloride   123 H 


 


Carbon Dioxide   


 


BUN   61 H 


 


Creatinine   1.43 H 


 


Glucose   153 H 


 


POC Glucose (mg/dL)   


 


Osmolality   


 


Plasma Lactic Acid Gagandeep   


 


Calcium   7.9 L 


 


Phosphorus   


 


Total Bilirubin   5.5 H 


 


AST   116 H 


 


ALT   


 


Alkaline Phosphatase   


 


Ammonia    73 H


 


Total Protein   6.0 L 


 


Albumin   2.2 L 


 


Amylase   


 


Urine Protein   


 


Urine Ketones   


 


Urine Bilirubin   


 


Ur Leukocyte Esterase   


 


Amorphous Sediment   


 


Urine Bacteria   


 


Urine Mucus   


 


U Tricyclic Antidepress   


 


U Benzodiazepines Scrn   


 


Crossmatch   














  20





  05:37 07:23 08:35


 


WBC   


 


RBC   


 


Hgb   


 


Hct   


 


MCV   


 


MCH   


 


RDW   


 


Plt Count   


 


Neutrophils #   


 


Neutrophils # (Manual)   


 


Lymphocytes #   


 


Macrocytosis   


 


PT    14.4 H


 


INR    1.5 H


 


APTT   


 


ABG pH   


 


ABG pCO2   


 


ABG pO2   


 


ABG HCO3   


 


ABG Total CO2   25 H 


 


ABG O2 Saturation   98.2 H 


 


Sodium   


 


Potassium   


 


Chloride   


 


Carbon Dioxide   


 


BUN   


 


Creatinine   


 


Glucose   


 


POC Glucose (mg/dL)  185 H  


 


Osmolality   


 


Plasma Lactic Acid Gagandeep   


 


Calcium   


 


Phosphorus   


 


Total Bilirubin   


 


AST   


 


ALT   


 


Alkaline Phosphatase   


 


Ammonia   


 


Total Protein   


 


Albumin   


 


Amylase   


 


Urine Protein   


 


Urine Ketones   


 


Urine Bilirubin   


 


Ur Leukocyte Esterase   


 


Amorphous Sediment   


 


Urine Bacteria   


 


Urine Mucus   


 


U Tricyclic Antidepress   


 


U Benzodiazepines Scrn   


 


Crossmatch   














  20





  12:02 18:10 00:01


 


WBC   


 


RBC   


 


Hgb   


 


Hct   


 


MCV   


 


MCH   


 


RDW   


 


Plt Count   


 


Neutrophils #   


 


Neutrophils # (Manual)   


 


Lymphocytes #   


 


Macrocytosis   


 


PT   


 


INR   


 


APTT   


 


ABG pH   


 


ABG pCO2   


 


ABG pO2   


 


ABG HCO3   


 


ABG Total CO2   


 


ABG O2 Saturation   


 


Sodium   


 


Potassium   


 


Chloride   


 


Carbon Dioxide   


 


BUN   


 


Creatinine   


 


Glucose   


 


POC Glucose (mg/dL)  156 H  196 H  205 H


 


Osmolality   


 


Plasma Lactic Acid Gagandeep   


 


Calcium   


 


Phosphorus   


 


Total Bilirubin   


 


AST   


 


ALT   


 


Alkaline Phosphatase   


 


Ammonia   


 


Total Protein   


 


Albumin   


 


Amylase   


 


Urine Protein   


 


Urine Ketones   


 


Urine Bilirubin   


 


Ur Leukocyte Esterase   


 


Amorphous Sediment   


 


Urine Bacteria   


 


Urine Mucus   


 


U Tricyclic Antidepress   


 


U Benzodiazepines Scrn   


 


Crossmatch   














  20





  04:40 04:40 04:40


 


WBC  10.8 H  


 


RBC  2.28 L  


 


Hgb  8.4 L  


 


Hct  25.4 L  


 


MCV  111.7 H  


 


MCH  36.9 H  


 


RDW  23.7 H  


 


Plt Count  58 L  


 


Neutrophils #   


 


Neutrophils # (Manual)   


 


Lymphocytes #   


 


Macrocytosis  Marked A  


 


PT   


 


INR   


 


APTT   


 


ABG pH   


 


ABG pCO2   


 


ABG pO2   


 


ABG HCO3   


 


ABG Total CO2   


 


ABG O2 Saturation   


 


Sodium   156 H 


 


Potassium   2.8 L 


 


Chloride   127 H 


 


Carbon Dioxide   


 


BUN   69 H 


 


Creatinine   1.32 H 


 


Glucose   157 H 


 


POC Glucose (mg/dL)   


 


Osmolality   


 


Plasma Lactic Acid Gagandeep   


 


Calcium   8.1 L 


 


Phosphorus   


 


Total Bilirubin   6.1 H 


 


AST   111 H 


 


ALT   


 


Alkaline Phosphatase   


 


Ammonia    64 H


 


Total Protein   6.0 L 


 


Albumin   2.2 L 


 


Amylase   


 


Urine Protein   


 


Urine Ketones   


 


Urine Bilirubin   


 


Ur Leukocyte Esterase   


 


Amorphous Sediment   


 


Urine Bacteria   


 


Urine Mucus   


 


U Tricyclic Antidepress   


 


U Benzodiazepines Scrn   


 


Crossmatch   














  20





  05:55 07:19 11:58


 


WBC   


 


RBC   


 


Hgb   


 


Hct   


 


MCV   


 


MCH   


 


RDW   


 


Plt Count   


 


Neutrophils #   


 


Neutrophils # (Manual)   


 


Lymphocytes #   


 


Macrocytosis   


 


PT   


 


INR   


 


APTT   


 


ABG pH   7.47 H 


 


ABG pCO2   


 


ABG pO2   


 


ABG HCO3   26 H 


 


ABG Total CO2   27 H 


 


ABG O2 Saturation   97.4 H 


 


Sodium   


 


Potassium   


 


Chloride   


 


Carbon Dioxide   


 


BUN   


 


Creatinine   


 


Glucose   


 


POC Glucose (mg/dL)  175 H   197 H


 


Osmolality   


 


Plasma Lactic Acid Gagandeep   


 


Calcium   


 


Phosphorus   


 


Total Bilirubin   


 


AST   


 


ALT   


 


Alkaline Phosphatase   


 


Ammonia   


 


Total Protein   


 


Albumin   


 


Amylase   


 


Urine Protein   


 


Urine Ketones   


 


Urine Bilirubin   


 


Ur Leukocyte Esterase   


 


Amorphous Sediment   


 


Urine Bacteria   


 


Urine Mucus   


 


U Tricyclic Antidepress   


 


U Benzodiazepines Scrn   


 


Crossmatch   














  20





  18:05 00:26 05:19


 


WBC   


 


RBC   


 


Hgb   


 


Hct   


 


MCV   


 


MCH   


 


RDW   


 


Plt Count   


 


Neutrophils #   


 


Neutrophils # (Manual)   


 


Lymphocytes #   


 


Macrocytosis   


 


PT   


 


INR   


 


APTT   


 


ABG pH   


 


ABG pCO2   


 


ABG pO2   


 


ABG HCO3    27 H


 


ABG Total CO2    28 H


 


ABG O2 Saturation    97.4 H


 


Sodium   


 


Potassium   


 


Chloride   


 


Carbon Dioxide   


 


BUN   


 


Creatinine   


 


Glucose   


 


POC Glucose (mg/dL)  230 H  184 H 


 


Osmolality   


 


Plasma Lactic Acid Gagandeep   


 


Calcium   


 


Phosphorus   


 


Total Bilirubin   


 


AST   


 


ALT   


 


Alkaline Phosphatase   


 


Ammonia   


 


Total Protein   


 


Albumin   


 


Amylase   


 


Urine Protein   


 


Urine Ketones   


 


Urine Bilirubin   


 


Ur Leukocyte Esterase   


 


Amorphous Sediment   


 


Urine Bacteria   


 


Urine Mucus   


 


U Tricyclic Antidepress   


 


U Benzodiazepines Scrn   


 


Crossmatch   














  20





  06:15 06:15 06:15


 


WBC  14.0 H  


 


RBC  2.24 L  


 


Hgb  8.0 L  


 


Hct  25.1 L  


 


MCV  112.0 H  


 


MCH  35.7 H  


 


RDW  23.1 H  


 


Plt Count  62 L  


 


Neutrophils #  12.1 H  


 


Neutrophils # (Manual)   


 


Lymphocytes #  0.8 L  


 


Macrocytosis  Marked A  


 


PT   


 


INR   


 


APTT   


 


ABG pH   


 


ABG pCO2   


 


ABG pO2   


 


ABG HCO3   


 


ABG Total CO2   


 


ABG O2 Saturation   


 


Sodium   159 H 


 


Potassium   3.3 L 


 


Chloride   128 H 


 


Carbon Dioxide   


 


BUN   78 H 


 


Creatinine   


 


Glucose   138 H 


 


POC Glucose (mg/dL)   


 


Osmolality    365 H*


 


Plasma Lactic Acid Gagandeep   


 


Calcium   8.1 L 


 


Phosphorus   


 


Total Bilirubin   


 


AST   


 


ALT   


 


Alkaline Phosphatase   


 


Ammonia   


 


Total Protein   


 


Albumin   


 


Amylase   


 


Urine Protein   


 


Urine Ketones   


 


Urine Bilirubin   


 


Ur Leukocyte Esterase   


 


Amorphous Sediment   


 


Urine Bacteria   


 


Urine Mucus   


 


U Tricyclic Antidepress   


 


U Benzodiazepines Scrn   


 


Crossmatch   














  20





  10:00 11:55 17:40


 


WBC   


 


RBC   


 


Hgb   


 


Hct   


 


MCV   


 


MCH   


 


RDW   


 


Plt Count   


 


Neutrophils #   


 


Neutrophils # (Manual)   


 


Lymphocytes #   


 


Macrocytosis   


 


PT   


 


INR   


 


APTT   


 


ABG pH   


 


ABG pCO2   


 


ABG pO2   


 


ABG HCO3   


 


ABG Total CO2   


 


ABG O2 Saturation   


 


Sodium   


 


Potassium   


 


Chloride   


 


Carbon Dioxide   


 


BUN   


 


Creatinine   


 


Glucose   


 


POC Glucose (mg/dL)   217 H  189 H


 


Osmolality   


 


Plasma Lactic Acid Gagandeep   


 


Calcium   


 


Phosphorus   


 


Total Bilirubin   


 


AST   


 


ALT   


 


Alkaline Phosphatase   


 


Ammonia  44 H  


 


Total Protein   


 


Albumin   


 


Amylase   


 


Urine Protein   


 


Urine Ketones   


 


Urine Bilirubin   


 


Ur Leukocyte Esterase   


 


Amorphous Sediment   


 


Urine Bacteria   


 


Urine Mucus   


 


U Tricyclic Antidepress   


 


U Benzodiazepines Scrn   


 


Crossmatch   














  20





  23:42 04:30 04:30


 


WBC   


 


RBC   


 


Hgb   


 


Hct   


 


MCV   


 


MCH   


 


RDW   


 


Plt Count   


 


Neutrophils #   


 


Neutrophils # (Manual)   


 


Lymphocytes #   


 


Macrocytosis   


 


PT   


 


INR   


 


APTT   


 


ABG pH   


 


ABG pCO2   


 


ABG pO2   


 


ABG HCO3   


 


ABG Total CO2   


 


ABG O2 Saturation   


 


Sodium   149 H 


 


Potassium   3.4 L 


 


Chloride   121 H 


 


Carbon Dioxide   


 


BUN   77 H 


 


Creatinine   


 


Glucose   200 H 


 


POC Glucose (mg/dL)  226 H  


 


Osmolality   


 


Plasma Lactic Acid Gagandeep   


 


Calcium   8.1 L 


 


Phosphorus   


 


Total Bilirubin   6.3 H 


 


AST   127 H 


 


ALT   61 H 


 


Alkaline Phosphatase   


 


Ammonia    47 H


 


Total Protein   6.1 L 


 


Albumin   2.3 L 


 


Amylase   


 


Urine Protein   


 


Urine Ketones   


 


Urine Bilirubin   


 


Ur Leukocyte Esterase   


 


Amorphous Sediment   


 


Urine Bacteria   


 


Urine Mucus   


 


U Tricyclic Antidepress   


 


U Benzodiazepines Scrn   


 


Crossmatch   














  20





  04:30 05:03 05:44


 


WBC  16.2 H  


 


RBC  2.22 L  


 


Hgb  8.1 L  


 


Hct  25.3 L  


 


MCV  114.0 H  


 


MCH  36.7 H  


 


RDW  22.7 H  


 


Plt Count  49 L  


 


Neutrophils #  14.5 H  


 


Neutrophils # (Manual)   


 


Lymphocytes #   


 


Macrocytosis  Marked A  


 


PT   


 


INR   


 


APTT   


 


ABG pH   7.47 H 


 


ABG pCO2   34 L 


 


ABG pO2   113 H 


 


ABG HCO3   


 


ABG Total CO2   26 H 


 


ABG O2 Saturation   98.9 H 


 


Sodium   


 


Potassium   


 


Chloride   


 


Carbon Dioxide   


 


BUN   


 


Creatinine   


 


Glucose   


 


POC Glucose (mg/dL)    205 H


 


Osmolality   


 


Plasma Lactic Acid Gagandeep   


 


Calcium   


 


Phosphorus   


 


Total Bilirubin   


 


AST   


 


ALT   


 


Alkaline Phosphatase   


 


Ammonia   


 


Total Protein   


 


Albumin   


 


Amylase   


 


Urine Protein   


 


Urine Ketones   


 


Urine Bilirubin   


 


Ur Leukocyte Esterase   


 


Amorphous Sediment   


 


Urine Bacteria   


 


Urine Mucus   


 


U Tricyclic Antidepress   


 


U Benzodiazepines Scrn   


 


Crossmatch   














  20





  11:22 12:37


 


WBC  


 


RBC  


 


Hgb  


 


Hct  


 


MCV  


 


MCH  


 


RDW  


 


Plt Count  


 


Neutrophils #  


 


Neutrophils # (Manual)  


 


Lymphocytes #  


 


Macrocytosis  


 


PT  


 


INR  


 


APTT  


 


ABG pH  


 


ABG pCO2  


 


ABG pO2  


 


ABG HCO3  


 


ABG Total CO2  


 


ABG O2 Saturation  


 


Sodium  


 


Potassium  


 


Chloride  


 


Carbon Dioxide  


 


BUN  


 


Creatinine  


 


Glucose  


 


POC Glucose (mg/dL)  183 H  166 H


 


Osmolality  


 


Plasma Lactic Acid Gagandeep  


 


Calcium  


 


Phosphorus  


 


Total Bilirubin  


 


AST  


 


ALT  


 


Alkaline Phosphatase  


 


Ammonia  


 


Total Protein  


 


Albumin  


 


Amylase  


 


Urine Protein  


 


Urine Ketones  


 


Urine Bilirubin  


 


Ur Leukocyte Esterase  


 


Amorphous Sediment  


 


Urine Bacteria  


 


Urine Mucus  


 


U Tricyclic Antidepress  


 


U Benzodiazepines Scrn  


 


Crossmatch  














Assessment and Plan


Assessment: 





* Altered mental status, likely related to hepatic and/or toxic metabolic 

  encephalopathy.


* Alcoholic liver disease with hepatic cirrhosis and ascites, anasarca.


* Status post acute kidney injury, septic shock.


* Ventilator-dependent respiratory failure, status post tracheostomy.


Plan: 





* We will check EEG to evaluate for encephalopathy, rule out subclinical status.


* Your medical management.


* We will follow.

## 2020-07-07 NOTE — XR
EXAMINATION TYPE: XR chest 1V portable

 

DATE OF EXAM: 7/7/2020

 

COMPARISON: 7/6/2020

 

INDICATION: ICU management, difficulty breathing

 

TECHNIQUE: Single frontal view of the chest is obtained.

 

FINDINGS:  

The heart size is normal.  

The pulmonary vasculature is normal.  

There is some increased infiltrate along the left base. This is improving. Correlate for atelectasis 
or resolving pneumonia.  

Endotracheal tube tip is above the clive. Nasogastric tube tip is in the left upper quadrant of the 
abdomen.

 

IMPRESSION:  

1. Improving left lower lobe infiltrate. Correlate for atelectasis and resolving pneumonia.

2. Lines and catheters discussed above

## 2020-07-07 NOTE — P.PN
Subjective


Progress Note Date: 07/07/20


Principal diagnosis: 


 Acute liver failure, acute hypoxic respiratory failure





This is a 54-year-old white male, history of alcohol abuse, patient was brought 

into the ER with change in mental status according to EMS.  Patient could not 

give any history, he was a very poor historian upon arrival to the ER.  Patient 

is supposedly a daily drinker, however from my review of the chart, I saw this 

patient back on 3/17/20, patient presented back then with multiple posterior 

lateral rib fractures on the right.  Apparently he fell in his bathroom, and 

landed on the side of the top sustaining multiple rib fractures.  Patient 

sustained rib fractures of 910 and 11 ribs.  Patient is also known to have 

history of severe emphysema/COPD.  He is legally blind.  He has history of 

pericarditis, anxiety, and history of degenerative joint disease.  Patient was 

discharged home on 3/18/20.  Drug screen in the ER was positive for tricyclic 

antidepressants and positive for benzodiazepines.  Rest of the drug screen was 

basically negative.  ABG in the ER showed a pO2 of 63 pCO2 of 29 pH of 7.49.  

Patient was also noted to have elevated INR of 3.7.  Low hemoglobin of 7.0 

although his baseline hemoglobin from 2 months ago was 15.1.  Ammonia level was 

91.  Total bilirubin was 14.9, and his liver enzymes were a bit elevated.  

Normal amylase and lipase were noted.  Gallbladder ultrasound showed abdominal 

ascites.  Chest x-ray showed bilateral diffuse infiltrates.  Patient was 

presumed septic upon presentation, received fluid boluses of 2500 ML's.  Patient

was found to have hepatic encephalopathy liver failure, bilateral pneumonia, GI 

hemorrhage and sepsis.  Early this morning, patient was intubated, placed on 

mechanical ventilation, and he was aware of the patient since admission.  

Presently the patient is on assist control rate of 28 tidal volume is 500 FiO2 

is 100% PEEP of 5.  ABG showed a pO2 of 170 pCO2 of 31 pH of 7.46.  Hence I cut 

down the FiO2 to 50%, increased the PEEP to 8, cut down the tidal volume to 450,

and decrease the respiratory rate to 26.  Patient is on Sandostatin for his GI 

bleeding, and he is yet to be seen by gastroenterology on consultation is also 

on Protonix.  He is on lactulose for his elevated ammonia level.  And he is 

empirically on Zosyn for presumptive aspiration pneumonia.  Echocardiogram 

showed evidence of good LV function.  And mild valvular heart disease.  BNP 

level was borderline elevated at 910





Patient was reevaluated today on 6/30/20, remains on mechanical ventilation.  He

is presently on assist control rate of 26 tidal volume is 450 FiO2 of 60% and 

PEEP of 8.  However I increased his PEEP to 12 and cut down his FiO2 to 50%.  At

night the patient developed worsening agitation, and Stacking his breaths, was 

not synchronous with mechanical ventilation, hence had to place the patient on 

Nimbex.  Today I plan to discontinue Nimbex and place him on fentanyl 25 g per 

hour.  Patient will be started on tube feeding, and I cut down his IV fluid to 

50 MLS per hour.  Remains on propofol at 60 mcg/kg/m, Nimbex which will be disco

ntinued, but creatinine which was discontinued this morning, and norepinephrine 

at 0.06 mcg/kg/m.  Enteral feeding to be started today by nutritionist.  Patient

remains on antibiotics, remains on lactulose for his elevated ammonia level 

which is 80 today.  And he remains on Protonix.  Chest x-ray showed more 

consolidation noted bilaterally in both lungs more so in the left lung.  

Bilateraldisease is noted consistent with aspiration pneumonia.  Sputum cultures

and blood cultures so far remain on diagnostic.  ABG today showed a pO2 of 67 

pCO2 of 39 pH of 7.33.  WBC count is 14.7 hemoglobin is 8.6 platelets are 66,000

and INR is 2.1.  Electrolytes are basically normal renal profile showed beer of 

25 creatinine 0.94.





Patient was reevaluated today on 7/1/20, remains in the intensive care unit, 

remains on mechanical ventilation.  His ventilator settings are assist control 

rate of 26, tidal volume is 450 FiO2 is 50% and PEEP is at 12.  ABG showed a pO2

of 65 pCO2 of 37 pH of 7.36, hence no change was made in the ventilator 

settings.  Patient remains on norepinephrine at 0.09 mcg/kg/m, propofol at 45 

mcg/kg/m, fentanyl at 0.5 mcg/kg/h.  Remains on enteral feeding at 30 MLS per 

hour goal is 45.  Remains on Zosyn for empiric coverage of aspiration pneumonia.

 Remains on lactulose and the dose was increased to 45 ML 4 times a day, and 

added Xifaxan at 550 mg twice a day.  His lactulose does not seem to be inducing

enough diarrhea did get his ammonia level down.  Continues to have significantly

elevated ammonia level in spite of lactulose for the last 2 days.  Patient 

continues to have ascites.  And no plans for paracentesis as recommended by 

gastroenterology at this point.  Chest x-ray showed very minimal improvement if 

any in his bilateral infiltrates.  The bilateral infiltrates are suggestive of 

aspiration pneumonia.  Obviously the patient is known to have history of 

alcoholism, and he presented with alcoholic liver hepatitis, and hepatic 

encephalopathy.  Labs today were all reviewed WBC is 14.1 hemoglobin is 9.4 

platelets are 75,000 INR is 1.7.  Electrolytes are unremarkable except for low 

potassium of 3.4 BUN of 27 creatinine 1.32.  Liver enzymes are borderline elevat

ed.  Total bilirubin is improving down to 8.9 today.  Albumin is 2.3





Patient was reevaluated today on 7/2/20, remains in the ICU, intubated, 

mechanically ventilated.  Patient is on assist control rate of 26 tidal volume 

450 FiO2 50% and PEEP is 12.  Chest x-ray is showing definite improvement, flower

mayra his ABG is basically about the same with a pO2 of 68 pCO2 of 38 and pH of 

7.38.  His ammonia level is on the rise in spite of the fact that the patient is

on relatively high dose of lactulose and he is also on Xifaxan.  Patient remains

on propofol at 40 mcg/kg/m, fentanyl at 0.5 mcg/kg/h, he is on very small dose 

of norepinephrine at 0.01 mcg/kg/m, IV fluid is at KVO, and he is also on Zosyn 

for presumptive aspiration pneumonia.  Remains on diuretics in the form of 

Lasix, Aldactone.  His Lasix was increased to 40 mg 3 times a day, and he is on 

Aldactone via nasogastric tube.  Patient was taken off sedation today, however 

he became extremely agitated, tachypneic, tachycardic, hence we had to place him

back on sedation and continued assist control mode of mechanical ventilation.  

Chest x-ray again showed improvement.  WBC count today is 11 hemoglobin is 8.5 

his INR is 1.7 basic metabolic profile is improving, creatinine however is up to

1.40, and the patient obviously developed acute kidney injury.  This could very 

well be related to his sepsis, and related to his hypotension requiring 

norepinephrine.  Cultures including blood cultures and sputum cultures have been

negative.





Patient was reevaluated today in the ICU, remains intubated and mechanically 

ventilated.  His ventilator settings are assist control rate of 26 tidal volume 

is 450 FiO2 is 50% and PEEP is at 12 chest x-ray showed dramatic improvement.  

His ABG is significantly improved hence I recommended cutting down the PEEP to 

8.  His ammonia level is significantly improved today, it is down to 69.  Sodium

seems to be creeping up a little bit, and I recommended free water flushes via 

orogastric tube.  Patient is on propofol at 50 mcg/kg/m, his tube feeding is 

presently on hold because of increased residual.  Patient was given a sedation 

holiday, however when he went off propofol, the patient became extremely 

agitated, restless, biting on the endotracheal tube, tachycardic, tachypneic, 

and was extremely agitated.  Hence I recommended placing him back on propofol, 

and not quite ready for weaning.  Labs were all reviewed today.  And addressed 

accordingly including his elevated sodium and elevated BUN of 49 creatinine 

1.46.  Chest x-ray showed significant improvement in his bilateral 

infiltrates/aspiration pneumonia.





Patient was reevaluated today on 7/4/20, remains in the ICU, intubated and 

mechanically ventilated.  His ventilator settings are assist control rate of 26 

FiO2 50% tidal volume of 450 PEEP is at 8 and today I cut down his FiO2 to 45% 

and kept him on a PEEP of 8.  ABG this morning showed a pO2 of 102 pCO2 of 35 pH

of 7.45.  Patient has significantly elevated sodium today of 150 potassium is 

low at 2.8 ammonia level remains high at 73.  Patient is not requiring any 

pressors, he is on propofol at 34 mcg/kg/m.  Patient is on enteral feeding.  I 

do plan to wean the patient off propofol sometime today, and assess mental 

status again.  This was attempted yesterday, but the patient didn't do well, I 

have also recommended free water flushes to correct his hyper natremia.





The patient is seen today 07/05/2020 in follow-up in the intensive care unit.  

He remains intubated on mechanical vent.  Current settings are assist control at

a rate of 26, FiO2 45% tidal volume 450 and a PEEP of 8.  Morning blood gases 

reveal a P O2 of 93, pCO2 of 35 and a pH of 7.47.  Chest x-ray reveals stable 

bilateral atelectasis/infiltrate.  He is sedated on propofol at 50 mcg/kg/m.  

Antibiotics in the form of Zosyn.  He remains on IV diuretics.  He is tolerating

his tube feedings.  He remains hypernatremic despite free water.  IVs changed to

D5W at 75 ML's per hour.  Sodium 156.  Potassium 2.8.  Creatinine 1.32.  Glucose

157.  White count 10.8.  Hemoglobin 8.4.  Platelet count 58,000.





On 07/06/2020 patient seen in follow-up in the intensive care unit, he remains 

sedated, intubated on mechanical ventilator, current vent settings are assist 

control mode of ventilation with a rate of 12, tidal volume is 450, FiO2 45% and

PEEP of 5, this morning blood gases show pO2 of 93, pCO2 38, pH of 7.45.  

Current IV fluids include D5 W5 to 75 ML per hour, and Diprivan at 45 mics per 

kilo per minute, no vasoactive drips, tube feedings of vital high protein at a 

rate of 45 with a goal of 45.  Today's chest x-ray has been reviewed showing 

stable appearance of the bibasilar opacities.  T-max in the last 24 hours was 

99.7F, afebrile this morning, 1 sputum cultures have shown no growth, today's 

blood work has been reviewed, limits according to 14.0, hemoglobin of 8.0, 

platelet count is 62, sodium is 159 Asian is on D5W at 75 ML per hour, in 

addition to free water flushes with 200 mL every 4 hours, patient continues on 

lactulose and rifaximin, and is having lactulose induced diarrhea and he had 2.4

L in liquid stool output in the last 24 hours.  Patient apparently has failed 

spontaneous breathing trials for last 4 days.  We will proceed with a 

spontaneous awakening and spontaneous breathing trials again today, we will 

speak to the family about placement of tracheostomy and PEG tube insertion. 





On 07/07/2020 patient is seen in follow-up in intensive care unit, he remains 

sedated, intubated on mechanical ventilator, current vent settings are assist-

control with a rate of 12, tidal vital 450, FiO2 is 45%, and PEEP of 8, this 

morning blood gases show pO2 of 113, pCO2 is 34, and pH of 7.47, PEEP has been 

dropped to 5.  IV fluids include D5 W at 125 ML per hour, Diprivan is a 40 mics 

per kilo per minute, no vasoactive drips, tube feedings are on hold patient was 

on vital high protein at 45 with a goal of 45 and 400 ML every 4 hours water 

flushes.  Patient was given a sedation holiday yesterday, he never really fully 

awakened, he was placed on pressure-support of 8, he tolerated it for 3 hours, 

however there he became predicted, and was placed back on assist control mode of

ventilation, yesterday we spoke to the patient's wife who was a continue with 

full code CODE STATUS and would like to proceed with tracheostomy and PEG tube 

placement.  Today's chest x-ray has been reviewed and is relatively stable, 

accounting for technical difficulties, bibasilar opacities and atelectases and 

pleural effusions.  We discontinued patient's Lasix and Aldactone yesterday in 

view of his hypernatremia, patient continues to have large liquid stool output, 

4.7 L in the last 24 hours, continues on lactulose and rifaximin.  Yesterday's 

ammonia level was 44, today it is 47.  Today's blood work has been reviewed











Objective





- Vital Signs


Vital signs: 


                                   Vital Signs











Temp  99.1 F   07/07/20 04:00


 


Pulse  92   07/07/20 07:52


 


Resp  21   07/07/20 07:00


 


BP  108/60   07/05/20 07:00


 


Pulse Ox  100   07/07/20 07:00








                                 Intake & Output











 07/06/20 07/07/20 07/07/20





 18:59 06:59 18:59


 


Intake Total 2346.000 3485.298 128


 


Output Total 3750 2905 75


 


Balance -1404.000 580.298 53


 


Weight 88.5 kg 91.7 kg 


 


Intake:   


 


  IV 1386 1536 128


 


    Dextrose 5% in Water 1, 1350 1500 125





    000 ml @ 125 mls/hr IV .   





    Q8H RISA Rx#:[id number]   


 


    Normal Saline Pressure 36 36 3





    Bag   


 


  Intake, IV Titration 200.000 209.298 





  Amount   


 


    Propofol 1,000 mg In 200.000 209.298 





    Empty Bag 1 bag @ Titrate   





    IV .Q0M RISA Rx#:   





    [id number]   


 


  Tube Feeding 360 540 


 


  Other 400 1200 


 


Output:   


 


  Urine 1050 905 75


 


  Stool 2700 2000 


 


Other:   


 


  Voiding Method Indwelling Catheter Indwelling Catheter 








                       ABP, PAP, CO, CI - Last Documented











Arterial Blood Pressure        126/51

















- Exam


 GENERAL EXAM: Sedated, 54-year-old white male, on mechanical ventilator, with 

settings of assist control rate of 12, tidal volume is 450, FiO2 45% and PEEP of

8 comfortable in no apparent distress.


HEAD: Normocephalic/atraumatic.


EYES: Normal reaction of pupils, equal size.  Conjunctiva pink, sclera white.


NOSE: Clear with pink turbinates.


THROAT: No erythema or exudates.


NECK: No masses, no JVD, no thyroid enlargement, no adenopathy.


CHEST: No chest wall deformity.  Symmetrical expansion. 


LUNGS: Equal air entry with no crackles, wheeze, rhonchi or dullness.


CVS: Regular rate and rhythm, normal S1 and S2, no gallops, no murmurs, no rubs


ABDOMEN: Soft, nontender.  No hepatosplenomegaly, normal bowel sounds, no 

guarding or rigidity.


EXTREMITIES: No clubbing, no edema, no cyanosis, 2+ pulses and upper and lower 

extremities.


MUSCULOSKELETAL: Muscle strength and tone normal.


SPINE: No scoliosis or deformity


SKIN: No rashes


CENTRAL NERVOUS SYSTEM: Sedated and intubated.  No focal deficits, tone is 

normal in all 4 extremities.














- Labs


CBC & Chem 7: 


                                 07/07/20 04:30





                                 07/07/20 04:30


Labs: 


                  Abnormal Lab Results - Last 24 Hours (Table)











  07/06/20 07/06/20 07/06/20 Range/Units





  06:15 10:00 11:55 


 


WBC     (3.8-10.6)  k/uL


 


RBC     (4.30-5.90)  m/uL


 


Hgb     (13.0-17.5)  gm/dL


 


Hct     (39.0-53.0)  %


 


MCV     (80.0-100.0)  fL


 


MCH     (25.0-35.0)  pg


 


RDW     (11.5-15.5)  %


 


Plt Count     (150-450)  k/uL


 


Neutrophils #     (1.3-7.7)  k/uL


 


Macrocytosis     


 


ABG pH     (7.35-7.45)  


 


ABG pCO2     (35-45)  mmHg


 


ABG pO2     ()  mmHg


 


ABG Total CO2     (19-24)  mmol/L


 


ABG O2 Saturation     (94-97)  %


 


Sodium     (137-145)  mmol/L


 


Potassium     (3.5-5.1)  mmol/L


 


Chloride     ()  mmol/L


 


BUN     (9-20)  mg/dL


 


Glucose     (74-99)  mg/dL


 


POC Glucose (mg/dL)    217 H  (75-99)  mg/dL


 


Osmolality  365 H*    (280-301)  mosm/kg


 


Calcium     (8.4-10.2)  mg/dL


 


Total Bilirubin     (0.2-1.3)  mg/dL


 


AST     (17-59)  U/L


 


ALT     (4-49)  U/L


 


Ammonia   44 H   (<30)  umol/L


 


Total Protein     (6.3-8.2)  g/dL


 


Albumin     (3.5-5.0)  g/dL














  07/06/20 07/06/20 07/07/20 Range/Units





  17:40 23:42 04:30 


 


WBC     (3.8-10.6)  k/uL


 


RBC     (4.30-5.90)  m/uL


 


Hgb     (13.0-17.5)  gm/dL


 


Hct     (39.0-53.0)  %


 


MCV     (80.0-100.0)  fL


 


MCH     (25.0-35.0)  pg


 


RDW     (11.5-15.5)  %


 


Plt Count     (150-450)  k/uL


 


Neutrophils #     (1.3-7.7)  k/uL


 


Macrocytosis     


 


ABG pH     (7.35-7.45)  


 


ABG pCO2     (35-45)  mmHg


 


ABG pO2     ()  mmHg


 


ABG Total CO2     (19-24)  mmol/L


 


ABG O2 Saturation     (94-97)  %


 


Sodium    149 H  (137-145)  mmol/L


 


Potassium    3.4 L  (3.5-5.1)  mmol/L


 


Chloride    121 H  ()  mmol/L


 


BUN    77 H  (9-20)  mg/dL


 


Glucose    200 H  (74-99)  mg/dL


 


POC Glucose (mg/dL)  189 H  226 H   (75-99)  mg/dL


 


Osmolality     (280-301)  mosm/kg


 


Calcium    8.1 L  (8.4-10.2)  mg/dL


 


Total Bilirubin    6.3 H  (0.2-1.3)  mg/dL


 


AST    127 H  (17-59)  U/L


 


ALT    61 H  (4-49)  U/L


 


Ammonia     (<30)  umol/L


 


Total Protein    6.1 L  (6.3-8.2)  g/dL


 


Albumin    2.3 L  (3.5-5.0)  g/dL














  07/07/20 07/07/20 07/07/20 Range/Units





  04:30 04:30 05:03 


 


WBC   16.2 H   (3.8-10.6)  k/uL


 


RBC   2.22 L   (4.30-5.90)  m/uL


 


Hgb   8.1 L   (13.0-17.5)  gm/dL


 


Hct   25.3 L   (39.0-53.0)  %


 


MCV   114.0 H   (80.0-100.0)  fL


 


MCH   36.7 H   (25.0-35.0)  pg


 


RDW   22.7 H   (11.5-15.5)  %


 


Plt Count   49 L   (150-450)  k/uL


 


Neutrophils #   14.5 H   (1.3-7.7)  k/uL


 


Macrocytosis   Marked A   


 


ABG pH    7.47 H  (7.35-7.45)  


 


ABG pCO2    34 L  (35-45)  mmHg


 


ABG pO2    113 H  ()  mmHg


 


ABG Total CO2    26 H  (19-24)  mmol/L


 


ABG O2 Saturation    98.9 H  (94-97)  %


 


Sodium     (137-145)  mmol/L


 


Potassium     (3.5-5.1)  mmol/L


 


Chloride     ()  mmol/L


 


BUN     (9-20)  mg/dL


 


Glucose     (74-99)  mg/dL


 


POC Glucose (mg/dL)     (75-99)  mg/dL


 


Osmolality     (280-301)  mosm/kg


 


Calcium     (8.4-10.2)  mg/dL


 


Total Bilirubin     (0.2-1.3)  mg/dL


 


AST     (17-59)  U/L


 


ALT     (4-49)  U/L


 


Ammonia  47 H    (<30)  umol/L


 


Total Protein     (6.3-8.2)  g/dL


 


Albumin     (3.5-5.0)  g/dL














  07/07/20 Range/Units





  05:44 


 


WBC   (3.8-10.6)  k/uL


 


RBC   (4.30-5.90)  m/uL


 


Hgb   (13.0-17.5)  gm/dL


 


Hct   (39.0-53.0)  %


 


MCV   (80.0-100.0)  fL


 


MCH   (25.0-35.0)  pg


 


RDW   (11.5-15.5)  %


 


Plt Count   (150-450)  k/uL


 


Neutrophils #   (1.3-7.7)  k/uL


 


Macrocytosis   


 


ABG pH   (7.35-7.45)  


 


ABG pCO2   (35-45)  mmHg


 


ABG pO2   ()  mmHg


 


ABG Total CO2   (19-24)  mmol/L


 


ABG O2 Saturation   (94-97)  %


 


Sodium   (137-145)  mmol/L


 


Potassium   (3.5-5.1)  mmol/L


 


Chloride   ()  mmol/L


 


BUN   (9-20)  mg/dL


 


Glucose   (74-99)  mg/dL


 


POC Glucose (mg/dL)  205 H  (75-99)  mg/dL


 


Osmolality   (280-301)  mosm/kg


 


Calcium   (8.4-10.2)  mg/dL


 


Total Bilirubin   (0.2-1.3)  mg/dL


 


AST   (17-59)  U/L


 


ALT   (4-49)  U/L


 


Ammonia   (<30)  umol/L


 


Total Protein   (6.3-8.2)  g/dL


 


Albumin   (3.5-5.0)  g/dL














Assessment and Plan


Plan: 


 Assessment:





Acute hypoxic respiratory failure, suspect aspiration pneumonia.  Chest x-ray 

has shown improvement over the last 2 days, and this is also reflected in the 

improvement noted in the ABG.





Acute sepsis, possible septic shock, patient required pressors and remains on 

norepinephrine today.  likely source is aspiration pneumonia, although abdominal

source is not entirely ruled out.  Blood cultures and sputum cultures remain 

negative.





Alcohol liver disease with ascites.  Jaundice, and elevated liver enzymes.





Blood loss anemia, most likely the patient has acute or subacute GI bleeding, GI

is following.  





Acute hepatic encephalopathy with significantly elevated ammonia level.  

Slightly increased today compared to yesterday, it is 73.





Recent history of fall about 2 months ago, and multiple right-sided rib 

fractures.





History of alcoholic neuropathy.





History of pericarditis





Chronic back pain





History of restless leg syndrome.





Acute kidney injury secondary to sepsis and septic shock.  Strongly doubt 

hepatorenal syndrome.  Improved





Hypernatremia, related to free water deficit.  Patient continues on D5W and free

water flushes, and today on 07/07/2020 serum sodium is down to 149 from 159





Plan:





Continue with the same vent settings we'll drop the PEEP down to 5, today's 

chest x-ray has been reviewed is stable in appearance with possibility of a 

basilar atelectasis, and pleural effusions, O2 sat is 45%, no fever or chills, 

hemodynamically stable, tube feedings on hold, yesterday patient's wife was 

updated on patient's condition, she wishes to continue with the full code status

and to proceed with a tracheostomy and PEG tube placement.  Will continue to 

closely follow, continue holding the diuretics.  Daily interruption of sedation 

in the morning, repeat chest x-ray. Microbiology data remains negative.





I performed a history & physical examination of the patient and discussed their 

management with my nurse practitioner, Shagufta Booker.  I reviewed the nurse 

practitioner's note and agree with the documented findings and plan of care.  

Lung sounds are positive for diminished breath sounds.  The findings and the 

impression was discussed with the patient.  I attest to the documentation by the

nurse practitioner. 





Time with Patient: Greater than 30

## 2020-07-07 NOTE — US
EXAMINATION TYPE: US carotid duplex BILAT

 

DATE OF EXAM: 7/7/2020

 

COMPARISON: NONE

 

CLINICAL HISTORY: Altered mental status. ICU patient.  Limited due to trachea tube placement and bind
ings.

 

EXAM MEASUREMENTS: 

 

RIGHT:  Peak Systolic Velocity (PSV) cm/sec

----- Right CCA:  89.5  

----- Right ICA:  133.1     

----- Right ECA:  83.7   

ICA/CCA ratio:  1.5    

 

RIGHT:  End Diastole cm/sec

----- Right CCA:  16.9   

----- Right ICA:  34.3      

----- Right ECA:  6.7     

 

LEFT:  Peak Systolic Velocity (PSV) cm/sec

----- Left CCA:  101.3  

----- Left ICA:  113.3   

----- Left ECA:  103.0  

ICA/CCA ratio:  1.1  

 

LEFT:  End Diastole cm/sec

----- Left CCA:  14.0  

----- Left ICA:  26.5   

----- Left ECA:  27.7 

 

VERTEBRALS (direction of flow):

Right Vertebral: Antegrade

Left Vertebral: Antegrade

 

Rhythm:  Normal

 

No significant stenosis, plaque or wall thickening.  Slightly elevated right distal ICA.  

 

 

 

IMPRESSION:  Images and measurements suggest close to 50% stenosis in the right internal carotid oneil
ry and left internal carotid artery. There is antegrade flow in the vertebral arteries.   

 

 

Criteria for Assigning % of Stenosis / Diameter reduction

(Estimation based on the indirect measurements of the internal carotid artery velocities (ICA PSV).

1.  Normal (no stenosis)=ICA PSV < 125 cm/s: ratio < 2.0: ICA EDV<40 cm/s.

2. Less than 50% stenosis=ICA PSV < 125 cm/s: ratio < 2.0: ICA EDV<40 cm/s.

3.  50 to 69% stenosis=ICA PSV of 125 to 230 cm/s: ration 2.0 ? 4.0: ICA EDV  cm/s.

4.  Greater than 70% stenosis to near occlusion= ICA PSV > 230 cm/s: ratio > 4.0: ICA EDV > 100 cm/s.
 

5.  Near occlusion= ICA PSV velocities may be low or undetectable: variable ratio and ICA EDV.

6.  Total occlusion=unable to detect flow.

## 2020-07-07 NOTE — XR
EXAMINATION TYPE: XR chest 1V

 

DATE OF EXAM: 7/7/2020

 

COMPARISON: 7/7/2020

 

INDICATION: Nasogastric tube placement

 

TECHNIQUE: Single frontal view of the chest is obtained.

 

FINDINGS:  

The heart size is normal.  

The pulmonary vasculature is normal.  

Some increasing atelectatic type changes are at the left base. Pneumonia could be considered.  

Tracheostomy tube is in the midline. Nasogastric tube remains in position with the tip in left upper 
quadrant of the abdomen.

 

IMPRESSION:  

1. Increasing infiltrate left base. Correlate for atelectasis or pneumonia.

2. Placement of tracheostomy tube.

## 2020-07-07 NOTE — P.PN
Subjective








This is a 54-year-old gentleman, history of polysubstance abuse including 

alcohol abuse , falls with recent rib fractures, legally blind, osteoarthritis 

and multiple other medical issues presented to the ER with changes in mental 

status 2 days.  Drug screen positive for tricyclics and benzos, serum alcohol 

less than 10.  UA reported dark brown cloudy urine with occasional bacteria, 

hyaline casts, 2 WBCs, trace leukocytes, 4+ bilirubin, negative for nitrates. 

Ammonia level 91, T bili 14.9, currently 15.8 , elevated LFTs .Gallbladder 

ultrasound reporting abdominal ascites, abdominal x-ray nonacute, right foot x-

ray reported no fracture, soft tissue swelling, EKG reported sinus tachycardia, 

troponin normal, , echo reported normal LV function, EF 60-65% ,lactic 

acid 2.1 now down to 1.7, BUN 25, creatinine 0.8 chest x-ray reporting moderate 

pulmonary interstitial and airspace edema significantly worse than yesterday, 

pulmonary edema.  ABGs noted.  INR on admission 3.7, down to 2.2 Sepsis 

protocol, Received IV fluid resuscitation, IV antibiotics, cultures drawn.  

Developed respiratory failure, requiring intubation during the night.  Currently

on Sandostatin drip.  3 maroon stools during the night.  Received 4 units of FFP

and 2 units of packed RBCs to date.  Hemoglobin currently 8.5.  And had required

pressor support with Levophed off since 06 100.  Maintained on IV fluid 

resuscitation.  Telemetry sinus rhythm.  Potassium 3.2, magnesium 1.7. 








06/30/2020 chest x-ray reporting persistent bilateral scattered airspace inf

iltrates, pleural effusion unchanged.  Ventilator dependent, on FiO2 of 50 with 

PEEP increased to 12.  Continues on lactulose with ammonia down to 80.  T bili 

decreased to 11.3, LFTs improving.  No further maroon stools.  Small black stool

this morning.  Hemoglobin 8.6.  Telemetry sinus rhythm to sinus tach.  

Sandostatin drip discontinued this morning.  Last night patient asynchronous 

with vent, stacking breaths, Nimbex drip initiated.  This morning Nimbex 

discontinued, changed to fentanyl drip.  Requiring pressor support, Levophed 

resumed.  Received vitamin K yesterday, INR 2.1.  Marginal urine output, IV 

fluids decreased, and Lasix IV push added to med regime.  Tube feedings ordered.

 Afebrile, WBC 14.7.  Sputum culture pending, preliminary blood cultures 

negative at 48 hours.  ABGs noted.








07/01/2020  yesterday Lasix and Aldactone initiated, creatinine mildly worsened 

up to 1.32.  Ammonia increased up to 89, lactulose increased.  No further maroon

stools, hemoglobin increased to 9.4, platelets increased to 75.  T bili trending

down, 8.9, LFTs improving.  Potassium 3.4.  Chest x-ray reporting improving left

lower lobe aeration.  Right foot evaluated by orthopedic surgery, possible fluid

collection, seroma with potential x-ray tomorrow.  Tolerating tube feeds with 

minimal to no residuals, currently at 30 mls per hour with goal of 45.  IV 

steroids added to med regimen with blood sugars increasing.  Remains vent 

dependent with FiO2 at 50/+12 of PEEP.  Continues on Levophed, fentanyl, 

diprovan drips.  Afebrile, T-max 99.5, WBC 14.1.











07/02/2020 ABGs unchanged,remains vent dependent, FiO2 50/+12 PEEP.  Chest x-ray

reporting improvement.  Maintained on both Xifaxan & Lactulose, ammonia 

increasing, beginning to stool.  Creatinine trending up 1.4.  Hemoglobin 

decreased to 8.5.  INR 1.7 Levophed weaned off this morning.  Attempted a 

sedation holiday for about an hour this morning; patient did not wake up, became

agitated, tachycardic, tachypneic with respiratory rate up into the 40s, 

diprovan/fentanyl drips resumed.  Tolerating tube feeds at 40 with goal of 

45/minimal to no residual.  Orthopedics discussing x-ray and foot possibly 

tomorrow.  Afebrile WBC down to 11.


notes 





7/6/2020 Patient remains intubated and sedated. He is scheduled for Trach and 

PEG soon. He remains Hypernatremic at 159 and anemiac @ 8.o today, Trial 

weeningand awakenings have so far failed. Ammonia level now 44. Critical care 

notes Reviewed today





7/7/2020: patient is currently in surgery for PEG and Trach. He remains a full 

code., he remains sedated, intubated on mechanical ventilator, current vent 

settings thi AM are assist-control with a rate of 12, tidal vital 450, FiO2 is 

45%, and PEEP of 8, this morning blood gases show pO2 of 113, pCO2 is 34, and pH

of 7.47, PEEP has been dropped to 5.  IV fluids include D5 W at 125 ML per hour,

Diprivan is a 40 mics per kilo per minute, no vasoactive drips, tube feedings 

are on hold patient was on vital high protein at 45 with a goal of 45 and 400 ML

every 4 hours water flushes.  NA is now down to 149. serum OSM was elevated 

consistatn with free water deficit.








Objective





- Vital Signs


Vital signs: 


                                   Vital Signs











Temp  99.1 F   07/07/20 08:00


 


Pulse  80   07/07/20 11:00


 


Resp  14   07/07/20 11:00


 


BP  108/60   07/05/20 07:00


 


Pulse Ox  100   07/07/20 11:00








                                 Intake & Output











 07/06/20 07/07/20 07/07/20





 18:59 06:59 18:59


 


Intake Total 2346.000 3485.298 1279.955


 


Output Total 3750 2905 310


 


Balance -1404.000 580.298 969.955


 


Weight 88.5 kg 91.7 kg 


 


Intake:   


 


  IV 1386 1536 1068


 


    Dextrose 5% in Water 1, 1350 1500 750





    000 ml @ 125 mls/hr IV .   





    Q8H RISA Rx#:[id number]   


 


    Normal Saline Pressure 36 36 18





    Bag   


 


  Intake, IV Titration 200.000 209.298 211.955





  Amount   


 


    Potassium Chloride 20 meq   100





    In Water For Injection 1   





    100ml.bag @ 50 mls/hr   





    IVPB Q2H RISA Rx#:   





    [id number]   


 


    Propofol 1,000 mg In 200.000 209.298 111.955





    Empty Bag 1 bag @ Titrate   





    IV .Q0M RISA Rx#:   





    [id number]   


 


  Tube Feeding 360 540 


 


  Other 400 1200 


 


Output:   


 


  Urine 1050 905 300


 


  Stool 2700 2000 


 


  Estimated Blood Loss   10


 


Other:   


 


  Voiding Method Indwelling Catheter Indwelling Catheter Indwelling Catheter








                       ABP, PAP, CO, CI - Last Documented











Arterial Blood Pressure        105/46

















- Exam





  Patient is no availble to be seen- he is in the OR








- Labs


CBC & Chem 7: 


                                 07/07/20 04:30





                                 07/07/20 11:15


Labs: 


                  Abnormal Lab Results - Last 24 Hours (Table)











  07/06/20 07/06/20 07/06/20 Range/Units





  06:15 17:40 23:42 


 


WBC     (3.8-10.6)  k/uL


 


RBC     (4.30-5.90)  m/uL


 


Hgb     (13.0-17.5)  gm/dL


 


Hct     (39.0-53.0)  %


 


MCV     (80.0-100.0)  fL


 


MCH     (25.0-35.0)  pg


 


RDW     (11.5-15.5)  %


 


Plt Count     (150-450)  k/uL


 


Neutrophils #     (1.3-7.7)  k/uL


 


Macrocytosis     


 


ABG pH     (7.35-7.45)  


 


ABG pCO2     (35-45)  mmHg


 


ABG pO2     ()  mmHg


 


ABG Total CO2     (19-24)  mmol/L


 


ABG O2 Saturation     (94-97)  %


 


Sodium     (137-145)  mmol/L


 


Potassium     (3.5-5.1)  mmol/L


 


Chloride     ()  mmol/L


 


BUN     (9-20)  mg/dL


 


Glucose     (74-99)  mg/dL


 


POC Glucose (mg/dL)   189 H  226 H  (75-99)  mg/dL


 


Osmolality  365 H*    (280-301)  mosm/kg


 


Calcium     (8.4-10.2)  mg/dL


 


Total Bilirubin     (0.2-1.3)  mg/dL


 


AST     (17-59)  U/L


 


ALT     (4-49)  U/L


 


Ammonia     (<30)  umol/L


 


Total Protein     (6.3-8.2)  g/dL


 


Albumin     (3.5-5.0)  g/dL














  07/07/20 07/07/20 07/07/20 Range/Units





  04:30 04:30 04:30 


 


WBC    16.2 H  (3.8-10.6)  k/uL


 


RBC    2.22 L  (4.30-5.90)  m/uL


 


Hgb    8.1 L  (13.0-17.5)  gm/dL


 


Hct    25.3 L  (39.0-53.0)  %


 


MCV    114.0 H  (80.0-100.0)  fL


 


MCH    36.7 H  (25.0-35.0)  pg


 


RDW    22.7 H  (11.5-15.5)  %


 


Plt Count    49 L  (150-450)  k/uL


 


Neutrophils #    14.5 H  (1.3-7.7)  k/uL


 


Macrocytosis    Marked A  


 


ABG pH     (7.35-7.45)  


 


ABG pCO2     (35-45)  mmHg


 


ABG pO2     ()  mmHg


 


ABG Total CO2     (19-24)  mmol/L


 


ABG O2 Saturation     (94-97)  %


 


Sodium  149 H    (137-145)  mmol/L


 


Potassium  3.4 L    (3.5-5.1)  mmol/L


 


Chloride  121 H    ()  mmol/L


 


BUN  77 H    (9-20)  mg/dL


 


Glucose  200 H    (74-99)  mg/dL


 


POC Glucose (mg/dL)     (75-99)  mg/dL


 


Osmolality     (280-301)  mosm/kg


 


Calcium  8.1 L    (8.4-10.2)  mg/dL


 


Total Bilirubin  6.3 H    (0.2-1.3)  mg/dL


 


AST  127 H    (17-59)  U/L


 


ALT  61 H    (4-49)  U/L


 


Ammonia   47 H   (<30)  umol/L


 


Total Protein  6.1 L    (6.3-8.2)  g/dL


 


Albumin  2.3 L    (3.5-5.0)  g/dL














  07/07/20 07/07/20 07/07/20 Range/Units





  05:03 05:44 11:22 


 


WBC     (3.8-10.6)  k/uL


 


RBC     (4.30-5.90)  m/uL


 


Hgb     (13.0-17.5)  gm/dL


 


Hct     (39.0-53.0)  %


 


MCV     (80.0-100.0)  fL


 


MCH     (25.0-35.0)  pg


 


RDW     (11.5-15.5)  %


 


Plt Count     (150-450)  k/uL


 


Neutrophils #     (1.3-7.7)  k/uL


 


Macrocytosis     


 


ABG pH  7.47 H    (7.35-7.45)  


 


ABG pCO2  34 L    (35-45)  mmHg


 


ABG pO2  113 H    ()  mmHg


 


ABG Total CO2  26 H    (19-24)  mmol/L


 


ABG O2 Saturation  98.9 H    (94-97)  %


 


Sodium     (137-145)  mmol/L


 


Potassium     (3.5-5.1)  mmol/L


 


Chloride     ()  mmol/L


 


BUN     (9-20)  mg/dL


 


Glucose     (74-99)  mg/dL


 


POC Glucose (mg/dL)   205 H  183 H  (75-99)  mg/dL


 


Osmolality     (280-301)  mosm/kg


 


Calcium     (8.4-10.2)  mg/dL


 


Total Bilirubin     (0.2-1.3)  mg/dL


 


AST     (17-59)  U/L


 


ALT     (4-49)  U/L


 


Ammonia     (<30)  umol/L


 


Total Protein     (6.3-8.2)  g/dL


 


Albumin     (3.5-5.0)  g/dL














  07/07/20 Range/Units





  12:37 


 


WBC   (3.8-10.6)  k/uL


 


RBC   (4.30-5.90)  m/uL


 


Hgb   (13.0-17.5)  gm/dL


 


Hct   (39.0-53.0)  %


 


MCV   (80.0-100.0)  fL


 


MCH   (25.0-35.0)  pg


 


RDW   (11.5-15.5)  %


 


Plt Count   (150-450)  k/uL


 


Neutrophils #   (1.3-7.7)  k/uL


 


Macrocytosis   


 


ABG pH   (7.35-7.45)  


 


ABG pCO2   (35-45)  mmHg


 


ABG pO2   ()  mmHg


 


ABG Total CO2   (19-24)  mmol/L


 


ABG O2 Saturation   (94-97)  %


 


Sodium   (137-145)  mmol/L


 


Potassium   (3.5-5.1)  mmol/L


 


Chloride   ()  mmol/L


 


BUN   (9-20)  mg/dL


 


Glucose   (74-99)  mg/dL


 


POC Glucose (mg/dL)  166 H  (75-99)  mg/dL


 


Osmolality   (280-301)  mosm/kg


 


Calcium   (8.4-10.2)  mg/dL


 


Total Bilirubin   (0.2-1.3)  mg/dL


 


AST   (17-59)  U/L


 


ALT   (4-49)  U/L


 


Ammonia   (<30)  umol/L


 


Total Protein   (6.3-8.2)  g/dL


 


Albumin   (3.5-5.0)  g/dL














Assessment and Plan


(1) Acute alcoholic hepatitis


Current Visit: Yes   Status: Acute   Code(s): K70.10 - ALCOHOLIC HEPATITIS 

WITHOUT ASCITES   SNOMED Code(s): 7961574


   





(2) Alcoholic peripheral neuropathy


Current Visit: Yes   Status: Acute   Code(s): G62.1 - ALCOHOLIC POLYNEUROPATHY  

SNOMED Code(s): 1806284


   





(3) Anemia due to blood loss, acute


Current Visit: Yes   Status: Acute   Code(s): D62 - ACUTE POSTHEMORRHAGIC ANEMIA

  SNOMED Code(s): 083358086


   





(4) Ascites


Current Visit: Yes   Status: Acute   Code(s): R18.8 - OTHER ASCITES   SNOMED 

Code(s): 677389801


   





(5) Hepatic encephalopathy


Current Visit: Yes   Status: Acute   Code(s): K72.90 - HEPATIC FAILURE, 

UNSPECIFIED WITHOUT COMA   SNOMED Code(s): 81875788


   





(6) Liver cirrhosis


Current Visit: Yes   Status: Acute   Code(s): K74.60 - UNSPECIFIED CIRRHOSIS OF 

LIVER   SNOMED Code(s): 60515981


   





(7) Liver failure


Current Visit: Yes   Status: Acute   Code(s): K72.90 - HEPATIC FAILURE, 

UNSPECIFIED WITHOUT COMA   SNOMED Code(s): 76209018


   





(8) Sepsis with acute hypoxic respiratory failure and septic shock


Current Visit: Yes   Status: Acute   Code(s): A41.9 - SEPSIS, UNSPECIFIED 

ORGANISM; R65.21 - SEVERE SEPSIS WITH SEPTIC SHOCK; J96.01 - ACUTE RESPIRATORY 

FAILURE WITH HYPOXIA   SNOMED Code(s): 280212469


   





(9) Hypernatremia


Current Visit: Yes   Status: Acute   Code(s): E87.0 - HYPEROSMOLALITY AND 

HYPERNATREMIA   SNOMED Code(s): 134813391


   


Plan: 





 PEG and trach tube placement going on now


Repeat labs in a.m.


  further recommendations from critical care about possible extubation soon.


We'll reevaluate him in the next 24 hours.

## 2020-07-08 LAB
ALBUMIN SERPL-MCNC: 2.2 G/DL (ref 3.5–5)
ALP SERPL-CCNC: 85 U/L (ref 38–126)
ALT SERPL-CCNC: 73 U/L (ref 4–49)
ANION GAP SERPL CALC-SCNC: 5 MMOL/L
AST SERPL-CCNC: 129 U/L (ref 17–59)
BASOPHILS # BLD AUTO: 0 K/UL (ref 0–0.2)
BASOPHILS NFR BLD AUTO: 0 %
BUN SERPL-SCNC: 69 MG/DL (ref 9–20)
CALCIUM SPEC-MCNC: 7.9 MG/DL (ref 8.4–10.2)
CHLORIDE SERPL-SCNC: 117 MMOL/L (ref 98–107)
CO2 BLDA-SCNC: 26 MMOL/L (ref 19–24)
CO2 SERPL-SCNC: 24 MMOL/L (ref 22–30)
EOSINOPHIL # BLD AUTO: 0.1 K/UL (ref 0–0.7)
EOSINOPHIL NFR BLD AUTO: 0 %
ERYTHROCYTE [DISTWIDTH] IN BLOOD BY AUTOMATED COUNT: 2.29 M/UL (ref 4.3–5.9)
ERYTHROCYTE [DISTWIDTH] IN BLOOD: 21.9 % (ref 11.5–15.5)
GLUCOSE BLD-MCNC: 175 MG/DL (ref 75–99)
GLUCOSE BLD-MCNC: 225 MG/DL (ref 75–99)
GLUCOSE BLD-MCNC: 234 MG/DL (ref 75–99)
GLUCOSE BLD-MCNC: 241 MG/DL (ref 75–99)
GLUCOSE SERPL-MCNC: 228 MG/DL (ref 74–99)
HCO3 BLDA-SCNC: 24 MMOL/L (ref 21–25)
HCT VFR BLD AUTO: 26.3 % (ref 39–53)
HGB BLD-MCNC: 8.1 GM/DL (ref 13–17.5)
INR PPP: 1.6 (ref ?–1.2)
LYMPHOCYTES # SPEC AUTO: 1.1 K/UL (ref 1–4.8)
LYMPHOCYTES NFR SPEC AUTO: 6 %
MCH RBC QN AUTO: 35.5 PG (ref 25–35)
MCHC RBC AUTO-ENTMCNC: 30.9 G/DL (ref 31–37)
MCV RBC AUTO: 114.7 FL (ref 80–100)
MONOCYTES # BLD AUTO: 0.5 K/UL (ref 0–1)
MONOCYTES NFR BLD AUTO: 2 %
NEUTROPHILS # BLD AUTO: 17.4 K/UL (ref 1.3–7.7)
NEUTROPHILS NFR BLD AUTO: 91 %
PCO2 BLDA: 38 MMHG (ref 35–45)
PH BLDA: 7.42 [PH] (ref 7.35–7.45)
PLATELET # BLD AUTO: 41 K/UL (ref 150–450)
PO2 BLDA: 104 MMHG (ref 83–108)
POTASSIUM SERPL-SCNC: 3.5 MMOL/L (ref 3.5–5.1)
PROT SERPL-MCNC: 5.9 G/DL (ref 6.3–8.2)
PT BLD: 15.6 SEC (ref 9–12)
SODIUM SERPL-SCNC: 146 MMOL/L (ref 137–145)
WBC # BLD AUTO: 19.2 K/UL (ref 3.8–10.6)

## 2020-07-08 RX ADMIN — CHLORHEXIDINE GLUCONATE SCH: 1.2 RINSE ORAL at 23:49

## 2020-07-08 RX ADMIN — POTASSIUM CHLORIDE SCH: 14.9 INJECTION, SOLUTION INTRAVENOUS at 23:49

## 2020-07-08 RX ADMIN — LACTULOSE SCH GM: 20 SOLUTION ORAL at 13:00

## 2020-07-08 RX ADMIN — RIFAXIMIN SCH: 550 TABLET ORAL at 08:53

## 2020-07-08 RX ADMIN — IPRATROPIUM BROMIDE AND ALBUTEROL SULFATE SCH: .5; 3 SOLUTION RESPIRATORY (INHALATION) at 12:43

## 2020-07-08 RX ADMIN — PANTOPRAZOLE SODIUM SCH: 40 INJECTION, POWDER, FOR SOLUTION INTRAVENOUS at 23:49

## 2020-07-08 RX ADMIN — LACTULOSE SCH: 20 SOLUTION ORAL at 08:53

## 2020-07-08 RX ADMIN — RIFAXIMIN SCH: 550 TABLET ORAL at 23:49

## 2020-07-08 RX ADMIN — METHYLPREDNISOLONE SODIUM SUCCINATE SCH MG: 40 INJECTION, POWDER, FOR SOLUTION INTRAMUSCULAR; INTRAVENOUS at 08:56

## 2020-07-08 RX ADMIN — PANTOPRAZOLE SODIUM SCH MG: 40 INJECTION, POWDER, FOR SOLUTION INTRAVENOUS at 08:56

## 2020-07-08 RX ADMIN — PROPOFOL SCH MLS/HR: 10 INJECTION, EMULSION INTRAVENOUS at 03:49

## 2020-07-08 RX ADMIN — IPRATROPIUM BROMIDE AND ALBUTEROL SULFATE SCH: .5; 3 SOLUTION RESPIRATORY (INHALATION) at 19:34

## 2020-07-08 RX ADMIN — INSULIN ASPART SCH UNIT: 100 INJECTION, SOLUTION INTRAVENOUS; SUBCUTANEOUS at 06:55

## 2020-07-08 RX ADMIN — INSULIN ASPART SCH: 100 INJECTION, SOLUTION INTRAVENOUS; SUBCUTANEOUS at 23:49

## 2020-07-08 RX ADMIN — IPRATROPIUM BROMIDE AND ALBUTEROL SULFATE SCH: .5; 3 SOLUTION RESPIRATORY (INHALATION) at 09:11

## 2020-07-08 RX ADMIN — POTASSIUM CHLORIDE SCH MLS/HR: 14.9 INJECTION, SOLUTION INTRAVENOUS at 13:18

## 2020-07-08 RX ADMIN — POTASSIUM CHLORIDE SCH MLS/HR: 14.9 INJECTION, SOLUTION INTRAVENOUS at 06:55

## 2020-07-08 RX ADMIN — PROPOFOL SCH MLS/HR: 10 INJECTION, EMULSION INTRAVENOUS at 07:12

## 2020-07-08 RX ADMIN — IPRATROPIUM BROMIDE AND ALBUTEROL SULFATE SCH: .5; 3 SOLUTION RESPIRATORY (INHALATION) at 16:06

## 2020-07-08 RX ADMIN — LACTULOSE SCH: 20 SOLUTION ORAL at 23:50

## 2020-07-08 RX ADMIN — CHLORHEXIDINE GLUCONATE SCH ML: 1.2 RINSE ORAL at 08:56

## 2020-07-08 RX ADMIN — PROPOFOL SCH MLS/HR: 10 INJECTION, EMULSION INTRAVENOUS at 14:00

## 2020-07-08 RX ADMIN — IPRATROPIUM BROMIDE AND ALBUTEROL SULFATE PRN ML: .5; 3 SOLUTION RESPIRATORY (INHALATION) at 03:08

## 2020-07-08 RX ADMIN — INSULIN ASPART SCH UNIT: 100 INJECTION, SOLUTION INTRAVENOUS; SUBCUTANEOUS at 12:00

## 2020-07-08 NOTE — P.PN
Subjective


Progress Note Date: 07/08/20





This is a 54-year-old gentleman, history of polysubstance abuse including 

alcohol abuse , falls with recent rib fractures, legally blind, osteoarthritis 

and multiple other medical issues presented to the ER with changes in mental 

status 2 days.  Drug screen positive for tricyclics and benzos, serum alcohol 

less than 10.  UA reported dark brown cloudy urine with occasional bacteria, 

hyaline casts, 2 WBCs, trace leukocytes, 4+ bilirubin, negative for nitrates. 

Ammonia level 91, T bili 14.9, currently 15.8 , elevated LFTs .Gallbladder 

ultrasound reporting abdominal ascites, abdominal x-ray nonacute, right foot x-

ray reported no fracture, soft tissue swelling, EKG reported sinus tachycardia, 

troponin normal, , echo reported normal LV function, EF 60-65% ,lactic 

acid 2.1 now down to 1.7, BUN 25, creatinine 0.8 chest x-ray reporting moderate 

pulmonary interstitial and airspace edema significantly worse than yesterday, 

pulmonary edema.  ABGs noted.  INR on admission 3.7, down to 2.2 Sepsis 

protocol, Received IV fluid resuscitation, IV antibiotics, cultures drawn.  

Developed respiratory failure, requiring intubation during the night.  Currently

on Sandostatin drip.  3 maroon stools during the night.  Received 4 units of FFP

and 2 units of packed RBCs to date.  Hemoglobin currently 8.5.  And had required

pressor support with Levophed off since 06 100.  Maintained on IV fluid 

resuscitation.  Telemetry sinus rhythm.  Potassium 3.2, magnesium 1.7. 








06/30/2020 chest x-ray reporting persistent bilateral scattered airspace 

infiltrates, pleural effusion unchanged.  Ventilator dependent, on FiO2 of 50 

with PEEP increased to 12.  Continues on lactulose with ammonia down to 80.  T 

bili decreased to 11.3, LFTs improving.  No further maroon stools.  Small black 

stool this morning.  Hemoglobin 8.6.  Telemetry sinus rhythm to sinus tach.  

Sandostatin drip discontinued this morning.  Last night patient asynchronous 

with vent, stacking breaths, Nimbex drip initiated.  This morning Nimbex 

discontinued, changed to fentanyl drip.  Requiring pressor support, Levophed 

resumed.  Received vitamin K yesterday, INR 2.1.  Marginal urine output, IV 

fluids decreased, and Lasix IV push added to med regime.  Tube feedings ordered.

 Afebrile, WBC 14.7.  Sputum culture pending, preliminary blood cultures 

negative at 48 hours.  ABGs noted.








07/01/2020  yesterday Lasix and Aldactone initiated, creatinine mildly worsened 

up to 1.32.  Ammonia increased up to 89, lactulose increased.  No further maroon

stools, hemoglobin increased to 9.4, platelets increased to 75.  T bili trending

down, 8.9, LFTs improving.  Potassium 3.4.  Chest x-ray reporting improving left

lower lobe aeration.  Right foot evaluated by orthopedic surgery, possible fluid

collection, seroma with potential x-ray tomorrow.  Tolerating tube feeds with 

minimal to no residuals, currently at 30 mls per hour with goal of 45.  IV 

steroids added to med regimen with blood sugars increasing.  Remains vent 

dependent with FiO2 at 50/+12 of PEEP.  Continues on Levophed, fentanyl, 

diprovan drips.  Afebrile, T-max 99.5, WBC 14.1.











07/02/2020 ABGs unchanged,remains vent dependent, FiO2 50/+12 PEEP.  Chest x-ray

reporting improvement.  Maintained on both Xifaxan & Lactulose, ammonia 

increasing, beginning to stool.  Creatinine trending up 1.4.  Hemoglobin 

decreased to 8.5.  INR 1.7 Levophed weaned off this morning.  Attempted a 

sedation holiday for about an hour this morning; patient did not wake up, became

agitated, tachycardic, tachypneic with respiratory rate up into the 40s, 

diprovan/fentanyl drips resumed.  Tolerating tube feeds at 40 with goal of 

45/minimal to no residual.  Orthopedics discussing x-ray and foot possibly 

tomorrow.  Afebrile WBC down to 11.











07/08/2020 patient was trached yesterday, remains vent dependent with FiO2 

45%/+5 of PEEP.  Chest x-ray suggestive of possible left lower lobe pneumonia, v

ersus atelectasis, possible interstitial disease. Scheduled for PEG tube 

placement today.  To date, patient has not tolerated sedation holidays, not 

following commands, becomes agitated, and becomes asynchronous with the patient.

 Neurology consulted, EEG completed, results pending.  Maintain on D5W with 

improvement in sodium, Na 146.  Currently sedated on Diprovan gtt. telemetry 

sinus rhythm.  Ammonia level LII on lactulose and Xifaxan.





Objective





- Vital Signs


Vital signs: 


                                   Vital Signs











Temp  96.3 F L  07/08/20 08:00


 


Pulse  71   07/08/20 09:00


 


Resp  12   07/08/20 09:00


 


BP  102/54   07/08/20 09:00


 


Pulse Ox  100   07/08/20 09:00








                                 Intake & Output











 07/07/20 07/08/20 07/08/20





 18:59 06:59 18:59


 


Intake Total 2728.343 2712.063 558.46


 


Output Total 785 840 250


 


Balance 2390.319 3305.063 308.46


 


Weight  88.4 kg 


 


Intake:   


 


  IV 1836 1536 384


 


    Dextrose 5% in Water 1, 1500 1500 375





    000 ml @ 125 mls/hr IV .   





    Q8H RISA Rx#:[id number]   


 


    Normal Saline Pressure 36 36 9





    Bag   


 


  Intake, IV Titration 267.343 281.063 174.46





  Amount   


 


    Potassium Chloride 20 meq 100  





    In Water For Injection 1   





    100ml.bag @ 50 mls/hr   





    IVPB Q2H RISA Rx#:   





    [id number]   


 


    Propofol 1,000 mg In 167.343 281.063 174.46





    Empty Bag 1 bag @ Titrate   





    IV .Q0M IRSA Rx#:   





    [id number]   


 


  Tube Feeding 225 495 


 


  Other 400 400 


 


Output:   


 


  Urine 775 840 250


 


  Estimated Blood Loss 10  


 


Other:   


 


  Voiding Method Indwelling Catheter Indwelling Catheter Indwelling Catheter








                       ABP, PAP, CO, CI - Last Documented











Arterial Blood Pressure        136/58

















- Exam


VITAL SIGNS: As above


GENERAL: Sitting up in bed, intubated, sedated on diprovan.


HEENT:  Conjunctivae normal.  Positive icterus, jaundice, NG,OG tubes present.


NECK:  No JVD. No thyroid enlargement. No LNs


CARDIOVASCULAR:  S1, S2 regular.  Systolic murmur


RESPIRATION: Coarse Breath sounds diminished in the bases with scattered 

bilateral rhonchi, no wheezing


ABDOMEN:  Soft, distended, positive ascites,  positive bowel sounds


Extremities: Positive upper and lower extremity edema, no clubbing, no cyanosis.

Right foot dorsal edema, significant ecchymosis 


NERVOUS SYSTEM: Unable to assess, sedated and intubated.


Skin: Warm and dry, no rash 








- Labs


CBC & Chem 7: 


                                 07/08/20 04:50





                                 07/08/20 04:50


Labs: 


                  Abnormal Lab Results - Last 24 Hours (Table)











  07/07/20 07/07/20 07/07/20 Range/Units





  17:52 17:54 23:39 


 


WBC     (3.8-10.6)  k/uL


 


RBC     (4.30-5.90)  m/uL


 


Hgb     (13.0-17.5)  gm/dL


 


Hct     (39.0-53.0)  %


 


MCV     (80.0-100.0)  fL


 


MCH     (25.0-35.0)  pg


 


MCHC     (31.0-37.0)  g/dL


 


RDW     (11.5-15.5)  %


 


Plt Count     (150-450)  k/uL


 


Neutrophils #     (1.3-7.7)  k/uL


 


Macrocytosis     


 


PT     (9.0-12.0)  sec


 


INR     (<1.2)  


 


ABG Total CO2     (19-24)  mmol/L


 


ABG O2 Saturation     (94-97)  %


 


Sodium     (137-145)  mmol/L


 


Chloride     ()  mmol/L


 


BUN     (9-20)  mg/dL


 


Glucose     (74-99)  mg/dL


 


POC Glucose (mg/dL)  275 H  246 H  208 H  (75-99)  mg/dL


 


Calcium     (8.4-10.2)  mg/dL


 


Total Bilirubin     (0.2-1.3)  mg/dL


 


AST     (17-59)  U/L


 


ALT     (4-49)  U/L


 


Ammonia     (<30)  umol/L


 


Total Protein     (6.3-8.2)  g/dL


 


Albumin     (3.5-5.0)  g/dL














  07/08/20 07/08/20 07/08/20 Range/Units





  04:50 04:50 05:09 


 


WBC   19.2 H   (3.8-10.6)  k/uL


 


RBC   2.29 L   (4.30-5.90)  m/uL


 


Hgb   8.1 L   (13.0-17.5)  gm/dL


 


Hct   26.3 L   (39.0-53.0)  %


 


MCV   114.7 H   (80.0-100.0)  fL


 


MCH   35.5 H   (25.0-35.0)  pg


 


MCHC   30.9 L   (31.0-37.0)  g/dL


 


RDW   21.9 H   (11.5-15.5)  %


 


Plt Count   41 L   (150-450)  k/uL


 


Neutrophils #   17.4 H   (1.3-7.7)  k/uL


 


Macrocytosis   Marked A   


 


PT     (9.0-12.0)  sec


 


INR     (<1.2)  


 


ABG Total CO2    26 H  (19-24)  mmol/L


 


ABG O2 Saturation    98.4 H  (94-97)  %


 


Sodium  146 H    (137-145)  mmol/L


 


Chloride  117 H    ()  mmol/L


 


BUN  69 H    (9-20)  mg/dL


 


Glucose  228 H    (74-99)  mg/dL


 


POC Glucose (mg/dL)     (75-99)  mg/dL


 


Calcium  7.9 L    (8.4-10.2)  mg/dL


 


Total Bilirubin  6.5 H    (0.2-1.3)  mg/dL


 


AST  129 H    (17-59)  U/L


 


ALT  73 H    (4-49)  U/L


 


Ammonia     (<30)  umol/L


 


Total Protein  5.9 L    (6.3-8.2)  g/dL


 


Albumin  2.2 L    (3.5-5.0)  g/dL














  07/08/20 07/08/20 07/08/20 Range/Units





  05:50 05:50 06:38 


 


WBC     (3.8-10.6)  k/uL


 


RBC     (4.30-5.90)  m/uL


 


Hgb     (13.0-17.5)  gm/dL


 


Hct     (39.0-53.0)  %


 


MCV     (80.0-100.0)  fL


 


MCH     (25.0-35.0)  pg


 


MCHC     (31.0-37.0)  g/dL


 


RDW     (11.5-15.5)  %


 


Plt Count     (150-450)  k/uL


 


Neutrophils #     (1.3-7.7)  k/uL


 


Macrocytosis     


 


PT   15.6 H   (9.0-12.0)  sec


 


INR   1.6 H   (<1.2)  


 


ABG Total CO2     (19-24)  mmol/L


 


ABG O2 Saturation     (94-97)  %


 


Sodium     (137-145)  mmol/L


 


Chloride     ()  mmol/L


 


BUN     (9-20)  mg/dL


 


Glucose     (74-99)  mg/dL


 


POC Glucose (mg/dL)    234 H  (75-99)  mg/dL


 


Calcium     (8.4-10.2)  mg/dL


 


Total Bilirubin     (0.2-1.3)  mg/dL


 


AST     (17-59)  U/L


 


ALT     (4-49)  U/L


 


Ammonia  52 H    (<30)  umol/L


 


Total Protein     (6.3-8.2)  g/dL


 


Albumin     (3.5-5.0)  g/dL














  07/08/20 Range/Units





  11:44 


 


WBC   (3.8-10.6)  k/uL


 


RBC   (4.30-5.90)  m/uL


 


Hgb   (13.0-17.5)  gm/dL


 


Hct   (39.0-53.0)  %


 


MCV   (80.0-100.0)  fL


 


MCH   (25.0-35.0)  pg


 


MCHC   (31.0-37.0)  g/dL


 


RDW   (11.5-15.5)  %


 


Plt Count   (150-450)  k/uL


 


Neutrophils #   (1.3-7.7)  k/uL


 


Macrocytosis   


 


PT   (9.0-12.0)  sec


 


INR   (<1.2)  


 


ABG Total CO2   (19-24)  mmol/L


 


ABG O2 Saturation   (94-97)  %


 


Sodium   (137-145)  mmol/L


 


Chloride   ()  mmol/L


 


BUN   (9-20)  mg/dL


 


Glucose   (74-99)  mg/dL


 


POC Glucose (mg/dL)  175 H  (75-99)  mg/dL


 


Calcium   (8.4-10.2)  mg/dL


 


Total Bilirubin   (0.2-1.3)  mg/dL


 


AST   (17-59)  U/L


 


ALT   (4-49)  U/L


 


Ammonia   (<30)  umol/L


 


Total Protein   (6.3-8.2)  g/dL


 


Albumin   (3.5-5.0)  g/dL














Assessment and Plan


Assessment: 


Sepsis secondary to aspiration bilateral pneumonia, possibly abdominal source,


Acute hypoxic respiratory failure,  related to the above,  ventilator dependent,

tired to wean, status post tracheostomy


Septic and hypovolemic shock multifactorial, secondary to bilateral aspiration 

pneumonia, alcoholic liver disease


Possible septic shock status post pressor support


Hypotension, secondary to the above, pressor support dependent secondary to 

sepsis


Acute renal failure multifactorial, secondary to all the above, diuretics


Acute GI bleed, possible esophageal varices in a patient with significant 

alcohol dependence, on Sandostatin


Acute blood loss anemia, status post multiple transfusions of both FFP, packed 

RBCs


Acute Hepatic encephalopathy


Alcoholic liver disease with ascites


Hyperammonia


Recent posterior lateral rib fractures of 9, 10 and 11 status post fall


History of pericarditis


Chronic back pain


Polysubstance abuse, per record review; patient reports alcohol, marijuana use 

currently.


History of nicotine dependence


COPD, emphysema


Legally blind


Obesity, BMI 30.7


Osteoarthritis


Venous insufficiency


Anxiety, depression, history of, currently controlled


Hyperglycemia, steroid-induced


Moderate protein calorie malnutrition


Hypernatremia secondary free water deficit.














Plan: Continue on current medication regime ,monitoring and symptomatic 

treatment.  Maintain D5W, close monitoring of electrolytes.  PEG tube placement 

scheduled for today.  Possible sedation holiday after PEG tube procedure today 

as per intensivist. Continue on lactulose, Xifaxan, close monitoring of ammonia 

level . Prognosis guarded given multiple complex medical issues.














The impression and plan of care has been dictated as directed.





:


I performed a history and examination of this patient,  discussed the same with 

the dictator.  I agree with the dictator's note ,documented as a scribe.  Any 

additional findings or plans will be noted.

## 2020-07-08 NOTE — XR
EXAMINATION TYPE: XR chest 1V portable

 

DATE OF EXAM: 7/8/2020

 

COMPARISON: Prior chest x-ray 7/7/2020

 

HISTORY: Tracheostomy tube, abnormal chest x-ray

, ICU management

TECHNIQUE: Single frontal view of the chest is obtained.

 

FINDINGS:  Tracheostomy tube and NG tube are stable overlying appropriate positions. Bandlike area of
 increased attenuation persists at the left lung base, there is retrocardiac density. Lung volumes ar
e low and the patient is rotated. There is no evident pneumothorax. Heart is stable. Aorta is dense. 
Interstitium is increased. Old posterior right fourth rib fracture noted appears healed.

 

IMPRESSION:  Correlate for left lower lobe pneumonia versus subsegmental atelectasis. There may be co
mponent of interstitial edema.

## 2020-07-08 NOTE — P.PN
Subjective


Progress Note Date: 07/08/20


Principal diagnosis: 


 Acute liver failure, acute hypoxic respiratory failure





This is a 54-year-old white male, history of alcohol abuse, patient was brought 

into the ER with change in mental status according to EMS.  Patient could not 

give any history, he was a very poor historian upon arrival to the ER.  Patient 

is supposedly a daily drinker, however from my review of the chart, I saw this 

patient back on 3/17/20, patient presented back then with multiple posterior 

lateral rib fractures on the right.  Apparently he fell in his bathroom, and 

landed on the side of the top sustaining multiple rib fractures.  Patient 

sustained rib fractures of 910 and 11 ribs.  Patient is also known to have 

history of severe emphysema/COPD.  He is legally blind.  He has history of 

pericarditis, anxiety, and history of degenerative joint disease.  Patient was 

discharged home on 3/18/20.  Drug screen in the ER was positive for tricyclic 

antidepressants and positive for benzodiazepines.  Rest of the drug screen was 

basically negative.  ABG in the ER showed a pO2 of 63 pCO2 of 29 pH of 7.49.  

Patient was also noted to have elevated INR of 3.7.  Low hemoglobin of 7.0 

although his baseline hemoglobin from 2 months ago was 15.1.  Ammonia level was 

91.  Total bilirubin was 14.9, and his liver enzymes were a bit elevated.  

Normal amylase and lipase were noted.  Gallbladder ultrasound showed abdominal 

ascites.  Chest x-ray showed bilateral diffuse infiltrates.  Patient was 

presumed septic upon presentation, received fluid boluses of 2500 ML's.  Patient

was found to have hepatic encephalopathy liver failure, bilateral pneumonia, GI 

hemorrhage and sepsis.  Early this morning, patient was intubated, placed on 

mechanical ventilation, and he was aware of the patient since admission.  

Presently the patient is on assist control rate of 28 tidal volume is 500 FiO2 

is 100% PEEP of 5.  ABG showed a pO2 of 170 pCO2 of 31 pH of 7.46.  Hence I cut 

down the FiO2 to 50%, increased the PEEP to 8, cut down the tidal volume to 450,

and decrease the respiratory rate to 26.  Patient is on Sandostatin for his GI 

bleeding, and he is yet to be seen by gastroenterology on consultation is also 

on Protonix.  He is on lactulose for his elevated ammonia level.  And he is 

empirically on Zosyn for presumptive aspiration pneumonia.  Echocardiogram 

showed evidence of good LV function.  And mild valvular heart disease.  BNP 

level was borderline elevated at 910





Patient was reevaluated today on 6/30/20, remains on mechanical ventilation.  He

is presently on assist control rate of 26 tidal volume is 450 FiO2 of 60% and 

PEEP of 8.  However I increased his PEEP to 12 and cut down his FiO2 to 50%.  At

night the patient developed worsening agitation, and Stacking his breaths, was 

not synchronous with mechanical ventilation, hence had to place the patient on 

Nimbex.  Today I plan to discontinue Nimbex and place him on fentanyl 25 g per 

hour.  Patient will be started on tube feeding, and I cut down his IV fluid to 

50 MLS per hour.  Remains on propofol at 60 mcg/kg/m, Nimbex which will be disco

ntinued, but creatinine which was discontinued this morning, and norepinephrine 

at 0.06 mcg/kg/m.  Enteral feeding to be started today by nutritionist.  Patient

remains on antibiotics, remains on lactulose for his elevated ammonia level 

which is 80 today.  And he remains on Protonix.  Chest x-ray showed more 

consolidation noted bilaterally in both lungs more so in the left lung.  

Bilateraldisease is noted consistent with aspiration pneumonia.  Sputum cultures

and blood cultures so far remain on diagnostic.  ABG today showed a pO2 of 67 

pCO2 of 39 pH of 7.33.  WBC count is 14.7 hemoglobin is 8.6 platelets are 66,000

and INR is 2.1.  Electrolytes are basically normal renal profile showed beer of 

25 creatinine 0.94.





Patient was reevaluated today on 7/1/20, remains in the intensive care unit, 

remains on mechanical ventilation.  His ventilator settings are assist control 

rate of 26, tidal volume is 450 FiO2 is 50% and PEEP is at 12.  ABG showed a pO2

of 65 pCO2 of 37 pH of 7.36, hence no change was made in the ventilator 

settings.  Patient remains on norepinephrine at 0.09 mcg/kg/m, propofol at 45 

mcg/kg/m, fentanyl at 0.5 mcg/kg/h.  Remains on enteral feeding at 30 MLS per 

hour goal is 45.  Remains on Zosyn for empiric coverage of aspiration pneumonia.

 Remains on lactulose and the dose was increased to 45 ML 4 times a day, and 

added Xifaxan at 550 mg twice a day.  His lactulose does not seem to be inducing

enough diarrhea did get his ammonia level down.  Continues to have significantly

elevated ammonia level in spite of lactulose for the last 2 days.  Patient 

continues to have ascites.  And no plans for paracentesis as recommended by 

gastroenterology at this point.  Chest x-ray showed very minimal improvement if 

any in his bilateral infiltrates.  The bilateral infiltrates are suggestive of 

aspiration pneumonia.  Obviously the patient is known to have history of 

alcoholism, and he presented with alcoholic liver hepatitis, and hepatic 

encephalopathy.  Labs today were all reviewed WBC is 14.1 hemoglobin is 9.4 

platelets are 75,000 INR is 1.7.  Electrolytes are unremarkable except for low 

potassium of 3.4 BUN of 27 creatinine 1.32.  Liver enzymes are borderline elevat

ed.  Total bilirubin is improving down to 8.9 today.  Albumin is 2.3





Patient was reevaluated today on 7/2/20, remains in the ICU, intubated, 

mechanically ventilated.  Patient is on assist control rate of 26 tidal volume 

450 FiO2 50% and PEEP is 12.  Chest x-ray is showing definite improvement, flower

mayra his ABG is basically about the same with a pO2 of 68 pCO2 of 38 and pH of 

7.38.  His ammonia level is on the rise in spite of the fact that the patient is

on relatively high dose of lactulose and he is also on Xifaxan.  Patient remains

on propofol at 40 mcg/kg/m, fentanyl at 0.5 mcg/kg/h, he is on very small dose 

of norepinephrine at 0.01 mcg/kg/m, IV fluid is at KVO, and he is also on Zosyn 

for presumptive aspiration pneumonia.  Remains on diuretics in the form of 

Lasix, Aldactone.  His Lasix was increased to 40 mg 3 times a day, and he is on 

Aldactone via nasogastric tube.  Patient was taken off sedation today, however 

he became extremely agitated, tachypneic, tachycardic, hence we had to place him

back on sedation and continued assist control mode of mechanical ventilation.  

Chest x-ray again showed improvement.  WBC count today is 11 hemoglobin is 8.5 

his INR is 1.7 basic metabolic profile is improving, creatinine however is up to

1.40, and the patient obviously developed acute kidney injury.  This could very 

well be related to his sepsis, and related to his hypotension requiring 

norepinephrine.  Cultures including blood cultures and sputum cultures have been

negative.





Patient was reevaluated today in the ICU, remains intubated and mechanically 

ventilated.  His ventilator settings are assist control rate of 26 tidal volume 

is 450 FiO2 is 50% and PEEP is at 12 chest x-ray showed dramatic improvement.  

His ABG is significantly improved hence I recommended cutting down the PEEP to 

8.  His ammonia level is significantly improved today, it is down to 69.  Sodium

seems to be creeping up a little bit, and I recommended free water flushes via 

orogastric tube.  Patient is on propofol at 50 mcg/kg/m, his tube feeding is 

presently on hold because of increased residual.  Patient was given a sedation 

holiday, however when he went off propofol, the patient became extremely 

agitated, restless, biting on the endotracheal tube, tachycardic, tachypneic, 

and was extremely agitated.  Hence I recommended placing him back on propofol, 

and not quite ready for weaning.  Labs were all reviewed today.  And addressed 

accordingly including his elevated sodium and elevated BUN of 49 creatinine 

1.46.  Chest x-ray showed significant improvement in his bilateral 

infiltrates/aspiration pneumonia.





Patient was reevaluated today on 7/4/20, remains in the ICU, intubated and 

mechanically ventilated.  His ventilator settings are assist control rate of 26 

FiO2 50% tidal volume of 450 PEEP is at 8 and today I cut down his FiO2 to 45% 

and kept him on a PEEP of 8.  ABG this morning showed a pO2 of 102 pCO2 of 35 pH

of 7.45.  Patient has significantly elevated sodium today of 150 potassium is 

low at 2.8 ammonia level remains high at 73.  Patient is not requiring any 

pressors, he is on propofol at 34 mcg/kg/m.  Patient is on enteral feeding.  I 

do plan to wean the patient off propofol sometime today, and assess mental 

status again.  This was attempted yesterday, but the patient didn't do well, I 

have also recommended free water flushes to correct his hyper natremia.





The patient is seen today 07/05/2020 in follow-up in the intensive care unit.  

He remains intubated on mechanical vent.  Current settings are assist control at

a rate of 26, FiO2 45% tidal volume 450 and a PEEP of 8.  Morning blood gases 

reveal a P O2 of 93, pCO2 of 35 and a pH of 7.47.  Chest x-ray reveals stable 

bilateral atelectasis/infiltrate.  He is sedated on propofol at 50 mcg/kg/m.  

Antibiotics in the form of Zosyn.  He remains on IV diuretics.  He is tolerating

his tube feedings.  He remains hypernatremic despite free water.  IVs changed to

D5W at 75 ML's per hour.  Sodium 156.  Potassium 2.8.  Creatinine 1.32.  Glucose

157.  White count 10.8.  Hemoglobin 8.4.  Platelet count 58,000.





On 07/06/2020 patient seen in follow-up in the intensive care unit, he remains 

sedated, intubated on mechanical ventilator, current vent settings are assist 

control mode of ventilation with a rate of 12, tidal volume is 450, FiO2 45% and

PEEP of 5, this morning blood gases show pO2 of 93, pCO2 38, pH of 7.45.  

Current IV fluids include D5 W5 to 75 ML per hour, and Diprivan at 45 mics per 

kilo per minute, no vasoactive drips, tube feedings of vital high protein at a 

rate of 45 with a goal of 45.  Today's chest x-ray has been reviewed showing 

stable appearance of the bibasilar opacities.  T-max in the last 24 hours was 

99.7F, afebrile this morning, 1 sputum cultures have shown no growth, today's 

blood work has been reviewed, limits according to 14.0, hemoglobin of 8.0, 

platelet count is 62, sodium is 159 Asian is on D5W at 75 ML per hour, in 

addition to free water flushes with 200 mL every 4 hours, patient continues on 

lactulose and rifaximin, and is having lactulose induced diarrhea and he had 2.4

L in liquid stool output in the last 24 hours.  Patient apparently has failed 

spontaneous breathing trials for last 4 days.  We will proceed with a 

spontaneous awakening and spontaneous breathing trials again today, we will 

speak to the family about placement of tracheostomy and PEG tube insertion. 





On 07/07/2020 patient is seen in follow-up in intensive care unit, he remains 

sedated, intubated on mechanical ventilator, current vent settings are assist-

control with a rate of 12, tidal vital 450, FiO2 is 45%, and PEEP of 8, this 

morning blood gases show pO2 of 113, pCO2 is 34, and pH of 7.47, PEEP has been 

dropped to 5.  IV fluids include D5 W at 125 ML per hour, Diprivan is a 40 mics 

per kilo per minute, no vasoactive drips, tube feedings are on hold patient was 

on vital high protein at 45 with a goal of 45 and 400 ML every 4 hours water 

flushes.  Patient was given a sedation holiday yesterday, he never really fully 

awakened, he was placed on pressure-support of 8, he tolerated it for 3 hours, 

however there he became predicted, and was placed back on assist control mode of

ventilation, yesterday we spoke to the patient's wife who was a continue with 

full code CODE STATUS and would like to proceed with tracheostomy and PEG tube 

placement.  Today's chest x-ray has been reviewed and is relatively stable, 

accounting for technical difficulties, bibasilar opacities and atelectases and 

pleural effusions.  We discontinued patient's Lasix and Aldactone yesterday in 

view of his hypernatremia, patient continues to have large liquid stool output, 

4.7 L in the last 24 hours, continues on lactulose and rifaximin.  Yesterday's 

ammonia level was 44, today it is 47.  Today's blood work has been reviewed





On 07/08/2020 patient seen in follow-up in the intensive care unit, he remains 

sedated, on mechanical ventilator, yesterday he had a tracheostomy inserted, 

this morning he is on assist-control mode of ventilation with a rate of 12, 

tidal volume is 450, FiO2 is 45%, and PEEP of 5.  This morning blood gases show 

pO2 of 14, pCO2 38, and pH of 7.42.  Patient is on IV fluids with D5W at a rate 

of 125 ML per hour, Diprivan and is at 40 mics per kilo per minute.  No 

vasoactive drips.  His feedings are on hold for PEG tube insertion today.  

Patient is afebrile.  Hemodynamically he has been stable, no sedation holiday 

was given yesterday, the day before patient's addition was held, however patient

never fully awakened.  Today's labs have been reviewed, showing white blood cell

count 19.2, hemoglobin of 8.1, platelet count is 41, INR today is 1.6, sodium is

146, potassium 3.5, chloride is 117, CO2 is 24, BUN 69, creatinine is 0.94.  

Ammonia level today 62, patient remains on lactulose 40 mg every 8 hours, in 

addition to Xifaxan.  Today's chest x-ray has been reviewed showing left lower 

lobe pneumonia versus subsegmental atelectasis with a component of interstitial 

edema.  Since diuretics remain on hold regarding hyper nature anemia.  

Hypernatremia is improving, and his sodium level is 146 today.  Blood and  

sputum culture remains negative.  Patient has completed a course of Levaquin.











Objective





- Vital Signs


Vital signs: 


                                   Vital Signs











Temp  96.3 F L  07/08/20 08:00


 


Pulse  64   07/08/20 08:00


 


Resp  10 L  07/08/20 08:00


 


BP  123/66   07/08/20 08:00


 


Pulse Ox  100   07/08/20 08:00








                                 Intake & Output











 07/07/20 07/08/20 07/08/20





 18:59 06:59 18:59


 


Intake Total 2728.343 2712.063 330.46


 


Output Total 785 840 175


 


Balance 7283.527 5591.063 155.46


 


Weight  88.4 kg 


 


Intake:   


 


  IV 1836 1536 256


 


    Dextrose 5% in Water 1, 1500 1500 250





    000 ml @ 125 mls/hr IV .   





    Q8H RISA Rx#:[id number]   


 


    Normal Saline Pressure 36 36 6





    Bag   


 


  Intake, IV Titration 267.343 281.063 74.46





  Amount   


 


    Potassium Chloride 20 meq 100  





    In Water For Injection 1   





    100ml.bag @ 50 mls/hr   





    IVPB Q2H RISA Rx#:   





    [id number]   


 


    Propofol 1,000 mg In 167.343 281.063 74.46





    Empty Bag 1 bag @ Titrate   





    IV .Q0M RISA Rx#:   





    [id number]   


 


  Tube Feeding 225 495 


 


  Other 400 400 


 


Output:   


 


  Urine 775 840 175


 


  Estimated Blood Loss 10  


 


Other:   


 


  Voiding Method Indwelling Catheter Indwelling Catheter 








                       ABP, PAP, CO, CI - Last Documented











Arterial Blood Pressure        100/46

















- Exam


 GENERAL EXAM: Sedated, 54-year-old white male, trached to the mechanical 

ventilator, with settings of assist control rate of 12, tidal volume is 450, 

FiO2 45% and PEEP of 5 comfortable in no apparent distress.


HEAD: Normocephalic/atraumatic.


EYES: Normal reaction of pupils, equal size.  Conjunctiva pink, sclera white.


NOSE: Clear with pink turbinates.


THROAT: No erythema or exudates.


NECK: No masses, no JVD, no thyroid enlargement, no adenopathy.


CHEST: No chest wall deformity.  Symmetrical expansion. 


LUNGS: Equal air entry with no crackles, wheeze, rhonchi or dullness.


CVS: Regular rate and rhythm, normal S1 and S2, no gallops, no murmurs, no rubs


ABDOMEN: Soft, nontender.  No hepatosplenomegaly, normal bowel sounds, no 

guarding or rigidity.


EXTREMITIES: No clubbing, no edema, no cyanosis, 2+ pulses and upper and lower 

extremities.


MUSCULOSKELETAL: Muscle strength and tone normal.


SPINE: No scoliosis or deformity


SKIN: No rashes


CENTRAL NERVOUS SYSTEM: Sedated and intubated.  No focal deficits, tone is shirley

l in all 4 extremities.














- Labs


CBC & Chem 7: 


                                 07/08/20 04:50





                                 07/08/20 04:50


Labs: 


                  Abnormal Lab Results - Last 24 Hours (Table)











  07/07/20 07/07/20 07/07/20 Range/Units





  11:22 12:37 17:52 


 


WBC     (3.8-10.6)  k/uL


 


RBC     (4.30-5.90)  m/uL


 


Hgb     (13.0-17.5)  gm/dL


 


Hct     (39.0-53.0)  %


 


MCV     (80.0-100.0)  fL


 


MCH     (25.0-35.0)  pg


 


MCHC     (31.0-37.0)  g/dL


 


RDW     (11.5-15.5)  %


 


Plt Count     (150-450)  k/uL


 


Neutrophils #     (1.3-7.7)  k/uL


 


Macrocytosis     


 


PT     (9.0-12.0)  sec


 


INR     (<1.2)  


 


ABG Total CO2     (19-24)  mmol/L


 


ABG O2 Saturation     (94-97)  %


 


Sodium     (137-145)  mmol/L


 


Chloride     ()  mmol/L


 


BUN     (9-20)  mg/dL


 


Glucose     (74-99)  mg/dL


 


POC Glucose (mg/dL)  183 H  166 H  275 H  (75-99)  mg/dL


 


Calcium     (8.4-10.2)  mg/dL


 


Total Bilirubin     (0.2-1.3)  mg/dL


 


AST     (17-59)  U/L


 


ALT     (4-49)  U/L


 


Ammonia     (<30)  umol/L


 


Total Protein     (6.3-8.2)  g/dL


 


Albumin     (3.5-5.0)  g/dL














  07/07/20 07/07/20 07/08/20 Range/Units





  17:54 23:39 04:50 


 


WBC     (3.8-10.6)  k/uL


 


RBC     (4.30-5.90)  m/uL


 


Hgb     (13.0-17.5)  gm/dL


 


Hct     (39.0-53.0)  %


 


MCV     (80.0-100.0)  fL


 


MCH     (25.0-35.0)  pg


 


MCHC     (31.0-37.0)  g/dL


 


RDW     (11.5-15.5)  %


 


Plt Count     (150-450)  k/uL


 


Neutrophils #     (1.3-7.7)  k/uL


 


Macrocytosis     


 


PT     (9.0-12.0)  sec


 


INR     (<1.2)  


 


ABG Total CO2     (19-24)  mmol/L


 


ABG O2 Saturation     (94-97)  %


 


Sodium    146 H  (137-145)  mmol/L


 


Chloride    117 H  ()  mmol/L


 


BUN    69 H  (9-20)  mg/dL


 


Glucose    228 H  (74-99)  mg/dL


 


POC Glucose (mg/dL)  246 H  208 H   (75-99)  mg/dL


 


Calcium    7.9 L  (8.4-10.2)  mg/dL


 


Total Bilirubin    6.5 H  (0.2-1.3)  mg/dL


 


AST    129 H  (17-59)  U/L


 


ALT    73 H  (4-49)  U/L


 


Ammonia     (<30)  umol/L


 


Total Protein    5.9 L  (6.3-8.2)  g/dL


 


Albumin    2.2 L  (3.5-5.0)  g/dL














  07/08/20 07/08/20 07/08/20 Range/Units





  04:50 05:09 05:50 


 


WBC  19.2 H    (3.8-10.6)  k/uL


 


RBC  2.29 L    (4.30-5.90)  m/uL


 


Hgb  8.1 L    (13.0-17.5)  gm/dL


 


Hct  26.3 L    (39.0-53.0)  %


 


MCV  114.7 H    (80.0-100.0)  fL


 


MCH  35.5 H    (25.0-35.0)  pg


 


MCHC  30.9 L    (31.0-37.0)  g/dL


 


RDW  21.9 H    (11.5-15.5)  %


 


Plt Count  41 L    (150-450)  k/uL


 


Neutrophils #  17.4 H    (1.3-7.7)  k/uL


 


Macrocytosis  Marked A    


 


PT     (9.0-12.0)  sec


 


INR     (<1.2)  


 


ABG Total CO2   26 H   (19-24)  mmol/L


 


ABG O2 Saturation   98.4 H   (94-97)  %


 


Sodium     (137-145)  mmol/L


 


Chloride     ()  mmol/L


 


BUN     (9-20)  mg/dL


 


Glucose     (74-99)  mg/dL


 


POC Glucose (mg/dL)     (75-99)  mg/dL


 


Calcium     (8.4-10.2)  mg/dL


 


Total Bilirubin     (0.2-1.3)  mg/dL


 


AST     (17-59)  U/L


 


ALT     (4-49)  U/L


 


Ammonia    52 H  (<30)  umol/L


 


Total Protein     (6.3-8.2)  g/dL


 


Albumin     (3.5-5.0)  g/dL














  07/08/20 07/08/20 Range/Units





  05:50 06:38 


 


WBC    (3.8-10.6)  k/uL


 


RBC    (4.30-5.90)  m/uL


 


Hgb    (13.0-17.5)  gm/dL


 


Hct    (39.0-53.0)  %


 


MCV    (80.0-100.0)  fL


 


MCH    (25.0-35.0)  pg


 


MCHC    (31.0-37.0)  g/dL


 


RDW    (11.5-15.5)  %


 


Plt Count    (150-450)  k/uL


 


Neutrophils #    (1.3-7.7)  k/uL


 


Macrocytosis    


 


PT  15.6 H   (9.0-12.0)  sec


 


INR  1.6 H   (<1.2)  


 


ABG Total CO2    (19-24)  mmol/L


 


ABG O2 Saturation    (94-97)  %


 


Sodium    (137-145)  mmol/L


 


Chloride    ()  mmol/L


 


BUN    (9-20)  mg/dL


 


Glucose    (74-99)  mg/dL


 


POC Glucose (mg/dL)   234 H  (75-99)  mg/dL


 


Calcium    (8.4-10.2)  mg/dL


 


Total Bilirubin    (0.2-1.3)  mg/dL


 


AST    (17-59)  U/L


 


ALT    (4-49)  U/L


 


Ammonia    (<30)  umol/L


 


Total Protein    (6.3-8.2)  g/dL


 


Albumin    (3.5-5.0)  g/dL














Assessment and Plan


Plan: 


 Assessment:





Acute hypoxic respiratory failure, suspect aspiration pneumonia.  Chest x-ray 

has shown improvement over the last 2 days, and this is also reflected in the 

improvement noted in the ABG.





Acute sepsis, possible septic shock, patient required pressors, improved, and is

currently off pressors. This is related to aspiration pneumonia.  Blood cultures

and sputum cultures remain negative.





Alcohol liver disease with ascites.  Jaundice, and elevated liver enzymes.





Blood loss anemia, most likely the patient has acute or subacute GI bleeding, GI

is following.  Hemoglobin has been stable over last several days, no signs of 

active bleeding at this time





Acute hepatic encephalopathy with significantly elevated ammonia level.  Today's

ammonia level is a 52, patient is on lactulose and Xifaxan





Recent history of fall about 2 months ago, and multiple right-sided rib 

fractures.





History of alcoholic neuropathy.





History of pericarditis





Chronic back pain





History of restless leg syndrome.





Acute kidney injury secondary to sepsis and septic shock.  Strongly doubt 

hepatorenal syndrome.  Improved





Hypernatremia, related to free water deficit.  Patient continues on D5W and free

water flushes, and today on 07/07/2020 serum sodium is down to 146 from 149





Plan:





Continue same vent settings, patient is scheduled for PEG tube insertion today, 

after he returns from this procedure will plan on proceeding with daily 

interruption sedation to assess mental status.  Continue same dose lactulose and

Xifaxan.  We'll continue with D5W at 125, likely his tube feedings and water 

flushes will be on hold for 24 hours post PEG tube insertion.  Today's chest x-

ray has been reviewed showing left lower lobe infiltrate versus atelectasis with

a component of interstitial edema.  Patient has been afebrile, he completed a 

course of antibiotics.  Hemodynamically he is stable, we may consider addition 

of diuretics.  We'll likely proceed with changing his right femoral triple-lumen

catheter tomorrow, we'll give a dose of DDAVP for the procedure.  Culture data 

has been reviewed and remains negative to date.  Repeat chest x-ray and labs in 

the morning, follow-up with ammonia level.  Daily interruption of sedation. 





I performed a history & physical examination of the patient and discussed their 

management with my nurse practitioner, Shagufta Booker.  I reviewed the nurse 

practitioner's note and agree with the documented findings and plan of care.  

Lung sounds are positive for diminished breath sounds.  The findings and the 

impression was discussed with the patient.  I attest to the documentation by the

nurse practitioner. 





Time with Patient: Greater than 30

## 2020-07-08 NOTE — P.PN
Subjective


Progress Note Date: 07/08/20





Patient is sedated on propofol 40 g.  Exam limited.  No seizures noted.  

Continues to be severely encephalopathic.





Objective





- Vital Signs


Vital signs: 


                                   Vital Signs











Temp  97.4 F L  07/08/20 12:00


 


Pulse  61   07/08/20 14:00


 


Resp  24   07/08/20 14:00


 


BP  109/65   07/08/20 14:00


 


Pulse Ox  98   07/08/20 14:00








                                 Intake & Output











 07/07/20 07/08/20 07/08/20





 18:59 06:59 18:59


 


Intake Total 2728.343 2712.063 1688.46


 


Output Total 


 


Balance 3775.828 8846.063 -891.54


 


Weight  88.4 kg 


 


Intake:   


 


  IV 1836 1536 1024


 


    Dextrose 5% in Water 1, 1500 1500 1000





    000 ml @ 125 mls/hr IV .   





    Q8H RIAS Rx#:[id number]   


 


    Normal Saline Pressure 36 36 24





    Bag   


 


  Intake, IV Titration 267.343 281.063 174.46





  Amount   


 


    Potassium Chloride 20 meq 100  





    In Water For Injection 1   





    100ml.bag @ 50 mls/hr   





    IVPB Q2H R[facility] Rx#:   





    [id number]   


 


    Propofol 1,000 mg In 167.343 281.063 174.46





    Empty Bag 1 bag @ Titrate   





    IV .Q0M RISA Rx#:   





    [id number]   


 


  Tube Feeding 225 495 90


 


  Other 400 400 400


 


Output:   


 


  Urine 775 840 580


 


  Stool   2000


 


  Estimated Blood Loss 10  


 


Other:   


 


  Voiding Method Indwelling Catheter Indwelling Catheter Indwelling Catheter








                       ABP, PAP, CO, CI - Last Documented











Arterial Blood Pressure        129/64

















- Exam





Patient sedated.





- Labs


CBC & Chem 7: 


                                 07/08/20 04:50





                                 07/08/20 04:50


Labs: 


                  Abnormal Lab Results - Last 24 Hours (Table)











  07/07/20 07/07/20 07/07/20 Range/Units





  17:52 17:54 23:39 


 


WBC     (3.8-10.6)  k/uL


 


RBC     (4.30-5.90)  m/uL


 


Hgb     (13.0-17.5)  gm/dL


 


Hct     (39.0-53.0)  %


 


MCV     (80.0-100.0)  fL


 


MCH     (25.0-35.0)  pg


 


MCHC     (31.0-37.0)  g/dL


 


RDW     (11.5-15.5)  %


 


Plt Count     (150-450)  k/uL


 


Neutrophils #     (1.3-7.7)  k/uL


 


Macrocytosis     


 


PT     (9.0-12.0)  sec


 


INR     (<1.2)  


 


ABG Total CO2     (19-24)  mmol/L


 


ABG O2 Saturation     (94-97)  %


 


Sodium     (137-145)  mmol/L


 


Chloride     ()  mmol/L


 


BUN     (9-20)  mg/dL


 


Glucose     (74-99)  mg/dL


 


POC Glucose (mg/dL)  275 H  246 H  208 H  (75-99)  mg/dL


 


Calcium     (8.4-10.2)  mg/dL


 


Total Bilirubin     (0.2-1.3)  mg/dL


 


AST     (17-59)  U/L


 


ALT     (4-49)  U/L


 


Ammonia     (<30)  umol/L


 


Total Protein     (6.3-8.2)  g/dL


 


Albumin     (3.5-5.0)  g/dL














  07/08/20 07/08/20 07/08/20 Range/Units





  04:50 04:50 05:09 


 


WBC   19.2 H   (3.8-10.6)  k/uL


 


RBC   2.29 L   (4.30-5.90)  m/uL


 


Hgb   8.1 L   (13.0-17.5)  gm/dL


 


Hct   26.3 L   (39.0-53.0)  %


 


MCV   114.7 H   (80.0-100.0)  fL


 


MCH   35.5 H   (25.0-35.0)  pg


 


MCHC   30.9 L   (31.0-37.0)  g/dL


 


RDW   21.9 H   (11.5-15.5)  %


 


Plt Count   41 L   (150-450)  k/uL


 


Neutrophils #   17.4 H   (1.3-7.7)  k/uL


 


Macrocytosis   Marked A   


 


PT     (9.0-12.0)  sec


 


INR     (<1.2)  


 


ABG Total CO2    26 H  (19-24)  mmol/L


 


ABG O2 Saturation    98.4 H  (94-97)  %


 


Sodium  146 H    (137-145)  mmol/L


 


Chloride  117 H    ()  mmol/L


 


BUN  69 H    (9-20)  mg/dL


 


Glucose  228 H    (74-99)  mg/dL


 


POC Glucose (mg/dL)     (75-99)  mg/dL


 


Calcium  7.9 L    (8.4-10.2)  mg/dL


 


Total Bilirubin  6.5 H    (0.2-1.3)  mg/dL


 


AST  129 H    (17-59)  U/L


 


ALT  73 H    (4-49)  U/L


 


Ammonia     (<30)  umol/L


 


Total Protein  5.9 L    (6.3-8.2)  g/dL


 


Albumin  2.2 L    (3.5-5.0)  g/dL














  07/08/20 07/08/20 07/08/20 Range/Units





  05:50 05:50 06:38 


 


WBC     (3.8-10.6)  k/uL


 


RBC     (4.30-5.90)  m/uL


 


Hgb     (13.0-17.5)  gm/dL


 


Hct     (39.0-53.0)  %


 


MCV     (80.0-100.0)  fL


 


MCH     (25.0-35.0)  pg


 


MCHC     (31.0-37.0)  g/dL


 


RDW     (11.5-15.5)  %


 


Plt Count     (150-450)  k/uL


 


Neutrophils #     (1.3-7.7)  k/uL


 


Macrocytosis     


 


PT   15.6 H   (9.0-12.0)  sec


 


INR   1.6 H   (<1.2)  


 


ABG Total CO2     (19-24)  mmol/L


 


ABG O2 Saturation     (94-97)  %


 


Sodium     (137-145)  mmol/L


 


Chloride     ()  mmol/L


 


BUN     (9-20)  mg/dL


 


Glucose     (74-99)  mg/dL


 


POC Glucose (mg/dL)    234 H  (75-99)  mg/dL


 


Calcium     (8.4-10.2)  mg/dL


 


Total Bilirubin     (0.2-1.3)  mg/dL


 


AST     (17-59)  U/L


 


ALT     (4-49)  U/L


 


Ammonia  52 H    (<30)  umol/L


 


Total Protein     (6.3-8.2)  g/dL


 


Albumin     (3.5-5.0)  g/dL














  07/08/20 Range/Units





  11:44 


 


WBC   (3.8-10.6)  k/uL


 


RBC   (4.30-5.90)  m/uL


 


Hgb   (13.0-17.5)  gm/dL


 


Hct   (39.0-53.0)  %


 


MCV   (80.0-100.0)  fL


 


MCH   (25.0-35.0)  pg


 


MCHC   (31.0-37.0)  g/dL


 


RDW   (11.5-15.5)  %


 


Plt Count   (150-450)  k/uL


 


Neutrophils #   (1.3-7.7)  k/uL


 


Macrocytosis   


 


PT   (9.0-12.0)  sec


 


INR   (<1.2)  


 


ABG Total CO2   (19-24)  mmol/L


 


ABG O2 Saturation   (94-97)  %


 


Sodium   (137-145)  mmol/L


 


Chloride   ()  mmol/L


 


BUN   (9-20)  mg/dL


 


Glucose   (74-99)  mg/dL


 


POC Glucose (mg/dL)  175 H  (75-99)  mg/dL


 


Calcium   (8.4-10.2)  mg/dL


 


Total Bilirubin   (0.2-1.3)  mg/dL


 


AST   (17-59)  U/L


 


ALT   (4-49)  U/L


 


Ammonia   (<30)  umol/L


 


Total Protein   (6.3-8.2)  g/dL


 


Albumin   (3.5-5.0)  g/dL














Assessment and Plan


Assessment: 





* Altered mental status, likely related to hepatic and/or toxic metabolic 

  encephalopathy.


* Alcoholic liver disease with hepatic cirrhosis and ascites, anasarca.


* Status post acute kidney injury, septic shock.


* Ventilator-dependent respiratory failure, status post tracheostomy.


Plan: 





* EEG revealed moderate to severe background slowing, consistent with toxic 

  metabolic encephalopathy.  No evidence of seizures.


* Patient critically sick, with multiple medical conditions and metabolic 

  dysfunction.


* Your medical management.


* We will follow.

## 2020-07-08 NOTE — EEG
ELECTROENCEPHALOGRAM REPORT



DATE OF SERVICE:

07/08/2020



PREAMBLE:

This is a 54-year-old male with altered mental status.



EEG FINDINGS:

This is a 21 channel portable EEG recorded in a patient utilizing 10/20 
international

system with referential and bipolar montages.  The background consists of poorly

developed and regulated, predominantly low-voltage generalized delta activity,

intermixed with some theta activity.  Background does not seem to be reactive to
eye

opening or closing.  Does not seem to be reactive to photic stimulation.  
Patient also

was seems to have some sleep spindles seen intermittently during this study.  
However,

no clear-cut sleep patterns were noticed.  There are intermittent episodes of

generalized suppression seen lasting for 2-4 seconds.  No epileptiform activity 
was

seen.  EKG rhythm lead revealed no arrhythmia.



IMPRESSION:

This is an abnormal EEG due to:

Background slowing with intermittent suppression.  This is suggestive of 
generalized

cerebral dysfunction as can be seen with toxic metabolic encephalopathy or 
related to

diffuse structural brain abnormality or medication effect.  Clinical correlation
is recommended.

No obvious epileptiform activity was seen.

Followup EEG recommended, if clinically indicated.





MELITON / DIONEN: [id number] / Job#: [id number]

MTDD

## 2020-07-08 NOTE — P.PN
Progress Note - Text


Progress Note Date: 07/08/20





The patient underwent tracheostomy yesterday.  Today.  Patient's current 

ventilator.  History Is clean.





Patient was scheduled for PEG tube placement today.  However due to endoscopy 

conflict in scheduling.  It will be postponed until tomorrow.

## 2020-07-09 LAB
ALT SERPL-CCNC: 100 U/L (ref 4–49)
ANION GAP SERPL CALC-SCNC: 4 MMOL/L
AST SERPL-CCNC: 157 U/L (ref 17–59)
BASOPHILS # BLD AUTO: 0 K/UL (ref 0–0.2)
BASOPHILS NFR BLD AUTO: 0 %
BUN SERPL-SCNC: 55 MG/DL (ref 9–20)
CALCIUM SPEC-MCNC: 8.1 MG/DL (ref 8.4–10.2)
CHLORIDE SERPL-SCNC: 111 MMOL/L (ref 98–107)
CO2 BLDA-SCNC: 25 MMOL/L (ref 19–24)
CO2 SERPL-SCNC: 23 MMOL/L (ref 22–30)
EOSINOPHIL # BLD AUTO: 0.1 K/UL (ref 0–0.7)
EOSINOPHIL NFR BLD AUTO: 1 %
ERYTHROCYTE [DISTWIDTH] IN BLOOD BY AUTOMATED COUNT: 2.36 M/UL (ref 4.3–5.9)
ERYTHROCYTE [DISTWIDTH] IN BLOOD: 21 % (ref 11.5–15.5)
GLUCOSE BLD-MCNC: 105 MG/DL (ref 75–99)
GLUCOSE BLD-MCNC: 120 MG/DL (ref 75–99)
GLUCOSE BLD-MCNC: 130 MG/DL (ref 75–99)
GLUCOSE BLD-MCNC: 143 MG/DL (ref 75–99)
GLUCOSE SERPL-MCNC: 131 MG/DL (ref 74–99)
HCO3 BLDA-SCNC: 24 MMOL/L (ref 21–25)
HCT VFR BLD AUTO: 26.6 % (ref 39–53)
HGB BLD-MCNC: 8.8 GM/DL (ref 13–17.5)
LYMPHOCYTES # SPEC AUTO: 2 K/UL (ref 1–4.8)
LYMPHOCYTES NFR SPEC AUTO: 8 %
MCH RBC QN AUTO: 37.1 PG (ref 25–35)
MCHC RBC AUTO-ENTMCNC: 33 G/DL (ref 31–37)
MCV RBC AUTO: 112.4 FL (ref 80–100)
MONOCYTES # BLD AUTO: 0.6 K/UL (ref 0–1)
MONOCYTES NFR BLD AUTO: 2 %
NEUTROPHILS # BLD AUTO: 21.9 K/UL (ref 1.3–7.7)
NEUTROPHILS NFR BLD AUTO: 88 %
PCO2 BLDA: 35 MMHG (ref 35–45)
PH BLDA: 7.44 [PH] (ref 7.35–7.45)
PLATELET # BLD AUTO: 35 K/UL (ref 150–450)
PO2 BLDA: 91 MMHG (ref 83–108)
POTASSIUM SERPL-SCNC: 2.8 MMOL/L (ref 3.5–5.1)
SODIUM SERPL-SCNC: 138 MMOL/L (ref 137–145)
WBC # BLD AUTO: 24.8 K/UL (ref 3.8–10.6)

## 2020-07-09 PROCEDURE — 0DH63UZ INSERTION OF FEEDING DEVICE INTO STOMACH, PERCUTANEOUS APPROACH: ICD-10-PCS

## 2020-07-09 PROCEDURE — 3E0G76Z INTRODUCTION OF NUTRITIONAL SUBSTANCE INTO UPPER GI, VIA NATURAL OR ARTIFICIAL OPENING: ICD-10-PCS

## 2020-07-09 RX ADMIN — INSULIN ASPART SCH UNIT: 100 INJECTION, SOLUTION INTRAVENOUS; SUBCUTANEOUS at 05:37

## 2020-07-09 RX ADMIN — RIFAXIMIN SCH: 550 TABLET ORAL at 19:58

## 2020-07-09 RX ADMIN — POTASSIUM BICARBONATE SCH: 782 TABLET, EFFERVESCENT ORAL at 06:27

## 2020-07-09 RX ADMIN — POTASSIUM CHLORIDE SCH MLS/HR: 14.9 INJECTION, SOLUTION INTRAVENOUS at 10:47

## 2020-07-09 RX ADMIN — IPRATROPIUM BROMIDE AND ALBUTEROL SULFATE SCH ML: .5; 3 SOLUTION RESPIRATORY (INHALATION) at 19:04

## 2020-07-09 RX ADMIN — INSULIN ASPART SCH: 100 INJECTION, SOLUTION INTRAVENOUS; SUBCUTANEOUS at 11:35

## 2020-07-09 RX ADMIN — CHLORHEXIDINE GLUCONATE SCH ML: 1.2 RINSE ORAL at 08:32

## 2020-07-09 RX ADMIN — POTASSIUM CHLORIDE SCH MLS/HR: 7.46 INJECTION, SOLUTION INTRAVENOUS at 22:56

## 2020-07-09 RX ADMIN — POTASSIUM CHLORIDE SCH MLS/HR: 14.9 INJECTION, SOLUTION INTRAVENOUS at 15:02

## 2020-07-09 RX ADMIN — POTASSIUM CHLORIDE SCH MLS/HR: 14.9 INJECTION, SOLUTION INTRAVENOUS at 17:09

## 2020-07-09 RX ADMIN — PANTOPRAZOLE SODIUM SCH MG: 40 INJECTION, POWDER, FOR SOLUTION INTRAVENOUS at 08:32

## 2020-07-09 RX ADMIN — POTASSIUM CHLORIDE SCH MLS/HR: 14.9 INJECTION, SOLUTION INTRAVENOUS at 05:25

## 2020-07-09 RX ADMIN — INSULIN ASPART SCH: 100 INJECTION, SOLUTION INTRAVENOUS; SUBCUTANEOUS at 23:44

## 2020-07-09 RX ADMIN — PROPOFOL SCH MLS/HR: 10 INJECTION, EMULSION INTRAVENOUS at 05:20

## 2020-07-09 RX ADMIN — LACTULOSE SCH: 20 SOLUTION ORAL at 10:07

## 2020-07-09 RX ADMIN — INSULIN ASPART SCH: 100 INJECTION, SOLUTION INTRAVENOUS; SUBCUTANEOUS at 19:17

## 2020-07-09 RX ADMIN — POTASSIUM CHLORIDE SCH MLS/HR: 14.9 INJECTION, SOLUTION INTRAVENOUS at 08:32

## 2020-07-09 RX ADMIN — LACTULOSE SCH: 20 SOLUTION ORAL at 19:58

## 2020-07-09 RX ADMIN — RIFAXIMIN SCH: 550 TABLET ORAL at 08:34

## 2020-07-09 RX ADMIN — INSULIN ASPART SCH: 100 INJECTION, SOLUTION INTRAVENOUS; SUBCUTANEOUS at 05:32

## 2020-07-09 RX ADMIN — IPRATROPIUM BROMIDE AND ALBUTEROL SULFATE SCH ML: .5; 3 SOLUTION RESPIRATORY (INHALATION) at 11:03

## 2020-07-09 RX ADMIN — POTASSIUM CHLORIDE SCH MLS/HR: 14.9 INJECTION, SOLUTION INTRAVENOUS at 06:41

## 2020-07-09 RX ADMIN — IPRATROPIUM BROMIDE AND ALBUTEROL SULFATE SCH ML: .5; 3 SOLUTION RESPIRATORY (INHALATION) at 07:34

## 2020-07-09 RX ADMIN — CHLORHEXIDINE GLUCONATE SCH ML: 1.2 RINSE ORAL at 19:58

## 2020-07-09 RX ADMIN — LACTULOSE SCH: 20 SOLUTION ORAL at 08:34

## 2020-07-09 RX ADMIN — PANTOPRAZOLE SODIUM SCH MG: 40 INJECTION, POWDER, FOR SOLUTION INTRAVENOUS at 19:58

## 2020-07-09 RX ADMIN — IPRATROPIUM BROMIDE AND ALBUTEROL SULFATE SCH ML: .5; 3 SOLUTION RESPIRATORY (INHALATION) at 15:20

## 2020-07-09 RX ADMIN — IPRATROPIUM BROMIDE AND ALBUTEROL SULFATE SCH: .5; 3 SOLUTION RESPIRATORY (INHALATION) at 04:32

## 2020-07-09 NOTE — P.OP
Date of Procedure: 07/09/20


Preoperative Diagnosis: 


Malnutrition


Postoperative Diagnosis: 


Mild traction


Procedure(s) Performed: 


EGD with PEG


Anesthesia: MAC


Surgeon: Brown Alvarado


Pathology: none sent


Condition: stable


Disposition: PACU


Description of Procedure: 


Patient's placed on the bed in the lateral position.  He received IV sedation.  

The gastroscope was oropharynx and passed in the esophagus stomach.  A suitable 

light reflux seen the anterior abdominal wall.  The skin was anesthetized 1% 

local Xylocaine.  A skin incision was made and the needles placed into the 

stomach under direct visualization.  The wire was placed through the needle and 

the wire was snared and brought the oropharynx.  The PEG tube placed overtop the

wire and brought down into the stomach.  The PEG tube was secured at 3 cm sherie. 

The one-piece bolster was applied.  Patient tolerated the procedure well.

## 2020-07-09 NOTE — XR
EXAMINATION TYPE: XR chest 1V portable

 

DATE OF EXAM: 7/9/2020

 

Comparison: 7/8/2020

 

Clinical History: 54-year-old male ICU management 

 

Findings:

Tracheostomy cannula. NG tube courses below the diaphragm. Heart upper limits of normal in size. Incr
eased airspace disease left lower lung obscuring the left hemidiaphragm.

 

 

Impression:

Significant interval increase in left basilar airspace disease. Correlate for infectious or aspiratio
n pneumonitis.

## 2020-07-09 NOTE — P.PN
Subjective


Progress Note Date: 07/08/20


Principal diagnosis: 





Alcoholic hepatitis, alcoholic cirrhosis of the liver with ascites, hepatic 

encephalopathy, elevated liver enzymes





Patient is seen in the intensive care unit where he is currently status post 

tracheostomy.  Receiving tube feeds.  Still requiring mechanical ventilation.





Objective





- Vital Signs


Vital signs: 


                                   Vital Signs











Temp  97.4 F L  07/08/20 12:00


 


Pulse  61   07/08/20 14:00


 


Resp  24   07/08/20 14:00


 


BP  109/65   07/08/20 14:00


 


Pulse Ox  98   07/08/20 14:00








                                 Intake & Output











 07/07/20 07/08/20 07/08/20





 18:59 06:59 18:59


 


Intake Total 2728.343 2712.063 1688.46


 


Output Total 


 


Balance 2062.526 0042.063 -891.54


 


Weight  88.4 kg 


 


Intake:   


 


  IV 1836 1536 1024


 


    Dextrose 5% in Water 1, 1500 1500 1000





    000 ml @ 125 mls/hr IV .   





    Q8H [facility] Rx#:[id number]   


 


    Normal Saline Pressure 36 36 24





    Bag   


 


  Intake, IV Titration 267.343 281.063 174.46





  Amount   


 


    Potassium Chloride 20 meq 100  





    In Water For Injection 1   





    100ml.bag @ 50 mls/hr   





    IVPB Q2H RISA Rx#:   





    [id number]   


 


    Propofol 1,000 mg In 167.343 281.063 174.46





    Empty Bag 1 bag @ Titrate   





    IV .Q0M RISA Rx#:   





    [id number]   


 


  Tube Feeding 225 495 90


 


  Other 400 400 400


 


Output:   


 


  Urine 775 840 580


 


  Stool   2000


 


  Estimated Blood Loss 10  


 


Other:   


 


  Voiding Method Indwelling Catheter Indwelling Catheter Indwelling Catheter








                       ABP, PAP, CO, CI - Last Documented











Arterial Blood Pressure        129/64

















- Exam





On physical examination, patient appears comfortable in no apparent distress. 


HEAD: Normocephalic, atraumatic. 


EYES: Scleral icterus. No conjunctival injection. 


MOUTH: No lesions, tongue midline, endotracheal tube in place. 


NECK: Tracheostomy in place. 


CHEST: Coarse respiratory noises in all lung fields on mechanical ventilation.


ABDOMEN: Soft, obese. Bowel sounds are positive. No organomegaly.  No guarding 

or rigidity.


EXTREMITIES: Bilateral pedal edema. 


SKIN: No rashes, jaundice. 


NEUROLOGIC: Intubated and sedated. 





- Labs


CBC & Chem 7: 


                                 07/09/20 04:50





                                 07/09/20 04:50


Labs: 


                  Abnormal Lab Results - Last 24 Hours (Table)











  07/07/20 07/07/20 07/07/20 Range/Units





  17:52 17:54 23:39 


 


WBC     (3.8-10.6)  k/uL


 


RBC     (4.30-5.90)  m/uL


 


Hgb     (13.0-17.5)  gm/dL


 


Hct     (39.0-53.0)  %


 


MCV     (80.0-100.0)  fL


 


MCH     (25.0-35.0)  pg


 


MCHC     (31.0-37.0)  g/dL


 


RDW     (11.5-15.5)  %


 


Plt Count     (150-450)  k/uL


 


Neutrophils #     (1.3-7.7)  k/uL


 


Macrocytosis     


 


PT     (9.0-12.0)  sec


 


INR     (<1.2)  


 


ABG Total CO2     (19-24)  mmol/L


 


ABG O2 Saturation     (94-97)  %


 


Sodium     (137-145)  mmol/L


 


Chloride     ()  mmol/L


 


BUN     (9-20)  mg/dL


 


Glucose     (74-99)  mg/dL


 


POC Glucose (mg/dL)  275 H  246 H  208 H  (75-99)  mg/dL


 


Calcium     (8.4-10.2)  mg/dL


 


Total Bilirubin     (0.2-1.3)  mg/dL


 


AST     (17-59)  U/L


 


ALT     (4-49)  U/L


 


Ammonia     (<30)  umol/L


 


Total Protein     (6.3-8.2)  g/dL


 


Albumin     (3.5-5.0)  g/dL














  07/08/20 07/08/20 07/08/20 Range/Units





  04:50 04:50 05:09 


 


WBC   19.2 H   (3.8-10.6)  k/uL


 


RBC   2.29 L   (4.30-5.90)  m/uL


 


Hgb   8.1 L   (13.0-17.5)  gm/dL


 


Hct   26.3 L   (39.0-53.0)  %


 


MCV   114.7 H   (80.0-100.0)  fL


 


MCH   35.5 H   (25.0-35.0)  pg


 


MCHC   30.9 L   (31.0-37.0)  g/dL


 


RDW   21.9 H   (11.5-15.5)  %


 


Plt Count   41 L   (150-450)  k/uL


 


Neutrophils #   17.4 H   (1.3-7.7)  k/uL


 


Macrocytosis   Marked A   


 


PT     (9.0-12.0)  sec


 


INR     (<1.2)  


 


ABG Total CO2    26 H  (19-24)  mmol/L


 


ABG O2 Saturation    98.4 H  (94-97)  %


 


Sodium  146 H    (137-145)  mmol/L


 


Chloride  117 H    ()  mmol/L


 


BUN  69 H    (9-20)  mg/dL


 


Glucose  228 H    (74-99)  mg/dL


 


POC Glucose (mg/dL)     (75-99)  mg/dL


 


Calcium  7.9 L    (8.4-10.2)  mg/dL


 


Total Bilirubin  6.5 H    (0.2-1.3)  mg/dL


 


AST  129 H    (17-59)  U/L


 


ALT  73 H    (4-49)  U/L


 


Ammonia     (<30)  umol/L


 


Total Protein  5.9 L    (6.3-8.2)  g/dL


 


Albumin  2.2 L    (3.5-5.0)  g/dL














  07/08/20 07/08/20 07/08/20 Range/Units





  05:50 05:50 06:38 


 


WBC     (3.8-10.6)  k/uL


 


RBC     (4.30-5.90)  m/uL


 


Hgb     (13.0-17.5)  gm/dL


 


Hct     (39.0-53.0)  %


 


MCV     (80.0-100.0)  fL


 


MCH     (25.0-35.0)  pg


 


MCHC     (31.0-37.0)  g/dL


 


RDW     (11.5-15.5)  %


 


Plt Count     (150-450)  k/uL


 


Neutrophils #     (1.3-7.7)  k/uL


 


Macrocytosis     


 


PT   15.6 H   (9.0-12.0)  sec


 


INR   1.6 H   (<1.2)  


 


ABG Total CO2     (19-24)  mmol/L


 


ABG O2 Saturation     (94-97)  %


 


Sodium     (137-145)  mmol/L


 


Chloride     ()  mmol/L


 


BUN     (9-20)  mg/dL


 


Glucose     (74-99)  mg/dL


 


POC Glucose (mg/dL)    234 H  (75-99)  mg/dL


 


Calcium     (8.4-10.2)  mg/dL


 


Total Bilirubin     (0.2-1.3)  mg/dL


 


AST     (17-59)  U/L


 


ALT     (4-49)  U/L


 


Ammonia  52 H    (<30)  umol/L


 


Total Protein     (6.3-8.2)  g/dL


 


Albumin     (3.5-5.0)  g/dL














  07/08/20 Range/Units





  11:44 


 


WBC   (3.8-10.6)  k/uL


 


RBC   (4.30-5.90)  m/uL


 


Hgb   (13.0-17.5)  gm/dL


 


Hct   (39.0-53.0)  %


 


MCV   (80.0-100.0)  fL


 


MCH   (25.0-35.0)  pg


 


MCHC   (31.0-37.0)  g/dL


 


RDW   (11.5-15.5)  %


 


Plt Count   (150-450)  k/uL


 


Neutrophils #   (1.3-7.7)  k/uL


 


Macrocytosis   


 


PT   (9.0-12.0)  sec


 


INR   (<1.2)  


 


ABG Total CO2   (19-24)  mmol/L


 


ABG O2 Saturation   (94-97)  %


 


Sodium   (137-145)  mmol/L


 


Chloride   ()  mmol/L


 


BUN   (9-20)  mg/dL


 


Glucose   (74-99)  mg/dL


 


POC Glucose (mg/dL)  175 H  (75-99)  mg/dL


 


Calcium   (8.4-10.2)  mg/dL


 


Total Bilirubin   (0.2-1.3)  mg/dL


 


AST   (17-59)  U/L


 


ALT   (4-49)  U/L


 


Ammonia   (<30)  umol/L


 


Total Protein   (6.3-8.2)  g/dL


 


Albumin   (3.5-5.0)  g/dL














Assessment and Plan


(1) Acute alcoholic hepatitis


Narrative/Plan: 


54-year-old male with known history of alcohol abuse presenting to the hospital 

with altered mental status.  Likely multifactorial given suspected underlying 

decompensated cirrhosis, acute alcoholic hepatitis and concern for aspiration 

pneumonia and sepsis.  Currently receiving treatment in the ICU, he remains on 

broad-spectrum antibiotic therapy and mechanically ventilated.





Patient is status post tracheostomy placement.  Plan is for PEG tube placement 

with the surgical service.


Current Visit: Yes   Status: Acute   Code(s): K70.10 - ALCOHOLIC HEPATITIS 

WITHOUT ASCITES   SNOMED Code(s): 2848720


   





(2) Liver cirrhosis


Current Visit: Yes   Status: Acute   Code(s): K74.60 - UNSPECIFIED CIRRHOSIS OF 

LIVER   SNOMED Code(s): 32842843


   





(3) Ascites


Current Visit: Yes   Status: Acute   Code(s): R18.8 - OTHER ASCITES   SNOMED 

Code(s): 388755174


   





(4) Hepatic encephalopathy


Current Visit: Yes   Status: Acute   Code(s): K72.90 - HEPATIC FAILURE, 

UNSPECIFIED WITHOUT COMA   SNOMED Code(s): 87600113


   





(5) Bicytopenia


Narrative/Plan: 


Patient anemic and thrombocytopenic likely related to chronic alcohol use.  

Stool testing was positive for blood but patient has no signs or symptoms of GI 

bleeding with normal brown stool noted and fecal management system.  Hemoglobin 

has remained stable.


Current Visit: Yes   Status: Acute   Code(s): D75.89 - OTHER SPECIFIED DISEASES 

OF BLOOD AND BLOOD-FORMING ORGANS   SNOMED Code(s): 23033343


   


Plan: 





Supportive care


Nothing by mouth, okay for tube feeds as tolerated, plan is for PEG tube 

placement


Continue management per intensivist service, patient currently intubated and 

sedated


Continue broad-spectrum antibiotic therapy


Continue lactulose 4 times a day


Xifaxan twice daily


Neurology service following for altered mentation


Status post tracheostomy


Alcohol abstinence


Thank you for allowing us to participate in the care of the patient we will 

continue to follow

## 2020-07-09 NOTE — P.PN
Subjective


Progress Note Date: 07/09/20





This is a 54-year-old gentleman, history of polysubstance abuse including 

alcohol abuse , falls with recent rib fractures, legally blind, osteoarthritis 

and multiple other medical issues presented to the ER with changes in mental 

status 2 days.  Drug screen positive for tricyclics and benzos, serum alcohol 

less than 10.  UA reported dark brown cloudy urine with occasional bacteria, 

hyaline casts, 2 WBCs, trace leukocytes, 4+ bilirubin, negative for nitrates. 

Ammonia level 91, T bili 14.9, currently 15.8 , elevated LFTs .Gallbladder 

ultrasound reporting abdominal ascites, abdominal x-ray nonacute, right foot x-

ray reported no fracture, soft tissue swelling, EKG reported sinus tachycardia, 

troponin normal, , echo reported normal LV function, EF 60-65% ,lactic 

acid 2.1 now down to 1.7, BUN 25, creatinine 0.8 chest x-ray reporting moderate 

pulmonary interstitial and airspace edema significantly worse than yesterday, 

pulmonary edema.  ABGs noted.  INR on admission 3.7, down to 2.2 Sepsis 

protocol, Received IV fluid resuscitation, IV antibiotics, cultures drawn.  

Developed respiratory failure, requiring intubation during the night.  Currently

on Sandostatin drip.  3 maroon stools during the night.  Received 4 units of FFP

and 2 units of packed RBCs to date.  Hemoglobin currently 8.5.  And had required

pressor support with Levophed off since 06 100.  Maintained on IV fluid 

resuscitation.  Telemetry sinus rhythm.  Potassium 3.2, magnesium 1.7. 








06/30/2020 chest x-ray reporting persistent bilateral scattered airspace 

infiltrates, pleural effusion unchanged.  Ventilator dependent, on FiO2 of 50 

with PEEP increased to 12.  Continues on lactulose with ammonia down to 80.  T 

bili decreased to 11.3, LFTs improving.  No further maroon stools.  Small black 

stool this morning.  Hemoglobin 8.6.  Telemetry sinus rhythm to sinus tach.  

Sandostatin drip discontinued this morning.  Last night patient asynchronous 

with vent, stacking breaths, Nimbex drip initiated.  This morning Nimbex 

discontinued, changed to fentanyl drip.  Requiring pressor support, Levophed 

resumed.  Received vitamin K yesterday, INR 2.1.  Marginal urine output, IV 

fluids decreased, and Lasix IV push added to med regime.  Tube feedings ordered.

 Afebrile, WBC 14.7.  Sputum culture pending, preliminary blood cultures 

negative at 48 hours.  ABGs noted.








07/01/2020  yesterday Lasix and Aldactone initiated, creatinine mildly worsened 

up to 1.32.  Ammonia increased up to 89, lactulose increased.  No further maroon

stools, hemoglobin increased to 9.4, platelets increased to 75.  T bili trending

down, 8.9, LFTs improving.  Potassium 3.4.  Chest x-ray reporting improving left

lower lobe aeration.  Right foot evaluated by orthopedic surgery, possible fluid

collection, seroma with potential x-ray tomorrow.  Tolerating tube feeds with 

minimal to no residuals, currently at 30 mls per hour with goal of 45.  IV 

steroids added to med regimen with blood sugars increasing.  Remains vent 

dependent with FiO2 at 50/+12 of PEEP.  Continues on Levophed, fentanyl, 

diprovan drips.  Afebrile, T-max 99.5, WBC 14.1.











07/02/2020 ABGs unchanged,remains vent dependent, FiO2 50/+12 PEEP.  Chest x-ray

reporting improvement.  Maintained on both Xifaxan & Lactulose, ammonia 

increasing, beginning to stool.  Creatinine trending up 1.4.  Hemoglobin 

decreased to 8.5.  INR 1.7 Levophed weaned off this morning.  Attempted a 

sedation holiday for about an hour this morning; patient did not wake up, became

agitated, tachycardic, tachypneic with respiratory rate up into the 40s, 

diprovan/fentanyl drips resumed.  Tolerating tube feeds at 40 with goal of 

45/minimal to no residual.  Orthopedics discussing x-ray and foot possibly 

tomorrow.  Afebrile WBC down to 11.











07/08/2020 patient was trached yesterday, remains vent dependent with FiO2 

45%/+5 of PEEP.  Chest x-ray suggestive of possible left lower lobe pneumonia, v

ersus atelectasis, possible interstitial disease. Scheduled for PEG tube 

placement today.  To date, patient has not tolerated sedation holidays, not 

following commands, becomes agitated, and becomes asynchronous with the patient.

 Neurology consulted, EEG completed, results pending.  Maintain on D5W with 

improvement in sodium, Na 146.  Currently sedated on Diprovan gtt. telemetry 

sinus rhythm.  Ammonia level LII on lactulose and Xifaxan.








07/09/2020  EGD with PEG tube placement completed at bedside this morning, 

tolerated procedure well.  Like gastroesophageal reflux reported along the 

anterior abdominal wall.  Evaluated by neurology with recommendations noted and 

appreciated.  EEG completed yesterday reporting abnormal, background slowing 

,suggestive of generalized cerebral dysfunction seen with toxic metabolic 

encephalopathy related to diffuse structural brain abnormality, no obvious 

epileptiform activity seen.  Afebrile, WBC continues trending up, currently 

24.8, recultured.  During sedation holiday patient noted to spontaneously move 

his right hand, half open his eyes responding to his name.  Diprovan has been 

weaned off this morning.  Remains vent dependent with FiO2 40%/+5 of PEEP.  

Chest x-ray reporting significant interval increase in left basilar airspace 

disease, possible infectious or aspiration pneumonitis. Ammonia level 42.  

Receiving potassium supplements for potassium 2.8.  Sodium continues improving, 

138.





Objective





- Vital Signs


Vital signs: 


                                   Vital Signs











Temp  97.8 F   07/09/20 08:00


 


Pulse  80   07/09/20 11:25


 


Resp  14   07/09/20 11:00


 


BP  91/51   07/09/20 10:00


 


Pulse Ox  97   07/09/20 11:00








                                 Intake & Output











 07/08/20 07/09/20 07/09/20





 18:59 06:59 18:59


 


Intake Total 2782.228 2832.232 642.512


 


Output Total 920 2350 1565


 


Balance 1862.228 482.232 -922.488


 


Weight  92 kg 


 


Intake:   


 


  IV 1536 1664 307


 


    0.9 NS   20


 


    Dextrose 5% in Water 1, 1500 1625 250





    000 ml @ 125 mls/hr IV .   





    Q8H R[facility] Rx#:[id number]   


 


    Normal Saline Pressure 36 39 12





    Bag   


 


  Intake, IV Titration 176.228 98.232 335.512





  Amount   


 


    Potassium Chloride 20 meq   300





    In Water For Injection 1   





    100ml.bag @ 50 mls/hr   





    IVPB Q2H R[facility] Rx#:   





    [id number]   


 


    Propofol 1,000 mg In 176.228 98.232 35.512





    Empty Bag 1 bag @ Titrate   





    IV .Q0M RISA Rx#:   





    [id number]   


 


  Tube Feeding 270 270 


 


  Other 800 800 


 


Output:   


 


  Urine 920 950 165


 


  Stool  1400 1400


 


Other:   


 


  Voiding Method Indwelling Catheter Indwelling Catheter Indwelling Catheter








                       ABP, PAP, CO, CI - Last Documented











Arterial Blood Pressure        105/52

















- Exam


VITAL SIGNS: As above


GENERAL: Sitting up in bed, intubated


HEENT:  Conjunctivae normal.  Positive icterus, jaundice


NECK:  No JVD. No thyroid enlargement. No LNs


CARDIOVASCULAR:  S1, S2 regular.  Systolic murmur


RESPIRATION: Clear, with no rhonchi, crackles or wheezing.


ABDOMEN:  Soft, distended, positive bowel sounds


Extremities: Positive upper and lower extremity edema, no clubbing, no cyanosis.

Right foot dorsal edema


NERVOUS SYSTEM: Unable to assess, sedated


Skin: Warm and dry, no rash 








- Labs


CBC & Chem 7: 


                                 07/09/20 04:50





                                 07/09/20 04:50


Labs: 


                  Abnormal Lab Results - Last 24 Hours (Table)











  06/28/20 06/28/20 06/28/20 Range/Units





  14:10 17:24 20:03 


 


WBC     (3.8-10.6)  k/uL


 


RBC     (4.30-5.90)  m/uL


 


Hgb     (13.0-17.5)  gm/dL


 


Hct     (39.0-53.0)  %


 


MCV     (80.0-100.0)  fL


 


MCH     (25.0-35.0)  pg


 


MCHC     (31.0-37.0)  g/dL


 


RDW     (11.5-15.5)  %


 


Plt Count     (150-450)  k/uL


 


Neutrophils #     (1.3-7.7)  k/uL


 


Macrocytosis     


 


ABG Total CO2     (19-24)  mmol/L


 


ABG O2 Saturation     (94-97)  %


 


Potassium     (3.5-5.1)  mmol/L


 


Chloride     ()  mmol/L


 


Carbon Dioxide     (22-30)  mmol/L


 


BUN     (9-20)  mg/dL


 


Glucose     (74-99)  mg/dL


 


POC Glucose (mg/dL)     (75-99)  mg/dL


 


Plasma Lactic Acid Gagandeep   3.1 H*  2.2 H*  (0.7-2.0)  mmol/L


 


Calcium     (8.4-10.2)  mg/dL


 


Total Bilirubin     (0.2-1.3)  mg/dL


 


AST     (17-59)  U/L


 


ALT     (4-49)  U/L


 


Ammonia     (<30)  umol/L


 


Crossmatch  See Detail    














  06/28/20 06/29/20 07/07/20 Range/Units





  22:50 17:30 04:30 


 


WBC    16.2 H  (3.8-10.6)  k/uL


 


RBC    2.22 L  (4.30-5.90)  m/uL


 


Hgb    8.1 L  (13.0-17.5)  gm/dL


 


Hct    25.3 L  (39.0-53.0)  %


 


MCV    114.0 H  (80.0-100.0)  fL


 


MCH    36.7 H  (25.0-35.0)  pg


 


MCHC     (31.0-37.0)  g/dL


 


RDW    22.7 H  (11.5-15.5)  %


 


Plt Count    49 L  (150-450)  k/uL


 


Neutrophils #    14.5 H  (1.3-7.7)  k/uL


 


Macrocytosis    Marked A  


 


ABG Total CO2     (19-24)  mmol/L


 


ABG O2 Saturation     (94-97)  %


 


Potassium   3.2 L   (3.5-5.1)  mmol/L


 


Chloride   113 H   ()  mmol/L


 


Carbon Dioxide   20 L   (22-30)  mmol/L


 


BUN   25 H   (9-20)  mg/dL


 


Glucose   100 H   (74-99)  mg/dL


 


POC Glucose (mg/dL)     (75-99)  mg/dL


 


Plasma Lactic Acid Gagandeep  2.1 H*    (0.7-2.0)  mmol/L


 


Calcium   7.1 L   (8.4-10.2)  mg/dL


 


Total Bilirubin     (0.2-1.3)  mg/dL


 


AST     (17-59)  U/L


 


ALT     (4-49)  U/L


 


Ammonia     (<30)  umol/L


 


Crossmatch     














  07/08/20 07/08/20 07/08/20 Range/Units





  04:50 17:56 23:55 


 


WBC  19.2 H    (3.8-10.6)  k/uL


 


RBC  2.29 L    (4.30-5.90)  m/uL


 


Hgb  8.1 L    (13.0-17.5)  gm/dL


 


Hct  26.3 L    (39.0-53.0)  %


 


MCV  114.7 H    (80.0-100.0)  fL


 


MCH  35.5 H    (25.0-35.0)  pg


 


MCHC  30.9 L    (31.0-37.0)  g/dL


 


RDW  21.9 H    (11.5-15.5)  %


 


Plt Count  41 L    (150-450)  k/uL


 


Neutrophils #  17.4 H    (1.3-7.7)  k/uL


 


Macrocytosis  Marked A    


 


ABG Total CO2     (19-24)  mmol/L


 


ABG O2 Saturation     (94-97)  %


 


Potassium     (3.5-5.1)  mmol/L


 


Chloride     ()  mmol/L


 


Carbon Dioxide     (22-30)  mmol/L


 


BUN     (9-20)  mg/dL


 


Glucose     (74-99)  mg/dL


 


POC Glucose (mg/dL)   225 H  241 H  (75-99)  mg/dL


 


Plasma Lactic Acid Gagandeep     (0.7-2.0)  mmol/L


 


Calcium     (8.4-10.2)  mg/dL


 


Total Bilirubin     (0.2-1.3)  mg/dL


 


AST     (17-59)  U/L


 


ALT     (4-49)  U/L


 


Ammonia     (<30)  umol/L


 


Crossmatch     














  07/09/20 07/09/20 07/09/20 Range/Units





  04:50 04:50 04:50 


 


WBC  24.8 H    (3.8-10.6)  k/uL


 


RBC  2.36 L    (4.30-5.90)  m/uL


 


Hgb  8.8 L    (13.0-17.5)  gm/dL


 


Hct  26.6 L    (39.0-53.0)  %


 


MCV  112.4 H    (80.0-100.0)  fL


 


MCH  37.1 H    (25.0-35.0)  pg


 


MCHC     (31.0-37.0)  g/dL


 


RDW  21.0 H    (11.5-15.5)  %


 


Plt Count  35 L    (150-450)  k/uL


 


Neutrophils #  21.9 H    (1.3-7.7)  k/uL


 


Macrocytosis  Marked A    


 


ABG Total CO2     (19-24)  mmol/L


 


ABG O2 Saturation     (94-97)  %


 


Potassium   2.8 L   (3.5-5.1)  mmol/L


 


Chloride   111 H   ()  mmol/L


 


Carbon Dioxide     (22-30)  mmol/L


 


BUN   55 H   (9-20)  mg/dL


 


Glucose   131 H   (74-99)  mg/dL


 


POC Glucose (mg/dL)     (75-99)  mg/dL


 


Plasma Lactic Acid Gagandeep     (0.7-2.0)  mmol/L


 


Calcium   8.1 L   (8.4-10.2)  mg/dL


 


Total Bilirubin    6.6 H  (0.2-1.3)  mg/dL


 


AST    157 H  (17-59)  U/L


 


ALT    100 H  (4-49)  U/L


 


Ammonia     (<30)  umol/L


 


Crossmatch     














  07/09/20 07/09/20 07/09/20 Range/Units





  05:28 05:32 05:43 


 


WBC     (3.8-10.6)  k/uL


 


RBC     (4.30-5.90)  m/uL


 


Hgb     (13.0-17.5)  gm/dL


 


Hct     (39.0-53.0)  %


 


MCV     (80.0-100.0)  fL


 


MCH     (25.0-35.0)  pg


 


MCHC     (31.0-37.0)  g/dL


 


RDW     (11.5-15.5)  %


 


Plt Count     (150-450)  k/uL


 


Neutrophils #     (1.3-7.7)  k/uL


 


Macrocytosis     


 


ABG Total CO2    25 H  (19-24)  mmol/L


 


ABG O2 Saturation    97.5 H  (94-97)  %


 


Potassium     (3.5-5.1)  mmol/L


 


Chloride     ()  mmol/L


 


Carbon Dioxide     (22-30)  mmol/L


 


BUN     (9-20)  mg/dL


 


Glucose     (74-99)  mg/dL


 


POC Glucose (mg/dL)   143 H   (75-99)  mg/dL


 


Plasma Lactic Acid Gagandeep     (0.7-2.0)  mmol/L


 


Calcium     (8.4-10.2)  mg/dL


 


Total Bilirubin     (0.2-1.3)  mg/dL


 


AST     (17-59)  U/L


 


ALT     (4-49)  U/L


 


Ammonia  42 H    (<30)  umol/L


 


Crossmatch     














  07/09/20 Range/Units





  11:33 


 


WBC   (3.8-10.6)  k/uL


 


RBC   (4.30-5.90)  m/uL


 


Hgb   (13.0-17.5)  gm/dL


 


Hct   (39.0-53.0)  %


 


MCV   (80.0-100.0)  fL


 


MCH   (25.0-35.0)  pg


 


MCHC   (31.0-37.0)  g/dL


 


RDW   (11.5-15.5)  %


 


Plt Count   (150-450)  k/uL


 


Neutrophils #   (1.3-7.7)  k/uL


 


Macrocytosis   


 


ABG Total CO2   (19-24)  mmol/L


 


ABG O2 Saturation   (94-97)  %


 


Potassium   (3.5-5.1)  mmol/L


 


Chloride   ()  mmol/L


 


Carbon Dioxide   (22-30)  mmol/L


 


BUN   (9-20)  mg/dL


 


Glucose   (74-99)  mg/dL


 


POC Glucose (mg/dL)  120 H  (75-99)  mg/dL


 


Plasma Lactic Acid Gagandeep   (0.7-2.0)  mmol/L


 


Calcium   (8.4-10.2)  mg/dL


 


Total Bilirubin   (0.2-1.3)  mg/dL


 


AST   (17-59)  U/L


 


ALT   (4-49)  U/L


 


Ammonia   (<30)  umol/L


 


Crossmatch   














Assessment and Plan


Assessment: 


Sepsis secondary to aspiration bilateral pneumonia, possibly abdominal source,


Acute hypoxic respiratory failure,  related to the above,  ventilator dependent,

failure to wean, status post tracheostomy.


Septic and hypovolemic shock multifactorial, secondary to bilateral aspiration 

pneumonia, alcoholic liver disease


Septic shock status post pressor support


Hypotension, secondary to the above, pressor support dependent secondary to 

sepsis


Acute renal failure multifactorial, secondary to all the above, diuretics


Acute GI bleed, possible esophageal varices in a patient with significant 

alcohol dependence, on Sandostatin


Acute blood loss anemia, status post multiple transfusions of both FFP, packed 

RBCs


Acute Hepatic encephalopathy 


Possible acute toxic, metabolic encephalopathy


Alcoholic liver disease with ascites


Hyperammonia


Recent posterior lateral rib fractures of 9, 10 and 11 status post fall


History of pericarditis


Chronic back pain


Polysubstance abuse, per record review; patient reports alcohol, marijuana use 

currently.


History of nicotine dependence


COPD, emphysema


Legally blind


Obesity, BMI 30.7


Osteoarthritis


Venous insufficiency


Anxiety, depression, history of, currently controlled


Hyperglycemia, steroid-induced


Moderate protein calorie malnutrition


Hypernatremia secondary free water deficit, improving


Status post PEG tube placement


Hypokalemia














Plan: Continue on current medication regime ,monitoring and symptomatic tr

eatment.  Electrolyte replacement in progress .Worsening leukocytosis, 

recultured .pro-calcitonin level pending.   further weaning trials as per 

intensivist .Continue on lactulose, Xifaxan, close monitoring of ammonia level .

 Select specialty possibly at discharge.  Prognosis guarded given multiple 

complex medical issues.














The impression and plan of care has been dictated as directed.





:


I performed a history and examination of this patient,  discussed the same with 

the dictator.  I agree with the dictator's note ,documented as a scribe.  Any 

additional findings or plans will be noted.

## 2020-07-09 NOTE — P.PN
Subjective


Progress Note Date: 07/09/20


Principal diagnosis: 


 Acute liver failure, acute hypoxic respiratory failure





This is a 54-year-old white male, history of alcohol abuse, patient was brought 

into the ER with change in mental status according to EMS.  Patient could not 

give any history, he was a very poor historian upon arrival to the ER.  Patient 

is supposedly a daily drinker, however from my review of the chart, I saw this 

patient back on 3/17/20, patient presented back then with multiple posterior 

lateral rib fractures on the right.  Apparently he fell in his bathroom, and 

landed on the side of the top sustaining multiple rib fractures.  Patient 

sustained rib fractures of 910 and 11 ribs.  Patient is also known to have 

history of severe emphysema/COPD.  He is legally blind.  He has history of 

pericarditis, anxiety, and history of degenerative joint disease.  Patient was 

discharged home on 3/18/20.  Drug screen in the ER was positive for tricyclic 

antidepressants and positive for benzodiazepines.  Rest of the drug screen was 

basically negative.  ABG in the ER showed a pO2 of 63 pCO2 of 29 pH of 7.49.  

Patient was also noted to have elevated INR of 3.7.  Low hemoglobin of 7.0 

although his baseline hemoglobin from 2 months ago was 15.1.  Ammonia level was 

91.  Total bilirubin was 14.9, and his liver enzymes were a bit elevated.  

Normal amylase and lipase were noted.  Gallbladder ultrasound showed abdominal 

ascites.  Chest x-ray showed bilateral diffuse infiltrates.  Patient was 

presumed septic upon presentation, received fluid boluses of 2500 ML's.  Patient

was found to have hepatic encephalopathy liver failure, bilateral pneumonia, GI 

hemorrhage and sepsis.  Early this morning, patient was intubated, placed on 

mechanical ventilation, and he was aware of the patient since admission.  

Presently the patient is on assist control rate of 28 tidal volume is 500 FiO2 

is 100% PEEP of 5.  ABG showed a pO2 of 170 pCO2 of 31 pH of 7.46.  Hence I cut 

down the FiO2 to 50%, increased the PEEP to 8, cut down the tidal volume to 450,

and decrease the respiratory rate to 26.  Patient is on Sandostatin for his GI 

bleeding, and he is yet to be seen by gastroenterology on consultation is also 

on Protonix.  He is on lactulose for his elevated ammonia level.  And he is 

empirically on Zosyn for presumptive aspiration pneumonia.  Echocardiogram 

showed evidence of good LV function.  And mild valvular heart disease.  BNP 

level was borderline elevated at 910





Patient was reevaluated today on 6/30/20, remains on mechanical ventilation.  He

is presently on assist control rate of 26 tidal volume is 450 FiO2 of 60% and 

PEEP of 8.  However I increased his PEEP to 12 and cut down his FiO2 to 50%.  At

night the patient developed worsening agitation, and Stacking his breaths, was 

not synchronous with mechanical ventilation, hence had to place the patient on 

Nimbex.  Today I plan to discontinue Nimbex and place him on fentanyl 25 g per 

hour.  Patient will be started on tube feeding, and I cut down his IV fluid to 

50 MLS per hour.  Remains on propofol at 60 mcg/kg/m, Nimbex which will be disco

ntinued, but creatinine which was discontinued this morning, and norepinephrine 

at 0.06 mcg/kg/m.  Enteral feeding to be started today by nutritionist.  Patient

remains on antibiotics, remains on lactulose for his elevated ammonia level 

which is 80 today.  And he remains on Protonix.  Chest x-ray showed more 

consolidation noted bilaterally in both lungs more so in the left lung.  

Bilateraldisease is noted consistent with aspiration pneumonia.  Sputum cultures

and blood cultures so far remain on diagnostic.  ABG today showed a pO2 of 67 

pCO2 of 39 pH of 7.33.  WBC count is 14.7 hemoglobin is 8.6 platelets are 66,000

and INR is 2.1.  Electrolytes are basically normal renal profile showed beer of 

25 creatinine 0.94.





Patient was reevaluated today on 7/1/20, remains in the intensive care unit, 

remains on mechanical ventilation.  His ventilator settings are assist control 

rate of 26, tidal volume is 450 FiO2 is 50% and PEEP is at 12.  ABG showed a pO2

of 65 pCO2 of 37 pH of 7.36, hence no change was made in the ventilator 

settings.  Patient remains on norepinephrine at 0.09 mcg/kg/m, propofol at 45 

mcg/kg/m, fentanyl at 0.5 mcg/kg/h.  Remains on enteral feeding at 30 MLS per 

hour goal is 45.  Remains on Zosyn for empiric coverage of aspiration pneumonia.

 Remains on lactulose and the dose was increased to 45 ML 4 times a day, and 

added Xifaxan at 550 mg twice a day.  His lactulose does not seem to be inducing

enough diarrhea did get his ammonia level down.  Continues to have significantly

elevated ammonia level in spite of lactulose for the last 2 days.  Patient 

continues to have ascites.  And no plans for paracentesis as recommended by 

gastroenterology at this point.  Chest x-ray showed very minimal improvement if 

any in his bilateral infiltrates.  The bilateral infiltrates are suggestive of 

aspiration pneumonia.  Obviously the patient is known to have history of 

alcoholism, and he presented with alcoholic liver hepatitis, and hepatic 

encephalopathy.  Labs today were all reviewed WBC is 14.1 hemoglobin is 9.4 

platelets are 75,000 INR is 1.7.  Electrolytes are unremarkable except for low 

potassium of 3.4 BUN of 27 creatinine 1.32.  Liver enzymes are borderline elevat

ed.  Total bilirubin is improving down to 8.9 today.  Albumin is 2.3





Patient was reevaluated today on 7/2/20, remains in the ICU, intubated, 

mechanically ventilated.  Patient is on assist control rate of 26 tidal volume 

450 FiO2 50% and PEEP is 12.  Chest x-ray is showing definite improvement, flower

mayra his ABG is basically about the same with a pO2 of 68 pCO2 of 38 and pH of 

7.38.  His ammonia level is on the rise in spite of the fact that the patient is

on relatively high dose of lactulose and he is also on Xifaxan.  Patient remains

on propofol at 40 mcg/kg/m, fentanyl at 0.5 mcg/kg/h, he is on very small dose 

of norepinephrine at 0.01 mcg/kg/m, IV fluid is at KVO, and he is also on Zosyn 

for presumptive aspiration pneumonia.  Remains on diuretics in the form of 

Lasix, Aldactone.  His Lasix was increased to 40 mg 3 times a day, and he is on 

Aldactone via nasogastric tube.  Patient was taken off sedation today, however 

he became extremely agitated, tachypneic, tachycardic, hence we had to place him

back on sedation and continued assist control mode of mechanical ventilation.  

Chest x-ray again showed improvement.  WBC count today is 11 hemoglobin is 8.5 

his INR is 1.7 basic metabolic profile is improving, creatinine however is up to

1.40, and the patient obviously developed acute kidney injury.  This could very 

well be related to his sepsis, and related to his hypotension requiring 

norepinephrine.  Cultures including blood cultures and sputum cultures have been

negative.





Patient was reevaluated today in the ICU, remains intubated and mechanically 

ventilated.  His ventilator settings are assist control rate of 26 tidal volume 

is 450 FiO2 is 50% and PEEP is at 12 chest x-ray showed dramatic improvement.  

His ABG is significantly improved hence I recommended cutting down the PEEP to 

8.  His ammonia level is significantly improved today, it is down to 69.  Sodium

seems to be creeping up a little bit, and I recommended free water flushes via 

orogastric tube.  Patient is on propofol at 50 mcg/kg/m, his tube feeding is 

presently on hold because of increased residual.  Patient was given a sedation 

holiday, however when he went off propofol, the patient became extremely 

agitated, restless, biting on the endotracheal tube, tachycardic, tachypneic, 

and was extremely agitated.  Hence I recommended placing him back on propofol, 

and not quite ready for weaning.  Labs were all reviewed today.  And addressed 

accordingly including his elevated sodium and elevated BUN of 49 creatinine 

1.46.  Chest x-ray showed significant improvement in his bilateral 

infiltrates/aspiration pneumonia.





Patient was reevaluated today on 7/4/20, remains in the ICU, intubated and 

mechanically ventilated.  His ventilator settings are assist control rate of 26 

FiO2 50% tidal volume of 450 PEEP is at 8 and today I cut down his FiO2 to 45% 

and kept him on a PEEP of 8.  ABG this morning showed a pO2 of 102 pCO2 of 35 pH

of 7.45.  Patient has significantly elevated sodium today of 150 potassium is 

low at 2.8 ammonia level remains high at 73.  Patient is not requiring any 

pressors, he is on propofol at 34 mcg/kg/m.  Patient is on enteral feeding.  I 

do plan to wean the patient off propofol sometime today, and assess mental 

status again.  This was attempted yesterday, but the patient didn't do well, I 

have also recommended free water flushes to correct his hyper natremia.





The patient is seen today 07/05/2020 in follow-up in the intensive care unit.  

He remains intubated on mechanical vent.  Current settings are assist control at

a rate of 26, FiO2 45% tidal volume 450 and a PEEP of 8.  Morning blood gases 

reveal a P O2 of 93, pCO2 of 35 and a pH of 7.47.  Chest x-ray reveals stable 

bilateral atelectasis/infiltrate.  He is sedated on propofol at 50 mcg/kg/m.  

Antibiotics in the form of Zosyn.  He remains on IV diuretics.  He is tolerating

his tube feedings.  He remains hypernatremic despite free water.  IVs changed to

D5W at 75 ML's per hour.  Sodium 156.  Potassium 2.8.  Creatinine 1.32.  Glucose

157.  White count 10.8.  Hemoglobin 8.4.  Platelet count 58,000.





On 07/06/2020 patient seen in follow-up in the intensive care unit, he remains 

sedated, intubated on mechanical ventilator, current vent settings are assist 

control mode of ventilation with a rate of 12, tidal volume is 450, FiO2 45% and

PEEP of 5, this morning blood gases show pO2 of 93, pCO2 38, pH of 7.45.  

Current IV fluids include D5 W5 to 75 ML per hour, and Diprivan at 45 mics per 

kilo per minute, no vasoactive drips, tube feedings of vital high protein at a 

rate of 45 with a goal of 45.  Today's chest x-ray has been reviewed showing 

stable appearance of the bibasilar opacities.  T-max in the last 24 hours was 

99.7F, afebrile this morning, 1 sputum cultures have shown no growth, today's 

blood work has been reviewed, limits according to 14.0, hemoglobin of 8.0, 

platelet count is 62, sodium is 159 Asian is on D5W at 75 ML per hour, in 

addition to free water flushes with 200 mL every 4 hours, patient continues on 

lactulose and rifaximin, and is having lactulose induced diarrhea and he had 2.4

L in liquid stool output in the last 24 hours.  Patient apparently has failed 

spontaneous breathing trials for last 4 days.  We will proceed with a 

spontaneous awakening and spontaneous breathing trials again today, we will 

speak to the family about placement of tracheostomy and PEG tube insertion. 





On 07/07/2020 patient is seen in follow-up in intensive care unit, he remains 

sedated, intubated on mechanical ventilator, current vent settings are assist-

control with a rate of 12, tidal vital 450, FiO2 is 45%, and PEEP of 8, this 

morning blood gases show pO2 of 113, pCO2 is 34, and pH of 7.47, PEEP has been 

dropped to 5.  IV fluids include D5 W at 125 ML per hour, Diprivan is a 40 mics 

per kilo per minute, no vasoactive drips, tube feedings are on hold patient was 

on vital high protein at 45 with a goal of 45 and 400 ML every 4 hours water 

flushes.  Patient was given a sedation holiday yesterday, he never really fully 

awakened, he was placed on pressure-support of 8, he tolerated it for 3 hours, 

however there he became predicted, and was placed back on assist control mode of

ventilation, yesterday we spoke to the patient's wife who was a continue with 

full code CODE STATUS and would like to proceed with tracheostomy and PEG tube 

placement.  Today's chest x-ray has been reviewed and is relatively stable, 

accounting for technical difficulties, bibasilar opacities and atelectases and 

pleural effusions.  We discontinued patient's Lasix and Aldactone yesterday in 

view of his hypernatremia, patient continues to have large liquid stool output, 

4.7 L in the last 24 hours, continues on lactulose and rifaximin.  Yesterday's 

ammonia level was 44, today it is 47.  Today's blood work has been reviewed





On 07/08/2020 patient seen in follow-up in the intensive care unit, he remains 

sedated, on mechanical ventilator, yesterday he had a tracheostomy inserted, 

this morning he is on assist-control mode of ventilation with a rate of 12, 

tidal volume is 450, FiO2 is 45%, and PEEP of 5.  This morning blood gases show 

pO2 of 14, pCO2 38, and pH of 7.42.  Patient is on IV fluids with D5W at a rate 

of 125 ML per hour, Diprivan and is at 40 mics per kilo per minute.  No 

vasoactive drips.  His feedings are on hold for PEG tube insertion today.  

Patient is afebrile.  Hemodynamically he has been stable, no sedation holiday 

was given yesterday, the day before patient's addition was held, however patient

never fully awakened.  Today's labs have been reviewed, showing white blood cell

count 19.2, hemoglobin of 8.1, platelet count is 41, INR today is 1.6, sodium is

146, potassium 3.5, chloride is 117, CO2 is 24, BUN 69, creatinine is 0.94.  

Ammonia level today 62, patient remains on lactulose 40 mg every 8 hours, in 

addition to Xifaxan.  Today's chest x-ray has been reviewed showing left lower 

lobe pneumonia versus subsegmental atelectasis with a component of interstitial 

edema.  Since diuretics remain on hold regarding hyper nature anemia.  

Hypernatremia is improving, and his sodium level is 146 today.  Blood and  

sputum culture remains negative.  Patient has completed a course of Levaquin.





On 07/09/2020 patient seen in follow-up in the intensive care unit.  Patient re

gloria sedated, on mechanical ventilator, currently on assist control mode of 

ventilation with a rate of 12, tidal volume is 450, FiO2 40%, and PEEP of 5.  

This morning blood gases showed pO2 of 91, pCO2 35, and pH of 7.43.  IV D5W at a

rate of 125, and improving and is currently at 25 mics per kilo per minute, 

yesterday he was given sedation holiday, he started opening his eyes, but not 

following command, later on apparently he became slightly tachypneic, and was 

placed back on sedation.  Hemodynamically he remains stable, no vasoactive drips

this morning, today's labs have been reviewed, showing limits oh, 24.8, 

hemoglobin is 8.8, sodium is 138, potassium is 2.8, chloride is 111, CO2 is 23, 

BUN is 55 and creatinine 0.73.  Abdomen is soft, liver enzymes show total 

bilirubin 6.6, AST is 157, ALT is 100, and ammonia level is 42, patient remains 

on a combination of lactulose and rifaximin.  Lung sounds are clear, today's 

chest x-ray has been reviewed showing significant interval increase in the left 

basilar airspace disease with consideration for infectious or aspiration 

pneumonitis.  No fever or chills.  FiO2 is currently at 40%.  Currently not on 

any antibiotics











Objective





- Vital Signs


Vital signs: 


                                   Vital Signs











Temp  97.8 F   07/09/20 08:00


 


Pulse  84   07/09/20 08:00


 


Resp  21   07/09/20 08:00


 


BP  94/50   07/09/20 08:00


 


Pulse Ox  100   07/09/20 08:00








                                 Intake & Output











 07/08/20 07/09/20 07/09/20





 18:59 06:59 18:59


 


Intake Total 2782.228 2832.232 263.512


 


Output Total 920 2350 750


 


Balance 1862.228 482.232 -486.488


 


Weight  92 kg 


 


Intake:   


 


  IV 1536 1664 128


 


    Dextrose 5% in Water 1, 1500 1625 125





    000 ml @ 125 mls/hr IV .   





    Q8H R[facility] Rx#:[id number]   


 


    Normal Saline Pressure 36 39 3





    Bag   


 


  Intake, IV Titration 176.228 98.232 135.512





  Amount   


 


    Potassium Chloride 20 meq   100





    In Water For Injection 1   





    100ml.bag @ 50 mls/hr   





    IVPB Q2H [facility] Rx#:   





    [id number]   


 


    Propofol 1,000 mg In 176.228 98.232 35.512





    Empty Bag 1 bag @ Titrate   





    IV .Q0M RISA Rx#:   





    [id number]   


 


  Tube Feeding 270 270 


 


  Other 800 800 


 


Output:   


 


  Urine 920 950 50


 


  Stool  1400 700


 


Other:   


 


  Voiding Method Indwelling Catheter Indwelling Catheter Indwelling Catheter








                       ABP, PAP, CO, CI - Last Documented











Arterial Blood Pressure        102/43

















- Exam


 GENERAL EXAM: Sedated, 54-year-old white male, trached to the mechanical 

ventilator, with settings of assist control rate of 12, tidal volume is 450, 

FiO2 40% and PEEP of 5 comfortable in no apparent distress.


HEAD: Normocephalic/atraumatic.


EYES: Normal reaction of pupils, equal size.  Conjunctiva pink, sclera white.


NOSE: Clear with pink turbinates.


THROAT: No erythema or exudates.


NECK: No masses, no JVD, no thyroid enlargement, no adenopathy.  Midline 

tracheostomy to to the ventilator with assist control mode of ventilation


CHEST: No chest wall deformity.  Symmetrical expansion. 


LUNGS: Equal air entry with no crackles, wheeze, rhonchi or dullness.


CVS: Regular rate and rhythm, normal S1 and S2, no gallops, no murmurs, no rubs


ABDOMEN: Soft, nontender.  No hepatosplenomegaly, normal bowel sounds, no 

guarding or rigidity.


EXTREMITIES: No clubbing, no edema, no cyanosis, 2+ pulses and upper and lower 

extremities.


MUSCULOSKELETAL: Muscle strength and tone normal.


SPINE: No scoliosis or deformity


SKIN: No rashes


CENTRAL NERVOUS SYSTEM: Sedated and intubated.  No focal deficits, tone is 

normal in all 4 extremities.














- Labs


CBC & Chem 7: 


                                 07/09/20 04:50





                                 07/09/20 04:50


Labs: 


                  Abnormal Lab Results - Last 24 Hours (Table)











  06/28/20 06/28/20 06/28/20 Range/Units





  14:10 17:24 20:03 


 


WBC     (3.8-10.6)  k/uL


 


RBC     (4.30-5.90)  m/uL


 


Hgb     (13.0-17.5)  gm/dL


 


Hct     (39.0-53.0)  %


 


MCV     (80.0-100.0)  fL


 


MCH     (25.0-35.0)  pg


 


MCHC     (31.0-37.0)  g/dL


 


RDW     (11.5-15.5)  %


 


Plt Count     (150-450)  k/uL


 


Neutrophils #     (1.3-7.7)  k/uL


 


Macrocytosis     


 


ABG Total CO2     (19-24)  mmol/L


 


ABG O2 Saturation     (94-97)  %


 


Potassium     (3.5-5.1)  mmol/L


 


Chloride     ()  mmol/L


 


Carbon Dioxide     (22-30)  mmol/L


 


BUN     (9-20)  mg/dL


 


Glucose     (74-99)  mg/dL


 


POC Glucose (mg/dL)     (75-99)  mg/dL


 


Plasma Lactic Acid Gagandeep   3.1 H*  2.2 H*  (0.7-2.0)  mmol/L


 


Calcium     (8.4-10.2)  mg/dL


 


Total Bilirubin     (0.2-1.3)  mg/dL


 


AST     (17-59)  U/L


 


ALT     (4-49)  U/L


 


Ammonia     (<30)  umol/L


 


Crossmatch  See Detail    














  06/28/20 06/29/20 07/07/20 Range/Units





  22:50 17:30 04:30 


 


WBC    16.2 H  (3.8-10.6)  k/uL


 


RBC    2.22 L  (4.30-5.90)  m/uL


 


Hgb    8.1 L  (13.0-17.5)  gm/dL


 


Hct    25.3 L  (39.0-53.0)  %


 


MCV    114.0 H  (80.0-100.0)  fL


 


MCH    36.7 H  (25.0-35.0)  pg


 


MCHC     (31.0-37.0)  g/dL


 


RDW    22.7 H  (11.5-15.5)  %


 


Plt Count    49 L  (150-450)  k/uL


 


Neutrophils #    14.5 H  (1.3-7.7)  k/uL


 


Macrocytosis    Marked A  


 


ABG Total CO2     (19-24)  mmol/L


 


ABG O2 Saturation     (94-97)  %


 


Potassium   3.2 L   (3.5-5.1)  mmol/L


 


Chloride   113 H   ()  mmol/L


 


Carbon Dioxide   20 L   (22-30)  mmol/L


 


BUN   25 H   (9-20)  mg/dL


 


Glucose   100 H   (74-99)  mg/dL


 


POC Glucose (mg/dL)     (75-99)  mg/dL


 


Plasma Lactic Acid Gagandeep  2.1 H*    (0.7-2.0)  mmol/L


 


Calcium   7.1 L   (8.4-10.2)  mg/dL


 


Total Bilirubin     (0.2-1.3)  mg/dL


 


AST     (17-59)  U/L


 


ALT     (4-49)  U/L


 


Ammonia     (<30)  umol/L


 


Crossmatch     














  07/08/20 07/08/20 07/08/20 Range/Units





  04:50 11:44 17:56 


 


WBC  19.2 H    (3.8-10.6)  k/uL


 


RBC  2.29 L    (4.30-5.90)  m/uL


 


Hgb  8.1 L    (13.0-17.5)  gm/dL


 


Hct  26.3 L    (39.0-53.0)  %


 


MCV  114.7 H    (80.0-100.0)  fL


 


MCH  35.5 H    (25.0-35.0)  pg


 


MCHC  30.9 L    (31.0-37.0)  g/dL


 


RDW  21.9 H    (11.5-15.5)  %


 


Plt Count  41 L    (150-450)  k/uL


 


Neutrophils #  17.4 H    (1.3-7.7)  k/uL


 


Macrocytosis  Marked A    


 


ABG Total CO2     (19-24)  mmol/L


 


ABG O2 Saturation     (94-97)  %


 


Potassium     (3.5-5.1)  mmol/L


 


Chloride     ()  mmol/L


 


Carbon Dioxide     (22-30)  mmol/L


 


BUN     (9-20)  mg/dL


 


Glucose     (74-99)  mg/dL


 


POC Glucose (mg/dL)   175 H  225 H  (75-99)  mg/dL


 


Plasma Lactic Acid Gagandeep     (0.7-2.0)  mmol/L


 


Calcium     (8.4-10.2)  mg/dL


 


Total Bilirubin     (0.2-1.3)  mg/dL


 


AST     (17-59)  U/L


 


ALT     (4-49)  U/L


 


Ammonia     (<30)  umol/L


 


Crossmatch     














  07/08/20 07/09/20 07/09/20 Range/Units





  23:55 04:50 04:50 


 


WBC   24.8 H   (3.8-10.6)  k/uL


 


RBC   2.36 L   (4.30-5.90)  m/uL


 


Hgb   8.8 L   (13.0-17.5)  gm/dL


 


Hct   26.6 L   (39.0-53.0)  %


 


MCV   112.4 H   (80.0-100.0)  fL


 


MCH   37.1 H   (25.0-35.0)  pg


 


MCHC     (31.0-37.0)  g/dL


 


RDW   21.0 H   (11.5-15.5)  %


 


Plt Count   35 L   (150-450)  k/uL


 


Neutrophils #   21.9 H   (1.3-7.7)  k/uL


 


Macrocytosis   Marked A   


 


ABG Total CO2     (19-24)  mmol/L


 


ABG O2 Saturation     (94-97)  %


 


Potassium    2.8 L  (3.5-5.1)  mmol/L


 


Chloride    111 H  ()  mmol/L


 


Carbon Dioxide     (22-30)  mmol/L


 


BUN    55 H  (9-20)  mg/dL


 


Glucose    131 H  (74-99)  mg/dL


 


POC Glucose (mg/dL)  241 H    (75-99)  mg/dL


 


Plasma Lactic Acid Gagandeep     (0.7-2.0)  mmol/L


 


Calcium    8.1 L  (8.4-10.2)  mg/dL


 


Total Bilirubin     (0.2-1.3)  mg/dL


 


AST     (17-59)  U/L


 


ALT     (4-49)  U/L


 


Ammonia     (<30)  umol/L


 


Crossmatch     














  07/09/20 07/09/20 07/09/20 Range/Units





  04:50 05:28 05:32 


 


WBC     (3.8-10.6)  k/uL


 


RBC     (4.30-5.90)  m/uL


 


Hgb     (13.0-17.5)  gm/dL


 


Hct     (39.0-53.0)  %


 


MCV     (80.0-100.0)  fL


 


MCH     (25.0-35.0)  pg


 


MCHC     (31.0-37.0)  g/dL


 


RDW     (11.5-15.5)  %


 


Plt Count     (150-450)  k/uL


 


Neutrophils #     (1.3-7.7)  k/uL


 


Macrocytosis     


 


ABG Total CO2     (19-24)  mmol/L


 


ABG O2 Saturation     (94-97)  %


 


Potassium     (3.5-5.1)  mmol/L


 


Chloride     ()  mmol/L


 


Carbon Dioxide     (22-30)  mmol/L


 


BUN     (9-20)  mg/dL


 


Glucose     (74-99)  mg/dL


 


POC Glucose (mg/dL)    143 H  (75-99)  mg/dL


 


Plasma Lactic Acid Gagandeep     (0.7-2.0)  mmol/L


 


Calcium     (8.4-10.2)  mg/dL


 


Total Bilirubin  6.6 H    (0.2-1.3)  mg/dL


 


AST  157 H    (17-59)  U/L


 


ALT  100 H    (4-49)  U/L


 


Ammonia   42 H   (<30)  umol/L


 


Crossmatch     














  07/09/20 Range/Units





  05:43 


 


WBC   (3.8-10.6)  k/uL


 


RBC   (4.30-5.90)  m/uL


 


Hgb   (13.0-17.5)  gm/dL


 


Hct   (39.0-53.0)  %


 


MCV   (80.0-100.0)  fL


 


MCH   (25.0-35.0)  pg


 


MCHC   (31.0-37.0)  g/dL


 


RDW   (11.5-15.5)  %


 


Plt Count   (150-450)  k/uL


 


Neutrophils #   (1.3-7.7)  k/uL


 


Macrocytosis   


 


ABG Total CO2  25 H  (19-24)  mmol/L


 


ABG O2 Saturation  97.5 H  (94-97)  %


 


Potassium   (3.5-5.1)  mmol/L


 


Chloride   ()  mmol/L


 


Carbon Dioxide   (22-30)  mmol/L


 


BUN   (9-20)  mg/dL


 


Glucose   (74-99)  mg/dL


 


POC Glucose (mg/dL)   (75-99)  mg/dL


 


Plasma Lactic Acid Gagandeep   (0.7-2.0)  mmol/L


 


Calcium   (8.4-10.2)  mg/dL


 


Total Bilirubin   (0.2-1.3)  mg/dL


 


AST   (17-59)  U/L


 


ALT   (4-49)  U/L


 


Ammonia   (<30)  umol/L


 


Crossmatch   














Assessment and Plan


Plan: 


 Assessment:





Acute hypoxic respiratory failure, suspect aspiration pneumonia.  Today's chest 

x-ray on 07/09/2020 shows a significant interval increase in the left basilar 

airspace disease with consideration for infectious or aspiration pneumonitis





Acute sepsis, possible septic shock, patient required pressors, improved, and is

currently off pressors. This is related to aspiration pneumonia.  Blood cultures

and sputum cultures remain negative.





Alcohol liver disease with ascites.  Jaundice, and elevated liver enzymes.





Blood loss anemia, most likely the patient has acute or subacute GI bleeding, GI

is following.  Hemoglobin has been stable over last several days, no signs of 

active bleeding at this time





Acute hepatic encephalopathy with significantly elevated ammonia level.  Today's

ammonia level is a 52, patient is on lactulose and Xifaxan





Recent history of fall about 2 months ago, and multiple right-sided rib 

fractures.





History of alcoholic neuropathy.





History of pericarditis





Chronic back pain





History of restless leg syndrome.





Acute kidney injury secondary to sepsis and septic shock.  Strongly doubt 

hepatorenal syndrome.  Improved





Hypernatremia, related to free water deficit.  Improved, and today on 07/09/2020

serum sodium is 138





Plan:





Proceed with a daily interruption of sedation, will discontinue Diprivan, 

patient's discomfort can be managed with intermittent doses of IV morphine 1 mg 

every 6 hours.  Today's chest x-ray has been reviewed showing interval increase 

of left basilar airspace disease, clinically patient remains stable, no fever or

chills, hemodynamically stable, FiO2 is at 40%.  Obtain procalcitonin level, 

send a new set of blood cultures, sputum culture and urine culture.  Will await 

those results to make a decision on antibiotics.  Continue to monitor clinical 

course.  We'll give the patient a dose of DDAVP 20 g today prior to placement 

of a new central venous catheter.  Discontinue D5W, maintenance IV fluids to go 

0.9 normal saline at a rate of KVO, water flushes will be decreased to 100 mL 

every 4 hours.  Repeat chest x-ray and labs in the morning.  Patient is 

scheduled for PEG tube placement today.  Continue to closely follow.





I performed a history & physical examination of the patient and discussed their 

management with my nurse practitioner, Shagufta Booker.  I reviewed the nurse 

practitioner's note and agree with the documented findings and plan of care.  

Lung sounds are positive for diminished breath sounds.  The findings and the 

impression was discussed with the patient.  I attest to the documentation by the

nurse practitioner. 





Time with Patient: Greater than 30

## 2020-07-10 LAB
ALBUMIN SERPL-MCNC: 2.1 G/DL (ref 3.5–5)
ALP SERPL-CCNC: 93 U/L (ref 38–126)
ALT SERPL-CCNC: 120 U/L (ref 4–49)
ANION GAP SERPL CALC-SCNC: 5 MMOL/L
AST SERPL-CCNC: 195 U/L (ref 17–59)
BASOPHILS # BLD AUTO: 0 K/UL (ref 0–0.2)
BASOPHILS NFR BLD AUTO: 0 %
BUN SERPL-SCNC: 50 MG/DL (ref 9–20)
CALCIUM SPEC-MCNC: 8.2 MG/DL (ref 8.4–10.2)
CHLORIDE SERPL-SCNC: 114 MMOL/L (ref 98–107)
CO2 BLDA-SCNC: 23 MMOL/L (ref 19–24)
CO2 SERPL-SCNC: 21 MMOL/L (ref 22–30)
EOSINOPHIL # BLD AUTO: 0.2 K/UL (ref 0–0.7)
EOSINOPHIL NFR BLD AUTO: 1 %
ERYTHROCYTE [DISTWIDTH] IN BLOOD BY AUTOMATED COUNT: 2.3 M/UL (ref 4.3–5.9)
ERYTHROCYTE [DISTWIDTH] IN BLOOD: 21.6 % (ref 11.5–15.5)
GLUCOSE BLD-MCNC: 121 MG/DL (ref 75–99)
GLUCOSE BLD-MCNC: 133 MG/DL (ref 75–99)
GLUCOSE BLD-MCNC: 150 MG/DL (ref 75–99)
GLUCOSE BLD-MCNC: 167 MG/DL (ref 75–99)
GLUCOSE SERPL-MCNC: 134 MG/DL (ref 74–99)
HCO3 BLDA-SCNC: 22 MMOL/L (ref 21–25)
HCT VFR BLD AUTO: 26.3 % (ref 39–53)
HGB BLD-MCNC: 8.6 GM/DL (ref 13–17.5)
LYMPHOCYTES # SPEC AUTO: 2.4 K/UL (ref 1–4.8)
LYMPHOCYTES NFR SPEC AUTO: 10 %
MCH RBC QN AUTO: 37.3 PG (ref 25–35)
MCHC RBC AUTO-ENTMCNC: 32.6 G/DL (ref 31–37)
MCV RBC AUTO: 114.6 FL (ref 80–100)
MONOCYTES # BLD AUTO: 0.7 K/UL (ref 0–1)
MONOCYTES NFR BLD AUTO: 3 %
NEUTROPHILS # BLD AUTO: 21.1 K/UL (ref 1.3–7.7)
NEUTROPHILS NFR BLD AUTO: 86 %
PCO2 BLDA: 31 MMHG (ref 35–45)
PH BLDA: 7.45 [PH] (ref 7.35–7.45)
PLATELET # BLD AUTO: 37 K/UL (ref 150–450)
PO2 BLDA: 73 MMHG (ref 83–108)
POTASSIUM SERPL-SCNC: 4 MMOL/L (ref 3.5–5.1)
PROT SERPL-MCNC: 5.7 G/DL (ref 6.3–8.2)
SODIUM SERPL-SCNC: 140 MMOL/L (ref 137–145)
WBC # BLD AUTO: 24.6 K/UL (ref 3.8–10.6)

## 2020-07-10 PROCEDURE — 4A133B1 MONITORING OF ARTERIAL PRESSURE, PERIPHERAL, PERCUTANEOUS APPROACH: ICD-10-PCS

## 2020-07-10 PROCEDURE — 3E043XZ INTRODUCTION OF VASOPRESSOR INTO CENTRAL VEIN, PERCUTANEOUS APPROACH: ICD-10-PCS

## 2020-07-10 PROCEDURE — 4A133J1 MONITORING OF ARTERIAL PULSE, PERIPHERAL, PERCUTANEOUS APPROACH: ICD-10-PCS

## 2020-07-10 PROCEDURE — 02HV33Z INSERTION OF INFUSION DEVICE INTO SUPERIOR VENA CAVA, PERCUTANEOUS APPROACH: ICD-10-PCS

## 2020-07-10 PROCEDURE — 03HY32Z INSERTION OF MONITORING DEVICE INTO UPPER ARTERY, PERCUTANEOUS APPROACH: ICD-10-PCS

## 2020-07-10 RX ADMIN — INSULIN ASPART SCH UNIT: 100 INJECTION, SOLUTION INTRAVENOUS; SUBCUTANEOUS at 18:25

## 2020-07-10 RX ADMIN — POTASSIUM CHLORIDE SCH MLS/HR: 7.46 INJECTION, SOLUTION INTRAVENOUS at 01:36

## 2020-07-10 RX ADMIN — LACTULOSE SCH GM: 20 SOLUTION ORAL at 22:00

## 2020-07-10 RX ADMIN — PANTOPRAZOLE SODIUM SCH MG: 40 INJECTION, POWDER, FOR SOLUTION INTRAVENOUS at 09:59

## 2020-07-10 RX ADMIN — NOREPINEPHRINE BITARTRATE SCH MLS/HR: 1 INJECTION, SOLUTION, CONCENTRATE INTRAVENOUS at 01:50

## 2020-07-10 RX ADMIN — IPRATROPIUM BROMIDE AND ALBUTEROL SULFATE SCH ML: .5; 3 SOLUTION RESPIRATORY (INHALATION) at 04:04

## 2020-07-10 RX ADMIN — CHLORHEXIDINE GLUCONATE SCH ML: 1.2 RINSE ORAL at 09:30

## 2020-07-10 RX ADMIN — RIFAXIMIN SCH MG: 550 TABLET ORAL at 20:47

## 2020-07-10 RX ADMIN — FUROSEMIDE SCH MG: 10 INJECTION, SOLUTION INTRAMUSCULAR; INTRAVENOUS at 09:59

## 2020-07-10 RX ADMIN — IPRATROPIUM BROMIDE AND ALBUTEROL SULFATE SCH ML: .5; 3 SOLUTION RESPIRATORY (INHALATION) at 07:58

## 2020-07-10 RX ADMIN — MORPHINE SULFATE PRN MG: 2 INJECTION, SOLUTION INTRAMUSCULAR; INTRAVENOUS at 20:45

## 2020-07-10 RX ADMIN — IPRATROPIUM BROMIDE AND ALBUTEROL SULFATE SCH ML: .5; 3 SOLUTION RESPIRATORY (INHALATION) at 00:29

## 2020-07-10 RX ADMIN — CISATRACURIUM BESYLATE ONE MG: 2 INJECTION, SOLUTION INTRAVENOUS at 09:28

## 2020-07-10 RX ADMIN — IPRATROPIUM BROMIDE AND ALBUTEROL SULFATE SCH ML: .5; 3 SOLUTION RESPIRATORY (INHALATION) at 16:03

## 2020-07-10 RX ADMIN — LACTULOSE SCH GM: 20 SOLUTION ORAL at 16:30

## 2020-07-10 RX ADMIN — IPRATROPIUM BROMIDE AND ALBUTEROL SULFATE SCH ML: .5; 3 SOLUTION RESPIRATORY (INHALATION) at 19:40

## 2020-07-10 RX ADMIN — MORPHINE SULFATE PRN MG: 2 INJECTION, SOLUTION INTRAMUSCULAR; INTRAVENOUS at 10:36

## 2020-07-10 RX ADMIN — INSULIN ASPART SCH: 100 INJECTION, SOLUTION INTRAVENOUS; SUBCUTANEOUS at 11:49

## 2020-07-10 RX ADMIN — INSULIN ASPART SCH UNIT: 100 INJECTION, SOLUTION INTRAVENOUS; SUBCUTANEOUS at 23:38

## 2020-07-10 RX ADMIN — IPRATROPIUM BROMIDE AND ALBUTEROL SULFATE SCH ML: .5; 3 SOLUTION RESPIRATORY (INHALATION) at 11:44

## 2020-07-10 RX ADMIN — CHLORHEXIDINE GLUCONATE SCH ML: 1.2 RINSE ORAL at 20:44

## 2020-07-10 RX ADMIN — IPRATROPIUM BROMIDE AND ALBUTEROL SULFATE SCH ML: .5; 3 SOLUTION RESPIRATORY (INHALATION) at 23:37

## 2020-07-10 RX ADMIN — LACTULOSE SCH GM: 20 SOLUTION ORAL at 09:58

## 2020-07-10 RX ADMIN — MORPHINE SULFATE PRN MG: 2 INJECTION, SOLUTION INTRAMUSCULAR; INTRAVENOUS at 16:30

## 2020-07-10 RX ADMIN — RIFAXIMIN SCH MG: 550 TABLET ORAL at 09:59

## 2020-07-10 RX ADMIN — PANTOPRAZOLE SODIUM SCH MG: 40 INJECTION, POWDER, FOR SOLUTION INTRAVENOUS at 20:44

## 2020-07-10 RX ADMIN — FUROSEMIDE SCH MG: 10 INJECTION, SOLUTION INTRAMUSCULAR; INTRAVENOUS at 20:45

## 2020-07-10 RX ADMIN — INSULIN ASPART SCH: 100 INJECTION, SOLUTION INTRAVENOUS; SUBCUTANEOUS at 06:19

## 2020-07-10 NOTE — PCN
PROCEDURE NOTE



PROCEDURE:

Placement of left subclavian triple-lumen catheter.



Indication:  Hemodynamic monitoring/Intravenous access



PREOPERATIVE DIAGNOSIS: Administration of fluids and pressors.



POSTOPERATIVE DIAGNOSIS: Administration of fluids and pressors.



A time-out was completed verifying correct patient, procedure, site, positioning, and

implant(s) or special equipment if applicable.



The patient was placed in a dependent position appropriate for triple-lumen catheter

placement based on the vein to be cannulated.  The patient's left shoulder was prepped

and draped in sterile fashion.  1% Lidocaine was used to anesthetize the surrounding

skin area.  A triple-lumen 9F Cordis catheter was introduced into the subclavian vein

using Seldinger technique.  The catheter was threaded smoothly over the guidewire and

appropriate blood return was obtained.  Each lumen of the catheter was evacuated of air

and flushed with sterile saline.  The catheter was then sutured in place to the skin

and a sterile dressing applied.  Perfusion to the extremity distal to the point of

catheter insertion was checked and found to be adequate.



There was no immediate complication.  The left subclavian site was used.  Dr. Tan was

the .  Chest x-ray was ordered.  The tip of the tube was seen in the junction

of superior vena cava and right atrium.  There was good blood return from all 3 ports.

The catheter was sutured in place.  Sterile dressing was applied by the nurse.





MMJIML / DIONEN: [id number] / Job#: [id number]

## 2020-07-10 NOTE — PN
PROGRESS NOTE



DATE OF DICTATION:

07/10/2020



Patient is a 54-year-old white male with history of alcoholic cirrhosis of the liver

and acute alcoholic hepatitis and aspiration pneumonia with acute respiratory failure.

Remains in the intensive care unit with a trach and PEG and remains relatively stable.

No acute events noted overnight, as per the nursing staff.



PHYSICAL EXAMINATION:

Remain sedated on the vent.

VITAL SIGNS:  Blood pressure 92/56, pulse rate 75, temperature 98.1.

HEENT:  Examination unremarkable, conjunctivae are pink, sclerae nonicteric, oral

cavity no lesions.

NECK:  No JVD or lymph node enlargement.

CHEST:  Clear to auscultation.

HEART:  Regular rate and rhythm.

ABDOMEN:  Slightly distended.  PEG tube in place.

EXTREMITIES:  No pedal edema/

NEURO:  Sedated.

FMS in place, has about 400 mL of liquid stool.



LABS:

From today WBC 24.6, hemoglobin 8.6, platelets 37,000.  BUN 50, creatinine 0.85, T-bili

7.1.  AST and ALT 120and 93 respectively, alk phos is 93.



IMPRESSION:

1. Acute respiratory failure secondary to aspiration pneumonia. Status post trach and

    PEG done yesterday.  Remains on broad-spectrum antibiotics and on the vent.

2. Acute alcoholic hepatitis with alcoholic cirrhosis of the liver and portal

    hypertension.

3. Status post EGD and PEG tube placement by Dr. Alvarado yesterday.

4. Hepatic encephalopathy with ammonia level of 45.



RECOMMENDATIONS:

1. Continue with broad-spectrum antibiotics.

2. Continue with symptomatic and supportive care by the intensivist.

3. Continue Xifaxan and oral lactulose.

4. Monitor labs closely.

5. Will follow with you.

Thank you for this consultation.





MMODL / IJN: [id number] / Job#: [id number]

## 2020-07-10 NOTE — XR
EXAMINATION TYPE: XR chest 1V portable

 

DATE OF EXAM: 7/10/2020

 

HISTORY: Shortness of breath.

 

COMPARISON: 7/9/2020

 

TECHNIQUE: Single view of the chest is submitted.

 

FINDINGS:

Tracheostomy tube redemonstrated. NG tube has been. Left lower lobe infiltrate persists.

 

The heart is stable.

 

Hilar and mediastinal structures are within normal limits.  

 

Degenerative changes are seen of the dorsal spine. 

 

 IMPRESSION: 

 

1.  Persistent left lower lobe infiltrate.

## 2020-07-10 NOTE — PCN
PROCEDURE NOTE



PROCEDURE:

Left radial arterial line.



OPERATORS:

Dr. Tan and Philip Booker.



PREOPERATIVE DIAGNOSIS:

Frequent blood draws and blood gas monitoring



POSTOPERATIVE DIAGNOSIS:

Frequent blood draws and blood gas monitoring





A time-out was completed verifying correct patient, procedure, site, positioning, and

implant(s) or special equipment if applicable.



João's test was performed to ensure adequate perfusion.  The patient's left wrist was

prepped and draped in sterile fashion.  1% Lidocaine was used to anesthetize the area.

An 18G Arrow arterial line was introduced into the radial artery.  The catheter was

threaded over the guide wire and the needle was removed with appropriate pulsatile

blood return.  Blood loss was minimal.  The catheter was then sutured in place to the

skin and a sterile dressing applied by the nurse.  Perfusion to the extremity distal to

the point of catheter insertion was checked and found to be adequate.



There was good waveform and blood pressure reading.  The patient tolerated the

procedure well.  There was no complications.





MMODL / IJN: [id number] / Job#: [id number]

## 2020-07-10 NOTE — XR
EXAMINATION TYPE: XR chest 1V confirm line [facility]

 

DATE OF EXAM: 7/10/2020

 

HISTORY: Line placement

 

COMPARISON: 7/10/2020

 

TECHNIQUE: Single view of the chest is submitted.

 

FINDINGS:

Tracheostomy tube is appropriately placed. Left subclavian central venous line with its distal tip in
 the right atrium. No evidence for pneumothorax.

 

Left basilar infiltrate and/or atelectasis. Small effusion difficult to exclude. Pulmonary venous con
gestion suggested. The heart is stable.

 

Hilar and mediastinal structures are within normal limits.  

 

Degenerative changes are seen of the dorsal spine. 

 

 IMPRESSION: 

 

1.  Line placement as above.

## 2020-07-10 NOTE — P.PN
Subjective


Progress Note Date: 07/10/20


Principal diagnosis: 


 Acute liver failure, acute hypoxic respiratory failure





This is a 54-year-old white male, history of alcohol abuse, patient was brought 

into the ER with change in mental status according to EMS.  Patient could not 

give any history, he was a very poor historian upon arrival to the ER.  Patient 

is supposedly a daily drinker, however from my review of the chart, I saw this 

patient back on 3/17/20, patient presented back then with multiple posterior 

lateral rib fractures on the right.  Apparently he fell in his bathroom, and 

landed on the side of the top sustaining multiple rib fractures.  Patient 

sustained rib fractures of 910 and 11 ribs.  Patient is also known to have 

history of severe emphysema/COPD.  He is legally blind.  He has history of 

pericarditis, anxiety, and history of degenerative joint disease.  Patient was 

discharged home on 3/18/20.  Drug screen in the ER was positive for tricyclic 

antidepressants and positive for benzodiazepines.  Rest of the drug screen was 

basically negative.  ABG in the ER showed a pO2 of 63 pCO2 of 29 pH of 7.49.  

Patient was also noted to have elevated INR of 3.7.  Low hemoglobin of 7.0 

although his baseline hemoglobin from 2 months ago was 15.1.  Ammonia level was 

91.  Total bilirubin was 14.9, and his liver enzymes were a bit elevated.  

Normal amylase and lipase were noted.  Gallbladder ultrasound showed abdominal 

ascites.  Chest x-ray showed bilateral diffuse infiltrates.  Patient was 

presumed septic upon presentation, received fluid boluses of 2500 ML's.  Patient

was found to have hepatic encephalopathy liver failure, bilateral pneumonia, GI 

hemorrhage and sepsis.  Early this morning, patient was intubated, placed on 

mechanical ventilation, and he was aware of the patient since admission.  

Presently the patient is on assist control rate of 28 tidal volume is 500 FiO2 

is 100% PEEP of 5.  ABG showed a pO2 of 170 pCO2 of 31 pH of 7.46.  Hence I cut 

down the FiO2 to 50%, increased the PEEP to 8, cut down the tidal volume to 450,

and decrease the respiratory rate to 26.  Patient is on Sandostatin for his GI 

bleeding, and he is yet to be seen by gastroenterology on consultation is also 

on Protonix.  He is on lactulose for his elevated ammonia level.  And he is 

empirically on Zosyn for presumptive aspiration pneumonia.  Echocardiogram 

showed evidence of good LV function.  And mild valvular heart disease.  BNP 

level was borderline elevated at 910





Patient was reevaluated today on 6/30/20, remains on mechanical ventilation.  He

is presently on assist control rate of 26 tidal volume is 450 FiO2 of 60% and 

PEEP of 8.  However I increased his PEEP to 12 and cut down his FiO2 to 50%.  At

night the patient developed worsening agitation, and Stacking his breaths, was 

not synchronous with mechanical ventilation, hence had to place the patient on 

Nimbex.  Today I plan to discontinue Nimbex and place him on fentanyl 25 g per 

hour.  Patient will be started on tube feeding, and I cut down his IV fluid to 

50 MLS per hour.  Remains on propofol at 60 mcg/kg/m, Nimbex which will be disco

ntinued, but creatinine which was discontinued this morning, and norepinephrine 

at 0.06 mcg/kg/m.  Enteral feeding to be started today by nutritionist.  Patient

remains on antibiotics, remains on lactulose for his elevated ammonia level 

which is 80 today.  And he remains on Protonix.  Chest x-ray showed more 

consolidation noted bilaterally in both lungs more so in the left lung.  

Bilateraldisease is noted consistent with aspiration pneumonia.  Sputum cultures

and blood cultures so far remain on diagnostic.  ABG today showed a pO2 of 67 

pCO2 of 39 pH of 7.33.  WBC count is 14.7 hemoglobin is 8.6 platelets are 66,000

and INR is 2.1.  Electrolytes are basically normal renal profile showed beer of 

25 creatinine 0.94.





Patient was reevaluated today on 7/1/20, remains in the intensive care unit, 

remains on mechanical ventilation.  His ventilator settings are assist control 

rate of 26, tidal volume is 450 FiO2 is 50% and PEEP is at 12.  ABG showed a pO2

of 65 pCO2 of 37 pH of 7.36, hence no change was made in the ventilator 

settings.  Patient remains on norepinephrine at 0.09 mcg/kg/m, propofol at 45 

mcg/kg/m, fentanyl at 0.5 mcg/kg/h.  Remains on enteral feeding at 30 MLS per 

hour goal is 45.  Remains on Zosyn for empiric coverage of aspiration pneumonia.

 Remains on lactulose and the dose was increased to 45 ML 4 times a day, and 

added Xifaxan at 550 mg twice a day.  His lactulose does not seem to be inducing

enough diarrhea did get his ammonia level down.  Continues to have significantly

elevated ammonia level in spite of lactulose for the last 2 days.  Patient 

continues to have ascites.  And no plans for paracentesis as recommended by 

gastroenterology at this point.  Chest x-ray showed very minimal improvement if 

any in his bilateral infiltrates.  The bilateral infiltrates are suggestive of 

aspiration pneumonia.  Obviously the patient is known to have history of 

alcoholism, and he presented with alcoholic liver hepatitis, and hepatic 

encephalopathy.  Labs today were all reviewed WBC is 14.1 hemoglobin is 9.4 

platelets are 75,000 INR is 1.7.  Electrolytes are unremarkable except for low 

potassium of 3.4 BUN of 27 creatinine 1.32.  Liver enzymes are borderline elevat

ed.  Total bilirubin is improving down to 8.9 today.  Albumin is 2.3





Patient was reevaluated today on 7/2/20, remains in the ICU, intubated, 

mechanically ventilated.  Patient is on assist control rate of 26 tidal volume 

450 FiO2 50% and PEEP is 12.  Chest x-ray is showing definite improvement, flower

mayra his ABG is basically about the same with a pO2 of 68 pCO2 of 38 and pH of 

7.38.  His ammonia level is on the rise in spite of the fact that the patient is

on relatively high dose of lactulose and he is also on Xifaxan.  Patient remains

on propofol at 40 mcg/kg/m, fentanyl at 0.5 mcg/kg/h, he is on very small dose 

of norepinephrine at 0.01 mcg/kg/m, IV fluid is at KVO, and he is also on Zosyn 

for presumptive aspiration pneumonia.  Remains on diuretics in the form of 

Lasix, Aldactone.  His Lasix was increased to 40 mg 3 times a day, and he is on 

Aldactone via nasogastric tube.  Patient was taken off sedation today, however 

he became extremely agitated, tachypneic, tachycardic, hence we had to place him

back on sedation and continued assist control mode of mechanical ventilation.  

Chest x-ray again showed improvement.  WBC count today is 11 hemoglobin is 8.5 

his INR is 1.7 basic metabolic profile is improving, creatinine however is up to

1.40, and the patient obviously developed acute kidney injury.  This could very 

well be related to his sepsis, and related to his hypotension requiring 

norepinephrine.  Cultures including blood cultures and sputum cultures have been

negative.





Patient was reevaluated today in the ICU, remains intubated and mechanically 

ventilated.  His ventilator settings are assist control rate of 26 tidal volume 

is 450 FiO2 is 50% and PEEP is at 12 chest x-ray showed dramatic improvement.  

His ABG is significantly improved hence I recommended cutting down the PEEP to 

8.  His ammonia level is significantly improved today, it is down to 69.  Sodium

seems to be creeping up a little bit, and I recommended free water flushes via 

orogastric tube.  Patient is on propofol at 50 mcg/kg/m, his tube feeding is 

presently on hold because of increased residual.  Patient was given a sedation 

holiday, however when he went off propofol, the patient became extremely 

agitated, restless, biting on the endotracheal tube, tachycardic, tachypneic, 

and was extremely agitated.  Hence I recommended placing him back on propofol, 

and not quite ready for weaning.  Labs were all reviewed today.  And addressed 

accordingly including his elevated sodium and elevated BUN of 49 creatinine 

1.46.  Chest x-ray showed significant improvement in his bilateral 

infiltrates/aspiration pneumonia.





Patient was reevaluated today on 7/4/20, remains in the ICU, intubated and 

mechanically ventilated.  His ventilator settings are assist control rate of 26 

FiO2 50% tidal volume of 450 PEEP is at 8 and today I cut down his FiO2 to 45% 

and kept him on a PEEP of 8.  ABG this morning showed a pO2 of 102 pCO2 of 35 pH

of 7.45.  Patient has significantly elevated sodium today of 150 potassium is 

low at 2.8 ammonia level remains high at 73.  Patient is not requiring any 

pressors, he is on propofol at 34 mcg/kg/m.  Patient is on enteral feeding.  I 

do plan to wean the patient off propofol sometime today, and assess mental 

status again.  This was attempted yesterday, but the patient didn't do well, I 

have also recommended free water flushes to correct his hyper natremia.





The patient is seen today 07/05/2020 in follow-up in the intensive care unit.  

He remains intubated on mechanical vent.  Current settings are assist control at

a rate of 26, FiO2 45% tidal volume 450 and a PEEP of 8.  Morning blood gases 

reveal a P O2 of 93, pCO2 of 35 and a pH of 7.47.  Chest x-ray reveals stable 

bilateral atelectasis/infiltrate.  He is sedated on propofol at 50 mcg/kg/m.  

Antibiotics in the form of Zosyn.  He remains on IV diuretics.  He is tolerating

his tube feedings.  He remains hypernatremic despite free water.  IVs changed to

D5W at 75 ML's per hour.  Sodium 156.  Potassium 2.8.  Creatinine 1.32.  Glucose

157.  White count 10.8.  Hemoglobin 8.4.  Platelet count 58,000.





On 07/06/2020 patient seen in follow-up in the intensive care unit, he remains 

sedated, intubated on mechanical ventilator, current vent settings are assist 

control mode of ventilation with a rate of 12, tidal volume is 450, FiO2 45% and

PEEP of 5, this morning blood gases show pO2 of 93, pCO2 38, pH of 7.45.  

Current IV fluids include D5 W5 to 75 ML per hour, and Diprivan at 45 mics per 

kilo per minute, no vasoactive drips, tube feedings of vital high protein at a 

rate of 45 with a goal of 45.  Today's chest x-ray has been reviewed showing 

stable appearance of the bibasilar opacities.  T-max in the last 24 hours was 

99.7F, afebrile this morning, 1 sputum cultures have shown no growth, today's 

blood work has been reviewed, limits according to 14.0, hemoglobin of 8.0, 

platelet count is 62, sodium is 159 Asian is on D5W at 75 ML per hour, in 

addition to free water flushes with 200 mL every 4 hours, patient continues on 

lactulose and rifaximin, and is having lactulose induced diarrhea and he had 2.4

L in liquid stool output in the last 24 hours.  Patient apparently has failed 

spontaneous breathing trials for last 4 days.  We will proceed with a 

spontaneous awakening and spontaneous breathing trials again today, we will 

speak to the family about placement of tracheostomy and PEG tube insertion. 





On 07/07/2020 patient is seen in follow-up in intensive care unit, he remains 

sedated, intubated on mechanical ventilator, current vent settings are assist-

control with a rate of 12, tidal vital 450, FiO2 is 45%, and PEEP of 8, this 

morning blood gases show pO2 of 113, pCO2 is 34, and pH of 7.47, PEEP has been 

dropped to 5.  IV fluids include D5 W at 125 ML per hour, Diprivan is a 40 mics 

per kilo per minute, no vasoactive drips, tube feedings are on hold patient was 

on vital high protein at 45 with a goal of 45 and 400 ML every 4 hours water 

flushes.  Patient was given a sedation holiday yesterday, he never really fully 

awakened, he was placed on pressure-support of 8, he tolerated it for 3 hours, 

however there he became predicted, and was placed back on assist control mode of

ventilation, yesterday we spoke to the patient's wife who was a continue with 

full code CODE STATUS and would like to proceed with tracheostomy and PEG tube 

placement.  Today's chest x-ray has been reviewed and is relatively stable, 

accounting for technical difficulties, bibasilar opacities and atelectases and 

pleural effusions.  We discontinued patient's Lasix and Aldactone yesterday in 

view of his hypernatremia, patient continues to have large liquid stool output, 

4.7 L in the last 24 hours, continues on lactulose and rifaximin.  Yesterday's 

ammonia level was 44, today it is 47.  Today's blood work has been reviewed





On 07/08/2020 patient seen in follow-up in the intensive care unit, he remains 

sedated, on mechanical ventilator, yesterday he had a tracheostomy inserted, 

this morning he is on assist-control mode of ventilation with a rate of 12, 

tidal volume is 450, FiO2 is 45%, and PEEP of 5.  This morning blood gases show 

pO2 of 14, pCO2 38, and pH of 7.42.  Patient is on IV fluids with D5W at a rate 

of 125 ML per hour, Diprivan and is at 40 mics per kilo per minute.  No 

vasoactive drips.  His feedings are on hold for PEG tube insertion today.  

Patient is afebrile.  Hemodynamically he has been stable, no sedation holiday 

was given yesterday, the day before patient's addition was held, however patient

never fully awakened.  Today's labs have been reviewed, showing white blood cell

count 19.2, hemoglobin of 8.1, platelet count is 41, INR today is 1.6, sodium is

146, potassium 3.5, chloride is 117, CO2 is 24, BUN 69, creatinine is 0.94.  

Ammonia level today 62, patient remains on lactulose 40 mg every 8 hours, in 

addition to Xifaxan.  Today's chest x-ray has been reviewed showing left lower 

lobe pneumonia versus subsegmental atelectasis with a component of interstitial 

edema.  Since diuretics remain on hold regarding hyper nature anemia.  

Hypernatremia is improving, and his sodium level is 146 today.  Blood and  

sputum culture remains negative.  Patient has completed a course of Levaquin.





On 07/09/2020 patient seen in follow-up in the intensive care unit.  Patient re

gloria sedated, on mechanical ventilator, currently on assist control mode of 

ventilation with a rate of 12, tidal volume is 450, FiO2 40%, and PEEP of 5.  

This morning blood gases showed pO2 of 91, pCO2 35, and pH of 7.43.  IV D5W at a

rate of 125, and improving and is currently at 25 mics per kilo per minute, 

yesterday he was given sedation holiday, he started opening his eyes, but not 

following command, later on apparently he became slightly tachypneic, and was 

placed back on sedation.  Hemodynamically he remains stable, no vasoactive drips

this morning, today's labs have been reviewed, showing limits oh, 24.8, 

hemoglobin is 8.8, sodium is 138, potassium is 2.8, chloride is 111, CO2 is 23, 

BUN is 55 and creatinine 0.73.  Abdomen is soft, liver enzymes show total 

bilirubin 6.6, AST is 157, ALT is 100, and ammonia level is 42, patient remains 

on a combination of lactulose and rifaximin.  Lung sounds are clear, today's 

chest x-ray has been reviewed showing significant interval increase in the left 

basilar airspace disease with consideration for infectious or aspiration 

pneumonitis.  No fever or chills.  FiO2 is currently at 40%.  Currently not on 

any antibiotics





On 07/10/2020 patient seen in follow-up in the intensive care unit, currently 

off sedation for last 24 hours, he is opening his eyes he is following command, 

but he has a severe generalized weakness, he is attempting to squeeze hands on 

command and nodding head appropriately to questioning.  Remains trached to the 

ventilator, assist control mode of ventilation, with a rate of 12, tidal is 450,

FiO2 is 40% and PEEP of 5, this morning blood gases show pO2 of 73, pCO2 31, pH 

is 7.45.  IV fluids 0.9 normal saline at a rate 10, and levothyroid is at 2 mics

per minute.  No other infusions.  Yesterday he had a PEG tube inserted, today we

anticipate restarting tube feedings.  We will start Lasix IV push every 12 hours

, patient has developed significant anasarca.  A chest x-ray has been reviewed 

showing persistent left lower lobe infiltrate, low-grade fever this morning, 

otherwise patient has been afebrile, pro-calcitonin level came back as 0.16, 

intermediate range.  Clinically patient has been asymptomatic.  New sets of 

blood cultures including sputum urine and blood cultures have been sent, sputum 

Gram stain showing few epithelial cells, moderate PMNs, and a few budding yeast,

final culture is pending.  His ammonia level is 45, patient remains on a 

combination of lactulose and rifaximin.











Objective





- Vital Signs


Vital signs: 


                                   Vital Signs











Temp  99.7 F H  07/10/20 08:00


 


Pulse  112 H  07/10/20 09:00


 


Resp  17   07/10/20 09:00


 


BP  108/54   07/10/20 08:00


 


Pulse Ox  97   07/10/20 09:00








                                 Intake & Output











 07/09/20 07/10/20 07/10/20





 18:59 06:59 18:59


 


Intake Total 982.866 2211.479 39


 


Output Total 970 745 200


 


Balance -136.488 923.479 -161


 


Weight 92 kg 93.2 kg 


 


Intake:   


 


   1656 39


 


    0.9 NS 90 120 30


 


    Dextrose 5% in Water 1, 250  





    000 ml @ 125 mls/hr IV .   





    Q8H [facility] Rx#:[id number]   


 


    Normal Saline Pressure 33 36 9





    Bag   


 


    Sodium Chloride 0.9% 1,  1000 





    000 ml @ 999 mls/hr IV .   





    Q1H1M [facility] Rx#:[id number]   


 


    Sodium Chloride 0.9% 500  500 





    ml 500 ml @ 999 mls/hr IV   





    .Q31M [facility] Rx#:[id number]   


 


  Intake, IV Titration 435.512 12.479 





  Amount   


 


    Norepinephrine 8 mg In  12.479 





    Sodium Chloride 0.9% 250   





    ml @ 0.05 MCG/KG/MIN 8.   





    872 mls/hr IV .Q24H [facility]   





    Rx#:[id number]   


 


    Potassium Chloride 20 meq 300  





    In Water For Injection 1   





    100ml.bag @ 50 mls/hr   





    IVPB Q2H [facility] Rx#:   





    [id number]   


 


    Potassium Chloride 20 meq 100  





    In Water For Injection 1   





    100ml.bag @ 50 mls/hr   





    IVPB Q2H [facility] Rx#:   





    [id number]   


 


    Propofol 1,000 mg In 35.512  





    Empty Bag 1 bag @ Titrate   





    IV .Q0M [facility] Rx#:   





    [id number]   


 


Output:   


 


  Urine 470 745 200


 


  Stool 500  


 


Other:   


 


  Voiding Method Indwelling Catheter Indwelling Catheter 








                       ABP, PAP, CO, CI - Last Documented











Arterial Blood Pressure        116/47

















- Exam


 GENERAL EXAM: Awake, following commands 54-year-old white male, trached to the 

mechanical ventilator, with settings of assist control rate of 12, tidal volume 

is 450, FiO2 40% and PEEP of 5 comfortable in no apparent distress.


HEAD: Normocephalic/atraumatic.


EYES: Normal reaction of pupils, equal size.  Conjunctiva pink, sclera white.


NOSE: Clear with pink turbinates.


THROAT: No erythema or exudates.


NECK: No masses, no JVD, no thyroid enlargement, no adenopathy.  Midline 

tracheostomy to to the ventilator with assist control mode of ventilation.  

Midline tracheostomy, this morning patient developed some bleeding around the 

tracheostomy insertion site, pressure has been held, surgeries aware, we'll 

continue to monitor, dose of DDAVP has been administered for low platelet.


CHEST: No chest wall deformity.  Symmetrical expansion. 


LUNGS: Equal air entry with no crackles, wheeze, rhonchi or dullness.


CVS: Regular rate and rhythm, normal S1 and S2, no gallops, no murmurs, no rubs


ABDOMEN: Soft, nontender.  No hepatosplenomegaly, normal bowel sounds, no 

guarding or rigidity. Peg tube in place


EXTREMITIES: No clubbing, generalized edema, anasarca involving torso, upper and

lower extremities no cyanosis, 2+ pulses and upper and lower extremities.


MUSCULOSKELETAL: Muscle strength and tone normal.


SPINE: No scoliosis or deformity


SKIN: No rashes


CENTRAL NERVOUS SYSTEM: Awake, trached to the vent.  No focal deficits, tone is 

normal in all 4 extremities.














- Labs


CBC & Chem 7: 


                                 07/10/20 04:18





                                 07/10/20 04:18


Labs: 


                  Abnormal Lab Results - Last 24 Hours (Table)











  07/09/20 07/09/20 07/09/20 Range/Units





  04:50 11:33 14:25 


 


WBC     (3.8-10.6)  k/uL


 


RBC     (4.30-5.90)  m/uL


 


Hgb     (13.0-17.5)  gm/dL


 


Hct     (39.0-53.0)  %


 


MCV     (80.0-100.0)  fL


 


MCH     (25.0-35.0)  pg


 


RDW     (11.5-15.5)  %


 


Plt Count     (150-450)  k/uL


 


Neutrophils #     (1.3-7.7)  k/uL


 


Macrocytosis     


 


ABG pCO2     (35-45)  mmHg


 


ABG pO2     ()  mmHg


 


Potassium    3.4 L  (3.5-5.1)  mmol/L


 


Chloride     ()  mmol/L


 


Carbon Dioxide     (22-30)  mmol/L


 


BUN     (9-20)  mg/dL


 


Glucose     (74-99)  mg/dL


 


POC Glucose (mg/dL)   120 H   (75-99)  mg/dL


 


Calcium     (8.4-10.2)  mg/dL


 


Total Bilirubin     (0.2-1.3)  mg/dL


 


AST     (17-59)  U/L


 


ALT     (4-49)  U/L


 


Ammonia     (<30)  umol/L


 


Total Protein     (6.3-8.2)  g/dL


 


Albumin     (3.5-5.0)  g/dL


 


Procalcitonin  0.16 H    (0.02-0.09)  ng/mL














  07/09/20 07/09/20 07/10/20 Range/Units





  17:41 23:43 04:18 


 


WBC    24.6 H  (3.8-10.6)  k/uL


 


RBC    2.30 L  (4.30-5.90)  m/uL


 


Hgb    8.6 L  (13.0-17.5)  gm/dL


 


Hct    26.3 L  (39.0-53.0)  %


 


MCV    114.6 H  (80.0-100.0)  fL


 


MCH    37.3 H  (25.0-35.0)  pg


 


RDW    21.6 H  (11.5-15.5)  %


 


Plt Count    37 L  (150-450)  k/uL


 


Neutrophils #    21.1 H  (1.3-7.7)  k/uL


 


Macrocytosis    Marked A  


 


ABG pCO2     (35-45)  mmHg


 


ABG pO2     ()  mmHg


 


Potassium     (3.5-5.1)  mmol/L


 


Chloride     ()  mmol/L


 


Carbon Dioxide     (22-30)  mmol/L


 


BUN     (9-20)  mg/dL


 


Glucose     (74-99)  mg/dL


 


POC Glucose (mg/dL)  105 H  130 H   (75-99)  mg/dL


 


Calcium     (8.4-10.2)  mg/dL


 


Total Bilirubin     (0.2-1.3)  mg/dL


 


AST     (17-59)  U/L


 


ALT     (4-49)  U/L


 


Ammonia     (<30)  umol/L


 


Total Protein     (6.3-8.2)  g/dL


 


Albumin     (3.5-5.0)  g/dL


 


Procalcitonin     (0.02-0.09)  ng/mL














  07/10/20 07/10/20 07/10/20 Range/Units





  04:18 04:18 04:21 


 


WBC     (3.8-10.6)  k/uL


 


RBC     (4.30-5.90)  m/uL


 


Hgb     (13.0-17.5)  gm/dL


 


Hct     (39.0-53.0)  %


 


MCV     (80.0-100.0)  fL


 


MCH     (25.0-35.0)  pg


 


RDW     (11.5-15.5)  %


 


Plt Count     (150-450)  k/uL


 


Neutrophils #     (1.3-7.7)  k/uL


 


Macrocytosis     


 


ABG pCO2    31 L  (35-45)  mmHg


 


ABG pO2    73 L  ()  mmHg


 


Potassium     (3.5-5.1)  mmol/L


 


Chloride  114 H    ()  mmol/L


 


Carbon Dioxide  21 L    (22-30)  mmol/L


 


BUN  50 H    (9-20)  mg/dL


 


Glucose  134 H    (74-99)  mg/dL


 


POC Glucose (mg/dL)     (75-99)  mg/dL


 


Calcium  8.2 L    (8.4-10.2)  mg/dL


 


Total Bilirubin  7.1 H    (0.2-1.3)  mg/dL


 


AST  195 H    (17-59)  U/L


 


ALT  120 H    (4-49)  U/L


 


Ammonia   45 H   (<30)  umol/L


 


Total Protein  5.7 L    (6.3-8.2)  g/dL


 


Albumin  2.1 L    (3.5-5.0)  g/dL


 


Procalcitonin     (0.02-0.09)  ng/mL














  07/10/20 Range/Units





  06:00 


 


WBC   (3.8-10.6)  k/uL


 


RBC   (4.30-5.90)  m/uL


 


Hgb   (13.0-17.5)  gm/dL


 


Hct   (39.0-53.0)  %


 


MCV   (80.0-100.0)  fL


 


MCH   (25.0-35.0)  pg


 


RDW   (11.5-15.5)  %


 


Plt Count   (150-450)  k/uL


 


Neutrophils #   (1.3-7.7)  k/uL


 


Macrocytosis   


 


ABG pCO2   (35-45)  mmHg


 


ABG pO2   ()  mmHg


 


Potassium   (3.5-5.1)  mmol/L


 


Chloride   ()  mmol/L


 


Carbon Dioxide   (22-30)  mmol/L


 


BUN   (9-20)  mg/dL


 


Glucose   (74-99)  mg/dL


 


POC Glucose (mg/dL)  133 H  (75-99)  mg/dL


 


Calcium   (8.4-10.2)  mg/dL


 


Total Bilirubin   (0.2-1.3)  mg/dL


 


AST   (17-59)  U/L


 


ALT   (4-49)  U/L


 


Ammonia   (<30)  umol/L


 


Total Protein   (6.3-8.2)  g/dL


 


Albumin   (3.5-5.0)  g/dL


 


Procalcitonin   (0.02-0.09)  ng/mL








                      Microbiology - Last 24 Hours (Table)











 07/09/20 11:00 Gram Stain - Preliminary





 Sputum Sputum Culture - Preliminary


 


 07/09/20 10:10 Urine Culture - Preliminary





 Urine,Catheterized 














Assessment and Plan


Plan: 


 Assessment:





Acute hypoxic respiratory failure, suspect aspiration pneumonia.  Today's chest 

x-ray on 07/09/2020 shows a significant interval increase in the left basilar 

airspace disease with consideration for infectious or aspiration pneumonitis.  

Pro-calcitonin in the intermediate range, of 0.16, repeat sputum, blood cultures

have been sent and are pending at this time.  Patient has required prolonged 

ventilator support, and has received a tracheostomy on 07/07/2020, not PEG tube 

was inserted on 07/09/2020





Acute sepsis, possible septic shock, patient required pressors, improved, and is

currently off pressors. This is related to aspiration pneumonia.  Blood cultures

and sputum cultures remain negative.





Alcohol liver disease with ascites.  Jaundice, and elevated liver enzymes.





Blood loss anemia, most likely the patient has acute or subacute GI bleeding, GI

is following.  Hemoglobin has been stable over last several days, no signs of 

active bleeding at this time





Acute hepatic encephalopathy with significantly elevated ammonia level.  Today's

ammonia level is 45, patient remains on lactulose and rifaximin





Recent history of fall about 2 months ago, and multiple right-sided rib 

fractures.





History of alcoholic neuropathy.





History of pericarditis





Chronic back pain





History of restless leg syndrome.





Acute kidney injury secondary to sepsis and septic shock.  Strongly doubt 

hepatorenal syndrome.  Improved





Hypernatremia, related to free water deficit.  Improved





Thrombocytopenia, multifactorial, possibly related to sepsis and chronic liver 

disease





Plan:





Continue with current vent settings, we will start Lasix at 40 mg every 12 

hours, reinitiate tube feedings if surgery is okay with that.  ProCalcitonin 

level is in the intermediate range, will await results of the sputum and blood c

ultures, patient is afebrile right now, clinically stable, and some bleeding 

around the tracheostomy site, patient was given a dose of DDAVP, pressure was 

held, we'll continue to monitor, surgery was made aware.  May use morphine IV 

push to help with the coughing spells to reduce amount of bleeding from around 

the tracheostomy site.  Hemodynamically patient remains stable, today's chest x-

ray shows persistence of left basilar infiltrate.  We'll diurese the patient.  

Repeat chest x-ray the morning, repeat labs, we'll continue to closely monitor.





I performed a history & physical examination of the patient and discussed their 

management with my nurse practitioner, Shagufta Booker.  I reviewed the nurse 

practitioner's note and agree with the documented findings and plan of care.  

Lung sounds are positive for diminished breath sounds.  The findings and the 

impression was discussed with the patient.  I attest to the documentation by the

nurse practitioner. 





Time with Patient: Greater than 30

## 2020-07-10 NOTE — P.PN
Subjective


Progress Note Date: 07/10/20





This is a 54-year-old gentleman, history of polysubstance abuse including 

alcohol abuse , falls with recent rib fractures, legally blind, osteoarthritis 

and multiple other medical issues presented to the ER with changes in mental 

status 2 days.  Drug screen positive for tricyclics and benzos, serum alcohol 

less than 10.  UA reported dark brown cloudy urine with occasional bacteria, 

hyaline casts, 2 WBCs, trace leukocytes, 4+ bilirubin, negative for nitrates. 

Ammonia level 91, T bili 14.9, currently 15.8 , elevated LFTs .Gallbladder 

ultrasound reporting abdominal ascites, abdominal x-ray nonacute, right foot x-

ray reported no fracture, soft tissue swelling, EKG reported sinus tachycardia, 

troponin normal, , echo reported normal LV function, EF 60-65% ,lactic 

acid 2.1 now down to 1.7, BUN 25, creatinine 0.8 chest x-ray reporting moderate 

pulmonary interstitial and airspace edema significantly worse than yesterday, 

pulmonary edema.  ABGs noted.  INR on admission 3.7, down to 2.2 Sepsis 

protocol, Received IV fluid resuscitation, IV antibiotics, cultures drawn.  

Developed respiratory failure, requiring intubation during the night.  Currently

on Sandostatin drip.  3 maroon stools during the night.  Received 4 units of FFP

and 2 units of packed RBCs to date.  Hemoglobin currently 8.5.  And had required

pressor support with Levophed off since 06 100.  Maintained on IV fluid 

resuscitation.  Telemetry sinus rhythm.  Potassium 3.2, magnesium 1.7. 








06/30/2020 chest x-ray reporting persistent bilateral scattered airspace 

infiltrates, pleural effusion unchanged.  Ventilator dependent, on FiO2 of 50 

with PEEP increased to 12.  Continues on lactulose with ammonia down to 80.  T 

bili decreased to 11.3, LFTs improving.  No further maroon stools.  Small black 

stool this morning.  Hemoglobin 8.6.  Telemetry sinus rhythm to sinus tach.  

Sandostatin drip discontinued this morning.  Last night patient asynchronous 

with vent, stacking breaths, Nimbex drip initiated.  This morning Nimbex 

discontinued, changed to fentanyl drip.  Requiring pressor support, Levophed 

resumed.  Received vitamin K yesterday, INR 2.1.  Marginal urine output, IV 

fluids decreased, and Lasix IV push added to med regime.  Tube feedings ordered.

 Afebrile, WBC 14.7.  Sputum culture pending, preliminary blood cultures 

negative at 48 hours.  ABGs noted.








07/01/2020  yesterday Lasix and Aldactone initiated, creatinine mildly worsened 

up to 1.32.  Ammonia increased up to 89, lactulose increased.  No further maroon

stools, hemoglobin increased to 9.4, platelets increased to 75.  T bili trending

down, 8.9, LFTs improving.  Potassium 3.4.  Chest x-ray reporting improving left

lower lobe aeration.  Right foot evaluated by orthopedic surgery, possible fluid

collection, seroma with potential x-ray tomorrow.  Tolerating tube feeds with 

minimal to no residuals, currently at 30 mls per hour with goal of 45.  IV 

steroids added to med regimen with blood sugars increasing.  Remains vent 

dependent with FiO2 at 50/+12 of PEEP.  Continues on Levophed, fentanyl, 

diprovan drips.  Afebrile, T-max 99.5, WBC 14.1.











07/02/2020 ABGs unchanged,remains vent dependent, FiO2 50/+12 PEEP.  Chest x-ray

reporting improvement.  Maintained on both Xifaxan & Lactulose, ammonia 

increasing, beginning to stool.  Creatinine trending up 1.4.  Hemoglobin 

decreased to 8.5.  INR 1.7 Levophed weaned off this morning.  Attempted a 

sedation holiday for about an hour this morning; patient did not wake up, became

agitated, tachycardic, tachypneic with respiratory rate up into the 40s, 

diprovan/fentanyl drips resumed.  Tolerating tube feeds at 40 with goal of 

45/minimal to no residual.  Orthopedics discussing x-ray and foot possibly 

tomorrow.  Afebrile WBC down to 11.











07/08/2020 patient was trached yesterday, remains vent dependent with FiO2 

45%/+5 of PEEP.  Chest x-ray suggestive of possible left lower lobe pneumonia, v

ersus atelectasis, possible interstitial disease. Scheduled for PEG tube 

placement today.  To date, patient has not tolerated sedation holidays, not 

following commands, becomes agitated, and becomes asynchronous with the patient.

 Neurology consulted, EEG completed, results pending.  Maintain on D5W with 

improvement in sodium, Na 146.  Currently sedated on Diprovan gtt. telemetry 

sinus rhythm.  Ammonia level LII on lactulose and Xifaxan.








07/09/2020  EGD with PEG tube placement completed at bedside this morning, 

tolerated procedure well.  Like gastroesophageal reflux reported along the 

anterior abdominal wall.  Evaluated by neurology with recommendations noted and 

appreciated.  EEG completed yesterday reporting abnormal, background slowing 

,suggestive of generalized cerebral dysfunction seen with toxic metabolic 

encephalopathy related to diffuse structural brain abnormality, no obvious 

epileptiform activity seen.  Afebrile, WBC continues trending up, currently 

24.8, recultured.  During sedation holiday patient noted to spontaneously move 

his right hand, half open his eyes responding to his name.  Diprovan has been 

weaned off this morning.  Remains vent dependent with FiO2 40%/+5 of PEEP.  

Chest x-ray reporting significant interval increase in left basilar airspace 

disease, possible infectious or aspiration pneumonitis. Ammonia level 42.  

Receiving potassium supplements for potassium 2.8.  Sodium continues improving, 

138.











07/10/2020 Remains off sedation X 24 hrs, sensorium slowly improving.  Following

simple commands ;Opens eyes to name, shakes head yes and no to simple questions.

 Sinus tachycardia with heart rate in the 110s. Chest x-ray reporting persistent

left lower lobe infiltrate, IV push Lasix every 12h initiated. Vent dependent, 

maintained on FiO2 40%/+5 of PEEP.  Significant anasarca. Yesterday PEG tube 

placed, did not receive any lactulose-current ammonia level 45.  Bleeding around

tracheostomy site, controlled with multiple packs of gauze, DDAVP.  Hemoglobin 

8.6, platelets 37 New central line and art line placed.  Levophed currently off.

 T-max 99.7, WBC unchanged 24.6, recultured yesterday, cultures pending.Na 140.





Objective





- Vital Signs


Vital signs: 


                                   Vital Signs











Temp  98.9 F   07/10/20 12:00


 


Pulse  102 H  07/10/20 13:15


 


Resp  22   07/10/20 13:15


 


BP  138/71   07/10/20 11:15


 


Pulse Ox  96   07/10/20 13:15








                                 Intake & Output











 07/09/20 07/10/20 07/10/20





 18:59 06:59 18:59


 


Intake Total 154.427 2551.479 102.468


 


Output Total 970 745 880


 


Balance -136.488 923.479 -777.532


 


Weight 92 kg 93.2 kg 


 


Intake:   


 


   1656 87


 


    0.9 NS 90 120 60


 


    Dextrose 5% in Water 1, 250  





    000 ml @ 125 mls/hr IV .   





    Q8H [facility] Rx#:[id number]   


 


    Normal Saline Pressure 33 36 27





    Bag   


 


    Sodium Chloride 0.9% 1,  1000 





    000 ml @ 999 mls/hr IV .   





    Q1H1M [facility] Rx#:[id number]   


 


    Sodium Chloride 0.9% 500  500 





    ml 500 ml @ 999 mls/hr IV   





    .Q31M [facility] Rx#:[id number]   


 


  Intake, IV Titration 435.512 12.479 15.468





  Amount   


 


    Norepinephrine 8 mg In  12.479 15.468





    Sodium Chloride 0.9% 250   





    ml @ 0.05 MCG/KG/MIN 8.   





    872 mls/hr IV .Q24H [facility]   





    Rx#:[id number]   


 


    Potassium Chloride 20 meq 300  





    In Water For Injection 1   





    100ml.bag @ 50 mls/hr   





    IVPB Q2H [facility] Rx#:   





    [id number]   


 


    Potassium Chloride 20 meq 100  





    In Water For Injection 1   





    100ml.bag @ 50 mls/hr   





    IVPB Q2H [facility] Rx#:   





    [id number]   


 


    Propofol 1,000 mg In 35.512  





    Empty Bag 1 bag @ Titrate   





    IV .Q0M [facility] Rx#:   





    [id number]   


 


Output:   


 


  Urine 470 745 880


 


  Stool 500  


 


Other:   


 


  Voiding Method Indwelling Catheter Indwelling Catheter Indwelling Catheter








                       ABP, PAP, CO, CI - Last Documented











Arterial Blood Pressure        139/45

















- Exam


VITAL SIGNS: As above


GENERAL: Sitting up in bed, intubated, off of sedation, appears comfortable


HEENT:  Conjunctivae normal.  Positive icterus, jaundice


NECK:  No JVD. No thyroid enlargement. No LNs.  Midline tracheostomy, 

surrounding dressing with sanguinous drainage


CARDIOVASCULAR:  S1, S2 regular.  Systolic murmur


RESPIRATION: Clear, with no rhonchi, crackles or wheezing.


ABDOMEN:  Soft, distended, positive bowel sounds.  PEG tube present. Scrotal 

edema


Extremities: Positive upper and lower extremity edema, no clubbing, no cyanosis.

Right foot dorsal edema with ecchymosis wearing boot.


NERVOUS SYSTEM: Unable to assess, on mechanical ventilation.Appears to be 

following simple commands


Skin: Warm and dry, no rash 








- Labs


CBC & Chem 7: 


                                 07/10/20 04:18





                                 07/10/20 04:18


Labs: 


                  Abnormal Lab Results - Last 24 Hours (Table)











  07/09/20 07/09/20 07/09/20 Range/Units





  04:50 14:25 17:41 


 


WBC     (3.8-10.6)  k/uL


 


RBC     (4.30-5.90)  m/uL


 


Hgb     (13.0-17.5)  gm/dL


 


Hct     (39.0-53.0)  %


 


MCV     (80.0-100.0)  fL


 


MCH     (25.0-35.0)  pg


 


RDW     (11.5-15.5)  %


 


Plt Count     (150-450)  k/uL


 


Neutrophils #     (1.3-7.7)  k/uL


 


Macrocytosis     


 


ABG pCO2     (35-45)  mmHg


 


ABG pO2     ()  mmHg


 


Potassium   3.4 L   (3.5-5.1)  mmol/L


 


Chloride     ()  mmol/L


 


Carbon Dioxide     (22-30)  mmol/L


 


BUN     (9-20)  mg/dL


 


Glucose     (74-99)  mg/dL


 


POC Glucose (mg/dL)    105 H  (75-99)  mg/dL


 


Calcium     (8.4-10.2)  mg/dL


 


Total Bilirubin     (0.2-1.3)  mg/dL


 


AST     (17-59)  U/L


 


ALT     (4-49)  U/L


 


Ammonia     (<30)  umol/L


 


Total Protein     (6.3-8.2)  g/dL


 


Albumin     (3.5-5.0)  g/dL


 


Procalcitonin  0.16 H    (0.02-0.09)  ng/mL














  07/09/20 07/10/20 07/10/20 Range/Units





  23:43 04:18 04:18 


 


WBC   24.6 H   (3.8-10.6)  k/uL


 


RBC   2.30 L   (4.30-5.90)  m/uL


 


Hgb   8.6 L   (13.0-17.5)  gm/dL


 


Hct   26.3 L   (39.0-53.0)  %


 


MCV   114.6 H   (80.0-100.0)  fL


 


MCH   37.3 H   (25.0-35.0)  pg


 


RDW   21.6 H   (11.5-15.5)  %


 


Plt Count   37 L   (150-450)  k/uL


 


Neutrophils #   21.1 H   (1.3-7.7)  k/uL


 


Macrocytosis   Marked A   


 


ABG pCO2     (35-45)  mmHg


 


ABG pO2     ()  mmHg


 


Potassium     (3.5-5.1)  mmol/L


 


Chloride    114 H  ()  mmol/L


 


Carbon Dioxide    21 L  (22-30)  mmol/L


 


BUN    50 H  (9-20)  mg/dL


 


Glucose    134 H  (74-99)  mg/dL


 


POC Glucose (mg/dL)  130 H    (75-99)  mg/dL


 


Calcium    8.2 L  (8.4-10.2)  mg/dL


 


Total Bilirubin    7.1 H  (0.2-1.3)  mg/dL


 


AST    195 H  (17-59)  U/L


 


ALT    120 H  (4-49)  U/L


 


Ammonia     (<30)  umol/L


 


Total Protein    5.7 L  (6.3-8.2)  g/dL


 


Albumin    2.1 L  (3.5-5.0)  g/dL


 


Procalcitonin     (0.02-0.09)  ng/mL














  07/10/20 07/10/20 07/10/20 Range/Units





  04:18 04:21 06:00 


 


WBC     (3.8-10.6)  k/uL


 


RBC     (4.30-5.90)  m/uL


 


Hgb     (13.0-17.5)  gm/dL


 


Hct     (39.0-53.0)  %


 


MCV     (80.0-100.0)  fL


 


MCH     (25.0-35.0)  pg


 


RDW     (11.5-15.5)  %


 


Plt Count     (150-450)  k/uL


 


Neutrophils #     (1.3-7.7)  k/uL


 


Macrocytosis     


 


ABG pCO2   31 L   (35-45)  mmHg


 


ABG pO2   73 L   ()  mmHg


 


Potassium     (3.5-5.1)  mmol/L


 


Chloride     ()  mmol/L


 


Carbon Dioxide     (22-30)  mmol/L


 


BUN     (9-20)  mg/dL


 


Glucose     (74-99)  mg/dL


 


POC Glucose (mg/dL)    133 H  (75-99)  mg/dL


 


Calcium     (8.4-10.2)  mg/dL


 


Total Bilirubin     (0.2-1.3)  mg/dL


 


AST     (17-59)  U/L


 


ALT     (4-49)  U/L


 


Ammonia  45 H    (<30)  umol/L


 


Total Protein     (6.3-8.2)  g/dL


 


Albumin     (3.5-5.0)  g/dL


 


Procalcitonin     (0.02-0.09)  ng/mL














  07/10/20 Range/Units





  11:44 


 


WBC   (3.8-10.6)  k/uL


 


RBC   (4.30-5.90)  m/uL


 


Hgb   (13.0-17.5)  gm/dL


 


Hct   (39.0-53.0)  %


 


MCV   (80.0-100.0)  fL


 


MCH   (25.0-35.0)  pg


 


RDW   (11.5-15.5)  %


 


Plt Count   (150-450)  k/uL


 


Neutrophils #   (1.3-7.7)  k/uL


 


Macrocytosis   


 


ABG pCO2   (35-45)  mmHg


 


ABG pO2   ()  mmHg


 


Potassium   (3.5-5.1)  mmol/L


 


Chloride   ()  mmol/L


 


Carbon Dioxide   (22-30)  mmol/L


 


BUN   (9-20)  mg/dL


 


Glucose   (74-99)  mg/dL


 


POC Glucose (mg/dL)  121 H  (75-99)  mg/dL


 


Calcium   (8.4-10.2)  mg/dL


 


Total Bilirubin   (0.2-1.3)  mg/dL


 


AST   (17-59)  U/L


 


ALT   (4-49)  U/L


 


Ammonia   (<30)  umol/L


 


Total Protein   (6.3-8.2)  g/dL


 


Albumin   (3.5-5.0)  g/dL


 


Procalcitonin   (0.02-0.09)  ng/mL








                      Microbiology - Last 24 Hours (Table)











 07/09/20 10:10 Urine Culture - Preliminary





 Urine,Catheterized    Yeast species


 


 07/09/20 11:00 Gram Stain - Preliminary





 Sputum Sputum Culture - Preliminary





    Candida albicans














Assessment and Plan


Assessment: 


Sepsis secondary to aspiration bilateral pneumonia, possibly abdominal source,


Acute hypoxic respiratory failure,  related to the above,  ventilator dependent,

failure to wean, status post tracheostomy.


Septic and hypovolemic shock multifactorial, secondary to bilateral aspiration 

pneumonia, alcoholic liver disease


Septic shock status post pressor support


Hypotension, secondary to the above, pressor support dependent secondary to 

sepsis


Acute renal failure multifactorial, secondary to all the above, diuretics


Acute GI bleed, possible esophageal varices in a patient with significant a

lcohol dependence, on Sandostatin


Acute blood loss anemia, status post multiple transfusions of both FFP, packed 

RBCs


Acute Hepatic encephalopathy 


Possible acute toxic, metabolic encephalopathy


Alcoholic liver disease with ascites


Hyperammonia


Recent posterior lateral rib fractures of 9, 10 and 11 status post fall


History of pericarditis


Chronic back pain


Polysubstance abuse, per record review; patient reports alcohol, marijuana use 

currently.


History of nicotine dependence


COPD, emphysema


Legally blind


Obesity, BMI 30.7


Osteoarthritis


Venous insufficiency


Anxiety, depression, history of, currently controlled


Hyperglycemia, steroid-induced


Moderate protein calorie malnutrition


Hypernatremia secondary free water deficit, resolved


Status post PEG tube placement


Hypokalemia














Plan: Continue on current medication regime ,monitoring and symptomatic 

treatment.  Close monitoring of coag's ,bleeding around tracheostomy site, post 

DDAVP.  Follow closely repeated cultures.  Diuretics.maintain lactulose, 

Xifaxan, close monitoring of ammonia level .  Select specialty possibly at 

discharge.  Prognosis guarded given multiple complex medical issues.














The impression and plan of care has been dictated as directed.





:


I performed a history and examination of this patient,  discussed the same with 

the dictator.  I agree with the dictator's note ,documented as a scribe.  Any 

additional findings or plans will be noted.

## 2020-07-10 NOTE — P.PN
Subjective


Progress Note Date: 07/09/20


Principal diagnosis: 





Alcoholic hepatitis, alcoholic cirrhosis of the liver with ascites, hepatic 

encephalopathy, elevated liver enzymes





Patient is seen in the intensive care unit where he is currently status post 

tracheostomy, and PEG tube placement. Mentation still poor but improving. Liver 

enzymes stable.





Objective





- Vital Signs


Vital signs: 


                                   Vital Signs











Temp  97.2 F L  07/09/20 12:00


 


Pulse  75   07/09/20 13:00


 


Resp  20   07/09/20 13:00


 


BP  95/54   07/09/20 13:00


 


Pulse Ox  98   07/09/20 13:00








                                 Intake & Output











 07/08/20 07/09/20 07/09/20





 18:59 06:59 18:59


 


Intake Total 2782.228 2832.232 668.512


 


Output Total 920 2350 1625


 


Balance 1862.228 482.232 -956.488


 


Weight  92 kg 92 kg


 


Intake:   


 


  IV 1536 1664 333


 


    0.9 NS   40


 


    Dextrose 5% in Water 1, 1500 1625 250





    000 ml @ 125 mls/hr IV .   





    Q8H [facility] Rx#:[id number]   


 


    Normal Saline Pressure 36 39 18





    Bag   


 


  Intake, IV Titration 176.228 98.232 335.512





  Amount   


 


    Potassium Chloride 20 meq   300





    In Water For Injection 1   





    100ml.bag @ 50 mls/hr   





    IVPB Q2H [facility] Rx#:   





    [id number]   


 


    Propofol 1,000 mg In 176.228 98.232 35.512





    Empty Bag 1 bag @ Titrate   





    IV .Q0M [facility] Rx#:   





    [id number]   


 


  Tube Feeding 270 270 


 


  Other 800 800 


 


Output:   


 


  Urine 920 950 225


 


  Stool  1400 1400


 


Other:   


 


  Voiding Method Indwelling Catheter Indwelling Catheter Indwelling Catheter








                       ABP, PAP, CO, CI - Last Documented











Arterial Blood Pressure        87/43

















- Exam





On physical examination, patient appears comfortable in no apparent distress. 


HEAD: Normocephalic, atraumatic. 


EYES: Scleral icterus. No conjunctival injection. 


MOUTH: No lesions, tongue midline, endotracheal tube in place. 


NECK: Tracheostomy in place. 


CHEST: Coarse respiratory noises in all lung fields on mechanical ventilation.


ABDOMEN: Soft, PEG tube in place. Bowel sounds are positive. No organomegaly.  

No guarding or rigidity.


EXTREMITIES: Bilateral pedal edema. 


SKIN: No rashes, jaundice. 


NEUROLOGIC: Intubated and sedated. 





- Labs


CBC & Chem 7: 


                                 07/09/20 04:50





                                 07/10/20 04:18


Labs: 


                  Abnormal Lab Results - Last 24 Hours (Table)











  06/28/20 06/28/20 06/28/20 Range/Units





  14:10 17:24 20:03 


 


WBC     (3.8-10.6)  k/uL


 


RBC     (4.30-5.90)  m/uL


 


Hgb     (13.0-17.5)  gm/dL


 


Hct     (39.0-53.0)  %


 


MCV     (80.0-100.0)  fL


 


MCH     (25.0-35.0)  pg


 


MCHC     (31.0-37.0)  g/dL


 


RDW     (11.5-15.5)  %


 


Plt Count     (150-450)  k/uL


 


Neutrophils #     (1.3-7.7)  k/uL


 


Macrocytosis     


 


ABG Total CO2     (19-24)  mmol/L


 


ABG O2 Saturation     (94-97)  %


 


Potassium     (3.5-5.1)  mmol/L


 


Chloride     ()  mmol/L


 


Carbon Dioxide     (22-30)  mmol/L


 


BUN     (9-20)  mg/dL


 


Glucose     (74-99)  mg/dL


 


POC Glucose (mg/dL)     (75-99)  mg/dL


 


Plasma Lactic Acid Gagandeep   3.1 H*  2.2 H*  (0.7-2.0)  mmol/L


 


Calcium     (8.4-10.2)  mg/dL


 


Total Bilirubin     (0.2-1.3)  mg/dL


 


AST     (17-59)  U/L


 


ALT     (4-49)  U/L


 


Ammonia     (<30)  umol/L


 


Crossmatch  See Detail    














  06/28/20 06/29/20 07/07/20 Range/Units





  22:50 17:30 04:30 


 


WBC    16.2 H  (3.8-10.6)  k/uL


 


RBC    2.22 L  (4.30-5.90)  m/uL


 


Hgb    8.1 L  (13.0-17.5)  gm/dL


 


Hct    25.3 L  (39.0-53.0)  %


 


MCV    114.0 H  (80.0-100.0)  fL


 


MCH    36.7 H  (25.0-35.0)  pg


 


MCHC     (31.0-37.0)  g/dL


 


RDW    22.7 H  (11.5-15.5)  %


 


Plt Count    49 L  (150-450)  k/uL


 


Neutrophils #    14.5 H  (1.3-7.7)  k/uL


 


Macrocytosis    Marked A  


 


ABG Total CO2     (19-24)  mmol/L


 


ABG O2 Saturation     (94-97)  %


 


Potassium   3.2 L   (3.5-5.1)  mmol/L


 


Chloride   113 H   ()  mmol/L


 


Carbon Dioxide   20 L   (22-30)  mmol/L


 


BUN   25 H   (9-20)  mg/dL


 


Glucose   100 H   (74-99)  mg/dL


 


POC Glucose (mg/dL)     (75-99)  mg/dL


 


Plasma Lactic Acid Gagandeep  2.1 H*    (0.7-2.0)  mmol/L


 


Calcium   7.1 L   (8.4-10.2)  mg/dL


 


Total Bilirubin     (0.2-1.3)  mg/dL


 


AST     (17-59)  U/L


 


ALT     (4-49)  U/L


 


Ammonia     (<30)  umol/L


 


Crossmatch     














  07/08/20 07/08/20 07/08/20 Range/Units





  04:50 17:56 23:55 


 


WBC  19.2 H    (3.8-10.6)  k/uL


 


RBC  2.29 L    (4.30-5.90)  m/uL


 


Hgb  8.1 L    (13.0-17.5)  gm/dL


 


Hct  26.3 L    (39.0-53.0)  %


 


MCV  114.7 H    (80.0-100.0)  fL


 


MCH  35.5 H    (25.0-35.0)  pg


 


MCHC  30.9 L    (31.0-37.0)  g/dL


 


RDW  21.9 H    (11.5-15.5)  %


 


Plt Count  41 L    (150-450)  k/uL


 


Neutrophils #  17.4 H    (1.3-7.7)  k/uL


 


Macrocytosis  Marked A    


 


ABG Total CO2     (19-24)  mmol/L


 


ABG O2 Saturation     (94-97)  %


 


Potassium     (3.5-5.1)  mmol/L


 


Chloride     ()  mmol/L


 


Carbon Dioxide     (22-30)  mmol/L


 


BUN     (9-20)  mg/dL


 


Glucose     (74-99)  mg/dL


 


POC Glucose (mg/dL)   225 H  241 H  (75-99)  mg/dL


 


Plasma Lactic Acid Gagandeep     (0.7-2.0)  mmol/L


 


Calcium     (8.4-10.2)  mg/dL


 


Total Bilirubin     (0.2-1.3)  mg/dL


 


AST     (17-59)  U/L


 


ALT     (4-49)  U/L


 


Ammonia     (<30)  umol/L


 


Crossmatch     














  07/09/20 07/09/20 07/09/20 Range/Units





  04:50 04:50 04:50 


 


WBC  24.8 H    (3.8-10.6)  k/uL


 


RBC  2.36 L    (4.30-5.90)  m/uL


 


Hgb  8.8 L    (13.0-17.5)  gm/dL


 


Hct  26.6 L    (39.0-53.0)  %


 


MCV  112.4 H    (80.0-100.0)  fL


 


MCH  37.1 H    (25.0-35.0)  pg


 


MCHC     (31.0-37.0)  g/dL


 


RDW  21.0 H    (11.5-15.5)  %


 


Plt Count  35 L    (150-450)  k/uL


 


Neutrophils #  21.9 H    (1.3-7.7)  k/uL


 


Macrocytosis  Marked A    


 


ABG Total CO2     (19-24)  mmol/L


 


ABG O2 Saturation     (94-97)  %


 


Potassium   2.8 L   (3.5-5.1)  mmol/L


 


Chloride   111 H   ()  mmol/L


 


Carbon Dioxide     (22-30)  mmol/L


 


BUN   55 H   (9-20)  mg/dL


 


Glucose   131 H   (74-99)  mg/dL


 


POC Glucose (mg/dL)     (75-99)  mg/dL


 


Plasma Lactic Acid Gagandeep     (0.7-2.0)  mmol/L


 


Calcium   8.1 L   (8.4-10.2)  mg/dL


 


Total Bilirubin    6.6 H  (0.2-1.3)  mg/dL


 


AST    157 H  (17-59)  U/L


 


ALT    100 H  (4-49)  U/L


 


Ammonia     (<30)  umol/L


 


Crossmatch     














  07/09/20 07/09/20 07/09/20 Range/Units





  05:28 05:32 05:43 


 


WBC     (3.8-10.6)  k/uL


 


RBC     (4.30-5.90)  m/uL


 


Hgb     (13.0-17.5)  gm/dL


 


Hct     (39.0-53.0)  %


 


MCV     (80.0-100.0)  fL


 


MCH     (25.0-35.0)  pg


 


MCHC     (31.0-37.0)  g/dL


 


RDW     (11.5-15.5)  %


 


Plt Count     (150-450)  k/uL


 


Neutrophils #     (1.3-7.7)  k/uL


 


Macrocytosis     


 


ABG Total CO2    25 H  (19-24)  mmol/L


 


ABG O2 Saturation    97.5 H  (94-97)  %


 


Potassium     (3.5-5.1)  mmol/L


 


Chloride     ()  mmol/L


 


Carbon Dioxide     (22-30)  mmol/L


 


BUN     (9-20)  mg/dL


 


Glucose     (74-99)  mg/dL


 


POC Glucose (mg/dL)   143 H   (75-99)  mg/dL


 


Plasma Lactic Acid Gagandeep     (0.7-2.0)  mmol/L


 


Calcium     (8.4-10.2)  mg/dL


 


Total Bilirubin     (0.2-1.3)  mg/dL


 


AST     (17-59)  U/L


 


ALT     (4-49)  U/L


 


Ammonia  42 H    (<30)  umol/L


 


Crossmatch     














  07/09/20 Range/Units





  11:33 


 


WBC   (3.8-10.6)  k/uL


 


RBC   (4.30-5.90)  m/uL


 


Hgb   (13.0-17.5)  gm/dL


 


Hct   (39.0-53.0)  %


 


MCV   (80.0-100.0)  fL


 


MCH   (25.0-35.0)  pg


 


MCHC   (31.0-37.0)  g/dL


 


RDW   (11.5-15.5)  %


 


Plt Count   (150-450)  k/uL


 


Neutrophils #   (1.3-7.7)  k/uL


 


Macrocytosis   


 


ABG Total CO2   (19-24)  mmol/L


 


ABG O2 Saturation   (94-97)  %


 


Potassium   (3.5-5.1)  mmol/L


 


Chloride   ()  mmol/L


 


Carbon Dioxide   (22-30)  mmol/L


 


BUN   (9-20)  mg/dL


 


Glucose   (74-99)  mg/dL


 


POC Glucose (mg/dL)  120 H  (75-99)  mg/dL


 


Plasma Lactic Acid Gagandeep   (0.7-2.0)  mmol/L


 


Calcium   (8.4-10.2)  mg/dL


 


Total Bilirubin   (0.2-1.3)  mg/dL


 


AST   (17-59)  U/L


 


ALT   (4-49)  U/L


 


Ammonia   (<30)  umol/L


 


Crossmatch   














Assessment and Plan


(1) Acute alcoholic hepatitis


Narrative/Plan: 


54-year-old male with known history of alcohol abuse presenting to the hospital 

with altered mental status.  Likely multifactorial given suspected underlying 

decompensated cirrhosis, acute alcoholic hepatitis and concern for aspiration 

pneumonia and sepsis.  Currently receiving treatment in the ICU, he remains on 

broad-spectrum antibiotic therapy and mechanically ventilated.





Patient is status post tracheostomy placement and PEG tube placement.


Current Visit: Yes   Status: Acute   Code(s): K70.10 - ALCOHOLIC HEPATITIS 

WITHOUT ASCITES   SNOMED Code(s): 3803588


   





(2) Liver cirrhosis


Current Visit: Yes   Status: Acute   Code(s): K74.60 - UNSPECIFIED CIRRHOSIS OF 

LIVER   SNOMED Code(s): 86292170


   





(3) Ascites


Current Visit: Yes   Status: Acute   Code(s): R18.8 - OTHER ASCITES   SNOMED 

Code(s): 716921634


   





(4) Hepatic encephalopathy


Current Visit: Yes   Status: Acute   Code(s): K72.90 - HEPATIC FAILURE, 

UNSPECIFIED WITHOUT COMA   SNOMED Code(s): 54109186


   





(5) Bicytopenia


Narrative/Plan: 


Patient anemic and thrombocytopenic likely related to chronic alcohol use.  

Stool testing was positive for blood but patient has no signs or symptoms of GI 

bleeding with normal brown stool noted and fecal management system.  Hemoglobin 

has remained stable.


Current Visit: Yes   Status: Acute   Code(s): D75.89 - OTHER SPECIFIED DISEASES 

OF BLOOD AND BLOOD-FORMING ORGANS   SNOMED Code(s): 72752437


   


Plan: 





Supportive care


Okay for tube feed through PEG tube


Continue management per intensivist service, patient currently intubated and 

sedated


Continue broad-spectrum antibiotic therapy


Continue lactulose 4 times a day


Xifaxan twice daily


Neurology service following for altered mentation


Status post tracheostomy


Alcohol abstinence


Thank you for allowing us to participate in the care of the patient we will 

continue to follow

## 2020-07-11 LAB
ALBUMIN SERPL-MCNC: 2.1 G/DL (ref 3.5–5)
ALP SERPL-CCNC: 111 U/L (ref 38–126)
ALT SERPL-CCNC: 114 U/L (ref 4–49)
ANION GAP SERPL CALC-SCNC: 2 MMOL/L
APTT BLD: 26 SEC (ref 22–30)
APTT BLD: 28.4 SEC (ref 22–30)
AST SERPL-CCNC: 172 U/L (ref 17–59)
BASOPHILS # BLD AUTO: 0.1 K/UL (ref 0–0.2)
BASOPHILS NFR BLD AUTO: 0 %
BUN SERPL-SCNC: 47 MG/DL (ref 9–20)
CALCIUM SPEC-MCNC: 8.3 MG/DL (ref 8.4–10.2)
CELLS COUNTED: 100
CHLORIDE SERPL-SCNC: 118 MMOL/L (ref 98–107)
CO2 BLDA-SCNC: 24 MMOL/L (ref 19–24)
CO2 SERPL-SCNC: 24 MMOL/L (ref 22–30)
EOSINOPHIL # BLD AUTO: 0.1 K/UL (ref 0–0.7)
EOSINOPHIL NFR BLD AUTO: 0 %
ERYTHROCYTE [DISTWIDTH] IN BLOOD BY AUTOMATED COUNT: 1.96 M/UL (ref 4.3–5.9)
ERYTHROCYTE [DISTWIDTH] IN BLOOD BY AUTOMATED COUNT: 1.97 M/UL (ref 4.3–5.9)
ERYTHROCYTE [DISTWIDTH] IN BLOOD BY AUTOMATED COUNT: 2.02 M/UL (ref 4.3–5.9)
ERYTHROCYTE [DISTWIDTH] IN BLOOD BY AUTOMATED COUNT: 2.06 M/UL (ref 4.3–5.9)
ERYTHROCYTE [DISTWIDTH] IN BLOOD: 22.2 % (ref 11.5–15.5)
ERYTHROCYTE [DISTWIDTH] IN BLOOD: 22.2 % (ref 11.5–15.5)
ERYTHROCYTE [DISTWIDTH] IN BLOOD: 23.2 % (ref 11.5–15.5)
ERYTHROCYTE [DISTWIDTH] IN BLOOD: 23.2 % (ref 11.5–15.5)
GLUCOSE BLD-MCNC: 171 MG/DL (ref 75–99)
GLUCOSE BLD-MCNC: 173 MG/DL (ref 75–99)
GLUCOSE BLD-MCNC: 184 MG/DL (ref 75–99)
GLUCOSE BLD-MCNC: 188 MG/DL (ref 75–99)
GLUCOSE SERPL-MCNC: 157 MG/DL (ref 74–99)
HCO3 BLDA-SCNC: 23 MMOL/L (ref 21–25)
HCT VFR BLD AUTO: 21.3 % (ref 39–53)
HCT VFR BLD AUTO: 21.5 % (ref 39–53)
HCT VFR BLD AUTO: 23.1 % (ref 39–53)
HCT VFR BLD AUTO: 23.7 % (ref 39–53)
HGB BLD-MCNC: 6.7 GM/DL (ref 13–17.5)
HGB BLD-MCNC: 7.3 GM/DL (ref 13–17.5)
HGB BLD-MCNC: 7.3 GM/DL (ref 13–17.5)
HGB BLD-MCNC: 7.5 GM/DL (ref 13–17.5)
INR PPP: 1.5 (ref ?–1.2)
INR PPP: 1.8 (ref ?–1.2)
LYMPHOCYTES # BLD MANUAL: 0.76 K/UL (ref 1–4.8)
LYMPHOCYTES # SPEC AUTO: 1.7 K/UL (ref 1–4.8)
LYMPHOCYTES NFR SPEC AUTO: 6 %
MCH RBC QN AUTO: 33.9 PG (ref 25–35)
MCH RBC QN AUTO: 35.2 PG (ref 25–35)
MCH RBC QN AUTO: 37.1 PG (ref 25–35)
MCH RBC QN AUTO: 37.3 PG (ref 25–35)
MCHC RBC AUTO-ENTMCNC: 31 G/DL (ref 31–37)
MCHC RBC AUTO-ENTMCNC: 31.6 G/DL (ref 31–37)
MCHC RBC AUTO-ENTMCNC: 31.6 G/DL (ref 31–37)
MCHC RBC AUTO-ENTMCNC: 34.2 G/DL (ref 31–37)
MCV RBC AUTO: 103.1 FL (ref 80–100)
MCV RBC AUTO: 109.2 FL (ref 80–100)
MCV RBC AUTO: 117.3 FL (ref 80–100)
MCV RBC AUTO: 118 FL (ref 80–100)
MONOCYTES # BLD AUTO: 1 K/UL (ref 0–1)
MONOCYTES # BLD MANUAL: 0.25 K/UL (ref 0–1)
MONOCYTES NFR BLD AUTO: 3 %
NEUTROPHILS # BLD AUTO: 28 K/UL (ref 1.3–7.7)
NEUTROPHILS NFR BLD AUTO: 89 %
NEUTROPHILS NFR BLD MANUAL: 96 %
NEUTS SEG # BLD MANUAL: 24.19 K/UL (ref 1.3–7.7)
PCO2 BLDA: 33 MMHG (ref 35–45)
PH BLDA: 7.45 [PH] (ref 7.35–7.45)
PLATELET # BLD AUTO: 41 K/UL (ref 150–450)
PLATELET # BLD AUTO: 45 K/UL (ref 150–450)
PLATELET # BLD AUTO: 72 K/UL (ref 150–450)
PLATELET # BLD AUTO: 93 K/UL (ref 150–450)
PO2 BLDA: 79 MMHG (ref 83–108)
POTASSIUM SERPL-SCNC: 3.2 MMOL/L (ref 3.5–5.1)
PROT SERPL-MCNC: 5.5 G/DL (ref 6.3–8.2)
PT BLD: 14.9 SEC (ref 9–12)
PT BLD: 17.9 SEC (ref 9–12)
SODIUM SERPL-SCNC: 144 MMOL/L (ref 137–145)
WBC # BLD AUTO: 24 K/UL (ref 3.8–10.6)
WBC # BLD AUTO: 25.2 K/UL (ref 3.8–10.6)
WBC # BLD AUTO: 28.9 K/UL (ref 3.8–10.6)
WBC # BLD AUTO: 31.3 K/UL (ref 3.8–10.6)

## 2020-07-11 PROCEDURE — 6A550Z2 PHERESIS OF PLATELETS, SINGLE: ICD-10-PCS

## 2020-07-11 PROCEDURE — 0B21XFZ CHANGE TRACHEOSTOMY DEVICE IN TRACHEA, EXTERNAL APPROACH: ICD-10-PCS

## 2020-07-11 RX ADMIN — MORPHINE SULFATE PRN MG: 2 INJECTION, SOLUTION INTRAMUSCULAR; INTRAVENOUS at 13:03

## 2020-07-11 RX ADMIN — IPRATROPIUM BROMIDE AND ALBUTEROL SULFATE SCH: .5; 3 SOLUTION RESPIRATORY (INHALATION) at 11:35

## 2020-07-11 RX ADMIN — FUROSEMIDE SCH MG: 10 INJECTION, SOLUTION INTRAMUSCULAR; INTRAVENOUS at 08:29

## 2020-07-11 RX ADMIN — IPRATROPIUM BROMIDE AND ALBUTEROL SULFATE SCH ML: .5; 3 SOLUTION RESPIRATORY (INHALATION) at 13:14

## 2020-07-11 RX ADMIN — PANTOPRAZOLE SODIUM SCH MG: 40 INJECTION, POWDER, FOR SOLUTION INTRAVENOUS at 08:28

## 2020-07-11 RX ADMIN — POTASSIUM CHLORIDE SCH MLS/HR: 14.9 INJECTION, SOLUTION INTRAVENOUS at 08:28

## 2020-07-11 RX ADMIN — MORPHINE SULFATE PRN MG: 2 INJECTION, SOLUTION INTRAMUSCULAR; INTRAVENOUS at 20:36

## 2020-07-11 RX ADMIN — MORPHINE SULFATE PRN MG: 2 INJECTION, SOLUTION INTRAMUSCULAR; INTRAVENOUS at 10:19

## 2020-07-11 RX ADMIN — IPRATROPIUM BROMIDE AND ALBUTEROL SULFATE SCH ML: .5; 3 SOLUTION RESPIRATORY (INHALATION) at 23:39

## 2020-07-11 RX ADMIN — ANIDULAFUNGIN SCH MLS/HR: 100 INJECTION, POWDER, LYOPHILIZED, FOR SOLUTION INTRAVENOUS at 18:23

## 2020-07-11 RX ADMIN — IPRATROPIUM BROMIDE AND ALBUTEROL SULFATE SCH ML: .5; 3 SOLUTION RESPIRATORY (INHALATION) at 03:59

## 2020-07-11 RX ADMIN — MORPHINE SULFATE PRN MG: 2 INJECTION, SOLUTION INTRAMUSCULAR; INTRAVENOUS at 00:08

## 2020-07-11 RX ADMIN — RIFAXIMIN SCH MG: 550 TABLET ORAL at 08:28

## 2020-07-11 RX ADMIN — PROPOFOL SCH MLS/HR: 10 INJECTION, EMULSION INTRAVENOUS at 15:07

## 2020-07-11 RX ADMIN — PROPOFOL SCH MLS/HR: 10 INJECTION, EMULSION INTRAVENOUS at 18:23

## 2020-07-11 RX ADMIN — INSULIN ASPART SCH UNIT: 100 INJECTION, SOLUTION INTRAVENOUS; SUBCUTANEOUS at 06:11

## 2020-07-11 RX ADMIN — LACTULOSE SCH GM: 20 SOLUTION ORAL at 16:35

## 2020-07-11 RX ADMIN — FUROSEMIDE SCH MG: 10 INJECTION, SOLUTION INTRAMUSCULAR; INTRAVENOUS at 20:29

## 2020-07-11 RX ADMIN — PROPOFOL SCH MLS/HR: 10 INJECTION, EMULSION INTRAVENOUS at 21:50

## 2020-07-11 RX ADMIN — RIFAXIMIN SCH MG: 550 TABLET ORAL at 20:29

## 2020-07-11 RX ADMIN — INSULIN ASPART SCH UNIT: 100 INJECTION, SOLUTION INTRAVENOUS; SUBCUTANEOUS at 14:39

## 2020-07-11 RX ADMIN — LACTULOSE SCH GM: 20 SOLUTION ORAL at 21:20

## 2020-07-11 RX ADMIN — CISATRACURIUM BESYLATE SCH: 10 INJECTION INTRAVENOUS at 14:18

## 2020-07-11 RX ADMIN — CISATRACURIUM BESYLATE ONE MG: 2 INJECTION, SOLUTION INTRAVENOUS at 10:55

## 2020-07-11 RX ADMIN — INSULIN ASPART SCH UNIT: 100 INJECTION, SOLUTION INTRAVENOUS; SUBCUTANEOUS at 18:23

## 2020-07-11 RX ADMIN — NOREPINEPHRINE BITARTRATE SCH MLS/HR: 1 INJECTION, SOLUTION, CONCENTRATE INTRAVENOUS at 21:48

## 2020-07-11 RX ADMIN — LACTULOSE SCH GM: 20 SOLUTION ORAL at 08:29

## 2020-07-11 RX ADMIN — MORPHINE SULFATE PRN MG: 2 INJECTION, SOLUTION INTRAMUSCULAR; INTRAVENOUS at 03:47

## 2020-07-11 RX ADMIN — IPRATROPIUM BROMIDE AND ALBUTEROL SULFATE SCH ML: .5; 3 SOLUTION RESPIRATORY (INHALATION) at 15:58

## 2020-07-11 RX ADMIN — CHLORHEXIDINE GLUCONATE SCH ML: 1.2 RINSE ORAL at 20:29

## 2020-07-11 RX ADMIN — PANTOPRAZOLE SODIUM SCH MG: 40 INJECTION, POWDER, FOR SOLUTION INTRAVENOUS at 20:12

## 2020-07-11 RX ADMIN — POTASSIUM CHLORIDE SCH MLS/HR: 14.9 INJECTION, SOLUTION INTRAVENOUS at 06:10

## 2020-07-11 RX ADMIN — IPRATROPIUM BROMIDE AND ALBUTEROL SULFATE SCH ML: .5; 3 SOLUTION RESPIRATORY (INHALATION) at 07:17

## 2020-07-11 RX ADMIN — IPRATROPIUM BROMIDE AND ALBUTEROL SULFATE SCH ML: .5; 3 SOLUTION RESPIRATORY (INHALATION) at 19:03

## 2020-07-11 RX ADMIN — NOREPINEPHRINE BITARTRATE SCH: 1 INJECTION, SOLUTION, CONCENTRATE INTRAVENOUS at 01:37

## 2020-07-11 RX ADMIN — CHLORHEXIDINE GLUCONATE SCH ML: 1.2 RINSE ORAL at 08:29

## 2020-07-11 NOTE — XR
EXAMINATION TYPE: XR chest 1V portable

 

DATE OF EXAM: 7/11/2020

 

HISTORY: Shortness of breath.

 

COMPARISON: 7/10/2020

 

TECHNIQUE: Single view of the chest is submitted.

 

FINDINGS:

Tracheostomy tube and left subclavian central venous line noted to be unchanged. Persistent left basi
lar infiltrate and pleural effusion. Pulmonary venous congestion.

 

The heart is stable.

 

Hilar and mediastinal structures are within normal limits.  

 

Degenerative changes are seen of the dorsal spine. 

 

 IMPRESSION: 

 

1.  Stable chest.

## 2020-07-11 NOTE — PN
PROGRESS NOTE



DATE OF SERVICE:

07/11/2020



The patient is a 54-year-old alcoholic with acute alcoholic hepatitis and acute

respiratory failure secondary to aspiration pneumonia, remains in the intensive care

unit.  He had a PEG tube and trach done two days ago.  Tube feeds are going, he is

tolerating well.  He dropped his hemoglobin to 7.5 g/dL but clinically no evidence of

active bleeding.  He has an FMS in place which has about 1000 mL of dark green stool.

No acute events noted overnight.



PHYSICAL EXAMINATION:

He remains sedated.

VITAL SIGNS:  Stable. Blood pressure 127/44, pulse rate 113, temperature 98.9.

HEENT examination unremarkable.  Conjunctivae pink.  Sclerae anicteric.  Oral cavity no

lesions.

NECK:  No JVD or lymph node enlargement.

CHEST:  Clear auscultation.

HEART:  Regular rate and rhythm.

ABDOMEN:  Soft. Bowel sounds are positive.  Slightly distended abdomen, PEG tube in

place.

EXTREMITIES:  2+ pedal edema.

NEURO:  Sedated.



LABS:

From today WBC 24, hemoglobin 7.3, platelets 45. T bilirubin is 6.9, AST and ALT are

172 and 113 respectively.  Alkaline phosphatase is normal. BUN and creatinine 47 and

0.9.



IMPRESSION:

1. Alcoholic cirrhosis of the liver with acute alcoholic hepatitis.

2. Acute respiratory failure secondary to aspiration pneumonia.  Remains on

    antibiotics and remains on the vent with tracheostomy done two days ago.

3. Status post PEG tube placement with oral feeds, tolerating well.

4. Anemia with no signs of active bleeding.  Hemoglobin dropped from 8.5 to 7.5 g/dL.

    FMS bag has some dark green stool.

5. Hepatic encephalopathy, remains on lactulose and Xifaxan.



RECOMMENDATION:

1. Continue with symptomatic and supportive care.

2. Monitor LFTs on a daily basis.

3. Monitor CBC daily.

4. Continue Protonix 40 mg daily.

5. Continue lactulose and Xifaxan.

6. Repeat labs in the morning.

We will follow with you closely. Thank you for this consultation.





MMODL / IJN: [id number] / Job#: [id number]

## 2020-07-11 NOTE — P.PN
Subjective


Progress Note Date: 07/11/20


Principal diagnosis: 





Acute hypoxic respiratory failure secondary to acute aspiration pneumonia, acute

sepsis





The patient is seen today 07/11/2020 in follow-up in the intensive care unit.  

He remains minimally responsive.  He is off sedation.  He is status post trach 

and PEG tube placements.  He remains on mechanical ventilator current settings 

assist-control with a rate of 12, tidal volume 450, FiO2 40% and a PEEP of 5.  

Morning blood gases reveal a pO2 79, pCO2 33, pH 7.45.  Norepinephrine has been 

off.  He has 0.9 at 10 MLS per hour.  Being nourished with Abhishek Piette 30 ML's

per hour which is goal.  He is continued on Eraxis.  He remains on Lasix 40 mg 

IV every 12 hours.  Currently in a negative balance.  SMS remains in place with 

copious amounts of liquid stool.  Ammonia level is down to 45.  .  ALT 

114.  Sodium 144.  Potassium 3.2.  Chloride 118.  Bicarb 24.  Creatinine 0.90.  

White count 25.2.  Hemoglobin 7.5.  .  INR 1.5.





Objective





- Vital Signs


Vital signs: 


                                   Vital Signs











Temp  99.8 F H  07/11/20 08:00


 


Pulse  112 H  07/11/20 09:00


 


Resp  22   07/11/20 09:00


 


BP  114/63   07/10/20 14:15


 


Pulse Ox  98   07/11/20 09:00








                                 Intake & Output











 07/10/20 07/11/20 07/11/20





 18:59 06:59 18:59


 


Intake Total 216.414 922 258


 


Output Total 1445 1473 850


 


Balance -1228.586 -551 -592


 


Weight 93.2 kg 90.6 kg 


 


Intake:   


 


   442 48


 


    0.9  110 30


 


    Anidulafungin 200 mg In  260 





    Sodium Chloride 0.9% 200   





    ml @ 84 mls/hr IVPB ONCE   





    ONE Rx#:[id number]   


 


    Normal Saline Pressure 60 72 18





    Bag   


 


  Intake, IV Titration 16.414  50





  Amount   


 


    Norepinephrine 8 mg In 16.414  





    Sodium Chloride 0.9% 250   





    ml @ 0.05 MCG/KG/MIN 8.   





    872 mls/hr IV [facility]   





    Rx#:[id number]   


 


    Potassium Chloride 20 meq   50





    In Water For Injection 1   





    100ml.bag @ 50 mls/hr   





    IVPB [facility] Rx#:   





    [id number]   


 


  Tube Feeding 20 180 60


 


  Other  300 100


 


Output:   


 


  Urine 1445 1473 250


 


  Stool   600


 


Other:   


 


  Voiding Method Indwelling Catheter Indwelling Catheter 








                       ABP, PAP, CO, CI - Last Documented











Arterial Blood Pressure        129/52

















- Exam





GENERAL EXAM: On mechanical ventilator, minimally responsive 54-year-old 

gentleman, comfortable in no apparent distress.


HEAD: Normocephalic.


EYES: Normal reaction of pupils, equal size.


NOSE: Clear with pink turbinates.


THROAT: Tracheostomy tube secured in place.


NECK: No masses, no JVD.


CHEST: No chest wall deformity.


LUNGS: Equal air entry with crackles at the bilateral posterior bases.


CVS: S1 and S2 normal with no audible murmur, regular rhythm.


ABDOMEN: PEG tube exit site clean and dry, normal bowel sounds, no guarding or 

rigidity.


SPINE: No scoliosis or deformity


SKIN: No rashes


CENTRAL NERVOUS SYSTEM: Sedated, tone is normal in all 4 extremities.


EXTREMITIES: There is 1-2+ peripheral edema.  No clubbing, no cyanosis.  

Peripheral pulses are intact.





- Labs


CBC & Chem 7: 


                                 07/11/20 04:41





                                 07/11/20 04:41


Labs: 


                  Abnormal Lab Results - Last 24 Hours (Table)











  07/10/20 07/10/20 07/10/20 Range/Units





  11:44 18:01 23:26 


 


WBC     (3.8-10.6)  k/uL


 


RBC     (4.30-5.90)  m/uL


 


Hgb     (13.0-17.5)  gm/dL


 


Hct     (39.0-53.0)  %


 


MCV     (80.0-100.0)  fL


 


MCH     (25.0-35.0)  pg


 


RDW     (11.5-15.5)  %


 


Plt Count     (150-450)  k/uL


 


Neutrophils # (Manual)     (1.3-7.7)  k/uL


 


Lymphocytes # (Manual)     (1.0-4.8)  k/uL


 


Macrocytosis     


 


PT     (9.0-12.0)  sec


 


INR     (<1.2)  


 


ABG pCO2     (35-45)  mmHg


 


ABG pO2     ()  mmHg


 


Potassium     (3.5-5.1)  mmol/L


 


Chloride     ()  mmol/L


 


BUN     (9-20)  mg/dL


 


Glucose     (74-99)  mg/dL


 


POC Glucose (mg/dL)  121 H  150 H  167 H  (75-99)  mg/dL


 


Calcium     (8.4-10.2)  mg/dL


 


Total Bilirubin     (0.2-1.3)  mg/dL


 


AST     (17-59)  U/L


 


ALT     (4-49)  U/L


 


Ammonia     (<30)  umol/L


 


Total Protein     (6.3-8.2)  g/dL


 


Albumin     (3.5-5.0)  g/dL














  07/11/20 07/11/20 07/11/20 Range/Units





  04:41 04:41 04:41 


 


WBC  25.2 H    (3.8-10.6)  k/uL


 


RBC  2.02 L    (4.30-5.90)  m/uL


 


Hgb  7.5 L    (13.0-17.5)  gm/dL


 


Hct  23.7 L    (39.0-53.0)  %


 


MCV  117.3 H    (80.0-100.0)  fL


 


MCH  37.1 H    (25.0-35.0)  pg


 


RDW  22.2 H    (11.5-15.5)  %


 


Plt Count  41 L    (150-450)  k/uL


 


Neutrophils # (Manual)  24.19 H    (1.3-7.7)  k/uL


 


Lymphocytes # (Manual)  0.76 L    (1.0-4.8)  k/uL


 


Macrocytosis  Marked A    


 


PT     (9.0-12.0)  sec


 


INR     (<1.2)  


 


ABG pCO2     (35-45)  mmHg


 


ABG pO2     ()  mmHg


 


Potassium   3.2 L   (3.5-5.1)  mmol/L


 


Chloride   118 H   ()  mmol/L


 


BUN   47 H   (9-20)  mg/dL


 


Glucose   157 H   (74-99)  mg/dL


 


POC Glucose (mg/dL)     (75-99)  mg/dL


 


Calcium   8.3 L   (8.4-10.2)  mg/dL


 


Total Bilirubin   6.9 H   (0.2-1.3)  mg/dL


 


AST   172 H   (17-59)  U/L


 


ALT   114 H   (4-49)  U/L


 


Ammonia    45 H  (<30)  umol/L


 


Total Protein   5.5 L   (6.3-8.2)  g/dL


 


Albumin   2.1 L   (3.5-5.0)  g/dL














  07/11/20 07/11/20 07/11/20 Range/Units





  04:41 05:05 06:00 


 


WBC     (3.8-10.6)  k/uL


 


RBC     (4.30-5.90)  m/uL


 


Hgb     (13.0-17.5)  gm/dL


 


Hct     (39.0-53.0)  %


 


MCV     (80.0-100.0)  fL


 


MCH     (25.0-35.0)  pg


 


RDW     (11.5-15.5)  %


 


Plt Count     (150-450)  k/uL


 


Neutrophils # (Manual)     (1.3-7.7)  k/uL


 


Lymphocytes # (Manual)     (1.0-4.8)  k/uL


 


Macrocytosis     


 


PT  14.9 H    (9.0-12.0)  sec


 


INR  1.5 H    (<1.2)  


 


ABG pCO2   33 L   (35-45)  mmHg


 


ABG pO2   79 L   ()  mmHg


 


Potassium     (3.5-5.1)  mmol/L


 


Chloride     ()  mmol/L


 


BUN     (9-20)  mg/dL


 


Glucose     (74-99)  mg/dL


 


POC Glucose (mg/dL)    188 H  (75-99)  mg/dL


 


Calcium     (8.4-10.2)  mg/dL


 


Total Bilirubin     (0.2-1.3)  mg/dL


 


AST     (17-59)  U/L


 


ALT     (4-49)  U/L


 


Ammonia     (<30)  umol/L


 


Total Protein     (6.3-8.2)  g/dL


 


Albumin     (3.5-5.0)  g/dL








                      Microbiology - Last 24 Hours (Table)











 07/09/20 11:00 Gram Stain - Final





 Sputum Sputum Culture - Final





    Candida albicans





    Cassia glabrata


 


 07/09/20 10:10 Urine Culture - Final





 Urine,Catheterized    Candida albicans


 


 07/10/20 10:08 Catheter Tip Culture - Preliminary





 Catheter Tip 


 


 07/10/20 10:08 Catheter Tip Culture - Preliminary





 Catheter Tip 


 


 07/09/20 14:25 Blood Culture - Preliminary





 Blood    No Growth after 24 hours


 


 07/09/20 14:45 Blood Culture - Preliminary





 Blood    No Growth after 24 hours














Assessment and Plan


Assessment: 





Acute hypoxic respiratory failure, suspect aspiration pneumonia, inability to 

wean, status post tracheostomy tube placement post PEG tube placement, off 

sedation minimally responsive





Acute sepsis, possible septic shock, patient required pressors which have since 

been discontinued.  likely source is aspiration pneumonia, although abdominal 

source is not entirely ruled out.  Blood cultures negative and sputum cultures 

with Candida 





Alcohol liver disease with ascites.  Jaundice, and elevated liver enzymes.





Blood loss anemia, most likely the patient has acute or subacute GI bleeding, GI

is following.  





Acute hepatic encephalopathy with significantly elevated ammonia level.  

Slightly increased today compared to yesterday, it is 73.





Recent history of fall about 2 months ago, and multiple right-sided rib 

fractures.





History of alcoholic neuropathy.





History of pericarditis





Chronic back pain





History of restless leg syndrome.





Acute kidney injury secondary to sepsis and septic shock.  Strongly doubt 

hepatorenal syndrome.





Plan





The patient was seen and evaluated by Dr. Tan


Chest x-ray, ABGs and labs reviewed


Remains off sedation


Currently off pressors


Continue nutritional support


Continue the current medications


Condition remains quite guarded and poor


Referral to select specialty for long-term weaning


We'll continue to follow





Critical care time 38 minutes





I, the cosigning physician, performed a history & physical examination of the 

patient. Lungs sounds with crackles in the bilateral posterior bases.  Maint

aining good O2 saturations in the 90s on 40% FiO2 via the mechanical ventilator.

 I discussed the assessment and plan of care with my nurse practitioner, Asmita Ramsay. I attest to the above note as dictated by her.


Time with Patient: Greater than 30

## 2020-07-11 NOTE — P.PN
Progress Note - Text


Progress Note Date: 07/11/20





I was called to the bedside this morning to evaluate tracheostomy site bleeding.

 Patient was found to have increased bleeding today although there has been some

oozing intermittently over the last few days.  Patient is coagulopathic from his

underlying liver dysfunction.  Pressure was being held when I arrived.  I 

removed the sutures on either side of the trach.  I could not visualize an exact

site of bleeding.  Certainly there was some concern for the possibility of an 

innominate artery bleed.  Patient was taken to the operating room once the OR 

was ready for our arrival.  It should be noted that while waiting despite the 

patient's blood pressure initially being quite stable he had an episode of 

arrest where he lost his cardiac rhythm and blood pressure.  He responded to one

dose of epinephrine.  The patient's sister spoke with the nursing staff.  

Unfortunately given the severity of the situation and I was unable to talk to 

the patient's family.  He was taken to the OR without formal consent even the 

emergency nature of the procedure.  I did speak with the patient's family by ag melendez following the procedure.

## 2020-07-11 NOTE — P.OP
Date of Procedure: 07/11/20


Procedure(s) Performed: 


PREOPERATIVE DIAGNOSIS: Tracheostomy site bleeding


POSTOPERATIVE DIAGNOSIS: Same


PROCEDURE: Tracheostomy removal and replacement with control of bleeding


SURGEON: Whit


EBL: Approximately 100 mL in the OR, while in ICU patient lost approximately 

500-7 50 mL


ANESTHESIA: General


COMPLICATIONS: None


OPERATIVE PROCEDURE: Patient was placed in the operative table in the supine 

position.  A shoulder roll was utilized.  The neck was prepped and draped in 

usual sterile fashion with the tracheostomy in place.  Anesthesia then using the

glide scope brought the tip of the endotracheal tube to the site of the trach 

and the trach was then removed.  Digitally I manipulated the endotracheal tube 

into the trachea.  The balloon was inflated.  There was good volume exchange and

positive end-tidal CO2.  The tracheostomy site itself was then carefully 

inspected.  Old clot was evacuated.  There was an area of bleeding coming from 

the cut edge of the thyroid isthmus on the left.  This was controlled using 2 

separate 2-0 silk ties.  There was a small amount of oozing present along the 

lateral aspect of the trachea as well and was controlled easily with 

electrocautery.  There was no evidence of bleeding inferiorly.  I could not 

palpate the innominate vessels.  Another area of bleeding was later identified 

coming from a subcutaneous vein.  This is controlled using a 2-0 silk ties well.

 At that point I was comfortable with replacing the patient's tracheostomy.  

Surgicel was left along the left lateral aspect of the trachea.  The 

endotracheal tube was gradually removed and a new 8-Turkish nonfenestrated Shiley

tracheostomy catheter was advanced without difficulty.  The inner cannula was 

placed and the balloon was inflated.  Positive end tidal CO2 was noted.  The 

trach was sutured to the skin superiorly using 2 separate 0 silk sutures.  On 

the left side there was some bleeding there.  The suture was removed and retied.

 No further bleeding was seen from the 0 silk suture at that time.  The skin was

closed using 3-0 Vicryl sutures.  A dressing was applied.


DISPOSITION: Stable to ICU


I spoke with the patient's sister by phone.  I know the family.  There were 

informed of the need for urgent operation without conversation regarding the 

surgery and risks.  They were notified that further bleeding was a risk given 

the patient's coagulopathy.  We'll monitor closely.

## 2020-07-12 LAB
ALBUMIN SERPL-MCNC: 1.9 G/DL (ref 3.5–5)
ALP SERPL-CCNC: 127 U/L (ref 38–126)
ALT SERPL-CCNC: 81 U/L (ref 4–49)
ANION GAP SERPL CALC-SCNC: 3 MMOL/L
AST SERPL-CCNC: 151 U/L (ref 17–59)
BASOPHILS # BLD AUTO: 0.1 K/UL (ref 0–0.2)
BASOPHILS # BLD AUTO: 0.1 K/UL (ref 0–0.2)
BASOPHILS NFR BLD AUTO: 0 %
BASOPHILS NFR BLD AUTO: 0 %
BUN SERPL-SCNC: 54 MG/DL (ref 9–20)
CALCIUM SPEC-MCNC: 7.8 MG/DL (ref 8.4–10.2)
CELLS COUNTED: 200
CHLORIDE SERPL-SCNC: 121 MMOL/L (ref 98–107)
CO2 BLDA-SCNC: 22 MMOL/L (ref 19–24)
CO2 SERPL-SCNC: 22 MMOL/L (ref 22–30)
EOSINOPHIL # BLD AUTO: 0.4 K/UL (ref 0–0.7)
EOSINOPHIL # BLD AUTO: 0.4 K/UL (ref 0–0.7)
EOSINOPHIL # BLD MANUAL: 0.7 K/UL (ref 0–0.7)
EOSINOPHIL NFR BLD AUTO: 2 %
EOSINOPHIL NFR BLD AUTO: 2 %
ERYTHROCYTE [DISTWIDTH] IN BLOOD BY AUTOMATED COUNT: 2 M/UL (ref 4.3–5.9)
ERYTHROCYTE [DISTWIDTH] IN BLOOD BY AUTOMATED COUNT: 2.12 M/UL (ref 4.3–5.9)
ERYTHROCYTE [DISTWIDTH] IN BLOOD BY AUTOMATED COUNT: 2.51 M/UL (ref 4.3–5.9)
ERYTHROCYTE [DISTWIDTH] IN BLOOD: 23 % (ref 11.5–15.5)
ERYTHROCYTE [DISTWIDTH] IN BLOOD: 23.6 % (ref 11.5–15.5)
ERYTHROCYTE [DISTWIDTH] IN BLOOD: 23.6 % (ref 11.5–15.5)
GLUCOSE BLD-MCNC: 148 MG/DL (ref 75–99)
GLUCOSE BLD-MCNC: 166 MG/DL (ref 75–99)
GLUCOSE BLD-MCNC: 173 MG/DL (ref 75–99)
GLUCOSE SERPL-MCNC: 142 MG/DL (ref 74–99)
HCO3 BLDA-SCNC: 21 MMOL/L (ref 21–25)
HCT VFR BLD AUTO: 20.7 % (ref 39–53)
HCT VFR BLD AUTO: 21.9 % (ref 39–53)
HCT VFR BLD AUTO: 25 % (ref 39–53)
HGB BLD-MCNC: 6.9 GM/DL (ref 13–17.5)
HGB BLD-MCNC: 7 GM/DL (ref 13–17.5)
HGB BLD-MCNC: 8 GM/DL (ref 13–17.5)
INR PPP: 1.5 (ref ?–1.2)
LYMPHOCYTES # BLD MANUAL: 3.03 K/UL (ref 1–4.8)
LYMPHOCYTES # SPEC AUTO: 2.4 K/UL (ref 1–4.8)
LYMPHOCYTES # SPEC AUTO: 3.1 K/UL (ref 1–4.8)
LYMPHOCYTES NFR SPEC AUTO: 11 %
LYMPHOCYTES NFR SPEC AUTO: 12 %
MAGNESIUM SPEC-SCNC: 2.3 MG/DL (ref 1.6–2.3)
MCH RBC QN AUTO: 32 PG (ref 25–35)
MCH RBC QN AUTO: 33.3 PG (ref 25–35)
MCH RBC QN AUTO: 34.7 PG (ref 25–35)
MCHC RBC AUTO-ENTMCNC: 32.1 G/DL (ref 31–37)
MCHC RBC AUTO-ENTMCNC: 32.1 G/DL (ref 31–37)
MCHC RBC AUTO-ENTMCNC: 33.5 G/DL (ref 31–37)
MCV RBC AUTO: 103.6 FL (ref 80–100)
MCV RBC AUTO: 103.7 FL (ref 80–100)
MCV RBC AUTO: 99.7 FL (ref 80–100)
MONOCYTES # BLD AUTO: 0.7 K/UL (ref 0–1)
MONOCYTES # BLD AUTO: 0.8 K/UL (ref 0–1)
MONOCYTES # BLD MANUAL: 1.4 K/UL (ref 0–1)
MONOCYTES NFR BLD AUTO: 3 %
MONOCYTES NFR BLD AUTO: 3 %
MYELOCYTES # BLD MANUAL: 0.47 K/UL
NEUTROPHILS # BLD AUTO: 17.2 K/UL (ref 1.3–7.7)
NEUTROPHILS # BLD AUTO: 20.5 K/UL (ref 1.3–7.7)
NEUTROPHILS NFR BLD AUTO: 81 %
NEUTROPHILS NFR BLD AUTO: 81 %
NEUTROPHILS NFR BLD MANUAL: 78 %
NEUTS SEG # BLD MANUAL: 18.17 K/UL (ref 1.3–7.7)
PCO2 BLDA: 33 MMHG (ref 35–45)
PH BLDA: 7.41 [PH] (ref 7.35–7.45)
PLATELET # BLD AUTO: 58 K/UL (ref 150–450)
PLATELET # BLD AUTO: 70 K/UL (ref 150–450)
PLATELET # BLD AUTO: 70 K/UL (ref 150–450)
PO2 BLDA: 69 MMHG (ref 83–108)
POTASSIUM SERPL-SCNC: 3.4 MMOL/L (ref 3.5–5.1)
PROT SERPL-MCNC: 4.6 G/DL (ref 6.3–8.2)
PT BLD: 15.2 SEC (ref 9–12)
SODIUM SERPL-SCNC: 146 MMOL/L (ref 137–145)
WBC # BLD AUTO: 21.2 K/UL (ref 3.8–10.6)
WBC # BLD AUTO: 23.3 K/UL (ref 3.8–10.6)
WBC # BLD AUTO: 25.3 K/UL (ref 3.8–10.6)

## 2020-07-12 RX ADMIN — IPRATROPIUM BROMIDE AND ALBUTEROL SULFATE SCH ML: .5; 3 SOLUTION RESPIRATORY (INHALATION) at 07:15

## 2020-07-12 RX ADMIN — LACTULOSE SCH GM: 20 SOLUTION ORAL at 21:04

## 2020-07-12 RX ADMIN — INSULIN ASPART SCH UNIT: 100 INJECTION, SOLUTION INTRAVENOUS; SUBCUTANEOUS at 12:59

## 2020-07-12 RX ADMIN — PROPOFOL SCH MLS/HR: 10 INJECTION, EMULSION INTRAVENOUS at 02:05

## 2020-07-12 RX ADMIN — NOREPINEPHRINE BITARTRATE SCH MLS/HR: 1 INJECTION, SOLUTION, CONCENTRATE INTRAVENOUS at 06:25

## 2020-07-12 RX ADMIN — IPRATROPIUM BROMIDE AND ALBUTEROL SULFATE SCH ML: .5; 3 SOLUTION RESPIRATORY (INHALATION) at 10:58

## 2020-07-12 RX ADMIN — CHLORHEXIDINE GLUCONATE SCH ML: 1.2 RINSE ORAL at 08:27

## 2020-07-12 RX ADMIN — CISATRACURIUM BESYLATE SCH: 10 INJECTION INTRAVENOUS at 05:36

## 2020-07-12 RX ADMIN — INSULIN ASPART SCH UNIT: 100 INJECTION, SOLUTION INTRAVENOUS; SUBCUTANEOUS at 17:12

## 2020-07-12 RX ADMIN — POTASSIUM BICARBONATE SCH MEQ: 782 TABLET, EFFERVESCENT ORAL at 06:24

## 2020-07-12 RX ADMIN — IPRATROPIUM BROMIDE AND ALBUTEROL SULFATE SCH ML: .5; 3 SOLUTION RESPIRATORY (INHALATION) at 20:28

## 2020-07-12 RX ADMIN — PROPOFOL SCH MLS/HR: 10 INJECTION, EMULSION INTRAVENOUS at 13:16

## 2020-07-12 RX ADMIN — FUROSEMIDE SCH MG: 10 INJECTION, SOLUTION INTRAMUSCULAR; INTRAVENOUS at 11:40

## 2020-07-12 RX ADMIN — PROPOFOL SCH MLS/HR: 10 INJECTION, EMULSION INTRAVENOUS at 09:18

## 2020-07-12 RX ADMIN — PROPOFOL SCH MLS/HR: 10 INJECTION, EMULSION INTRAVENOUS at 17:11

## 2020-07-12 RX ADMIN — PANTOPRAZOLE SODIUM SCH MG: 40 INJECTION, POWDER, FOR SOLUTION INTRAVENOUS at 21:03

## 2020-07-12 RX ADMIN — IPRATROPIUM BROMIDE AND ALBUTEROL SULFATE SCH ML: .5; 3 SOLUTION RESPIRATORY (INHALATION) at 03:18

## 2020-07-12 RX ADMIN — POTASSIUM BICARBONATE SCH MEQ: 782 TABLET, EFFERVESCENT ORAL at 08:18

## 2020-07-12 RX ADMIN — RIFAXIMIN SCH MG: 550 TABLET ORAL at 08:28

## 2020-07-12 RX ADMIN — MORPHINE SULFATE PRN MG: 2 INJECTION, SOLUTION INTRAMUSCULAR; INTRAVENOUS at 13:10

## 2020-07-12 RX ADMIN — PROPOFOL SCH MLS/HR: 10 INJECTION, EMULSION INTRAVENOUS at 05:21

## 2020-07-12 RX ADMIN — MORPHINE SULFATE PRN MG: 2 INJECTION, SOLUTION INTRAMUSCULAR; INTRAVENOUS at 00:17

## 2020-07-12 RX ADMIN — LACTULOSE SCH GM: 20 SOLUTION ORAL at 08:27

## 2020-07-12 RX ADMIN — IPRATROPIUM BROMIDE AND ALBUTEROL SULFATE SCH ML: .5; 3 SOLUTION RESPIRATORY (INHALATION) at 15:52

## 2020-07-12 RX ADMIN — INSULIN ASPART SCH UNIT: 100 INJECTION, SOLUTION INTRAVENOUS; SUBCUTANEOUS at 00:16

## 2020-07-12 RX ADMIN — FUROSEMIDE SCH MG: 10 INJECTION, SOLUTION INTRAMUSCULAR; INTRAVENOUS at 21:03

## 2020-07-12 RX ADMIN — ANIDULAFUNGIN SCH MLS/HR: 100 INJECTION, POWDER, LYOPHILIZED, FOR SOLUTION INTRAVENOUS at 17:12

## 2020-07-12 RX ADMIN — PANTOPRAZOLE SODIUM SCH MG: 40 INJECTION, POWDER, FOR SOLUTION INTRAVENOUS at 08:27

## 2020-07-12 RX ADMIN — RIFAXIMIN SCH MG: 550 TABLET ORAL at 21:04

## 2020-07-12 RX ADMIN — LACTULOSE SCH GM: 20 SOLUTION ORAL at 16:27

## 2020-07-12 RX ADMIN — CHLORHEXIDINE GLUCONATE SCH ML: 1.2 RINSE ORAL at 21:03

## 2020-07-12 RX ADMIN — IPRATROPIUM BROMIDE AND ALBUTEROL SULFATE SCH ML: .5; 3 SOLUTION RESPIRATORY (INHALATION) at 23:29

## 2020-07-12 RX ADMIN — INSULIN ASPART SCH UNIT: 100 INJECTION, SOLUTION INTRAVENOUS; SUBCUTANEOUS at 05:21

## 2020-07-12 NOTE — PN
PROGRESS NOTE



DATE OF SERVICE:

July 12, 2020



Patient is a 54-year-old white male admitted with acute respiratory failure, remains on

the vent. History of alcoholic liver disease and acute alcoholic hepatitis.  The

patient yesterday developed active bleeding from the tracheostomy site.  He had to be

taken to the OR by Dr. Sherman.  After that, he decompensated and presently on pressors

with Levophed at 10 mics.  He remains on the vent, completely sedated.  He has been

oxygenating poorly through the night following the surgery.  No active GI bleed noted.



PHYSICAL EXAMINATION:

Remains on the vent.  Blood pressure 125/38, pulse rate 120, afebrile.

HEENT examination unremarkable. Conjunctivae pink.  Sclerae anicteric.  Oral cavity no

lesions.

Neck:  No JVD or lymph node enlargement.

CHEST: Clear to auscultation.

HEART: Regular rate and rhythm.

ABDOMEN is slightly distended.  PEG tube in place.  Tube feeds ongoing.

EXTREMITIES:  No edema.

NEURO sedated.



LABS:

From today: WBC 25.3, hemoglobin 7. Status post 2 units of blood transfusion yesterday,

platelets 70,000, bilirubin 4.7, , ALT 81, alkaline phosphatase 127.



IMPRESSION:

1. Acute bleed from tracheostomy site status post surgery by Dr. Sherman yesterday.

    Remains on the vent.

2. Acute respiratory failure.

3. Aspiration pneumonia on broad-spectrum antibiotics.

4. Alcoholic cirrhosis of the liver with acute alcoholic hepatitis with stable LFTs.

5. Hepatic encephalopathy.  Remains on Xifaxan and oral lactulose and continues to

    have several loose bowel movements thru the FMS.



RECOMMENDATIONS:

1. Continue with symptomatic and supportive care.

2. Continue broad-spectrum antibiotics.

3. Continue lactulose and Xifaxan.

4. Monitor labs closely.

5. Will follow with you.





MMODL / IJN: [id number] / Job#: [id number]

## 2020-07-12 NOTE — P.PN
Subjective


Progress Note Date: 07/12/20


Principal diagnosis: 





acute hypoxic respiratory failure secondary to aspiration pneumonia and acute 

sepsis, with subsequent alcoholic liver failure


Status post tracheostomy, PEG tube placement, consider placement vent hospital 

probably select specialties





patient evaluated today, in the intensive care unit, intubated and mechanically 

ventilated.  This morning's chest x-ray showing continued improvement.  Ammonia 

level is 64 which actually is down from 69.


Chest x-ray was reviewed this morning stable comparable to yesterday.  Blood 

gases also showed improvement in pulmonary, decreased PEEP to 5. THE ammonia 

level was down to 64


Patient is currently being sedated BUN 69 creatinine 1.32





07/12/2020


Patient status post tracheostomy and PEG tube placement.  On mechanical vent

ilator settings assist-control rate of 12 TV for 50 FiO2 40% PEEP of 5.  

Norepinephrine is off.  IV fluids KVO being tube fed  30 ML's per hour


Ammonia level is down to 45





Objective





- Vital Signs


Vital signs: 


                                   Vital Signs











Temp  99.6 F   07/12/20 07:30


 


Pulse  120 H  07/12/20 08:00


 


Resp  18   07/12/20 08:00


 


BP  102/49   07/12/20 07:15


 


Pulse Ox  98   07/12/20 08:00








                                 Intake & Output











 07/11/20 07/12/20 07/12/20





 18:59 06:59 18:59


 


Intake Total 2514.388 1592.036 216.132


 


Output Total 1340 1482 65


 


Balance 1174.388 110.036 151.132


 


Weight  89.8 kg 


 


Intake:   


 


  IV 1232 312 52


 


    0.9 NS 1160 240 40


 


    Normal Saline Pressure 72 72 12





    Bag   


 


  Intake, IV Titration 268.388 622.036 24.132





  Amount   


 


    Norepinephrine 8 mg In 147.630 339.477 24.132





    Sodium Chloride 0.9% 250   





    ml @ 0.05 MCG/KG/MIN 8.   





    872 mls/hr IV .Q24H RISA   





    Rx#:[id number]   


 


    Potassium Chloride 20 meq 50  





    In Water For Injection 1   





    100ml.bag @ 50 mls/hr   





    IVPB Q2H RISA Rx#:   





    [id number]   


 


    Propofol 1,000 mg In 70.758 282.559 





    Empty Bag 1 bag @ Titrate   





    IV .Q0M RISA Rx#:   





    [id number]   


 


  Tube Feeding 210 90 40


 


  Blood Product 704 468 


 


    Ffp 24 Cp2d  Unit 0 234 





    [id number]   


 


    Ffp 24 Cp2d  Unit 183  





    [id number]   


 


    Platelet Pheresis Acda2 211  





    Unit [id number]   


 


    Rc As-1  Unit 0  





    [id number]   


 


    Rc Pheresis 2 As3  Unit 310  





    [id number]   


 


  Other 100 100 100


 


Output:   


 


  Urine 540 432 65


 


  Stool 700  


 


  Urine/Stool Mix  1050 


 


  Estimated Blood Loss 100  


 


Other:   


 


  Voiding Method Indwelling Catheter Indwelling Catheter 








                       ABP, PAP, CO, CI - Last Documented











Arterial Blood Pressure        125/38

















- Exam





General: Tracheostomy,mechanical ventilation


HEENT: mild scleral icterus


Neck: Tracheostomy clean and dry


Cardiac: [Heart regular in rate and rhythm.  normal S1 and S2, regular 6 

systolic murmur


Lungs: crackles and/or rhonchi bilateral


Abdomen: [No mass.  No organomegaly.  Bowel sounds presnt and normoactive in all

 4 quadrants.


PEG tube in place


Extremes: 2+ bipedal edema, no cyanosis, deformity noted dorsal aspect right 

foot


: Rectal tube in place


Musculoskeletal: [No joint erythema, edema or tenderness.]


Skin: [No rash.]


Neurologic: cannot be assessed, fully sedated, on mechanical ventilation


Lymphatic: [No adenopathy.]





- Labs


CBC & Chem 7: 


                                 07/12/20 05:10





                                 07/12/20 05:10


Labs: 


                  Abnormal Lab Results - Last 24 Hours (Table)











  06/28/20 07/11/20 07/11/20 Range/Units





  14:10 10:25 10:50 


 


WBC   24.0 H   (3.8-10.6)  k/uL


 


RBC   1.96 L   (4.30-5.90)  m/uL


 


Hgb   7.3 L   (13.0-17.5)  gm/dL


 


Hct   23.1 L   (39.0-53.0)  %


 


MCV   118.0 H   (80.0-100.0)  fL


 


MCH   37.3 H   (25.0-35.0)  pg


 


RDW   22.2 H   (11.5-15.5)  %


 


Plt Count   45 L   (150-450)  k/uL


 


Neutrophils #     (1.3-7.7)  k/uL


 


Macrocytosis   Marked A   


 


PT     (9.0-12.0)  sec


 


INR     (<1.2)  


 


ABG pCO2     (35-45)  mmHg


 


ABG pO2     ()  mmHg


 


Sodium     (137-145)  mmol/L


 


Potassium     (3.5-5.1)  mmol/L


 


Chloride     ()  mmol/L


 


BUN     (9-20)  mg/dL


 


Glucose     (74-99)  mg/dL


 


POC Glucose (mg/dL)     (75-99)  mg/dL


 


Calcium     (8.4-10.2)  mg/dL


 


Total Bilirubin     (0.2-1.3)  mg/dL


 


AST     (17-59)  U/L


 


ALT     (4-49)  U/L


 


Alkaline Phosphatase     ()  U/L


 


Total Protein     (6.3-8.2)  g/dL


 


Albumin     (3.5-5.0)  g/dL


 


Crossmatch  See Detail   See Detail  














  07/11/20 07/11/20 07/11/20 Range/Units





  14:21 15:00 15:00 


 


WBC   31.3 H   (3.8-10.6)  k/uL


 


RBC   1.97 L   (4.30-5.90)  m/uL


 


Hgb   6.7 L*   (13.0-17.5)  gm/dL


 


Hct   21.5 L   (39.0-53.0)  %


 


MCV   109.2 H D   (80.0-100.0)  fL


 


MCH     (25.0-35.0)  pg


 


RDW   23.2 H   (11.5-15.5)  %


 


Plt Count   93 L D   (150-450)  k/uL


 


Neutrophils #   28.0 H   (1.3-7.7)  k/uL


 


Macrocytosis   Marked A   


 


PT    17.9 H  (9.0-12.0)  sec


 


INR    1.8 H  (<1.2)  


 


ABG pCO2     (35-45)  mmHg


 


ABG pO2     ()  mmHg


 


Sodium     (137-145)  mmol/L


 


Potassium     (3.5-5.1)  mmol/L


 


Chloride     ()  mmol/L


 


BUN     (9-20)  mg/dL


 


Glucose     (74-99)  mg/dL


 


POC Glucose (mg/dL)  171 H    (75-99)  mg/dL


 


Calcium     (8.4-10.2)  mg/dL


 


Total Bilirubin     (0.2-1.3)  mg/dL


 


AST     (17-59)  U/L


 


ALT     (4-49)  U/L


 


Alkaline Phosphatase     ()  U/L


 


Total Protein     (6.3-8.2)  g/dL


 


Albumin     (3.5-5.0)  g/dL


 


Crossmatch     














  07/11/20 07/11/20 07/11/20 Range/Units





  18:12 22:00 23:46 


 


WBC   28.9 H   (3.8-10.6)  k/uL


 


RBC   2.06 L   (4.30-5.90)  m/uL


 


Hgb   7.3 L   (13.0-17.5)  gm/dL


 


Hct   21.3 L   (39.0-53.0)  %


 


MCV   103.1 H D   (80.0-100.0)  fL


 


MCH   35.2 H   (25.0-35.0)  pg


 


RDW   23.2 H   (11.5-15.5)  %


 


Plt Count   72 L   (150-450)  k/uL


 


Neutrophils #     (1.3-7.7)  k/uL


 


Macrocytosis   Marked A   


 


PT     (9.0-12.0)  sec


 


INR     (<1.2)  


 


ABG pCO2     (35-45)  mmHg


 


ABG pO2     ()  mmHg


 


Sodium     (137-145)  mmol/L


 


Potassium     (3.5-5.1)  mmol/L


 


Chloride     ()  mmol/L


 


BUN     (9-20)  mg/dL


 


Glucose     (74-99)  mg/dL


 


POC Glucose (mg/dL)  173 H   184 H  (75-99)  mg/dL


 


Calcium     (8.4-10.2)  mg/dL


 


Total Bilirubin     (0.2-1.3)  mg/dL


 


AST     (17-59)  U/L


 


ALT     (4-49)  U/L


 


Alkaline Phosphatase     ()  U/L


 


Total Protein     (6.3-8.2)  g/dL


 


Albumin     (3.5-5.0)  g/dL


 


Crossmatch     














  07/12/20 07/12/20 07/12/20 Range/Units





  04:58 05:10 05:10 


 


WBC   25.3 H   (3.8-10.6)  k/uL


 


RBC   2.12 L   (4.30-5.90)  m/uL


 


Hgb   7.0 L   (13.0-17.5)  gm/dL


 


Hct   21.9 L   (39.0-53.0)  %


 


MCV   103.6 H   (80.0-100.0)  fL


 


MCH     (25.0-35.0)  pg


 


RDW   23.6 H   (11.5-15.5)  %


 


Plt Count   70 L   (150-450)  k/uL


 


Neutrophils #   20.5 H   (1.3-7.7)  k/uL


 


Macrocytosis   Marked A   


 


PT     (9.0-12.0)  sec


 


INR     (<1.2)  


 


ABG pCO2  33 L    (35-45)  mmHg


 


ABG pO2  69 L    ()  mmHg


 


Sodium    146 H  (137-145)  mmol/L


 


Potassium    3.4 L  (3.5-5.1)  mmol/L


 


Chloride    121 H  ()  mmol/L


 


BUN    54 H  (9-20)  mg/dL


 


Glucose    142 H  (74-99)  mg/dL


 


POC Glucose (mg/dL)     (75-99)  mg/dL


 


Calcium    7.8 L  (8.4-10.2)  mg/dL


 


Total Bilirubin    4.7 H  (0.2-1.3)  mg/dL


 


AST    151 H  (17-59)  U/L


 


ALT    81 H  (4-49)  U/L


 


Alkaline Phosphatase    127 H  ()  U/L


 


Total Protein    4.6 L  (6.3-8.2)  g/dL


 


Albumin    1.9 L  (3.5-5.0)  g/dL


 


Crossmatch     














  07/12/20 Range/Units





  05:10 


 


WBC   (3.8-10.6)  k/uL


 


RBC   (4.30-5.90)  m/uL


 


Hgb   (13.0-17.5)  gm/dL


 


Hct   (39.0-53.0)  %


 


MCV   (80.0-100.0)  fL


 


MCH   (25.0-35.0)  pg


 


RDW   (11.5-15.5)  %


 


Plt Count   (150-450)  k/uL


 


Neutrophils #   (1.3-7.7)  k/uL


 


Macrocytosis   


 


PT   (9.0-12.0)  sec


 


INR   (<1.2)  


 


ABG pCO2   (35-45)  mmHg


 


ABG pO2   ()  mmHg


 


Sodium   (137-145)  mmol/L


 


Potassium   (3.5-5.1)  mmol/L


 


Chloride   ()  mmol/L


 


BUN   (9-20)  mg/dL


 


Glucose   (74-99)  mg/dL


 


POC Glucose (mg/dL)  166 H  (75-99)  mg/dL


 


Calcium   (8.4-10.2)  mg/dL


 


Total Bilirubin   (0.2-1.3)  mg/dL


 


AST   (17-59)  U/L


 


ALT   (4-49)  U/L


 


Alkaline Phosphatase   ()  U/L


 


Total Protein   (6.3-8.2)  g/dL


 


Albumin   (3.5-5.0)  g/dL


 


Crossmatch   








                      Microbiology - Last 24 Hours (Table)











 07/09/20 14:25 Blood Culture - Preliminary





 Blood    No Growth after 48 hours


 


 07/09/20 14:45 Blood Culture - Preliminary





 Blood    No Growth after 48 hours


 


 07/10/20 10:08 Catheter Tip Culture - Preliminary





 Catheter Tip 


 


 07/10/20 10:08 Catheter Tip Culture - Preliminary





 Catheter Tip 


 


 07/09/20 11:00 Gram Stain - Final





 Sputum Sputum Culture - Final





    Candida albicans





    Cassia glabrata


 


 07/09/20 10:10 Urine Culture - Final





 Urine,Catheterized    Candida albicans














Assessment and Plan


(1) Sepsis with acute hypoxic respiratory failure and septic shock


Current Visit: Yes   Status: Acute   Code(s): A41.9 - SEPSIS, UNSPECIFIED 

ORGANISM; R65.21 - SEVERE SEPSIS WITH SEPTIC SHOCK; J96.01 - ACUTE RESPIRATORY 

FAILURE WITH HYPOXIA   SNOMED Code(s): 125303645


   





(2) Serum ammonia increased


Current Visit: Yes   Status: Acute   Code(s): E72.20 - DISORDER OF UREA CYCLE 

METABOLISM, UNSPECIFIED   SNOMED Code(s): 5904517


   





(3) Anemia due to blood loss, acute


Current Visit: Yes   Status: Acute   Code(s): D62 - ACUTE POSTHEMORRHAGIC ANEMIA

  SNOMED Code(s): 676639824


   





(4) Alcoholic peripheral neuropathy


Current Visit: Yes   Status: Acute   Code(s): G62.1 - ALCOHOLIC POLYNEUROPATHY  

SNOMED Code(s): 0516074


   





(5) Acute alcoholic hepatitis


Current Visit: Yes   Status: Acute   Code(s): K70.10 - ALCOHOLIC HEPATITIS 

WITHOUT ASCITES   SNOMED Code(s): 2048919


   





(6) Ascites


Current Visit: Yes   Status: Acute   Code(s): R18.8 - OTHER ASCITES   SNOMED 

Code(s): 643227663


   





(7) Bicytopenia


Current Visit: Yes   Status: Acute   Code(s): D75.89 - OTHER SPECIFIED DISEASES 

OF BLOOD AND BLOOD-FORMING ORGANS   SNOMED Code(s): 37270396


   





(8) Foot swelling


Current Visit: Yes   Status: Acute   Priority: Medium   Code(s): M79.89 - OTHER 

SPECIFIED SOFT TISSUE DISORDERS   SNOMED Code(s): 054597055


   





(9) GI hemorrhage


Current Visit: Yes   Status: Acute   Code(s): K92.2 - GASTROINTESTINAL 

HEMORRHAGE, UNSPECIFIED   SNOMED Code(s): 28644381


   





(10) Hepatic encephalopathy


Current Visit: Yes   Status: Acute   Code(s): K72.90 - HEPATIC FAILURE, 

UNSPECIFIED WITHOUT COMA   SNOMED Code(s): 42826149


   





(11) Liver cirrhosis


Current Visit: Yes   Status: Acute   Code(s): K74.60 - UNSPECIFIED CIRRHOSIS OF 

LIVER   SNOMED Code(s): 73614381


   





(12) Pneumonia


Current Visit: Yes   Status: Acute   Code(s): J18.9 - PNEUMONIA, UNSPECIFIED 

ORGANISM   SNOMED Code(s): 483996448


   





(13) Sepsis


Current Visit: Yes   Status: Acute   Code(s): A41.9 - SEPSIS, UNSPECIFIED 

ORGANISM   SNOMED Code(s): 36303477


   


Plan: 





continue ventilatory support


Continue sedation


Tracheostomy, PEG tube


Ammonia levels improved


Anticipate placement early next week, [facility], probably select specialties


Patient appears improved today however prognosis is still guarded,





Time with Patient: Greater than 30

## 2020-07-12 NOTE — XR
EXAMINATION TYPE: XR chest 1V portable

 

DATE OF EXAM: 7/12/2020

 

COMPARISON: Prior chest x-ray 7/11/2020

 

HISTORY: Tracheostomy tube, patient on ventilator, abnormal chest x-ray

 

TECHNIQUE: Single frontal view of the chest is obtained.

 

FINDINGS:  Tracheostomy tube is overlying the tracheal air column, is less preferences venous cathete
r with the distal tip in the right atrium. No evident pneumothorax. Lung lines are low and the patien
t is rotated. Heart size is stable. Interstitium is increased. There is retrocardiac increased attenu
ation as on prior.

 

IMPRESSION:  Correlate for pneumonia. Suspect underlying interstitial lung disease, correlate for pos
sible pulmonary venous is interstitial edema, consider follow-up.

## 2020-07-12 NOTE — P.PN
Subjective


Progress Note Date: 07/12/20


Principal diagnosis: 





Tracheostomy site bleeding





Patient did fairly well overnight.  Remains on low-dose pressors.  Small amount 

of bloody fluid around trach site.  Hemoglobin 7.0 this morning.





Objective





- Vital Signs


Vital signs: 


                                   Vital Signs











Temp  99.6 F   07/12/20 07:30


 


Pulse  118 H  07/12/20 10:00


 


Resp  24   07/12/20 10:00


 


BP  102/49   07/12/20 07:15


 


Pulse Ox  100   07/12/20 10:00








                                 Intake & Output











 07/11/20 07/12/20 07/12/20





 18:59 06:59 18:59


 


Intake Total 2514.388 1592.036 458.132


 


Output Total 1340 1482 215


 


Balance 1174.388 110.036 243.132


 


Weight  89.8 kg 


 


Intake:   


 


  IV 1232 312 104


 


    0.9 NS 1160 240 80


 


    Normal Saline Pressure 72 72 24





    Bag   


 


  Intake, IV Titration 268.388 622.036 124.132





  Amount   


 


    Norepinephrine 8 mg In 147.630 339.477 24.132





    Sodium Chloride 0.9% 250   





    ml @ 0.05 MCG/KG/MIN 8.   





    872 mls/hr IV .Q24H RISA   





    Rx#:[id number]   


 


    Potassium Chloride 20 meq 50  





    In Water For Injection 1   





    100ml.bag @ 50 mls/hr   





    IVPB Q2H RISA Rx#:   





    [id number]   


 


    Propofol 1,000 mg In 70.758 282.559 100





    Empty Bag 1 bag @ Titrate   





    IV .Q0M RISA Rx#:   





    [id number]   


 


  Tube Feeding 210 90 130


 


  Blood Product 704 468 


 


    Ffp 24 Cp2d  Unit 0 234 





    [id number]   


 


    Ffp 24 Cp2d  Unit 183  





    [id number]   


 


    Platelet Pheresis Acda2 211  





    Unit [id number]   


 


    Rc As-1  Unit 0  





    [id number]   


 


    Rc Pheresis 2 As3  Unit 310  





    [id number]   


 


  Other 100 100 100


 


Output:   


 


  Urine 540 432 215


 


  Stool 700  


 


  Urine/Stool Mix  1050 


 


  Estimated Blood Loss 100  


 


Other:   


 


  Voiding Method Indwelling Catheter Indwelling Catheter 








                       ABP, PAP, CO, CI - Last Documented











Arterial Blood Pressure        135/39

















- Exam





Tracheostomy site intact, small amount of old bloody drainage, no active 

bleeding





- Labs


CBC & Chem 7: 


                                 07/12/20 10:00





                                 07/12/20 10:00


Labs: 


                  Abnormal Lab Results - Last 24 Hours (Table)











  06/28/20 07/11/20 07/11/20 Range/Units





  14:10 10:25 10:50 


 


WBC   24.0 H   (3.8-10.6)  k/uL


 


RBC   1.96 L   (4.30-5.90)  m/uL


 


Hgb   7.3 L   (13.0-17.5)  gm/dL


 


Hct   23.1 L   (39.0-53.0)  %


 


MCV   118.0 H   (80.0-100.0)  fL


 


MCH   37.3 H   (25.0-35.0)  pg


 


RDW   22.2 H   (11.5-15.5)  %


 


Plt Count   45 L   (150-450)  k/uL


 


Neutrophils #     (1.3-7.7)  k/uL


 


Neutrophils # (Manual)     (1.3-7.7)  k/uL


 


Monocytes # (Manual)     (0-1.0)  k/uL


 


Myelocytes # (Manual)     (0)  k/uL


 


Macrocytosis   Marked A   


 


PT     (9.0-12.0)  sec


 


INR     (<1.2)  


 


ABG pCO2     (35-45)  mmHg


 


ABG pO2     ()  mmHg


 


Sodium     (137-145)  mmol/L


 


Potassium     (3.5-5.1)  mmol/L


 


Chloride     ()  mmol/L


 


BUN     (9-20)  mg/dL


 


Glucose     (74-99)  mg/dL


 


POC Glucose (mg/dL)     (75-99)  mg/dL


 


Calcium     (8.4-10.2)  mg/dL


 


Total Bilirubin     (0.2-1.3)  mg/dL


 


AST     (17-59)  U/L


 


ALT     (4-49)  U/L


 


Alkaline Phosphatase     ()  U/L


 


Total Protein     (6.3-8.2)  g/dL


 


Albumin     (3.5-5.0)  g/dL


 


Crossmatch  See Detail   See Detail  














  07/11/20 07/11/20 07/11/20 Range/Units





  14:21 15:00 15:00 


 


WBC   31.3 H   (3.8-10.6)  k/uL


 


RBC   1.97 L   (4.30-5.90)  m/uL


 


Hgb   6.7 L*   (13.0-17.5)  gm/dL


 


Hct   21.5 L   (39.0-53.0)  %


 


MCV   109.2 H D   (80.0-100.0)  fL


 


MCH     (25.0-35.0)  pg


 


RDW   23.2 H   (11.5-15.5)  %


 


Plt Count   93 L D   (150-450)  k/uL


 


Neutrophils #   28.0 H   (1.3-7.7)  k/uL


 


Neutrophils # (Manual)     (1.3-7.7)  k/uL


 


Monocytes # (Manual)     (0-1.0)  k/uL


 


Myelocytes # (Manual)     (0)  k/uL


 


Macrocytosis   Marked A   


 


PT    17.9 H  (9.0-12.0)  sec


 


INR    1.8 H  (<1.2)  


 


ABG pCO2     (35-45)  mmHg


 


ABG pO2     ()  mmHg


 


Sodium     (137-145)  mmol/L


 


Potassium     (3.5-5.1)  mmol/L


 


Chloride     ()  mmol/L


 


BUN     (9-20)  mg/dL


 


Glucose     (74-99)  mg/dL


 


POC Glucose (mg/dL)  171 H    (75-99)  mg/dL


 


Calcium     (8.4-10.2)  mg/dL


 


Total Bilirubin     (0.2-1.3)  mg/dL


 


AST     (17-59)  U/L


 


ALT     (4-49)  U/L


 


Alkaline Phosphatase     ()  U/L


 


Total Protein     (6.3-8.2)  g/dL


 


Albumin     (3.5-5.0)  g/dL


 


Crossmatch     














  07/11/20 07/11/20 07/11/20 Range/Units





  18:12 22:00 23:46 


 


WBC   28.9 H   (3.8-10.6)  k/uL


 


RBC   2.06 L   (4.30-5.90)  m/uL


 


Hgb   7.3 L   (13.0-17.5)  gm/dL


 


Hct   21.3 L   (39.0-53.0)  %


 


MCV   103.1 H D   (80.0-100.0)  fL


 


MCH   35.2 H   (25.0-35.0)  pg


 


RDW   23.2 H   (11.5-15.5)  %


 


Plt Count   72 L   (150-450)  k/uL


 


Neutrophils #     (1.3-7.7)  k/uL


 


Neutrophils # (Manual)     (1.3-7.7)  k/uL


 


Monocytes # (Manual)     (0-1.0)  k/uL


 


Myelocytes # (Manual)     (0)  k/uL


 


Macrocytosis   Marked A   


 


PT     (9.0-12.0)  sec


 


INR     (<1.2)  


 


ABG pCO2     (35-45)  mmHg


 


ABG pO2     ()  mmHg


 


Sodium     (137-145)  mmol/L


 


Potassium     (3.5-5.1)  mmol/L


 


Chloride     ()  mmol/L


 


BUN     (9-20)  mg/dL


 


Glucose     (74-99)  mg/dL


 


POC Glucose (mg/dL)  173 H   184 H  (75-99)  mg/dL


 


Calcium     (8.4-10.2)  mg/dL


 


Total Bilirubin     (0.2-1.3)  mg/dL


 


AST     (17-59)  U/L


 


ALT     (4-49)  U/L


 


Alkaline Phosphatase     ()  U/L


 


Total Protein     (6.3-8.2)  g/dL


 


Albumin     (3.5-5.0)  g/dL


 


Crossmatch     














  07/12/20 07/12/20 07/12/20 Range/Units





  04:58 05:10 05:10 


 


WBC   25.3 H   (3.8-10.6)  k/uL


 


RBC   2.12 L   (4.30-5.90)  m/uL


 


Hgb   7.0 L   (13.0-17.5)  gm/dL


 


Hct   21.9 L   (39.0-53.0)  %


 


MCV   103.6 H   (80.0-100.0)  fL


 


MCH     (25.0-35.0)  pg


 


RDW   23.6 H   (11.5-15.5)  %


 


Plt Count   70 L   (150-450)  k/uL


 


Neutrophils #   20.5 H   (1.3-7.7)  k/uL


 


Neutrophils # (Manual)     (1.3-7.7)  k/uL


 


Monocytes # (Manual)     (0-1.0)  k/uL


 


Myelocytes # (Manual)     (0)  k/uL


 


Macrocytosis   Marked A   


 


PT     (9.0-12.0)  sec


 


INR     (<1.2)  


 


ABG pCO2  33 L    (35-45)  mmHg


 


ABG pO2  69 L    ()  mmHg


 


Sodium    146 H  (137-145)  mmol/L


 


Potassium    3.4 L  (3.5-5.1)  mmol/L


 


Chloride    121 H  ()  mmol/L


 


BUN    54 H  (9-20)  mg/dL


 


Glucose    142 H  (74-99)  mg/dL


 


POC Glucose (mg/dL)     (75-99)  mg/dL


 


Calcium    7.8 L  (8.4-10.2)  mg/dL


 


Total Bilirubin    4.7 H  (0.2-1.3)  mg/dL


 


AST    151 H  (17-59)  U/L


 


ALT    81 H  (4-49)  U/L


 


Alkaline Phosphatase    127 H  ()  U/L


 


Total Protein    4.6 L  (6.3-8.2)  g/dL


 


Albumin    1.9 L  (3.5-5.0)  g/dL


 


Crossmatch     














  07/12/20 07/12/20 07/12/20 Range/Units





  05:10 10:00 10:00 


 


WBC   23.3 H   (3.8-10.6)  k/uL


 


RBC   2.00 L   (4.30-5.90)  m/uL


 


Hgb   6.9 L*   (13.0-17.5)  gm/dL


 


Hct   20.7 L   (39.0-53.0)  %


 


MCV   103.7 H   (80.0-100.0)  fL


 


MCH     (25.0-35.0)  pg


 


RDW   23.6 H   (11.5-15.5)  %


 


Plt Count   70 L   (150-450)  k/uL


 


Neutrophils #     (1.3-7.7)  k/uL


 


Neutrophils # (Manual)   18.17 H   (1.3-7.7)  k/uL


 


Monocytes # (Manual)   1.40 H   (0-1.0)  k/uL


 


Myelocytes # (Manual)   0.47 H   (0)  k/uL


 


Macrocytosis   Marked A   


 


PT    15.2 H  (9.0-12.0)  sec


 


INR    1.5 H  (<1.2)  


 


ABG pCO2     (35-45)  mmHg


 


ABG pO2     ()  mmHg


 


Sodium     (137-145)  mmol/L


 


Potassium     (3.5-5.1)  mmol/L


 


Chloride     ()  mmol/L


 


BUN     (9-20)  mg/dL


 


Glucose     (74-99)  mg/dL


 


POC Glucose (mg/dL)  166 H    (75-99)  mg/dL


 


Calcium     (8.4-10.2)  mg/dL


 


Total Bilirubin     (0.2-1.3)  mg/dL


 


AST     (17-59)  U/L


 


ALT     (4-49)  U/L


 


Alkaline Phosphatase     ()  U/L


 


Total Protein     (6.3-8.2)  g/dL


 


Albumin     (3.5-5.0)  g/dL


 


Crossmatch     








                      Microbiology - Last 24 Hours (Table)











 07/09/20 14:25 Blood Culture - Preliminary





 Blood    No Growth after 48 hours


 


 07/09/20 14:45 Blood Culture - Preliminary





 Blood    No Growth after 48 hours


 


 07/10/20 10:08 Catheter Tip Culture - Preliminary





 Catheter Tip 


 


 07/10/20 10:08 Catheter Tip Culture - Preliminary





 Catheter Tip 


 


 07/09/20 11:00 Gram Stain - Final





 Sputum Sputum Culture - Final





    Candida albicans





    Cassia glabrata


 


 07/09/20 10:10 Urine Culture - Final





 Urine,Catheterized    Candida albicans














Assessment and Plan


(1) Tracheostomy hemorrhage


Narrative/Plan: 


Patient doing better today.  No further active bleeding noted.  We'll monitor.  

Defer transfusion decisions to intensivists.


Current Visit: Yes   Status: Acute   Code(s): J95.01 - HEMORRHAGE FROM 

TRACHEOSTOMY STOMA   SNOMED Code(s): 04182165

## 2020-07-12 NOTE — P.PN
Subjective


Progress Note Date: 07/12/20


Principal diagnosis: 





Acute hypoxic respiratory failure secondary to acute aspiration pneumonia, acute

sepsis





The patient is seen today 07/11/2020 in follow-up in the intensive care unit.  

He remains minimally responsive.  He is off sedation.  He is status post trach 

and PEG tube placements.  He remains on mechanical ventilator current settings 

assist-control with a rate of 12, tidal volume 450, FiO2 40% and a PEEP of 5.  

Morning blood gases reveal a pO2 79, pCO2 33, pH 7.45.  Norepinephrine has been 

off.  He has 0.9 at 10 MLS per hour.  Being nourished with Abhishek Piette 30 ML's

per hour which is goal.  He is continued on Eraxis.  He remains on Lasix 40 mg 

IV every 12 hours.  Currently in a negative balance.  SMS remains in place with 

copious amounts of liquid stool.  Ammonia level is down to 45.  .  ALT 

114.  Sodium 144.  Potassium 3.2.  Chloride 118.  Bicarb 24.  Creatinine 0.90.  

White count 25.2.  Hemoglobin 7.5.  .  INR 1.5.





The patient is seen today 07/12/2020 in follow-up in the intensive care unit.  

Yesterday the patient developed significant bleeding around his tracheostomy 

tube sites with significant blood loss of 6-800 ML's.  He was taken to the 

operating room and had a tracheostomy removal and replacement with control of 

bleeding by Dr. Sherman.  He has received a total of 4 units of packed red blood 

cells 6 units of fresh frozen plasma and 5 of platelets.  His current hemoglobin

is 6.9.  Platelet count 70,000.  The plan is for another unit of packed red 

blood cells today.  White count 23.3.  INR 1.5.  Sodium 146.  Potassium 4.0.  

Chloride 121.  Creatinine 1.20.  .  ALT 81.  He remains sedated on 

propofol at 50 mcg/kg/m.  Norepinephrine at 10 mcg/m.  0.9 normal saline at KVO.

 He is receiving nourishment via PEG tube with vital HP at 50 MLS per hour with 

a goal of 55.  He remains on mechanical ventilator at assist control with a rate

of 12, tidal volume 450, FiO2 50% and a PEEP of 5.  Morning blood gases revealed

a PaO2 of 69, pCO2 33, pH 7.41.  Chest x-ray reveals possible underlying 

interstitial lung disease with pulmonary vascular congestion.  He is on Lasix 40

mg IV every 12 hours.  Eraxis.  Lactulose.  Xifaxan.





Objective





- Vital Signs


Vital signs: 


                                   Vital Signs











Temp  99.6 F   07/12/20 07:30


 


Pulse  118 H  07/12/20 10:00


 


Resp  24   07/12/20 10:00


 


BP  102/49   07/12/20 07:15


 


Pulse Ox  100   07/12/20 10:00








                                 Intake & Output











 07/11/20 07/12/20 07/12/20





 18:59 06:59 18:59


 


Intake Total 2514.388 1592.036 458.132


 


Output Total 1340 1482 215


 


Balance 1174.388 110.036 243.132


 


Weight  89.8 kg 


 


Intake:   


 


  IV 1232 312 104


 


    0.9 NS 1160 240 80


 


    Normal Saline Pressure 72 72 24





    Bag   


 


  Intake, IV Titration 268.388 622.036 124.132





  Amount   


 


    Norepinephrine 8 mg In 147.630 339.477 24.132





    Sodium Chloride 0.9% 250   





    ml @ 0.05 MCG/KG/MIN 8.   





    872 mls/hr IV .Q24H RISA   





    Rx#:[id number]   


 


    Potassium Chloride 20 meq 50  





    In Water For Injection 1   





    100ml.bag @ 50 mls/hr   





    IVPB Q2H RISA Rx#:   





    [id number]   


 


    Propofol 1,000 mg In 70.758 282.559 100





    Empty Bag 1 bag @ Titrate   





    IV .Q0M RISA Rx#:   





    [id number]   


 


  Tube Feeding 210 90 130


 


  Blood Product 704 468 


 


    Ffp 24 Cp2d  Unit 0 234 





    [id number]   


 


    Ffp 24 Cp2d  Unit 183  





    [id number]   


 


    Platelet Pheresis Acda2 211  





    Unit [id number]   


 


    Rc As-1  Unit 0  





    [id number]   


 


    Rc Pheresis 2 As3  Unit 310  





    [id number]   


 


  Other 100 100 100


 


Output:   


 


  Urine 540 432 215


 


  Stool 700  


 


  Urine/Stool Mix  1050 


 


  Estimated Blood Loss 100  


 


Other:   


 


  Voiding Method Indwelling Catheter Indwelling Catheter 








                       ABP, PAP, CO, CI - Last Documented











Arterial Blood Pressure        135/39

















- Exam





GENERAL EXAM: On mechanical ventilator, minimally responsive 54-year-old 

gentleman, comfortable in no apparent distress.


HEAD: Normocephalic.


EYES: Normal reaction of pupils, equal size.


NOSE: Clear with pink turbinates.


THROAT: Tracheostomy tube secured in place.


NECK: No masses, no JVD.


CHEST: No chest wall deformity.


LUNGS: Equal air entry with crackles at the bilateral posterior bases.


CVS: S1 and S2 normal with no audible murmur, regular rhythm.


ABDOMEN: PEG tube exit site clean and dry, normal bowel sounds, no guarding or 

rigidity.


SPINE: No scoliosis or deformity


SKIN: No rashes


CENTRAL NERVOUS SYSTEM: Sedated, tone is normal in all 4 extremities.


EXTREMITIES: There is 1-2+ peripheral edema.  No clubbing, no cyanosis.  

Peripheral pulses are intact.





- Labs


CBC & Chem 7: 


                                 07/12/20 10:00





                                 07/12/20 10:00


Labs: 


                  Abnormal Lab Results - Last 24 Hours (Table)











  06/28/20 07/11/20 07/11/20 Range/Units





  14:10 10:50 14:21 


 


WBC     (3.8-10.6)  k/uL


 


RBC     (4.30-5.90)  m/uL


 


Hgb     (13.0-17.5)  gm/dL


 


Hct     (39.0-53.0)  %


 


MCV     (80.0-100.0)  fL


 


MCH     (25.0-35.0)  pg


 


RDW     (11.5-15.5)  %


 


Plt Count     (150-450)  k/uL


 


Neutrophils #     (1.3-7.7)  k/uL


 


Neutrophils # (Manual)     (1.3-7.7)  k/uL


 


Monocytes # (Manual)     (0-1.0)  k/uL


 


Myelocytes # (Manual)     (0)  k/uL


 


Macrocytosis     


 


PT     (9.0-12.0)  sec


 


INR     (<1.2)  


 


ABG pCO2     (35-45)  mmHg


 


ABG pO2     ()  mmHg


 


Sodium     (137-145)  mmol/L


 


Potassium     (3.5-5.1)  mmol/L


 


Chloride     ()  mmol/L


 


BUN     (9-20)  mg/dL


 


Glucose     (74-99)  mg/dL


 


POC Glucose (mg/dL)    171 H  (75-99)  mg/dL


 


Calcium     (8.4-10.2)  mg/dL


 


Total Bilirubin     (0.2-1.3)  mg/dL


 


AST     (17-59)  U/L


 


ALT     (4-49)  U/L


 


Alkaline Phosphatase     ()  U/L


 


Total Protein     (6.3-8.2)  g/dL


 


Albumin     (3.5-5.0)  g/dL


 


Crossmatch  See Detail  See Detail   














  07/11/20 07/11/20 07/11/20 Range/Units





  15:00 15:00 18:12 


 


WBC  31.3 H    (3.8-10.6)  k/uL


 


RBC  1.97 L    (4.30-5.90)  m/uL


 


Hgb  6.7 L*    (13.0-17.5)  gm/dL


 


Hct  21.5 L    (39.0-53.0)  %


 


MCV  109.2 H D    (80.0-100.0)  fL


 


MCH     (25.0-35.0)  pg


 


RDW  23.2 H    (11.5-15.5)  %


 


Plt Count  93 L D    (150-450)  k/uL


 


Neutrophils #  28.0 H    (1.3-7.7)  k/uL


 


Neutrophils # (Manual)     (1.3-7.7)  k/uL


 


Monocytes # (Manual)     (0-1.0)  k/uL


 


Myelocytes # (Manual)     (0)  k/uL


 


Macrocytosis  Marked A    


 


PT   17.9 H   (9.0-12.0)  sec


 


INR   1.8 H   (<1.2)  


 


ABG pCO2     (35-45)  mmHg


 


ABG pO2     ()  mmHg


 


Sodium     (137-145)  mmol/L


 


Potassium     (3.5-5.1)  mmol/L


 


Chloride     ()  mmol/L


 


BUN     (9-20)  mg/dL


 


Glucose     (74-99)  mg/dL


 


POC Glucose (mg/dL)    173 H  (75-99)  mg/dL


 


Calcium     (8.4-10.2)  mg/dL


 


Total Bilirubin     (0.2-1.3)  mg/dL


 


AST     (17-59)  U/L


 


ALT     (4-49)  U/L


 


Alkaline Phosphatase     ()  U/L


 


Total Protein     (6.3-8.2)  g/dL


 


Albumin     (3.5-5.0)  g/dL


 


Crossmatch     














  07/11/20 07/11/20 07/12/20 Range/Units





  22:00 23:46 04:58 


 


WBC  28.9 H    (3.8-10.6)  k/uL


 


RBC  2.06 L    (4.30-5.90)  m/uL


 


Hgb  7.3 L    (13.0-17.5)  gm/dL


 


Hct  21.3 L    (39.0-53.0)  %


 


MCV  103.1 H D    (80.0-100.0)  fL


 


MCH  35.2 H    (25.0-35.0)  pg


 


RDW  23.2 H    (11.5-15.5)  %


 


Plt Count  72 L    (150-450)  k/uL


 


Neutrophils #     (1.3-7.7)  k/uL


 


Neutrophils # (Manual)     (1.3-7.7)  k/uL


 


Monocytes # (Manual)     (0-1.0)  k/uL


 


Myelocytes # (Manual)     (0)  k/uL


 


Macrocytosis  Marked A    


 


PT     (9.0-12.0)  sec


 


INR     (<1.2)  


 


ABG pCO2    33 L  (35-45)  mmHg


 


ABG pO2    69 L  ()  mmHg


 


Sodium     (137-145)  mmol/L


 


Potassium     (3.5-5.1)  mmol/L


 


Chloride     ()  mmol/L


 


BUN     (9-20)  mg/dL


 


Glucose     (74-99)  mg/dL


 


POC Glucose (mg/dL)   184 H   (75-99)  mg/dL


 


Calcium     (8.4-10.2)  mg/dL


 


Total Bilirubin     (0.2-1.3)  mg/dL


 


AST     (17-59)  U/L


 


ALT     (4-49)  U/L


 


Alkaline Phosphatase     ()  U/L


 


Total Protein     (6.3-8.2)  g/dL


 


Albumin     (3.5-5.0)  g/dL


 


Crossmatch     














  07/12/20 07/12/20 07/12/20 Range/Units





  05:10 05:10 05:10 


 


WBC  25.3 H    (3.8-10.6)  k/uL


 


RBC  2.12 L    (4.30-5.90)  m/uL


 


Hgb  7.0 L    (13.0-17.5)  gm/dL


 


Hct  21.9 L    (39.0-53.0)  %


 


MCV  103.6 H    (80.0-100.0)  fL


 


MCH     (25.0-35.0)  pg


 


RDW  23.6 H    (11.5-15.5)  %


 


Plt Count  70 L    (150-450)  k/uL


 


Neutrophils #  20.5 H    (1.3-7.7)  k/uL


 


Neutrophils # (Manual)     (1.3-7.7)  k/uL


 


Monocytes # (Manual)     (0-1.0)  k/uL


 


Myelocytes # (Manual)     (0)  k/uL


 


Macrocytosis  Marked A    


 


PT     (9.0-12.0)  sec


 


INR     (<1.2)  


 


ABG pCO2     (35-45)  mmHg


 


ABG pO2     ()  mmHg


 


Sodium   146 H   (137-145)  mmol/L


 


Potassium   3.4 L   (3.5-5.1)  mmol/L


 


Chloride   121 H   ()  mmol/L


 


BUN   54 H   (9-20)  mg/dL


 


Glucose   142 H   (74-99)  mg/dL


 


POC Glucose (mg/dL)    166 H  (75-99)  mg/dL


 


Calcium   7.8 L   (8.4-10.2)  mg/dL


 


Total Bilirubin   4.7 H   (0.2-1.3)  mg/dL


 


AST   151 H   (17-59)  U/L


 


ALT   81 H   (4-49)  U/L


 


Alkaline Phosphatase   127 H   ()  U/L


 


Total Protein   4.6 L   (6.3-8.2)  g/dL


 


Albumin   1.9 L   (3.5-5.0)  g/dL


 


Crossmatch     














  07/12/20 07/12/20 Range/Units





  10:00 10:00 


 


WBC  23.3 H   (3.8-10.6)  k/uL


 


RBC  2.00 L   (4.30-5.90)  m/uL


 


Hgb  6.9 L*   (13.0-17.5)  gm/dL


 


Hct  20.7 L   (39.0-53.0)  %


 


MCV  103.7 H   (80.0-100.0)  fL


 


MCH    (25.0-35.0)  pg


 


RDW  23.6 H   (11.5-15.5)  %


 


Plt Count  70 L   (150-450)  k/uL


 


Neutrophils #    (1.3-7.7)  k/uL


 


Neutrophils # (Manual)  18.17 H   (1.3-7.7)  k/uL


 


Monocytes # (Manual)  1.40 H   (0-1.0)  k/uL


 


Myelocytes # (Manual)  0.47 H   (0)  k/uL


 


Macrocytosis  Marked A   


 


PT   15.2 H  (9.0-12.0)  sec


 


INR   1.5 H  (<1.2)  


 


ABG pCO2    (35-45)  mmHg


 


ABG pO2    ()  mmHg


 


Sodium    (137-145)  mmol/L


 


Potassium    (3.5-5.1)  mmol/L


 


Chloride    ()  mmol/L


 


BUN    (9-20)  mg/dL


 


Glucose    (74-99)  mg/dL


 


POC Glucose (mg/dL)    (75-99)  mg/dL


 


Calcium    (8.4-10.2)  mg/dL


 


Total Bilirubin    (0.2-1.3)  mg/dL


 


AST    (17-59)  U/L


 


ALT    (4-49)  U/L


 


Alkaline Phosphatase    ()  U/L


 


Total Protein    (6.3-8.2)  g/dL


 


Albumin    (3.5-5.0)  g/dL


 


Crossmatch    








                      Microbiology - Last 24 Hours (Table)











 07/09/20 14:25 Blood Culture - Preliminary





 Blood    No Growth after 48 hours


 


 07/09/20 14:45 Blood Culture - Preliminary





 Blood    No Growth after 48 hours


 


 07/10/20 10:08 Catheter Tip Culture - Preliminary





 Catheter Tip 


 


 07/10/20 10:08 Catheter Tip Culture - Preliminary





 Catheter Tip 


 


 07/09/20 11:00 Gram Stain - Final





 Sputum Sputum Culture - Final





    Candida albicans





    Cassia glabrata


 


 07/09/20 10:10 Urine Culture - Final





 Urine,Catheterized    Candida albicans














Assessment and Plan


Assessment: 





Acute hypoxic respiratory failure, suspect aspiration pneumonia, inability to 

wean, status post tracheostomy tube placement post PEG tube placement.  On 

07/11/2020 the patient developed significant bleeding and from the tracheostomy 

tube site.  He was taken to the operating room and had a tracheostomy tube 

change out along with control of the bleeding vessels.





Acute anemia secondary to above currently receiving his second unit of packed 

red blood cells and he did receive 4 units of fresh frozen plasma and 5 units of

platelets yesterday.  Current hemoglobin 6.9.  Platelet count 70,000.





Acute sepsis, possible septic shock, patient required pressors which have been 

resumed on 07/11/2020 currently at 10 mcg/m of norepinephrine.  likely source is

aspiration pneumonia, although abdominal source is not entirely ruled out.  

Blood cultures negative and sputum cultures with Candida 





Alcohol liver disease with ascites.  Jaundice, and elevated liver enzymes.





Blood loss anemia, most likely the patient has acute or subacute GI bleeding, GI

is following.  





Acute hepatic encephalopathy with significantly elevated ammonia level.  

Slightly increased today compared to yesterday, it is 73.





Recent history of fall about 2 months ago, and multiple right-sided rib 

fractures.





History of alcoholic neuropathy.





History of pericarditis





Chronic back pain





History of restless leg syndrome.





Acute kidney injury secondary to sepsis and septic shock.  Strongly doubt 

hepatorenal syndrome.





Plan





The patient was seen and evaluated by Dr. Tan


Chest x-ray, ABGs and labs reviewed


Tracheostomy tube changed out with control of bleeding yesterday


Continue with blood products


Continue nutritional support


Condition remains quite guarded and poor


Referral to select specialty for long-term weaning


We'll continue to follow





Critical care time 35 minutes





I, the cosigning physician, performed a history & physical examination of the 

patient. Lungs sounds with crackles in the bilateral posterior bases.  

Maintaining good O2 saturations in the 90s on 50% FiO2 via the mechanical 

ventilator.  I discussed the assessment and plan of care with my nurse 

practitioner, Asmita Ramsay. I attest to the above note as dictated by her.


Time with Patient: Greater than 30

## 2020-07-12 NOTE — P.PN
Subjective


Progress Note Date: 07/12/20


Principal diagnosis: 





acute hypoxic respiratory failure secondary to aspiration pneumonia and acute 

sepsis, with subsequent alcoholic liver failure


Status post tracheostomy, PEG tube placement, consider placement [facility] 

probably select specialties





patient evaluated today, in the intensive care unit, intubated and mechanically 

ventilated.  This morning's chest x-ray showing continued improvement.  Ammonia 

level is 64 which actually is down from 69.


Chest x-ray was reviewed this morning stable comparable to yesterday.  Blood 

gases also showed improvement in pulmonary, decreased PEEP to 5. THE ammonia 

level was down to 64


Patient is currently being sedated BUN 69 creatinine 1.32





07/12/2020


Patient status post tracheostomy and PEG tube placement.  On mechanical vent

ilator settings assist-control rate of 12 TV for 50 FiO2 40% PEEP of 5.  

Norepinephrine is off.  IV fluids KVO being tube fed  30 ML's per hour


Ammonia level is down to 45





Objective





- Vital Signs


Vital signs: 


                                   Vital Signs











Temp  99.6 F   07/12/20 07:30


 


Pulse  120 H  07/12/20 08:00


 


Resp  18   07/12/20 08:00


 


BP  102/49   07/12/20 07:15


 


Pulse Ox  98   07/12/20 08:00








                                 Intake & Output











 07/11/20 07/12/20 07/12/20





 18:59 06:59 18:59


 


Intake Total 2514.388 1592.036 50.132


 


Output Total 1340 1482 30


 


Balance 1174.388 110.036 20.132


 


Weight  89.8 kg 


 


Intake:   


 


  IV 1232 312 26


 


    0.9 NS 1160 240 20


 


    Normal Saline Pressure 72 72 6





    Bag   


 


  Intake, IV Titration 268.388 622.036 24.132





  Amount   


 


    Norepinephrine 8 mg In 147.630 339.477 24.132





    Sodium Chloride 0.9% 250   





    ml @ 0.05 MCG/KG/MIN 8.   





    872 mls/hr IV .Q24H RISA   





    Rx#:[id number]   


 


    Potassium Chloride 20 meq 50  





    In Water For Injection 1   





    100ml.bag @ 50 mls/hr   





    IVPB Q2H RISA Rx#:   





    [id number]   


 


    Propofol 1,000 mg In 70.758 282.559 





    Empty Bag 1 bag @ Titrate   





    IV .Q0M RISA Rx#:   





    [id number]   


 


  Tube Feeding 210 90 


 


  Blood Product 704 468 


 


    Ffp 24 Cp2d  Unit 0 234 





    [id number]   


 


    Ffp 24 Cp2d  Unit 183  





    [id number]   


 


    Platelet Pheresis Acda2 211  





    Unit [id number]   


 


    Rc As-1  Unit 0  





    [id number]   


 


    Rc Pheresis 2 As3  Unit 310  





    [id number]   


 


  Other 100 100 


 


Output:   


 


  Urine 540 432 30


 


  Stool 700  


 


  Urine/Stool Mix  1050 


 


  Estimated Blood Loss 100  


 


Other:   


 


  Voiding Method Indwelling Catheter Indwelling Catheter 








                       ABP, PAP, CO, CI - Last Documented











Arterial Blood Pressure        125/38

















- Exam





General: Tracheostomy,mechanical ventilation


HEENT: mild scleral icterus


endotracheal tube oral gastric tube is intact


Neck: Tracheostomy clean and dry


Cardiac: [Heart regular in rate and rhythm.  normal S1 and S2, regular 6 

systolic murmur


Lungs: crackles and/or rhonchi bilateral


Abdomen: [No mass.  No organomegaly.  Bowel sounds presnt and normoactive in all

 4 quadrants.


PEG tube in place


Extremes: 2+ bipedal edema, no cyanosis, deformity noted dorsal aspect right 

foot


: Rectal tube in place


Musculoskeletal: [No joint erythema, edema or tenderness.]


Skin: [No rash.]


Neurologic: cannot be assessed, fully sedated, on mechanical ventilation


Lymphatic: [No adenopathy.]





- Labs


CBC & Chem 7: 


                                 07/12/20 05:10





                                 07/12/20 05:10


Labs: 


                  Abnormal Lab Results - Last 24 Hours (Table)











  06/28/20 07/11/20 07/11/20 Range/Units





  14:10 10:25 10:50 


 


WBC   24.0 H   (3.8-10.6)  k/uL


 


RBC   1.96 L   (4.30-5.90)  m/uL


 


Hgb   7.3 L   (13.0-17.5)  gm/dL


 


Hct   23.1 L   (39.0-53.0)  %


 


MCV   118.0 H   (80.0-100.0)  fL


 


MCH   37.3 H   (25.0-35.0)  pg


 


RDW   22.2 H   (11.5-15.5)  %


 


Plt Count   45 L   (150-450)  k/uL


 


Neutrophils #     (1.3-7.7)  k/uL


 


Macrocytosis   Marked A   


 


PT     (9.0-12.0)  sec


 


INR     (<1.2)  


 


ABG pCO2     (35-45)  mmHg


 


ABG pO2     ()  mmHg


 


Sodium     (137-145)  mmol/L


 


Potassium     (3.5-5.1)  mmol/L


 


Chloride     ()  mmol/L


 


BUN     (9-20)  mg/dL


 


Glucose     (74-99)  mg/dL


 


POC Glucose (mg/dL)     (75-99)  mg/dL


 


Calcium     (8.4-10.2)  mg/dL


 


Total Bilirubin     (0.2-1.3)  mg/dL


 


AST     (17-59)  U/L


 


ALT     (4-49)  U/L


 


Alkaline Phosphatase     ()  U/L


 


Total Protein     (6.3-8.2)  g/dL


 


Albumin     (3.5-5.0)  g/dL


 


Crossmatch  See Detail   See Detail  














  07/11/20 07/11/20 07/11/20 Range/Units





  14:21 15:00 15:00 


 


WBC   31.3 H   (3.8-10.6)  k/uL


 


RBC   1.97 L   (4.30-5.90)  m/uL


 


Hgb   6.7 L*   (13.0-17.5)  gm/dL


 


Hct   21.5 L   (39.0-53.0)  %


 


MCV   109.2 H D   (80.0-100.0)  fL


 


MCH     (25.0-35.0)  pg


 


RDW   23.2 H   (11.5-15.5)  %


 


Plt Count   93 L D   (150-450)  k/uL


 


Neutrophils #   28.0 H   (1.3-7.7)  k/uL


 


Macrocytosis   Marked A   


 


PT    17.9 H  (9.0-12.0)  sec


 


INR    1.8 H  (<1.2)  


 


ABG pCO2     (35-45)  mmHg


 


ABG pO2     ()  mmHg


 


Sodium     (137-145)  mmol/L


 


Potassium     (3.5-5.1)  mmol/L


 


Chloride     ()  mmol/L


 


BUN     (9-20)  mg/dL


 


Glucose     (74-99)  mg/dL


 


POC Glucose (mg/dL)  171 H    (75-99)  mg/dL


 


Calcium     (8.4-10.2)  mg/dL


 


Total Bilirubin     (0.2-1.3)  mg/dL


 


AST     (17-59)  U/L


 


ALT     (4-49)  U/L


 


Alkaline Phosphatase     ()  U/L


 


Total Protein     (6.3-8.2)  g/dL


 


Albumin     (3.5-5.0)  g/dL


 


Crossmatch     














  07/11/20 07/11/20 07/11/20 Range/Units





  18:12 22:00 23:46 


 


WBC   28.9 H   (3.8-10.6)  k/uL


 


RBC   2.06 L   (4.30-5.90)  m/uL


 


Hgb   7.3 L   (13.0-17.5)  gm/dL


 


Hct   21.3 L   (39.0-53.0)  %


 


MCV   103.1 H D   (80.0-100.0)  fL


 


MCH   35.2 H   (25.0-35.0)  pg


 


RDW   23.2 H   (11.5-15.5)  %


 


Plt Count   72 L   (150-450)  k/uL


 


Neutrophils #     (1.3-7.7)  k/uL


 


Macrocytosis   Marked A   


 


PT     (9.0-12.0)  sec


 


INR     (<1.2)  


 


ABG pCO2     (35-45)  mmHg


 


ABG pO2     ()  mmHg


 


Sodium     (137-145)  mmol/L


 


Potassium     (3.5-5.1)  mmol/L


 


Chloride     ()  mmol/L


 


BUN     (9-20)  mg/dL


 


Glucose     (74-99)  mg/dL


 


POC Glucose (mg/dL)  173 H   184 H  (75-99)  mg/dL


 


Calcium     (8.4-10.2)  mg/dL


 


Total Bilirubin     (0.2-1.3)  mg/dL


 


AST     (17-59)  U/L


 


ALT     (4-49)  U/L


 


Alkaline Phosphatase     ()  U/L


 


Total Protein     (6.3-8.2)  g/dL


 


Albumin     (3.5-5.0)  g/dL


 


Crossmatch     














  07/12/20 07/12/20 07/12/20 Range/Units





  04:58 05:10 05:10 


 


WBC   25.3 H   (3.8-10.6)  k/uL


 


RBC   2.12 L   (4.30-5.90)  m/uL


 


Hgb   7.0 L   (13.0-17.5)  gm/dL


 


Hct   21.9 L   (39.0-53.0)  %


 


MCV   103.6 H   (80.0-100.0)  fL


 


MCH     (25.0-35.0)  pg


 


RDW   23.6 H   (11.5-15.5)  %


 


Plt Count   70 L   (150-450)  k/uL


 


Neutrophils #   20.5 H   (1.3-7.7)  k/uL


 


Macrocytosis   Marked A   


 


PT     (9.0-12.0)  sec


 


INR     (<1.2)  


 


ABG pCO2  33 L    (35-45)  mmHg


 


ABG pO2  69 L    ()  mmHg


 


Sodium    146 H  (137-145)  mmol/L


 


Potassium    3.4 L  (3.5-5.1)  mmol/L


 


Chloride    121 H  ()  mmol/L


 


BUN    54 H  (9-20)  mg/dL


 


Glucose    142 H  (74-99)  mg/dL


 


POC Glucose (mg/dL)     (75-99)  mg/dL


 


Calcium    7.8 L  (8.4-10.2)  mg/dL


 


Total Bilirubin    4.7 H  (0.2-1.3)  mg/dL


 


AST    151 H  (17-59)  U/L


 


ALT    81 H  (4-49)  U/L


 


Alkaline Phosphatase    127 H  ()  U/L


 


Total Protein    4.6 L  (6.3-8.2)  g/dL


 


Albumin    1.9 L  (3.5-5.0)  g/dL


 


Crossmatch     














  07/12/20 Range/Units





  05:10 


 


WBC   (3.8-10.6)  k/uL


 


RBC   (4.30-5.90)  m/uL


 


Hgb   (13.0-17.5)  gm/dL


 


Hct   (39.0-53.0)  %


 


MCV   (80.0-100.0)  fL


 


MCH   (25.0-35.0)  pg


 


RDW   (11.5-15.5)  %


 


Plt Count   (150-450)  k/uL


 


Neutrophils #   (1.3-7.7)  k/uL


 


Macrocytosis   


 


PT   (9.0-12.0)  sec


 


INR   (<1.2)  


 


ABG pCO2   (35-45)  mmHg


 


ABG pO2   ()  mmHg


 


Sodium   (137-145)  mmol/L


 


Potassium   (3.5-5.1)  mmol/L


 


Chloride   ()  mmol/L


 


BUN   (9-20)  mg/dL


 


Glucose   (74-99)  mg/dL


 


POC Glucose (mg/dL)  166 H  (75-99)  mg/dL


 


Calcium   (8.4-10.2)  mg/dL


 


Total Bilirubin   (0.2-1.3)  mg/dL


 


AST   (17-59)  U/L


 


ALT   (4-49)  U/L


 


Alkaline Phosphatase   ()  U/L


 


Total Protein   (6.3-8.2)  g/dL


 


Albumin   (3.5-5.0)  g/dL


 


Crossmatch   








                      Microbiology - Last 24 Hours (Table)











 07/09/20 14:25 Blood Culture - Preliminary





 Blood    No Growth after 48 hours


 


 07/09/20 14:45 Blood Culture - Preliminary





 Blood    No Growth after 48 hours


 


 07/10/20 10:08 Catheter Tip Culture - Preliminary





 Catheter Tip 


 


 07/10/20 10:08 Catheter Tip Culture - Preliminary





 Catheter Tip 


 


 07/09/20 11:00 Gram Stain - Final





 Sputum Sputum Culture - Final





    Candida albicans





    Cassia glabrata


 


 07/09/20 10:10 Urine Culture - Final





 Urine,Catheterized    Candida albicans














Assessment and Plan


(1) Sepsis with acute hypoxic respiratory failure and septic shock


Current Visit: Yes   Status: Acute   Code(s): A41.9 - SEPSIS, UNSPECIFIED 

ORGANISM; R65.21 - SEVERE SEPSIS WITH SEPTIC SHOCK; J96.01 - ACUTE RESPIRATORY 

FAILURE WITH HYPOXIA   SNOMED Code(s): 224598766


   





(2) Serum ammonia increased


Current Visit: Yes   Status: Acute   Code(s): E72.20 - DISORDER OF UREA CYCLE 

METABOLISM, UNSPECIFIED   SNOMED Code(s): 7942537


   





(3) Anemia due to blood loss, acute


Current Visit: Yes   Status: Acute   Code(s): D62 - ACUTE POSTHEMORRHAGIC ANEMIA

  SNOMED Code(s): 516475680


   





(4) Alcoholic peripheral neuropathy


Current Visit: Yes   Status: Acute   Code(s): G62.1 - ALCOHOLIC POLYNEUROPATHY  

SNOMED Code(s): 2299446


   





(5) Acute alcoholic hepatitis


Current Visit: Yes   Status: Acute   Code(s): K70.10 - ALCOHOLIC HEPATITIS 

WITHOUT ASCITES   SNOMED Code(s): 8289067


   





(6) Ascites


Current Visit: Yes   Status: Acute   Code(s): R18.8 - OTHER ASCITES   SNOMED 

Code(s): 602672909


   





(7) Bicytopenia


Current Visit: Yes   Status: Acute   Code(s): D75.89 - OTHER SPECIFIED DISEASES 

OF BLOOD AND BLOOD-FORMING ORGANS   SNOMED Code(s): 00353062


   





(8) Foot swelling


Current Visit: Yes   Status: Acute   Priority: Medium   Code(s): M79.89 - OTHER 

SPECIFIED SOFT TISSUE DISORDERS   SNOMED Code(s): 006659175


   





(9) GI hemorrhage


Current Visit: Yes   Status: Acute   Code(s): K92.2 - GASTROINTESTINAL 

HEMORRHAGE, UNSPECIFIED   SNOMED Code(s): 27798304


   





(10) Hepatic encephalopathy


Current Visit: Yes   Status: Acute   Code(s): K72.90 - HEPATIC FAILURE, 

UNSPECIFIED WITHOUT COMA   SNOMED Code(s): 08095711


   





(11) Liver cirrhosis


Current Visit: Yes   Status: Acute   Code(s): K74.60 - UNSPECIFIED CIRRHOSIS OF 

LIVER   SNOMED Code(s): 99707922


   





(12) Pneumonia


Current Visit: Yes   Status: Acute   Code(s): J18.9 - PNEUMONIA, UNSPECIFIED ORG

ANISM   SNOMED Code(s): 285953442


   





(13) Sepsis


Current Visit: Yes   Status: Acute   Code(s): A41.9 - SEPSIS, UNSPECIFIED 

ORGANISM   SNOMED Code(s): 94116167


   


Plan: 





continue ventilatory support


Continue sedation


Tracheostomy, PEG tube


Ammonia levels improved


Anticipate placement early next week, [facility], probably select specialties


Patient appears improved today however prognosis is still guarded,





Time with Patient: Greater than 30

## 2020-07-13 LAB
ALBUMIN SERPL-MCNC: 1.9 G/DL (ref 3.5–5)
ALP SERPL-CCNC: 173 U/L (ref 38–126)
ALT SERPL-CCNC: 77 U/L (ref 4–49)
ANION GAP SERPL CALC-SCNC: 4 MMOL/L
AST SERPL-CCNC: 140 U/L (ref 17–59)
BASOPHILS # BLD AUTO: 0.1 K/UL (ref 0–0.2)
BASOPHILS NFR BLD AUTO: 0 %
BUN SERPL-SCNC: 56 MG/DL (ref 9–20)
CALCIUM SPEC-MCNC: 7.8 MG/DL (ref 8.4–10.2)
CHLORIDE SERPL-SCNC: 122 MMOL/L (ref 98–107)
CO2 BLDA-SCNC: 23 MMOL/L (ref 19–24)
CO2 SERPL-SCNC: 22 MMOL/L (ref 22–30)
EOSINOPHIL # BLD AUTO: 0.3 K/UL (ref 0–0.7)
EOSINOPHIL NFR BLD AUTO: 2 %
ERYTHROCYTE [DISTWIDTH] IN BLOOD BY AUTOMATED COUNT: 2.44 M/UL (ref 4.3–5.9)
ERYTHROCYTE [DISTWIDTH] IN BLOOD: 23.4 % (ref 11.5–15.5)
GLUCOSE BLD-MCNC: 159 MG/DL (ref 75–99)
GLUCOSE BLD-MCNC: 161 MG/DL (ref 75–99)
GLUCOSE BLD-MCNC: 163 MG/DL (ref 75–99)
GLUCOSE BLD-MCNC: 183 MG/DL (ref 75–99)
GLUCOSE BLD-MCNC: 193 MG/DL (ref 75–99)
GLUCOSE SERPL-MCNC: 147 MG/DL (ref 74–99)
HCO3 BLDA-SCNC: 22 MMOL/L (ref 21–25)
HCT VFR BLD AUTO: 24.7 % (ref 39–53)
HGB BLD-MCNC: 7.9 GM/DL (ref 13–17.5)
LYMPHOCYTES # SPEC AUTO: 2.5 K/UL (ref 1–4.8)
LYMPHOCYTES NFR SPEC AUTO: 12 %
MCH RBC QN AUTO: 32.3 PG (ref 25–35)
MCHC RBC AUTO-ENTMCNC: 32 G/DL (ref 31–37)
MCV RBC AUTO: 100.9 FL (ref 80–100)
MONOCYTES # BLD AUTO: 0.7 K/UL (ref 0–1)
MONOCYTES NFR BLD AUTO: 4 %
NEUTROPHILS # BLD AUTO: 16.3 K/UL (ref 1.3–7.7)
NEUTROPHILS NFR BLD AUTO: 80 %
PCO2 BLDA: 35 MMHG (ref 35–45)
PH BLDA: 7.4 [PH] (ref 7.35–7.45)
PLATELET # BLD AUTO: 64 K/UL (ref 150–450)
PO2 BLDA: 87 MMHG (ref 83–108)
POTASSIUM SERPL-SCNC: 3.2 MMOL/L (ref 3.5–5.1)
PROT SERPL-MCNC: 4.7 G/DL (ref 6.3–8.2)
SODIUM SERPL-SCNC: 148 MMOL/L (ref 137–145)
WBC # BLD AUTO: 20.3 K/UL (ref 3.8–10.6)

## 2020-07-13 RX ADMIN — ANIDULAFUNGIN SCH MLS/HR: 100 INJECTION, POWDER, LYOPHILIZED, FOR SOLUTION INTRAVENOUS at 18:40

## 2020-07-13 RX ADMIN — FUROSEMIDE SCH MG: 10 INJECTION, SOLUTION INTRAMUSCULAR; INTRAVENOUS at 20:55

## 2020-07-13 RX ADMIN — CHLORHEXIDINE GLUCONATE SCH ML: 1.2 RINSE ORAL at 20:55

## 2020-07-13 RX ADMIN — LACTULOSE SCH GM: 20 SOLUTION ORAL at 09:46

## 2020-07-13 RX ADMIN — IPRATROPIUM BROMIDE AND ALBUTEROL SULFATE SCH ML: .5; 3 SOLUTION RESPIRATORY (INHALATION) at 07:40

## 2020-07-13 RX ADMIN — PROPOFOL SCH MLS/HR: 10 INJECTION, EMULSION INTRAVENOUS at 08:56

## 2020-07-13 RX ADMIN — CHLORHEXIDINE GLUCONATE SCH ML: 1.2 RINSE ORAL at 09:46

## 2020-07-13 RX ADMIN — PROPOFOL SCH MLS/HR: 10 INJECTION, EMULSION INTRAVENOUS at 23:20

## 2020-07-13 RX ADMIN — POTASSIUM CHLORIDE SCH MLS/HR: 14.9 INJECTION, SOLUTION INTRAVENOUS at 23:22

## 2020-07-13 RX ADMIN — POTASSIUM CHLORIDE SCH: 20 TABLET, EXTENDED RELEASE ORAL at 09:16

## 2020-07-13 RX ADMIN — INSULIN ASPART SCH UNIT: 100 INJECTION, SOLUTION INTRAVENOUS; SUBCUTANEOUS at 18:39

## 2020-07-13 RX ADMIN — INSULIN ASPART SCH UNIT: 100 INJECTION, SOLUTION INTRAVENOUS; SUBCUTANEOUS at 12:09

## 2020-07-13 RX ADMIN — IPRATROPIUM BROMIDE AND ALBUTEROL SULFATE SCH ML: .5; 3 SOLUTION RESPIRATORY (INHALATION) at 03:29

## 2020-07-13 RX ADMIN — PANTOPRAZOLE SODIUM SCH MG: 40 INJECTION, POWDER, FOR SOLUTION INTRAVENOUS at 09:46

## 2020-07-13 RX ADMIN — IPRATROPIUM BROMIDE AND ALBUTEROL SULFATE SCH ML: .5; 3 SOLUTION RESPIRATORY (INHALATION) at 15:51

## 2020-07-13 RX ADMIN — FUROSEMIDE SCH MG: 10 INJECTION, SOLUTION INTRAMUSCULAR; INTRAVENOUS at 09:46

## 2020-07-13 RX ADMIN — CISATRACURIUM BESYLATE SCH: 10 INJECTION INTRAVENOUS at 01:25

## 2020-07-13 RX ADMIN — INSULIN ASPART SCH UNIT: 100 INJECTION, SOLUTION INTRAVENOUS; SUBCUTANEOUS at 05:27

## 2020-07-13 RX ADMIN — NOREPINEPHRINE BITARTRATE SCH MLS/HR: 1 INJECTION, SOLUTION, CONCENTRATE INTRAVENOUS at 21:25

## 2020-07-13 RX ADMIN — POTASSIUM CHLORIDE SCH MEQ: 20 TABLET, EXTENDED RELEASE ORAL at 07:17

## 2020-07-13 RX ADMIN — PANTOPRAZOLE SODIUM SCH MG: 40 INJECTION, POWDER, FOR SOLUTION INTRAVENOUS at 20:55

## 2020-07-13 RX ADMIN — POTASSIUM CHLORIDE SCH MLS/HR: 14.9 INJECTION, SOLUTION INTRAVENOUS at 10:36

## 2020-07-13 RX ADMIN — PROPOFOL SCH MLS/HR: 10 INJECTION, EMULSION INTRAVENOUS at 01:24

## 2020-07-13 RX ADMIN — RIFAXIMIN SCH MG: 550 TABLET ORAL at 09:47

## 2020-07-13 RX ADMIN — IPRATROPIUM BROMIDE AND ALBUTEROL SULFATE SCH ML: .5; 3 SOLUTION RESPIRATORY (INHALATION) at 12:01

## 2020-07-13 RX ADMIN — INSULIN ASPART SCH UNIT: 100 INJECTION, SOLUTION INTRAVENOUS; SUBCUTANEOUS at 01:26

## 2020-07-13 RX ADMIN — PROPOFOL SCH MLS/HR: 10 INJECTION, EMULSION INTRAVENOUS at 04:46

## 2020-07-13 RX ADMIN — IPRATROPIUM BROMIDE AND ALBUTEROL SULFATE SCH ML: .5; 3 SOLUTION RESPIRATORY (INHALATION) at 20:09

## 2020-07-13 RX ADMIN — INSULIN ASPART SCH UNIT: 100 INJECTION, SOLUTION INTRAVENOUS; SUBCUTANEOUS at 23:26

## 2020-07-13 NOTE — XR
EXAMINATION TYPE: XR chest 1V portable

 

DATE OF EXAM: 7/13/2020

 

COMPARISON: 7/12/2020

 

HISTORY: SOB, Follow Up

 

FINDINGS:

 

Indwelling tubes and catheters are unchanged.

 

No change in scattered infiltrates.

 

Stable appearance of the cardio-mediastinal structures at this time.

 

Pleural effusion unchanged.

 

IMPRESSION:

1.  Stable portable chest.  Clinical correlation and follow up until resolution is recommended.

## 2020-07-13 NOTE — P.PN
Subjective


Progress Note Date: 07/13/20





This is a 54-year-old gentleman, history of polysubstance abuse including 

alcohol abuse , falls with recent rib fractures, legally blind, osteoarthritis 

and multiple other medical issues presented to the ER with changes in mental 

status 2 days.  Drug screen positive for tricyclics and benzos, serum alcohol 

less than 10.  UA reported dark brown cloudy urine with occasional bacteria, 

hyaline casts, 2 WBCs, trace leukocytes, 4+ bilirubin, negative for nitrates. 

Ammonia level 91, T bili 14.9, currently 15.8 , elevated LFTs .Gallbladder 

ultrasound reporting abdominal ascites, abdominal x-ray nonacute, right foot x-

ray reported no fracture, soft tissue swelling, EKG reported sinus tachycardia, 

troponin normal, , echo reported normal LV function, EF 60-65% ,lactic 

acid 2.1 now down to 1.7, BUN 25, creatinine 0.8 chest x-ray reporting moderate 

pulmonary interstitial and airspace edema significantly worse than yesterday, 

pulmonary edema.  ABGs noted.  INR on admission 3.7, down to 2.2 Sepsis 

protocol, Received IV fluid resuscitation, IV antibiotics, cultures drawn.  

Developed respiratory failure, requiring intubation during the night.  Currently

on Sandostatin drip.  3 maroon stools during the night.  Received 4 units of FFP

and 2 units of packed RBCs to date.  Hemoglobin currently 8.5.  And had required

pressor support with Levophed off since 06 100.  Maintained on IV fluid 

resuscitation.  Telemetry sinus rhythm.  Potassium 3.2, magnesium 1.7. 








06/30/2020 chest x-ray reporting persistent bilateral scattered airspace 

infiltrates, pleural effusion unchanged.  Ventilator dependent, on FiO2 of 50 

with PEEP increased to 12.  Continues on lactulose with ammonia down to 80.  T 

bili decreased to 11.3, LFTs improving.  No further maroon stools.  Small black 

stool this morning.  Hemoglobin 8.6.  Telemetry sinus rhythm to sinus tach.  

Sandostatin drip discontinued this morning.  Last night patient asynchronous 

with vent, stacking breaths, Nimbex drip initiated.  This morning Nimbex 

discontinued, changed to fentanyl drip.  Requiring pressor support, Levophed 

resumed.  Received vitamin K yesterday, INR 2.1.  Marginal urine output, IV 

fluids decreased, and Lasix IV push added to med regime.  Tube feedings ordered.

 Afebrile, WBC 14.7.  Sputum culture pending, preliminary blood cultures 

negative at 48 hours.  ABGs noted.








07/01/2020  yesterday Lasix and Aldactone initiated, creatinine mildly worsened 

up to 1.32.  Ammonia increased up to 89, lactulose increased.  No further maroon

stools, hemoglobin increased to 9.4, platelets increased to 75.  T bili trending

down, 8.9, LFTs improving.  Potassium 3.4.  Chest x-ray reporting improving left

lower lobe aeration.  Right foot evaluated by orthopedic surgery, possible fluid

collection, seroma with potential x-ray tomorrow.  Tolerating tube feeds with 

minimal to no residuals, currently at 30 mls per hour with goal of 45.  IV 

steroids added to med regimen with blood sugars increasing.  Remains vent 

dependent with FiO2 at 50/+12 of PEEP.  Continues on Levophed, fentanyl, 

diprovan drips.  Afebrile, T-max 99.5, WBC 14.1.











07/02/2020 ABGs unchanged,remains vent dependent, FiO2 50/+12 PEEP.  Chest x-ray

reporting improvement.  Maintained on both Xifaxan & Lactulose, ammonia 

increasing, beginning to stool.  Creatinine trending up 1.4.  Hemoglobin 

decreased to 8.5.  INR 1.7 Levophed weaned off this morning.  Attempted a 

sedation holiday for about an hour this morning; patient did not wake up, became

agitated, tachycardic, tachypneic with respiratory rate up into the 40s, 

diprovan/fentanyl drips resumed.  Tolerating tube feeds at 40 with goal of 

45/minimal to no residual.  Orthopedics discussing x-ray and foot possibly 

tomorrow.  Afebrile WBC down to 11.











07/08/2020 patient was trached yesterday, remains vent dependent with FiO2 

45%/+5 of PEEP.  Chest x-ray suggestive of possible left lower lobe pneumonia, v

ersus atelectasis, possible interstitial disease. Scheduled for PEG tube 

placement today.  To date, patient has not tolerated sedation holidays, not 

following commands, becomes agitated, and becomes asynchronous with the patient.

 Neurology consulted, EEG completed, results pending.  Maintain on D5W with 

improvement in sodium, Na 146.  Currently sedated on Diprovan gtt. telemetry 

sinus rhythm.  Ammonia level LII on lactulose and Xifaxan.








07/09/2020  EGD with PEG tube placement completed at bedside this morning, 

tolerated procedure well.  Like gastroesophageal reflux reported along the 

anterior abdominal wall.  Evaluated by neurology with recommendations noted and 

appreciated.  EEG completed yesterday reporting abnormal, background slowing 

,suggestive of generalized cerebral dysfunction seen with toxic metabolic 

encephalopathy related to diffuse structural brain abnormality, no obvious 

epileptiform activity seen.  Afebrile, WBC continues trending up, currently 

24.8, recultured.  During sedation holiday patient noted to spontaneously move 

his right hand, half open his eyes responding to his name.  Diprovan has been 

weaned off this morning.  Remains vent dependent with FiO2 40%/+5 of PEEP.  

Chest x-ray reporting significant interval increase in left basilar airspace 

disease, possible infectious or aspiration pneumonitis. Ammonia level 42.  

Receiving potassium supplements for potassium 2.8.  Sodium continues improving, 

138.











07/10/2020 Remains off sedation X 24 hrs, sensorium slowly improving.  Following

simple commands ;Opens eyes to name, shakes head yes and no to simple questions.

 Sinus tachycardia with heart rate in the 110s. Chest x-ray reporting persistent

left lower lobe infiltrate, IV push Lasix every 12h initiated. Vent dependent, 

maintained on FiO2 40%/+5 of PEEP.  Significant anasarca. Yesterday PEG tube 

placed, did not receive any lactulose-current ammonia level 45.  Bleeding around

tracheostomy site, controlled with multiple packs of gauze, DDAVP.  Hemoglobin 

8.6, platelets 37 New central line and art line placed.  Levophed currently off.

 T-max 99.7, WBC unchanged 24.6, recultured yesterday, cultures pending.Na 140.











07/13/2020 remains vent dependent with FiO2 50%/+5 of PEEP.. Staff reports 

patient following simple commands throughout the weekend when off sedation.  

Currently maintained on Levophed, diprovan, IV fluids of D5W and Eraxis.  Re

cultured last week, T-max  99.9,   WBC trending down ,20.3.  Tachycardic in the 

one-teens to 120s.  Hemoglobin 7.9 , platelets 64 .Sodium 148.  Potassium 3.2, 

receiving potassium supplements.  Diuresing well on Lasix IV push with 24-hour 

I&O reflecting a negative fluid balance.  Significant stool output/FMS, ammonia 

level LIX, lactulose decreased.  Chest x-ray reporting stable.  Creatinine 1.17.





Objective





- Vital Signs


Vital signs: 


                                   Vital Signs











Temp  99.6 F   07/13/20 13:00


 


Pulse  116 H  07/13/20 13:00


 


Resp  13   07/13/20 13:00


 


BP  124/61   07/13/20 12:00


 


Pulse Ox  98   07/13/20 13:00








                                 Intake & Output











 07/12/20 07/13/20 07/13/20





 18:59 06:59 18:59


 


Intake Total 9376.806 6456.892 738.795


 


Output Total 2065 2032 848


 


Balance -204.859 -691.108 -109.205


 


Weight  91.2 kg 91.2 kg


 


Intake:   


 


   312 377


 


    0.9  240 110


 


    Dextrose 5% in Water 1,   225





    000 ml @ 75 mls/hr IV .   





    [id number] RISA Rx#:[id number]   


 


    Normal Saline Pressure 72 72 42





    Bag   


 


  Intake, IV Titration 428.141 258.892 251.795





  Amount   


 


    Norepinephrine 8 mg In 128.141 68.194 61.665





    Sodium Chloride 0.9% 250   





    ml @ 0.05 MCG/KG/MIN 8.   





    872 mls/hr IV .Q24H RISA   





    Rx#:[id number]   


 


    Propofol 1,000 mg In 300 190.698 190.130





    Empty Bag 1 bag @ Titrate   





    IV .Q0M RISA Rx#:   





    [id number]   


 


  Tube Feeding 570 770 110


 


  Blood Product 250  


 


    Rc Cpd  Unit 250  





    [id number]   


 


  Other 300  


 


Output:   


 


  Urine 865 1332 848


 


  Stool 1200 700 


 


Other:   


 


  Voiding Method Indwelling Catheter Indwelling Catheter Indwelling Catheter








                       ABP, PAP, CO, CI - Last Documented











Arterial Blood Pressure        130/41

















- Exam


VITAL SIGNS: As above


GENERAL: Sitting up in bed, intubated, sedated, positive anasarca


HEENT:  Conjunctivae normal.  Positive icterus, jaundice


NECK:  No JVD. No thyroid enlargement. No LNs.  Midline tracheostomy


CARDIOVASCULAR:  S1, S2 regular.  Tachycardic, Systolic murmur,


RESPIRATION: Clear, with no rhonchi, fine bibasilar crackles, no wheezing.


ABDOMEN:  Soft, distended, positive bowel sounds.  PEG tube present. Scrotal 

edema


Extremities: Positive upper and lower extremity edema, no clubbing, no cyanosis.

Right foot dorsal edema with ecchymosis wearing boot.


NERVOUS SYSTEM: Unable to assess, on mechanical ventilation, sedated


Skin: Warm and dry, no rash 








- Labs


CBC & Chem 7: 


                                 07/13/20 05:00





                                 07/13/20 05:00


Labs: 


                  Abnormal Lab Results - Last 24 Hours (Table)











  07/11/20 07/12/20 07/12/20 Range/Units





  10:50 17:02 17:17 


 


WBC    21.2 H  (3.8-10.6)  k/uL


 


RBC    2.51 L  (4.30-5.90)  m/uL


 


Hgb    8.0 L  (13.0-17.5)  gm/dL


 


Hct    25.0 L  (39.0-53.0)  %


 


MCV     (80.0-100.0)  fL


 


RDW    23.0 H  (11.5-15.5)  %


 


Plt Count    58 L  (150-450)  k/uL


 


Neutrophils #    17.2 H  (1.3-7.7)  k/uL


 


Macrocytosis     


 


ABG O2 Saturation     (94-97)  %


 


Sodium     (137-145)  mmol/L


 


Potassium     (3.5-5.1)  mmol/L


 


Chloride     ()  mmol/L


 


BUN     (9-20)  mg/dL


 


Glucose     (74-99)  mg/dL


 


POC Glucose (mg/dL)   148 H   (75-99)  mg/dL


 


Calcium     (8.4-10.2)  mg/dL


 


Total Bilirubin     (0.2-1.3)  mg/dL


 


AST     (17-59)  U/L


 


ALT     (4-49)  U/L


 


Alkaline Phosphatase     ()  U/L


 


Ammonia     (<30)  umol/L


 


Total Protein     (6.3-8.2)  g/dL


 


Albumin     (3.5-5.0)  g/dL


 


Crossmatch  See Detail    














  07/13/20 07/13/20 07/13/20 Range/Units





  00:06 05:00 05:00 


 


WBC   20.3 H   (3.8-10.6)  k/uL


 


RBC   2.44 L   (4.30-5.90)  m/uL


 


Hgb   7.9 L   (13.0-17.5)  gm/dL


 


Hct   24.7 L   (39.0-53.0)  %


 


MCV   100.9 H   (80.0-100.0)  fL


 


RDW   23.4 H   (11.5-15.5)  %


 


Plt Count   64 L   (150-450)  k/uL


 


Neutrophils #   16.3 H   (1.3-7.7)  k/uL


 


Macrocytosis   Marked A   


 


ABG O2 Saturation     (94-97)  %


 


Sodium    148 H  (137-145)  mmol/L


 


Potassium    3.2 L  (3.5-5.1)  mmol/L


 


Chloride    122 H  ()  mmol/L


 


BUN    56 H  (9-20)  mg/dL


 


Glucose    147 H  (74-99)  mg/dL


 


POC Glucose (mg/dL)  183 H    (75-99)  mg/dL


 


Calcium    7.8 L  (8.4-10.2)  mg/dL


 


Total Bilirubin    5.0 H  (0.2-1.3)  mg/dL


 


AST    140 H  (17-59)  U/L


 


ALT    77 H  (4-49)  U/L


 


Alkaline Phosphatase    173 H  ()  U/L


 


Ammonia     (<30)  umol/L


 


Total Protein    4.7 L  (6.3-8.2)  g/dL


 


Albumin    1.9 L  (3.5-5.0)  g/dL


 


Crossmatch     














  07/13/20 07/13/20 07/13/20 Range/Units





  05:03 05:09 05:20 


 


WBC     (3.8-10.6)  k/uL


 


RBC     (4.30-5.90)  m/uL


 


Hgb     (13.0-17.5)  gm/dL


 


Hct     (39.0-53.0)  %


 


MCV     (80.0-100.0)  fL


 


RDW     (11.5-15.5)  %


 


Plt Count     (150-450)  k/uL


 


Neutrophils #     (1.3-7.7)  k/uL


 


Macrocytosis     


 


ABG O2 Saturation   97.4 H   (94-97)  %


 


Sodium     (137-145)  mmol/L


 


Potassium     (3.5-5.1)  mmol/L


 


Chloride     ()  mmol/L


 


BUN     (9-20)  mg/dL


 


Glucose     (74-99)  mg/dL


 


POC Glucose (mg/dL)  159 H    (75-99)  mg/dL


 


Calcium     (8.4-10.2)  mg/dL


 


Total Bilirubin     (0.2-1.3)  mg/dL


 


AST     (17-59)  U/L


 


ALT     (4-49)  U/L


 


Alkaline Phosphatase     ()  U/L


 


Ammonia    59 H  (<30)  umol/L


 


Total Protein     (6.3-8.2)  g/dL


 


Albumin     (3.5-5.0)  g/dL


 


Crossmatch     














  07/13/20 Range/Units





  11:54 


 


WBC   (3.8-10.6)  k/uL


 


RBC   (4.30-5.90)  m/uL


 


Hgb   (13.0-17.5)  gm/dL


 


Hct   (39.0-53.0)  %


 


MCV   (80.0-100.0)  fL


 


RDW   (11.5-15.5)  %


 


Plt Count   (150-450)  k/uL


 


Neutrophils #   (1.3-7.7)  k/uL


 


Macrocytosis   


 


ABG O2 Saturation   (94-97)  %


 


Sodium   (137-145)  mmol/L


 


Potassium   (3.5-5.1)  mmol/L


 


Chloride   ()  mmol/L


 


BUN   (9-20)  mg/dL


 


Glucose   (74-99)  mg/dL


 


POC Glucose (mg/dL)  193 H  (75-99)  mg/dL


 


Calcium   (8.4-10.2)  mg/dL


 


Total Bilirubin   (0.2-1.3)  mg/dL


 


AST   (17-59)  U/L


 


ALT   (4-49)  U/L


 


Alkaline Phosphatase   ()  U/L


 


Ammonia   (<30)  umol/L


 


Total Protein   (6.3-8.2)  g/dL


 


Albumin   (3.5-5.0)  g/dL


 


Crossmatch   








                      Microbiology - Last 24 Hours (Table)











 07/09/20 14:25 Blood Culture - Preliminary





 Blood    No Growth after 72 hours


 


 07/09/20 14:45 Blood Culture - Preliminary





 Blood    No Growth after 72 hours


 


 07/10/20 10:08 Catheter Tip Culture - Final





 Catheter Tip 


 


 07/10/20 10:08 Catheter Tip Culture - Final





 Catheter Tip 














Assessment and Plan


Assessment: 


Sepsis secondary to aspiration bilateral pneumonia, possibly abdominal source,


Acute hypoxic respiratory failure,  related to the above,  ventilator dependent,

failure to wean, status post tracheostomy.


tracheostomy site postop bleeding,unexpected,status post DDAVP , witih 

tracheostomy removal and replacement, requiring sutures and electrocautery.


Septic and hypovolemic shock multifactorial, secondary to bilateral aspiration 

pneumonia, alcoholic liver disease


Septic shock status post pressor support


Hypotension, secondary to the above, pressor support dependent secondary to 

sepsis


Acute renal failure multifactorial, secondary to all the above, diuretics


Acute GI bleed, possible esophageal varices in a patient with significant 

alcohol dependence,  status post Sandostatin


Acute blood loss anemia, status post multiple transfusions of both FFP, packed 

RBCs


Acute Hepatic encephalopathy 


Acute toxic, metabolic encephalopathy


Alcoholic liver disease with ascites


Hyperammonia


Recent posterior lateral rib fractures of 9, 10 and 11 status post fall


History of pericarditis


Chronic back pain


Polysubstance abuse, per record review; patient reports alcohol, marijuana use 

currently.


History of nicotine dependence


COPD, emphysema


Legally blind


Obesity, BMI 30.7


Osteoarthritis


Venous insufficiency


Anxiety, depression, history of, currently controlled


Hyperglycemia, steroid-induced


Moderate protein calorie malnutrition


Hypernatremia, hyperchloremic secondary free water deficit, intravascular volume

depletion


Status post PEG tube placement


Hypokalemia














Plan: Continue on current medication regime ,monitoring and symptomatic 

treatment. Potential sedation holiday today as per intensivist.  Hypernatremic, 

IV fluids changed over to D5W.  Continue on lactulose and Xifaxan; lactulose 

decreased secondary to significant diarrhea .continue close monitoring of 

bleeding from tracheostomy with close monitoring of coag's . Follow closely 

repeated cultures.  Diuretics. [facility] reevaluation in progress.  

Prognosis guarded given multiple complex medical issues.














The impression and plan of care has been dictated as directed.





:


I performed a history and examination of this patient,  discussed the same with 

the dictator.  I agree with the dictator's note ,documented as a scribe.  Any 

additional findings or plans will be noted.

## 2020-07-13 NOTE — P.PN
Subjective


Progress Note Date: 07/13/20





On today's evaluation of 07/13/2020, the patient is being seen in follow-up.  

This is my first encounter with the patient.  I was able to review the records 

and events that occurred as the patient is currently on a mechanical ventilator 

and the patient has a cystic estimate of in place and a PEG tube in place.  The 

patient has a history of liver cirrhosis.  The patient has been in respiratory 

failure requiring tracheostomy tube insertion and ultimately there was some 

issues with a tracheostomy tube where there was significant amount of bleeding 

from the tracheostomy stoma on 07/11/2020.  The patient was taken to the 

operating room and control of bleeding was done and the patient currently has a 

shock tracheostomy tube in place #8.  Activity was also in place.  The patient 

is sedated with propofol of alcohol the rate of 50 g per KG pigmented.  The 

patient is on normal saline at the rate of 10 mL an hour.  The patient is on 

levo fed at the rate of 03 g per KG pigmented.  The patient is also receiving 

high protein vital for enteral nutrition at the rate of 55 mL an hour.  The 

patient is able to tolerate the tube feeds well.  Nevertheless, he is having 

significant amount of liquidy diarrhea which is large volume and this has 

resulted in development of intravascular volume depletion and electrodes 

imbalance.  Sodium level is up to 148.  He is on assist control mode of 

ventilation at the rate of 12 with tidal volume of 450 and FiO2 of 50% with D5. 

The blood gases from today showed a pH of 7.4 with a pCO2 of 35 and a pO2 of 87.

 Patient had a follow-up chest x-ray today that showed indwelling catheters are 

in place and they're unchanged compared to yesterday.  Pleural effusion is u

nchanged.  There is stable appearance of the cardia mediastinal structures at 

this point in time.  The findings on chest x-rays essentially stable.  He is 

afebrile.  The antibiotic coverage includes Eraxis and infectious diseases on 

the case.  Most recent blood cultures and catheter tip cultures of been 

negative.  The sputum culture was positive for Candida albicans.  There was also

Candida glabrata.  Urine culture was also positive for Candida albicans.  He 

does have significant amount of third spacing and anasarca as the patient has 

scrotal swelling, 5 swelling, lower extremity swelling, upper extremity 

swelling.  He is currently on IV Lasix.  He has been in a positive fluid 

balance.  His weight is currently up to 91 kg and the patient's BMI 31.5.  He is

receiving Lasix 40 mg IV every 12 hours.  The patient is also on Xifaxan 

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  The most recent 

ammonia level was at 59.





Objective





- Vital Signs


Vital signs: 


                                   Vital Signs











Temp  98.2 F   07/13/20 08:00


 


Pulse  114 H  07/13/20 12:12


 


Resp  15   07/13/20 12:00


 


BP  124/61   07/13/20 12:00


 


Pulse Ox  100   07/13/20 12:00








                                 Intake & Output











 07/12/20 07/13/20 07/13/20





 18:59 06:59 18:59


 


Intake Total 1256.539 3916.892 468.012


 


Output Total 2065 2032 683


 


Balance -204.859 -691.108 -214.988


 


Weight  91.2 kg 91.2 kg


 


Intake:   


 


   312 195


 


    0.9  240 90


 


    Dextrose 5% in Water 1,   75





    000 ml @ 75 mls/hr IV .   





    [id number] RISA Rx#:[id number]   


 


    Normal Saline Pressure 72 72 30





    Bag   


 


  Intake, IV Titration 428.141 258.892 163.012





  Amount   


 


    Norepinephrine 8 mg In 128.141 68.194 61.665





    Sodium Chloride 0.9% 250   





    ml @ 0.05 MCG/KG/MIN 8.   





    872 mls/hr IV .Q24H RISA   





    Rx#:[id number]   


 


    Propofol 1,000 mg In 300 190.698 101.347





    Empty Bag 1 bag @ Titrate   





    IV .Q0M RISA Rx#:   





    [id number]   


 


  Tube Feeding 570 770 110


 


  Blood Product 250  


 


    Rc Cpd  Unit 250  





    [id number]   


 


  Other 300  


 


Output:   


 


  Urine 865 1332 683


 


  Stool 1200 700 


 


Other:   


 


  Voiding Method Indwelling Catheter Indwelling Catheter Indwelling Catheter








                       ABP, PAP, CO, CI - Last Documented











Arterial Blood Pressure        124/40

















- Exam











GENERAL EXAM: On mechanical ventilator, minimally responsive 54-year-old 

gentleman, comfortable in no apparent distress.  This morning, the patient is 

sedated with propofol.  Tracheostomy tube is in place and there is no signs of 

an acute bleed.  They tracheostomy tube is well sutured to the neck area.


HEAD: Normocephalic.


EYES: Normal reaction of pupils, equal size.


NOSE: Clear with pink turbinates.


THROAT: Tracheostomy tube secured in place.  The patient has a [place] #8 to 

[place] tube in place.


NECK: No masses, no JVD.


CHEST: No chest wall deformity.


LUNGS: Equal air entry with crackles at the bilateral posterior bases.


CVS: S1 and S2 normal with no audible murmur, regular rhythm.


ABDOMEN: PEG tube exit site clean and dry, normal bowel sounds, no guarding or 

rigidity.


SPINE: No scoliosis or deformity


SKIN: No rashes


CENTRAL NERVOUS SYSTEM: Sedated, tone is normal in all 4 extremities.  The 

patient is in the process of obtaining a sedation holiday.


EXTREMITIES:   The patient has significant peripheral edema both in upper and 

lower extremities and the patient has diffuse anasarca in addition to scrotal 

edema.  No cyanosis.  No clubbing.





- Labs


CBC & Chem 7: 


                                 07/13/20 05:00





                                 07/13/20 05:00


Labs: 


                  Abnormal Lab Results - Last 24 Hours (Table)











  07/11/20 07/12/20 07/12/20 Range/Units





  10:50 17:02 17:17 


 


WBC    21.2 H  (3.8-10.6)  k/uL


 


RBC    2.51 L  (4.30-5.90)  m/uL


 


Hgb    8.0 L  (13.0-17.5)  gm/dL


 


Hct    25.0 L  (39.0-53.0)  %


 


MCV     (80.0-100.0)  fL


 


RDW    23.0 H  (11.5-15.5)  %


 


Plt Count    58 L  (150-450)  k/uL


 


Neutrophils #    17.2 H  (1.3-7.7)  k/uL


 


Macrocytosis     


 


ABG O2 Saturation     (94-97)  %


 


Sodium     (137-145)  mmol/L


 


Potassium     (3.5-5.1)  mmol/L


 


Chloride     ()  mmol/L


 


BUN     (9-20)  mg/dL


 


Glucose     (74-99)  mg/dL


 


POC Glucose (mg/dL)   148 H   (75-99)  mg/dL


 


Calcium     (8.4-10.2)  mg/dL


 


Total Bilirubin     (0.2-1.3)  mg/dL


 


AST     (17-59)  U/L


 


ALT     (4-49)  U/L


 


Alkaline Phosphatase     ()  U/L


 


Ammonia     (<30)  umol/L


 


Total Protein     (6.3-8.2)  g/dL


 


Albumin     (3.5-5.0)  g/dL


 


Crossmatch  See Detail    














  07/13/20 07/13/20 07/13/20 Range/Units





  00:06 05:00 05:00 


 


WBC   20.3 H   (3.8-10.6)  k/uL


 


RBC   2.44 L   (4.30-5.90)  m/uL


 


Hgb   7.9 L   (13.0-17.5)  gm/dL


 


Hct   24.7 L   (39.0-53.0)  %


 


MCV   100.9 H   (80.0-100.0)  fL


 


RDW   23.4 H   (11.5-15.5)  %


 


Plt Count   64 L   (150-450)  k/uL


 


Neutrophils #   16.3 H   (1.3-7.7)  k/uL


 


Macrocytosis   Marked A   


 


ABG O2 Saturation     (94-97)  %


 


Sodium    148 H  (137-145)  mmol/L


 


Potassium    3.2 L  (3.5-5.1)  mmol/L


 


Chloride    122 H  ()  mmol/L


 


BUN    56 H  (9-20)  mg/dL


 


Glucose    147 H  (74-99)  mg/dL


 


POC Glucose (mg/dL)  183 H    (75-99)  mg/dL


 


Calcium    7.8 L  (8.4-10.2)  mg/dL


 


Total Bilirubin    5.0 H  (0.2-1.3)  mg/dL


 


AST    140 H  (17-59)  U/L


 


ALT    77 H  (4-49)  U/L


 


Alkaline Phosphatase    173 H  ()  U/L


 


Ammonia     (<30)  umol/L


 


Total Protein    4.7 L  (6.3-8.2)  g/dL


 


Albumin    1.9 L  (3.5-5.0)  g/dL


 


Crossmatch     














  07/13/20 07/13/20 07/13/20 Range/Units





  05:03 05:09 05:20 


 


WBC     (3.8-10.6)  k/uL


 


RBC     (4.30-5.90)  m/uL


 


Hgb     (13.0-17.5)  gm/dL


 


Hct     (39.0-53.0)  %


 


MCV     (80.0-100.0)  fL


 


RDW     (11.5-15.5)  %


 


Plt Count     (150-450)  k/uL


 


Neutrophils #     (1.3-7.7)  k/uL


 


Macrocytosis     


 


ABG O2 Saturation   97.4 H   (94-97)  %


 


Sodium     (137-145)  mmol/L


 


Potassium     (3.5-5.1)  mmol/L


 


Chloride     ()  mmol/L


 


BUN     (9-20)  mg/dL


 


Glucose     (74-99)  mg/dL


 


POC Glucose (mg/dL)  159 H    (75-99)  mg/dL


 


Calcium     (8.4-10.2)  mg/dL


 


Total Bilirubin     (0.2-1.3)  mg/dL


 


AST     (17-59)  U/L


 


ALT     (4-49)  U/L


 


Alkaline Phosphatase     ()  U/L


 


Ammonia    59 H  (<30)  umol/L


 


Total Protein     (6.3-8.2)  g/dL


 


Albumin     (3.5-5.0)  g/dL


 


Crossmatch     














  07/13/20 Range/Units





  11:54 


 


WBC   (3.8-10.6)  k/uL


 


RBC   (4.30-5.90)  m/uL


 


Hgb   (13.0-17.5)  gm/dL


 


Hct   (39.0-53.0)  %


 


MCV   (80.0-100.0)  fL


 


RDW   (11.5-15.5)  %


 


Plt Count   (150-450)  k/uL


 


Neutrophils #   (1.3-7.7)  k/uL


 


Macrocytosis   


 


ABG O2 Saturation   (94-97)  %


 


Sodium   (137-145)  mmol/L


 


Potassium   (3.5-5.1)  mmol/L


 


Chloride   ()  mmol/L


 


BUN   (9-20)  mg/dL


 


Glucose   (74-99)  mg/dL


 


POC Glucose (mg/dL)  193 H  (75-99)  mg/dL


 


Calcium   (8.4-10.2)  mg/dL


 


Total Bilirubin   (0.2-1.3)  mg/dL


 


AST   (17-59)  U/L


 


ALT   (4-49)  U/L


 


Alkaline Phosphatase   ()  U/L


 


Ammonia   (<30)  umol/L


 


Total Protein   (6.3-8.2)  g/dL


 


Albumin   (3.5-5.0)  g/dL


 


Crossmatch   








                      Microbiology - Last 24 Hours (Table)











 07/09/20 14:25 Blood Culture - Preliminary





 Blood    No Growth after 72 hours


 


 07/09/20 14:45 Blood Culture - Preliminary





 Blood    No Growth after 72 hours


 


 07/10/20 10:08 Catheter Tip Culture - Final





 Catheter Tip 


 


 07/10/20 10:08 Catheter Tip Culture - Final





 Catheter Tip 














Assessment and Plan


Plan: 








1 Acute hypoxic respiratory failure, suspect aspiration pneumonia, inability to 

wean, status post tracheostomy tube placement post PEG tube placement.  On 

07/11/2020 the patient developed significant bleeding and from the tracheostomy 

tube site.  He was taken to the operating room and had a tracheostomy tube 

change out along with control of the bleeding vessels.  The patient is 

adequately ventilated for now.  Chest x-ray was noted.  Blood gases was noted.  

No signs of any acute bleeds from the tracheostomy stoma.





2  Acute anemia secondary to above currently receiving his second unit of packed

red blood cells and he did receive 4 units of fresh frozen plasma and 5 units of

platelets .  The patient's hemoglobin stable at 7.9





3 hypotension/shock.  This is likely secondary to intravascular volume depletion

time of bleeds.  Septic shock cannot be completely ruled out is another contribu

ting factor.  Hemodynamically more stable and the patient is only on 3 mcg/kg 

per minute of norepinephrine infusion for now.  Repeat blood cultures of been 

negative.  Sputum cultures positive for Candida glabrata and Candida albicans.  

Urine cultures positive for Albicans. 





4 Alcohol liver disease with ascites.  Jaundice, and elevated liver enzymes and 

stigmata of chronic liver failure with anasarca.  The MRI level is elevated at 

59.  Patient is currently on a combination of Xifaxan and lactulose.





5 hypernatremia secondary to aggressive use of lactulose and intravascular 

volume depletion with third spacing





6 chronic hepatic encephalopathy with elevated ammonia level which is improving 

his level is down to 59 





7 Recent history of fall about 2 months ago, and multiple right-sided rib fra

ctures.





8History of alcoholic neuropathy.





9 History of pericarditis





10 Chronic back pain





11 History of restless leg syndrome.





12 Acute kidney injury secondary to sepsis and intravascular volume depletion, 

creatinine is improving is down to 1.17





13 hyperchloremic hypernatremia secondary to above





Plan





Sedation holiday


Assessment and status


Continue vent support


Add D5 water at the rate of 75 mL an hour


Monitor sodium level


Drop the lactulose to when necessary as the patient is having considerable 

amount of diarrhea for now


Continue Xifaxan


Continue current antibiotic coverage


Enteral feeding with vital high protein at the rate of 55 mL an hour


Watch for any signs of bleeding from the tracheostomy stoma


We'll continue to follow


Critically care evaluation was done and more than 30 minutes.


Time with Patient: Greater than 30

## 2020-07-13 NOTE — P.PN
<Tash Yañez - Last Filed: 07/13/20 10:45>





Subjective


Progress Note Date: 07/13/20





CHIEF COMPLAINT: Trach and PEG





HISTORY OF PRESENT ILLNESS: Patient is status post trach and PEG with Dr. Alvarado.  Patient is also status post tracheostomy removal and replacement with

control of bleeding on 07/11/2020 with Dr. Sherman.  Patient examined today the 

intensive care unit.  Tube feedings are infusing at 55 mL an hour. Trach without

evidence of further active bleeding-there is a small amount of serosanguineous 

drainage. Hemoglobin 7.9.





PHYSICAL EXAM: 


VITAL SIGNS: Reviewed.


GENERAL: Well-developed in no acute distress. 


HEENT: Trach noted with no gross bleeding visualized. No sclera icterus. 

Extraocular movements grossly intact.  Moist buccal mucosa. Head is atraumatic, 

normocephalic. 


ABDOMEN:  Soft.  Nondistended. Nontender. PEG tube intact.


NEUROLOGIC: Sedated on mechanical ventilation





ASSESSMENT: 


1.  Acute hypoxic respiratory failure


2.  Status post trach and PEG with subsequent tracheostomy site bleeding





PLAN: 


-Ventilator management per pulmonary


-Monitor hemoglobin


-Tube feedings as tolerated





Nurse practitioner note has been reviewed by physician. Signing provider agrees 

with the documented findings, assessment, and plan of care. 








Objective





- Vital Signs


Vital signs: 


                                   Vital Signs











Temp  98.2 F   07/13/20 08:00


 


Pulse  124 H  07/13/20 10:00


 


Resp  20   07/13/20 10:00


 


BP  124/61   07/13/20 10:00


 


Pulse Ox  98   07/13/20 10:00








                                 Intake & Output











 07/12/20 07/13/20 07/13/20





 18:59 06:59 18:59


 


Intake Total 4621.167 8124.892 377.012


 


Output Total 2065 2032 333


 


Balance -204.859 -691.108 44.012


 


Weight  91.2 kg 


 


Intake:   


 


   312 104


 


    0.9  240 80


 


    Normal Saline Pressure 72 72 24





    Bag   


 


  Intake, IV Titration 428.141 258.892 163.012





  Amount   


 


    Norepinephrine 8 mg In 128.141 68.194 61.665





    Sodium Chloride 0.9% 250   





    ml @ 0.05 MCG/KG/MIN 8.   





    872 mls/hr IV .Q24H [facility]   





    Rx#:[id number]   


 


    Propofol 1,000 mg In 300 190.698 101.347





    Empty Bag 1 bag @ Titrate   





    IV .Q0M [facility] Rx#:   





    [id number]   


 


  Tube Feeding 570 770 110


 


  Blood Product 250  


 


    Rc Cpd  Unit 250  





    [id number]   


 


  Other 300  


 


Output:   


 


  Urine 865 1332 333


 


  Stool 1200 700 


 


Other:   


 


  Voiding Method Indwelling Catheter Indwelling Catheter 








                       ABP, PAP, CO, CI - Last Documented











Arterial Blood Pressure        140/51

















- Labs


CBC & Chem 7: 


                                 07/13/20 05:00





                                 07/13/20 05:00


Labs: 


                  Abnormal Lab Results - Last 24 Hours (Table)











  07/11/20 07/12/20 07/12/20 Range/Units





  10:50 12:51 17:02 


 


WBC     (3.8-10.6)  k/uL


 


RBC     (4.30-5.90)  m/uL


 


Hgb     (13.0-17.5)  gm/dL


 


Hct     (39.0-53.0)  %


 


MCV     (80.0-100.0)  fL


 


RDW     (11.5-15.5)  %


 


Plt Count     (150-450)  k/uL


 


Neutrophils #     (1.3-7.7)  k/uL


 


Macrocytosis     


 


ABG O2 Saturation     (94-97)  %


 


Sodium     (137-145)  mmol/L


 


Potassium     (3.5-5.1)  mmol/L


 


Chloride     ()  mmol/L


 


BUN     (9-20)  mg/dL


 


Glucose     (74-99)  mg/dL


 


POC Glucose (mg/dL)   173 H  148 H  (75-99)  mg/dL


 


Calcium     (8.4-10.2)  mg/dL


 


Total Bilirubin     (0.2-1.3)  mg/dL


 


AST     (17-59)  U/L


 


ALT     (4-49)  U/L


 


Alkaline Phosphatase     ()  U/L


 


Ammonia     (<30)  umol/L


 


Total Protein     (6.3-8.2)  g/dL


 


Albumin     (3.5-5.0)  g/dL


 


Crossmatch  See Detail    














  07/12/20 07/13/20 07/13/20 Range/Units





  17:17 00:06 05:00 


 


WBC  21.2 H   20.3 H  (3.8-10.6)  k/uL


 


RBC  2.51 L   2.44 L  (4.30-5.90)  m/uL


 


Hgb  8.0 L   7.9 L  (13.0-17.5)  gm/dL


 


Hct  25.0 L   24.7 L  (39.0-53.0)  %


 


MCV    100.9 H  (80.0-100.0)  fL


 


RDW  23.0 H   23.4 H  (11.5-15.5)  %


 


Plt Count  58 L   64 L  (150-450)  k/uL


 


Neutrophils #  17.2 H   16.3 H  (1.3-7.7)  k/uL


 


Macrocytosis    Marked A  


 


ABG O2 Saturation     (94-97)  %


 


Sodium     (137-145)  mmol/L


 


Potassium     (3.5-5.1)  mmol/L


 


Chloride     ()  mmol/L


 


BUN     (9-20)  mg/dL


 


Glucose     (74-99)  mg/dL


 


POC Glucose (mg/dL)   183 H   (75-99)  mg/dL


 


Calcium     (8.4-10.2)  mg/dL


 


Total Bilirubin     (0.2-1.3)  mg/dL


 


AST     (17-59)  U/L


 


ALT     (4-49)  U/L


 


Alkaline Phosphatase     ()  U/L


 


Ammonia     (<30)  umol/L


 


Total Protein     (6.3-8.2)  g/dL


 


Albumin     (3.5-5.0)  g/dL


 


Crossmatch     














  07/13/20 07/13/20 07/13/20 Range/Units





  05:00 05:03 05:09 


 


WBC     (3.8-10.6)  k/uL


 


RBC     (4.30-5.90)  m/uL


 


Hgb     (13.0-17.5)  gm/dL


 


Hct     (39.0-53.0)  %


 


MCV     (80.0-100.0)  fL


 


RDW     (11.5-15.5)  %


 


Plt Count     (150-450)  k/uL


 


Neutrophils #     (1.3-7.7)  k/uL


 


Macrocytosis     


 


ABG O2 Saturation    97.4 H  (94-97)  %


 


Sodium  148 H    (137-145)  mmol/L


 


Potassium  3.2 L    (3.5-5.1)  mmol/L


 


Chloride  122 H    ()  mmol/L


 


BUN  56 H    (9-20)  mg/dL


 


Glucose  147 H    (74-99)  mg/dL


 


POC Glucose (mg/dL)   159 H   (75-99)  mg/dL


 


Calcium  7.8 L    (8.4-10.2)  mg/dL


 


Total Bilirubin  5.0 H    (0.2-1.3)  mg/dL


 


AST  140 H    (17-59)  U/L


 


ALT  77 H    (4-49)  U/L


 


Alkaline Phosphatase  173 H    ()  U/L


 


Ammonia     (<30)  umol/L


 


Total Protein  4.7 L    (6.3-8.2)  g/dL


 


Albumin  1.9 L    (3.5-5.0)  g/dL


 


Crossmatch     














  07/13/20 Range/Units





  05:20 


 


WBC   (3.8-10.6)  k/uL


 


RBC   (4.30-5.90)  m/uL


 


Hgb   (13.0-17.5)  gm/dL


 


Hct   (39.0-53.0)  %


 


MCV   (80.0-100.0)  fL


 


RDW   (11.5-15.5)  %


 


Plt Count   (150-450)  k/uL


 


Neutrophils #   (1.3-7.7)  k/uL


 


Macrocytosis   


 


ABG O2 Saturation   (94-97)  %


 


Sodium   (137-145)  mmol/L


 


Potassium   (3.5-5.1)  mmol/L


 


Chloride   ()  mmol/L


 


BUN   (9-20)  mg/dL


 


Glucose   (74-99)  mg/dL


 


POC Glucose (mg/dL)   (75-99)  mg/dL


 


Calcium   (8.4-10.2)  mg/dL


 


Total Bilirubin   (0.2-1.3)  mg/dL


 


AST   (17-59)  U/L


 


ALT   (4-49)  U/L


 


Alkaline Phosphatase   ()  U/L


 


Ammonia  59 H  (<30)  umol/L


 


Total Protein   (6.3-8.2)  g/dL


 


Albumin   (3.5-5.0)  g/dL


 


Crossmatch   








                      Microbiology - Last 24 Hours (Table)











 07/09/20 14:25 Blood Culture - Preliminary





 Blood    No Growth after 72 hours


 


 07/09/20 14:45 Blood Culture - Preliminary





 Blood    No Growth after 72 hours


 


 07/10/20 10:08 Catheter Tip Culture - Final





 Catheter Tip 


 


 07/10/20 10:08 Catheter Tip Culture - Final





 Catheter Tip 














<Rehan Sherman - Last Filed: 07/13/20 12:59>





Subjective


As above.  Patient doing better.  No active bleeding.  Will follow intermittentl

y.








Objective





- Vital Signs


Vital signs: 


                                   Vital Signs











Temp  98.2 F   07/13/20 08:00


 


Pulse  114 H  07/13/20 12:12


 


Resp  15   07/13/20 12:00


 


BP  124/61   07/13/20 12:00


 


Pulse Ox  100   07/13/20 12:00








                                 Intake & Output











 07/12/20 07/13/20 07/13/20





 18:59 06:59 18:59


 


Intake Total 6826.597 0396.892 468.012


 


Output Total 2065 2032 683


 


Balance -204.859 -691.108 -214.988


 


Weight  91.2 kg 91.2 kg


 


Intake:   


 


   312 195


 


    0.9  240 90


 


    Dextrose 5% in Water 1,   75





    000 ml @ 75 mls/hr IV .   





    [id number] [facility] Rx#:[id number]   


 


    Normal Saline Pressure 72 72 30





    Bag   


 


  Intake, IV Titration 428.141 258.892 163.012





  Amount   


 


    Norepinephrine 8 mg In 128.141 68.194 61.665





    Sodium Chloride 0.9% 250   





    ml @ 0.05 MCG/KG/MIN 8.   





    872 mls/hr IV .Q24H [facility]   





    Rx#:[id number]   


 


    Propofol 1,000 mg In 300 190.698 101.347





    Empty Bag 1 bag @ Titrate   





    IV .Q0M [facility] Rx#:   





    [id number]   


 


  Tube Feeding 570 770 110


 


  Blood Product 250  


 


    Rc Cpd  Unit 250  





    [id number]   


 


  Other 300  


 


Output:   


 


  Urine 865 1332 683


 


  Stool 1200 700 


 


Other:   


 


  Voiding Method Indwelling Catheter Indwelling Catheter Indwelling Catheter








                       ABP, PAP, CO, CI - Last Documented











Arterial Blood Pressure        124/40

















- Labs


CBC & Chem 7: 


                                 07/13/20 05:00





                                 07/13/20 05:00


Labs: 


                  Abnormal Lab Results - Last 24 Hours (Table)











  07/11/20 07/12/20 07/12/20 Range/Units





  10:50 17:02 17:17 


 


WBC    21.2 H  (3.8-10.6)  k/uL


 


RBC    2.51 L  (4.30-5.90)  m/uL


 


Hgb    8.0 L  (13.0-17.5)  gm/dL


 


Hct    25.0 L  (39.0-53.0)  %


 


MCV     (80.0-100.0)  fL


 


RDW    23.0 H  (11.5-15.5)  %


 


Plt Count    58 L  (150-450)  k/uL


 


Neutrophils #    17.2 H  (1.3-7.7)  k/uL


 


Macrocytosis     


 


ABG O2 Saturation     (94-97)  %


 


Sodium     (137-145)  mmol/L


 


Potassium     (3.5-5.1)  mmol/L


 


Chloride     ()  mmol/L


 


BUN     (9-20)  mg/dL


 


Glucose     (74-99)  mg/dL


 


POC Glucose (mg/dL)   148 H   (75-99)  mg/dL


 


Calcium     (8.4-10.2)  mg/dL


 


Total Bilirubin     (0.2-1.3)  mg/dL


 


AST     (17-59)  U/L


 


ALT     (4-49)  U/L


 


Alkaline Phosphatase     ()  U/L


 


Ammonia     (<30)  umol/L


 


Total Protein     (6.3-8.2)  g/dL


 


Albumin     (3.5-5.0)  g/dL


 


Crossmatch  See Detail    














  07/13/20 07/13/20 07/13/20 Range/Units





  00:06 05:00 05:00 


 


WBC   20.3 H   (3.8-10.6)  k/uL


 


RBC   2.44 L   (4.30-5.90)  m/uL


 


Hgb   7.9 L   (13.0-17.5)  gm/dL


 


Hct   24.7 L   (39.0-53.0)  %


 


MCV   100.9 H   (80.0-100.0)  fL


 


RDW   23.4 H   (11.5-15.5)  %


 


Plt Count   64 L   (150-450)  k/uL


 


Neutrophils #   16.3 H   (1.3-7.7)  k/uL


 


Macrocytosis   Marked A   


 


ABG O2 Saturation     (94-97)  %


 


Sodium    148 H  (137-145)  mmol/L


 


Potassium    3.2 L  (3.5-5.1)  mmol/L


 


Chloride    122 H  ()  mmol/L


 


BUN    56 H  (9-20)  mg/dL


 


Glucose    147 H  (74-99)  mg/dL


 


POC Glucose (mg/dL)  183 H    (75-99)  mg/dL


 


Calcium    7.8 L  (8.4-10.2)  mg/dL


 


Total Bilirubin    5.0 H  (0.2-1.3)  mg/dL


 


AST    140 H  (17-59)  U/L


 


ALT    77 H  (4-49)  U/L


 


Alkaline Phosphatase    173 H  ()  U/L


 


Ammonia     (<30)  umol/L


 


Total Protein    4.7 L  (6.3-8.2)  g/dL


 


Albumin    1.9 L  (3.5-5.0)  g/dL


 


Crossmatch     














  07/13/20 07/13/20 07/13/20 Range/Units





  05:03 05:09 05:20 


 


WBC     (3.8-10.6)  k/uL


 


RBC     (4.30-5.90)  m/uL


 


Hgb     (13.0-17.5)  gm/dL


 


Hct     (39.0-53.0)  %


 


MCV     (80.0-100.0)  fL


 


RDW     (11.5-15.5)  %


 


Plt Count     (150-450)  k/uL


 


Neutrophils #     (1.3-7.7)  k/uL


 


Macrocytosis     


 


ABG O2 Saturation   97.4 H   (94-97)  %


 


Sodium     (137-145)  mmol/L


 


Potassium     (3.5-5.1)  mmol/L


 


Chloride     ()  mmol/L


 


BUN     (9-20)  mg/dL


 


Glucose     (74-99)  mg/dL


 


POC Glucose (mg/dL)  159 H    (75-99)  mg/dL


 


Calcium     (8.4-10.2)  mg/dL


 


Total Bilirubin     (0.2-1.3)  mg/dL


 


AST     (17-59)  U/L


 


ALT     (4-49)  U/L


 


Alkaline Phosphatase     ()  U/L


 


Ammonia    59 H  (<30)  umol/L


 


Total Protein     (6.3-8.2)  g/dL


 


Albumin     (3.5-5.0)  g/dL


 


Crossmatch     














  07/13/20 Range/Units





  11:54 


 


WBC   (3.8-10.6)  k/uL


 


RBC   (4.30-5.90)  m/uL


 


Hgb   (13.0-17.5)  gm/dL


 


Hct   (39.0-53.0)  %


 


MCV   (80.0-100.0)  fL


 


RDW   (11.5-15.5)  %


 


Plt Count   (150-450)  k/uL


 


Neutrophils #   (1.3-7.7)  k/uL


 


Macrocytosis   


 


ABG O2 Saturation   (94-97)  %


 


Sodium   (137-145)  mmol/L


 


Potassium   (3.5-5.1)  mmol/L


 


Chloride   ()  mmol/L


 


BUN   (9-20)  mg/dL


 


Glucose   (74-99)  mg/dL


 


POC Glucose (mg/dL)  193 H  (75-99)  mg/dL


 


Calcium   (8.4-10.2)  mg/dL


 


Total Bilirubin   (0.2-1.3)  mg/dL


 


AST   (17-59)  U/L


 


ALT   (4-49)  U/L


 


Alkaline Phosphatase   ()  U/L


 


Ammonia   (<30)  umol/L


 


Total Protein   (6.3-8.2)  g/dL


 


Albumin   (3.5-5.0)  g/dL


 


Crossmatch   








                      Microbiology - Last 24 Hours (Table)











 07/09/20 14:25 Blood Culture - Preliminary





 Blood    No Growth after 72 hours


 


 07/09/20 14:45 Blood Culture - Preliminary





 Blood    No Growth after 72 hours


 


 07/10/20 10:08 Catheter Tip Culture - Final





 Catheter Tip 


 


 07/10/20 10:08 Catheter Tip Culture - Final





 Catheter Tip 














Assessment and Plan


(1) Tracheostomy hemorrhage


Current Visit: Yes   Status: Acute   Code(s): J95.01 - HEMORRHAGE FROM 

TRACHEOSTOMY STOMA   SNOMED Code(s): 38560548

## 2020-07-14 LAB
ALBUMIN SERPL-MCNC: 1.8 G/DL (ref 3.5–5)
ALP SERPL-CCNC: 169 U/L (ref 38–126)
ALT SERPL-CCNC: 70 U/L (ref 4–49)
ANION GAP SERPL CALC-SCNC: -1 MMOL/L
AST SERPL-CCNC: 121 U/L (ref 17–59)
BASOPHILS # BLD AUTO: 0 K/UL (ref 0–0.2)
BASOPHILS NFR BLD AUTO: 0 %
BUN SERPL-SCNC: 47 MG/DL (ref 9–20)
CALCIUM SPEC-MCNC: 7.7 MG/DL (ref 8.4–10.2)
CHLORIDE SERPL-SCNC: 120 MMOL/L (ref 98–107)
CO2 BLDA-SCNC: 26 MMOL/L (ref 19–24)
CO2 SERPL-SCNC: 26 MMOL/L (ref 22–30)
EOSINOPHIL # BLD AUTO: 0.2 K/UL (ref 0–0.7)
EOSINOPHIL NFR BLD AUTO: 2 %
ERYTHROCYTE [DISTWIDTH] IN BLOOD BY AUTOMATED COUNT: 2.17 M/UL (ref 4.3–5.9)
ERYTHROCYTE [DISTWIDTH] IN BLOOD: 23 % (ref 11.5–15.5)
GLUCOSE BLD-MCNC: 150 MG/DL (ref 75–99)
GLUCOSE BLD-MCNC: 157 MG/DL (ref 75–99)
GLUCOSE BLD-MCNC: 165 MG/DL (ref 75–99)
GLUCOSE BLD-MCNC: 168 MG/DL (ref 75–99)
GLUCOSE SERPL-MCNC: 141 MG/DL (ref 74–99)
HCO3 BLDA-SCNC: 25 MMOL/L (ref 21–25)
HCT VFR BLD AUTO: 22.5 % (ref 39–53)
HGB BLD-MCNC: 7.4 GM/DL (ref 13–17.5)
INR PPP: 1.5 (ref ?–1.2)
LYMPHOCYTES # SPEC AUTO: 1.7 K/UL (ref 1–4.8)
LYMPHOCYTES NFR SPEC AUTO: 12 %
MCH RBC QN AUTO: 34.3 PG (ref 25–35)
MCHC RBC AUTO-ENTMCNC: 33.1 G/DL (ref 31–37)
MCV RBC AUTO: 103.4 FL (ref 80–100)
MONOCYTES # BLD AUTO: 0.6 K/UL (ref 0–1)
MONOCYTES NFR BLD AUTO: 4 %
NEUTROPHILS # BLD AUTO: 12.1 K/UL (ref 1.3–7.7)
NEUTROPHILS NFR BLD AUTO: 81 %
PCO2 BLDA: 39 MMHG (ref 35–45)
PH BLDA: 7.42 [PH] (ref 7.35–7.45)
PLATELET # BLD AUTO: 62 K/UL (ref 150–450)
PO2 BLDA: 86 MMHG (ref 83–108)
POTASSIUM SERPL-SCNC: 2.7 MMOL/L (ref 3.5–5.1)
PROT SERPL-MCNC: 4.6 G/DL (ref 6.3–8.2)
PT BLD: 14.8 SEC (ref 9–12)
SODIUM SERPL-SCNC: 145 MMOL/L (ref 137–145)
WBC # BLD AUTO: 15 K/UL (ref 3.8–10.6)

## 2020-07-14 RX ADMIN — IPRATROPIUM BROMIDE AND ALBUTEROL SULFATE SCH ML: .5; 3 SOLUTION RESPIRATORY (INHALATION) at 16:32

## 2020-07-14 RX ADMIN — POTASSIUM BICARBONATE SCH MEQ: 782 TABLET, EFFERVESCENT ORAL at 06:23

## 2020-07-14 RX ADMIN — IPRATROPIUM BROMIDE AND ALBUTEROL SULFATE SCH ML: .5; 3 SOLUTION RESPIRATORY (INHALATION) at 12:08

## 2020-07-14 RX ADMIN — POTASSIUM BICARBONATE SCH MEQ: 782 TABLET, EFFERVESCENT ORAL at 16:25

## 2020-07-14 RX ADMIN — IPRATROPIUM BROMIDE AND ALBUTEROL SULFATE SCH ML: .5; 3 SOLUTION RESPIRATORY (INHALATION) at 00:33

## 2020-07-14 RX ADMIN — IPRATROPIUM BROMIDE AND ALBUTEROL SULFATE SCH ML: .5; 3 SOLUTION RESPIRATORY (INHALATION) at 21:13

## 2020-07-14 RX ADMIN — INSULIN ASPART SCH UNIT: 100 INJECTION, SOLUTION INTRAVENOUS; SUBCUTANEOUS at 23:25

## 2020-07-14 RX ADMIN — PANTOPRAZOLE SODIUM SCH MG: 40 INJECTION, POWDER, FOR SOLUTION INTRAVENOUS at 08:06

## 2020-07-14 RX ADMIN — POTASSIUM CHLORIDE SCH MLS/HR: 14.9 INJECTION, SOLUTION INTRAVENOUS at 12:19

## 2020-07-14 RX ADMIN — IPRATROPIUM BROMIDE AND ALBUTEROL SULFATE SCH ML: .5; 3 SOLUTION RESPIRATORY (INHALATION) at 08:02

## 2020-07-14 RX ADMIN — PANTOPRAZOLE SODIUM SCH MG: 40 INJECTION, POWDER, FOR SOLUTION INTRAVENOUS at 20:36

## 2020-07-14 RX ADMIN — FUROSEMIDE SCH MG: 10 INJECTION, SOLUTION INTRAMUSCULAR; INTRAVENOUS at 20:36

## 2020-07-14 RX ADMIN — POTASSIUM BICARBONATE SCH MEQ: 782 TABLET, EFFERVESCENT ORAL at 05:20

## 2020-07-14 RX ADMIN — POTASSIUM BICARBONATE SCH MEQ: 782 TABLET, EFFERVESCENT ORAL at 17:54

## 2020-07-14 RX ADMIN — CHLORHEXIDINE GLUCONATE SCH ML: 1.2 RINSE ORAL at 20:36

## 2020-07-14 RX ADMIN — INSULIN ASPART SCH UNIT: 100 INJECTION, SOLUTION INTRAVENOUS; SUBCUTANEOUS at 18:06

## 2020-07-14 RX ADMIN — INSULIN ASPART SCH UNIT: 100 INJECTION, SOLUTION INTRAVENOUS; SUBCUTANEOUS at 05:19

## 2020-07-14 RX ADMIN — FUROSEMIDE SCH MG: 10 INJECTION, SOLUTION INTRAMUSCULAR; INTRAVENOUS at 08:07

## 2020-07-14 RX ADMIN — INSULIN ASPART SCH UNIT: 100 INJECTION, SOLUTION INTRAVENOUS; SUBCUTANEOUS at 12:18

## 2020-07-14 RX ADMIN — ANIDULAFUNGIN SCH MLS/HR: 100 INJECTION, POWDER, LYOPHILIZED, FOR SOLUTION INTRAVENOUS at 17:56

## 2020-07-14 RX ADMIN — PROPOFOL SCH MLS/HR: 10 INJECTION, EMULSION INTRAVENOUS at 05:48

## 2020-07-14 RX ADMIN — POTASSIUM BICARBONATE SCH MEQ: 782 TABLET, EFFERVESCENT ORAL at 08:06

## 2020-07-14 RX ADMIN — IPRATROPIUM BROMIDE AND ALBUTEROL SULFATE SCH ML: .5; 3 SOLUTION RESPIRATORY (INHALATION) at 05:10

## 2020-07-14 RX ADMIN — CHLORHEXIDINE GLUCONATE SCH ML: 1.2 RINSE ORAL at 08:06

## 2020-07-14 RX ADMIN — LACTULOSE SCH GM: 20 SOLUTION ORAL at 08:07

## 2020-07-14 RX ADMIN — POTASSIUM BICARBONATE SCH MEQ: 782 TABLET, EFFERVESCENT ORAL at 15:09

## 2020-07-14 NOTE — P.PN
Subjective


Progress Note Date: 07/13/20


Principal diagnosis: 





Alcoholic hepatitis, alcoholic cirrhosis of the liver with ascites, hepatic 

encephalopathy, elevated liver enzymes





Patient is seen in the intensive care unit where he is currently status post 

tracheostomy with subsequent replacement after bleed, and PEG tube placement. 

Mentation still poor and requiring mechanical ventilation.





Objective





- Vital Signs


Vital signs: 


                                   Vital Signs











Temp  99.6 F   07/13/20 13:00


 


Pulse  121 H  07/13/20 14:00


 


Resp  11 L  07/13/20 14:00


 


BP  124/61   07/13/20 12:00


 


Pulse Ox  100   07/13/20 14:00








                                 Intake & Output











 07/12/20 07/13/20 07/13/20





 18:59 06:59 18:59


 


Intake Total 9070.144 3766.892 920.795


 


Output Total 2065 2032 1123


 


Balance -204.859 -691.108 -202.205


 


Weight  91.2 kg 91.2 kg


 


Intake:   


 


   312 559


 


    0.9  240 130


 


    Dextrose 5% in Water 1,   375





    000 ml @ 75 mls/hr IV .   





    [id number] [facility] Rx#:[id number]   


 


    Normal Saline Pressure 72 72 54





    Bag   


 


  Intake, IV Titration 428.141 258.892 251.795





  Amount   


 


    Norepinephrine 8 mg In 128.141 68.194 61.665





    Sodium Chloride 0.9% 250   





    ml @ 0.05 MCG/KG/MIN 8.   





    872 mls/hr IV .Q24H [facility]   





    Rx#:[id number]   


 


    Propofol 1,000 mg In 300 190.698 190.130





    Empty Bag 1 bag @ Titrate   





    IV .Q0M [facility] Rx#:   





    [id number]   


 


  Tube Feeding 570 770 110


 


  Blood Product 250  


 


    Rc Cpd  Unit 250  





    [id number]   


 


  Other 300  


 


Output:   


 


  Urine 865 1332 1123


 


  Stool 1200 700 0


 


Other:   


 


  Voiding Method Indwelling Catheter Indwelling Catheter Indwelling Catheter








                       ABP, PAP, CO, CI - Last Documented











Arterial Blood Pressure        131/45

















- Exam





On physical examination, patient appears comfortable in no apparent distress. 


HEAD: Normocephalic, atraumatic. 


EYES: Scleral icterus. No conjunctival injection. 


MOUTH: No lesions, tongue midline, endotracheal tube in place. 


NECK: Tracheostomy in place. 


CHEST: Coarse respiratory noises in all lung fields on mechanical ventilation.


ABDOMEN: Soft, PEG tube in place. Bowel sounds are positive. No organomegaly.  

No guarding or rigidity.


EXTREMITIES: Bilateral pedal edema. 


SKIN: No rashes, jaundice. 


NEUROLOGIC: Intubated and sedated. 





- Labs


CBC & Chem 7: 


                                 07/14/20 04:35





                                 07/14/20 04:35


Labs: 


                  Abnormal Lab Results - Last 24 Hours (Table)











  07/11/20 07/12/20 07/12/20 Range/Units





  10:50 17:02 17:17 


 


WBC    21.2 H  (3.8-10.6)  k/uL


 


RBC    2.51 L  (4.30-5.90)  m/uL


 


Hgb    8.0 L  (13.0-17.5)  gm/dL


 


Hct    25.0 L  (39.0-53.0)  %


 


MCV     (80.0-100.0)  fL


 


RDW    23.0 H  (11.5-15.5)  %


 


Plt Count    58 L  (150-450)  k/uL


 


Neutrophils #    17.2 H  (1.3-7.7)  k/uL


 


Macrocytosis     


 


ABG O2 Saturation     (94-97)  %


 


Sodium     (137-145)  mmol/L


 


Potassium     (3.5-5.1)  mmol/L


 


Chloride     ()  mmol/L


 


BUN     (9-20)  mg/dL


 


Glucose     (74-99)  mg/dL


 


POC Glucose (mg/dL)   148 H   (75-99)  mg/dL


 


Calcium     (8.4-10.2)  mg/dL


 


Total Bilirubin     (0.2-1.3)  mg/dL


 


AST     (17-59)  U/L


 


ALT     (4-49)  U/L


 


Alkaline Phosphatase     ()  U/L


 


Ammonia     (<30)  umol/L


 


Total Protein     (6.3-8.2)  g/dL


 


Albumin     (3.5-5.0)  g/dL


 


Crossmatch  See Detail    














  07/13/20 07/13/20 07/13/20 Range/Units





  00:06 05:00 05:00 


 


WBC   20.3 H   (3.8-10.6)  k/uL


 


RBC   2.44 L   (4.30-5.90)  m/uL


 


Hgb   7.9 L   (13.0-17.5)  gm/dL


 


Hct   24.7 L   (39.0-53.0)  %


 


MCV   100.9 H   (80.0-100.0)  fL


 


RDW   23.4 H   (11.5-15.5)  %


 


Plt Count   64 L   (150-450)  k/uL


 


Neutrophils #   16.3 H   (1.3-7.7)  k/uL


 


Macrocytosis   Marked A   


 


ABG O2 Saturation     (94-97)  %


 


Sodium    148 H  (137-145)  mmol/L


 


Potassium    3.2 L  (3.5-5.1)  mmol/L


 


Chloride    122 H  ()  mmol/L


 


BUN    56 H  (9-20)  mg/dL


 


Glucose    147 H  (74-99)  mg/dL


 


POC Glucose (mg/dL)  183 H    (75-99)  mg/dL


 


Calcium    7.8 L  (8.4-10.2)  mg/dL


 


Total Bilirubin    5.0 H  (0.2-1.3)  mg/dL


 


AST    140 H  (17-59)  U/L


 


ALT    77 H  (4-49)  U/L


 


Alkaline Phosphatase    173 H  ()  U/L


 


Ammonia     (<30)  umol/L


 


Total Protein    4.7 L  (6.3-8.2)  g/dL


 


Albumin    1.9 L  (3.5-5.0)  g/dL


 


Crossmatch     














  07/13/20 07/13/20 07/13/20 Range/Units





  05:03 05:09 05:20 


 


WBC     (3.8-10.6)  k/uL


 


RBC     (4.30-5.90)  m/uL


 


Hgb     (13.0-17.5)  gm/dL


 


Hct     (39.0-53.0)  %


 


MCV     (80.0-100.0)  fL


 


RDW     (11.5-15.5)  %


 


Plt Count     (150-450)  k/uL


 


Neutrophils #     (1.3-7.7)  k/uL


 


Macrocytosis     


 


ABG O2 Saturation   97.4 H   (94-97)  %


 


Sodium     (137-145)  mmol/L


 


Potassium     (3.5-5.1)  mmol/L


 


Chloride     ()  mmol/L


 


BUN     (9-20)  mg/dL


 


Glucose     (74-99)  mg/dL


 


POC Glucose (mg/dL)  159 H    (75-99)  mg/dL


 


Calcium     (8.4-10.2)  mg/dL


 


Total Bilirubin     (0.2-1.3)  mg/dL


 


AST     (17-59)  U/L


 


ALT     (4-49)  U/L


 


Alkaline Phosphatase     ()  U/L


 


Ammonia    59 H  (<30)  umol/L


 


Total Protein     (6.3-8.2)  g/dL


 


Albumin     (3.5-5.0)  g/dL


 


Crossmatch     














  07/13/20 Range/Units





  11:54 


 


WBC   (3.8-10.6)  k/uL


 


RBC   (4.30-5.90)  m/uL


 


Hgb   (13.0-17.5)  gm/dL


 


Hct   (39.0-53.0)  %


 


MCV   (80.0-100.0)  fL


 


RDW   (11.5-15.5)  %


 


Plt Count   (150-450)  k/uL


 


Neutrophils #   (1.3-7.7)  k/uL


 


Macrocytosis   


 


ABG O2 Saturation   (94-97)  %


 


Sodium   (137-145)  mmol/L


 


Potassium   (3.5-5.1)  mmol/L


 


Chloride   ()  mmol/L


 


BUN   (9-20)  mg/dL


 


Glucose   (74-99)  mg/dL


 


POC Glucose (mg/dL)  193 H  (75-99)  mg/dL


 


Calcium   (8.4-10.2)  mg/dL


 


Total Bilirubin   (0.2-1.3)  mg/dL


 


AST   (17-59)  U/L


 


ALT   (4-49)  U/L


 


Alkaline Phosphatase   ()  U/L


 


Ammonia   (<30)  umol/L


 


Total Protein   (6.3-8.2)  g/dL


 


Albumin   (3.5-5.0)  g/dL


 


Crossmatch   








                      Microbiology - Last 24 Hours (Table)











 07/09/20 14:25 Blood Culture - Preliminary





 Blood    No Growth after 72 hours


 


 07/09/20 14:45 Blood Culture - Preliminary





 Blood    No Growth after 72 hours


 


 07/10/20 10:08 Catheter Tip Culture - Final





 Catheter Tip 


 


 07/10/20 10:08 Catheter Tip Culture - Final





 Catheter Tip 














Assessment and Plan


(1) Acute alcoholic hepatitis


Narrative/Plan: 


54-year-old male with known history of alcohol abuse presenting to the hospital 

with altered mental status.  Likely multifactorial given suspected underlying 

decompensated cirrhosis, acute alcoholic hepatitis and concern for aspiration 

pneumonia and sepsis.  Currently receiving treatment in the ICU, he remains on 

broad-spectrum antibiotic therapy and mechanically ventilated.





Patient is status post tracheostomy placement and PEG tube placement.


Current Visit: Yes   Status: Acute   Code(s): K70.10 - ALCOHOLIC HEPATITIS 

WITHOUT ASCITES   SNOMED Code(s): 6223104


   





(2) Liver cirrhosis


Current Visit: Yes   Status: Acute   Code(s): K74.60 - UNSPECIFIED CIRRHOSIS OF 

LIVER   SNOMED Code(s): 68895104


   





(3) Ascites


Current Visit: Yes   Status: Acute   Code(s): R18.8 - OTHER ASCITES   SNOMED 

Code(s): 569080092


   





(4) Hepatic encephalopathy


Current Visit: Yes   Status: Acute   Code(s): K72.90 - HEPATIC FAILURE, 

UNSPECIFIED WITHOUT COMA   SNOMED Code(s): 00131292


   





(5) Bicytopenia


Narrative/Plan: 


Patient anemic and thrombocytopenic likely related to chronic alcohol use.  

Stool testing was positive for blood but patient has no signs or symptoms of GI 

bleeding with normal brown stool noted and fecal management system.  Hemoglobin 

has remained stable, after initial fall secondary to bleeding around the 

tracheostomy site.


Current Visit: Yes   Status: Acute   Code(s): D75.89 - OTHER SPECIFIED DISEASES 

OF BLOOD AND BLOOD-FORMING ORGANS   SNOMED Code(s): 33501415


   


Plan: 





Supportive care


Okay for tube feed through PEG tube


Continue management per intensivist service, patient currently intubated and 

sedated


Continue broad-spectrum antibiotic therapy and antifungal therapy


Continue lactulose and Xifaxan therapy


Currently receiving Lasix twice a day for diuresis


Status post tracheostomy


Alcohol abstinence


Thank you for allowing us to participate in the care of the patient we will 

continue to follow

## 2020-07-14 NOTE — P.PN
Subjective


Progress Note Date: 07/14/20





On today's evaluation of 07/13/2020, the patient is being seen in follow-up.  

This is my first encounter with the patient.  I was able to review the records 

and events that occurred as the patient is currently on a mechanical ventilator 

and the patient has a cystic estimate of in place and a PEG tube in place.  The 

patient has a history of liver cirrhosis.  The patient has been in respiratory 

failure requiring tracheostomy tube insertion and ultimately there was some 

issues with a tracheostomy tube where there was significant amount of bleeding 

from the tracheostomy stoma on 07/11/2020.  The patient was taken to the 

operating room and control of bleeding was done and the patient currently has a 

shock tracheostomy tube in place #8.  Activity was also in place.  The patient 

is sedated with propofol of alcohol the rate of 50 g per KG pigmented.  The 

patient is on normal saline at the rate of 10 mL an hour.  The patient is on 

levo fed at the rate of 03 g per KG pigmented.  The patient is also receiving 

high protein vital for enteral nutrition at the rate of 55 mL an hour.  The 

patient is able to tolerate the tube feeds well.  Nevertheless, he is having 

significant amount of liquidy diarrhea which is large volume and this has 

resulted in development of intravascular volume depletion and electrodes 

imbalance.  Sodium level is up to 148.  He is on assist control mode of 

ventilation at the rate of 12 with tidal volume of 450 and FiO2 of 50% with D5. 

The blood gases from today showed a pH of 7.4 with a pCO2 of 35 and a pO2 of 87.

 Patient had a follow-up chest x-ray today that showed indwelling catheters are 

in place and they're unchanged compared to yesterday.  Pleural effusion is u

nchanged.  There is stable appearance of the cardia mediastinal structures at 

this point in time.  The findings on chest x-rays essentially stable.  He is 

afebrile.  The antibiotic coverage includes Eraxis and infectious diseases on 

the case.  Most recent blood cultures and catheter tip cultures of been 

negative.  The sputum culture was positive for Candida albicans.  There was also

Candida glabrata.  Urine culture was also positive for Candida albicans.  He 

does have significant amount of third spacing and anasarca as the patient has 

scrotal swelling, 5 swelling, lower extremity swelling, upper extremity 

swelling.  He is currently on IV Lasix.  He has been in a positive fluid 

balance.  His weight is currently up to 91 kg and the patient's BMI 31.5.  He is

receiving Lasix 40 mg IV every 12 hours.  The patient is also on Xifaxan 

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  The most recent 

ammonia level was at 59.





On 07/14/2020, the patient remains sedated with propofol.  I gave him a sedation

holiday yesterday.  He was taken off the sedation.  It took him approximately 10

hours to show some recovery of sedation.  Nevertheless, the patient became quite

restless and agitated and he had to be placed back on the respirator to maintain

synchrony with the mechanical ventilator.  On today's evaluation the patient 

remained on assist control mode of ventilation.  He is having double stacking on

his breath.  On today's evaluation is an assist-control at the rate of 12 with a

tidal volume of 450 and FiO2 of 40% with a PEEP of 5.  His blood gas show a pH 

of 7.42 with a pCO2 of 39 and pO2 of 86.  The chest x-ray is showing no interval

change and there are some stable infiltration and small effusion the lung bases 

bilaterally.  The patient is on enteral feeding for nutritional support and he 

is having vital high protein and he is also on water flushes through the NG at 

100 mL every 4 hours.  The patient is also receiving D5 water at the rate of 75 

mL's an hour.  He is currently on propofol at 25 g per KG per minute and levo 

fed is also running at 0.05 g per KG per minute.  The patient is in a positive 

fluid balance of 367 mL..  He is on D5 water at 75 mL an hour.  Sodium level is 

improved and is down to 145.  Provide level is also improving.  Lactulose dose 

has been drop down to 20 mg on a daily basis and the patient is having still 

liquidy stool.  The patient had an ammonia level of 58 is comparable to 

yesterday.  Norepinephrine infusion is running a 0.05 g per KG per minute.





Objective





- Vital Signs


Vital signs: 


                                   Vital Signs











Temp  98.6 F   07/14/20 12:00


 


Pulse  105 H  07/14/20 14:00


 


Resp  27 H  07/14/20 14:00


 


BP  124/61   07/13/20 12:00


 


Pulse Ox  99   07/14/20 14:00








                                 Intake & Output











 07/13/20 07/14/20 07/14/20





 18:59 06:59 18:59


 


Intake Total 9842.909 4951.291 1267.498


 


Output Total 1338 1420 1305


 


Balance -53.205 317.291 -37.502


 


Weight 91.2 kg 91.7 kg 


 


Intake:   


 


   1001 728


 


    0.9  110 80


 


    Dextrose 5% in Water 1, 675 825 600





    000 ml @ 75 mls/hr IV .   





    [id number] RISA Rx#:[id number]   


 


    Normal Saline Pressure 78 66 48





    Bag   


 


  Intake, IV Titration 251.795 117.291 77.498





  Amount   


 


    Norepinephrine 8 mg In 61.665 18.957 33.950





    Sodium Chloride 0.9% 250   





    ml @ 0.05 MCG/KG/MIN 8.   





    872 mls/hr IV .Q24H RISA   





    Rx#:[id number]   


 


    Propofol 1,000 mg In 190.130 98.334 43.548





    Empty Bag 1 bag @ Titrate   





    IV .Q0M RISA Rx#:   





    [id number]   


 


  Tube Feeding 110 319 232


 


  Other  300 230


 


Output:   


 


  Urine 1338 1420 1305


 


  Stool 0  


 


Other:   


 


  Voiding Method Indwelling Catheter Indwelling Catheter Indwelling Catheter








                       ABP, PAP, CO, CI - Last Documented











Arterial Blood Pressure        129/41

















- Exam











GENERAL EXAM: On mechanical ventilator, minimally responsive 54-year-old 

gentleman, comfortable in no apparent distress.  This morning, the patient is 

sedated with propofol.  Tracheostomy tube is in place and there is no signs of 

an acute bleed.  They tracheostomy tube is well sutured to the neck area.


HEAD: Normocephalic.


EYES: Normal reaction of pupils, equal size.


NOSE: Clear with pink turbinates.


THROAT: Tracheostomy tube secured in place.  The patient has a [place] #8 to 

[place] tube in place.


NECK: No masses, no JVD.


CHEST: No chest wall deformity.


LUNGS: Equal air entry with crackles at the bilateral posterior bases.


CVS: S1 and S2 normal with no audible murmur, regular rhythm.


ABDOMEN: PEG tube exit site clean and dry, normal bowel sounds, no guarding or 

rigidity.


SPINE: No scoliosis or deformity


SKIN: No rashes


CENTRAL NERVOUS SYSTEM: Sedated, tone is normal in all 4 extremities.  The 

patient is in the process of obtaining a sedation holiday.


EXTREMITIES:   The patient has significant peripheral edema both in upper and 

lower extremities and the patient has diffuse anasarca in addition to scrotal 

edema.  No cyanosis.  No clubbing.





- Labs


CBC & Chem 7: 


                                 07/14/20 04:35





                                 07/14/20 13:35


Labs: 


                  Abnormal Lab Results - Last 24 Hours (Table)











  07/13/20 07/13/20 07/13/20 Range/Units





  18:35 19:15 23:23 


 


WBC     (3.8-10.6)  k/uL


 


RBC     (4.30-5.90)  m/uL


 


Hgb     (13.0-17.5)  gm/dL


 


Hct     (39.0-53.0)  %


 


MCV     (80.0-100.0)  fL


 


RDW     (11.5-15.5)  %


 


Plt Count     (150-450)  k/uL


 


Neutrophils #     (1.3-7.7)  k/uL


 


Macrocytosis     


 


PT     (9.0-12.0)  sec


 


INR     (<1.2)  


 


ABG Total CO2     (19-24)  mmol/L


 


ABG O2 Saturation     (94-97)  %


 


Potassium   3.1 L   (3.5-5.1)  mmol/L


 


Chloride     ()  mmol/L


 


BUN     (9-20)  mg/dL


 


Glucose     (74-99)  mg/dL


 


POC Glucose (mg/dL)  161 H   163 H  (75-99)  mg/dL


 


Calcium     (8.4-10.2)  mg/dL


 


Total Bilirubin     (0.2-1.3)  mg/dL


 


AST     (17-59)  U/L


 


ALT     (4-49)  U/L


 


Alkaline Phosphatase     ()  U/L


 


Ammonia     (<30)  umol/L


 


Total Protein     (6.3-8.2)  g/dL


 


Albumin     (3.5-5.0)  g/dL














  07/14/20 07/14/20 07/14/20 Range/Units





  04:35 04:35 04:35 


 


WBC  15.0 H    (3.8-10.6)  k/uL


 


RBC  2.17 L    (4.30-5.90)  m/uL


 


Hgb  7.4 L    (13.0-17.5)  gm/dL


 


Hct  22.5 L    (39.0-53.0)  %


 


MCV  103.4 H    (80.0-100.0)  fL


 


RDW  23.0 H    (11.5-15.5)  %


 


Plt Count  62 L    (150-450)  k/uL


 


Neutrophils #  12.1 H    (1.3-7.7)  k/uL


 


Macrocytosis  Marked A    


 


PT    14.8 H  (9.0-12.0)  sec


 


INR    1.5 H  (<1.2)  


 


ABG Total CO2     (19-24)  mmol/L


 


ABG O2 Saturation     (94-97)  %


 


Potassium   2.7 L*   (3.5-5.1)  mmol/L


 


Chloride   120 H   ()  mmol/L


 


BUN   47 H   (9-20)  mg/dL


 


Glucose   141 H   (74-99)  mg/dL


 


POC Glucose (mg/dL)     (75-99)  mg/dL


 


Calcium   7.7 L   (8.4-10.2)  mg/dL


 


Total Bilirubin   4.2 H   (0.2-1.3)  mg/dL


 


AST   121 H   (17-59)  U/L


 


ALT   70 H   (4-49)  U/L


 


Alkaline Phosphatase   169 H   ()  U/L


 


Ammonia     (<30)  umol/L


 


Total Protein   4.6 L   (6.3-8.2)  g/dL


 


Albumin   1.8 L   (3.5-5.0)  g/dL














  07/14/20 07/14/20 07/14/20 Range/Units





  04:45 05:08 06:01 


 


WBC     (3.8-10.6)  k/uL


 


RBC     (4.30-5.90)  m/uL


 


Hgb     (13.0-17.5)  gm/dL


 


Hct     (39.0-53.0)  %


 


MCV     (80.0-100.0)  fL


 


RDW     (11.5-15.5)  %


 


Plt Count     (150-450)  k/uL


 


Neutrophils #     (1.3-7.7)  k/uL


 


Macrocytosis     


 


PT     (9.0-12.0)  sec


 


INR     (<1.2)  


 


ABG Total CO2    26 H  (19-24)  mmol/L


 


ABG O2 Saturation    97.4 H  (94-97)  %


 


Potassium     (3.5-5.1)  mmol/L


 


Chloride     ()  mmol/L


 


BUN     (9-20)  mg/dL


 


Glucose     (74-99)  mg/dL


 


POC Glucose (mg/dL)   165 H   (75-99)  mg/dL


 


Calcium     (8.4-10.2)  mg/dL


 


Total Bilirubin     (0.2-1.3)  mg/dL


 


AST     (17-59)  U/L


 


ALT     (4-49)  U/L


 


Alkaline Phosphatase     ()  U/L


 


Ammonia  58 H    (<30)  umol/L


 


Total Protein     (6.3-8.2)  g/dL


 


Albumin     (3.5-5.0)  g/dL














  07/14/20 07/14/20 Range/Units





  11:52 13:35 


 


WBC    (3.8-10.6)  k/uL


 


RBC    (4.30-5.90)  m/uL


 


Hgb    (13.0-17.5)  gm/dL


 


Hct    (39.0-53.0)  %


 


MCV    (80.0-100.0)  fL


 


RDW    (11.5-15.5)  %


 


Plt Count    (150-450)  k/uL


 


Neutrophils #    (1.3-7.7)  k/uL


 


Macrocytosis    


 


PT    (9.0-12.0)  sec


 


INR    (<1.2)  


 


ABG Total CO2    (19-24)  mmol/L


 


ABG O2 Saturation    (94-97)  %


 


Potassium   2.8 L  (3.5-5.1)  mmol/L


 


Chloride    ()  mmol/L


 


BUN    (9-20)  mg/dL


 


Glucose    (74-99)  mg/dL


 


POC Glucose (mg/dL)  168 H   (75-99)  mg/dL


 


Calcium    (8.4-10.2)  mg/dL


 


Total Bilirubin    (0.2-1.3)  mg/dL


 


AST    (17-59)  U/L


 


ALT    (4-49)  U/L


 


Alkaline Phosphatase    ()  U/L


 


Ammonia    (<30)  umol/L


 


Total Protein    (6.3-8.2)  g/dL


 


Albumin    (3.5-5.0)  g/dL








                      Microbiology - Last 24 Hours (Table)











 07/09/20 14:25 Blood Culture - Preliminary





 Blood    No Growth after 96 hours


 


 07/09/20 14:45 Blood Culture - Preliminary





 Blood    No Growth after 96 hours














Assessment and Plan


Plan: 








1 Acute hypoxic respiratory failure, suspect aspiration pneumonia, inability to 

wean, status post tracheostomy tube placement post PEG tube placement.  On 

07/11/2020 the patient developed significant bleeding and from the tracheostomy 

tube site.  He was taken to the operating room and had a tracheostomy tube 

change out along with control of the bleeding vessels.  The patient is 

adequately ventilated for now.  Chest x-ray was noted.  Blood gases was noted.  

No signs of any acute bleeds from the tracheostomy stoma.  Meanwhile, it was 

noted that the patient was having double stacking on his breasts and for that 

reason he was switched to an SIMV mode with a pressure support.  This improved 

his breath stacking.





2  Acute anemia secondary to above currently receiving his second unit of packed

red blood cells and he did receive 4 units of fresh frozen plasma and 5 units of

platelets .  The patient's hemoglobin stable at 7.4





3 hypotension/shock.  This is likely secondary to intravascular volume depletion

time of bleeds.  Septic shock cannot be completely ruled out is another 

contributing factor.  Hemodynamically more stable and the patient is only on 

0.05 g per KG per minute of norepinephrine infusion as will be gradually weaned

off.





4 Alcohol liver disease with ascites.  Jaundice, and elevated liver enzymes and 

stigmata of chronic liver failure with anasarca.  The MRI level is elevated at 

58  Patient is currently on a combination of Xifaxan and lactulose.  The 

lactulose dose of the modified and the patient is having some liquidy loose 

bowel movements.





5 hypernatremia secondary to aggressive use of lactulose and intravascular 

volume depletion with third spacing, the patient is on water flushes and D5 

water with IV and the sodium level is improved significantly.





6 chronic hepatic encephalopathy with elevated ammonia level which is improving 

his level is down to 58, failed a sedation holiday yesterday.





7 Recent history of fall about 2 months ago, and multiple right-sided rib 

fractures.





8History of alcoholic neuropathy.





9 History of pericarditis





10 Chronic back pain





11 History of restless leg syndrome.





12 Acute kidney injury secondary to sepsis and intravascular volume depletion, 

creatinine is improving is down to 0.96





13 hyperchloremic hypernatremia secondary to above, improving





Plan





Sedation holiday will be given again today


Assessment mentation periodically


Continue vent support and switch this patient an SIMV mode at the rate of 6 and 

the patient was given pressure support at 10 with an FiO2 of 40%.


Continue D5 water at the rate of 75 mL an hour and discontinue the free water 

flushes with NG 


Monitor sodium level


Continue XifaxanAnd lactulose for hepatic encephalopathy.   


Continue current antibiotic coverage


Enteral feeding with vital high protein at the rate of 55 mL an hour


Watch for any signs of bleeding from the tracheostomy stoma


We'll continue to follow


Critically care evaluation was done and more than 30 minutes.


Time with Patient: Greater than 30

## 2020-07-14 NOTE — P.PN
Subjective


Progress Note Date: 07/14/20





This is a 54-year-old gentleman, history of polysubstance abuse including 

alcohol abuse , falls with recent rib fractures, legally blind, osteoarthritis 

and multiple other medical issues presented to the ER with changes in mental 

status 2 days.  Drug screen positive for tricyclics and benzos, serum alcohol 

less than 10.  UA reported dark brown cloudy urine with occasional bacteria, 

hyaline casts, 2 WBCs, trace leukocytes, 4+ bilirubin, negative for nitrates. 

Ammonia level 91, T bili 14.9, currently 15.8 , elevated LFTs .Gallbladder 

ultrasound reporting abdominal ascites, abdominal x-ray nonacute, right foot x-

ray reported no fracture, soft tissue swelling, EKG reported sinus tachycardia, 

troponin normal, , echo reported normal LV function, EF 60-65% ,lactic 

acid 2.1 now down to 1.7, BUN 25, creatinine 0.8 chest x-ray reporting moderate 

pulmonary interstitial and airspace edema significantly worse than yesterday, 

pulmonary edema.  ABGs noted.  INR on admission 3.7, down to 2.2 Sepsis 

protocol, Received IV fluid resuscitation, IV antibiotics, cultures drawn.  

Developed respiratory failure, requiring intubation during the night.  Currently

on Sandostatin drip.  3 maroon stools during the night.  Received 4 units of FFP

and 2 units of packed RBCs to date.  Hemoglobin currently 8.5.  And had required

pressor support with Levophed off since 06 100.  Maintained on IV fluid 

resuscitation.  Telemetry sinus rhythm.  Potassium 3.2, magnesium 1.7. 








06/30/2020 chest x-ray reporting persistent bilateral scattered airspace 

infiltrates, pleural effusion unchanged.  Ventilator dependent, on FiO2 of 50 

with PEEP increased to 12.  Continues on lactulose with ammonia down to 80.  T 

bili decreased to 11.3, LFTs improving.  No further maroon stools.  Small black 

stool this morning.  Hemoglobin 8.6.  Telemetry sinus rhythm to sinus tach.  

Sandostatin drip discontinued this morning.  Last night patient asynchronous 

with vent, stacking breaths, Nimbex drip initiated.  This morning Nimbex 

discontinued, changed to fentanyl drip.  Requiring pressor support, Levophed 

resumed.  Received vitamin K yesterday, INR 2.1.  Marginal urine output, IV 

fluids decreased, and Lasix IV push added to med regime.  Tube feedings ordered.

 Afebrile, WBC 14.7.  Sputum culture pending, preliminary blood cultures 

negative at 48 hours.  ABGs noted.








07/01/2020  yesterday Lasix and Aldactone initiated, creatinine mildly worsened 

up to 1.32.  Ammonia increased up to 89, lactulose increased.  No further maroon

stools, hemoglobin increased to 9.4, platelets increased to 75.  T bili trending

down, 8.9, LFTs improving.  Potassium 3.4.  Chest x-ray reporting improving left

lower lobe aeration.  Right foot evaluated by orthopedic surgery, possible fluid

collection, seroma with potential x-ray tomorrow.  Tolerating tube feeds with 

minimal to no residuals, currently at 30 mls per hour with goal of 45.  IV 

steroids added to med regimen with blood sugars increasing.  Remains vent 

dependent with FiO2 at 50/+12 of PEEP.  Continues on Levophed, fentanyl, 

diprovan drips.  Afebrile, T-max 99.5, WBC 14.1.











07/02/2020 ABGs unchanged,remains vent dependent, FiO2 50/+12 PEEP.  Chest x-ray

reporting improvement.  Maintained on both Xifaxan & Lactulose, ammonia 

increasing, beginning to stool.  Creatinine trending up 1.4.  Hemoglobin 

decreased to 8.5.  INR 1.7 Levophed weaned off this morning.  Attempted a 

sedation holiday for about an hour this morning; patient did not wake up, became

agitated, tachycardic, tachypneic with respiratory rate up into the 40s, 

diprovan/fentanyl drips resumed.  Tolerating tube feeds at 40 with goal of 

45/minimal to no residual.  Orthopedics discussing x-ray and foot possibly 

tomorrow.  Afebrile WBC down to 11.











07/08/2020 patient was trached yesterday, remains vent dependent with FiO2 

45%/+5 of PEEP.  Chest x-ray suggestive of possible left lower lobe pneumonia, v

ersus atelectasis, possible interstitial disease. Scheduled for PEG tube 

placement today.  To date, patient has not tolerated sedation holidays, not 

following commands, becomes agitated, and becomes asynchronous with the patient.

 Neurology consulted, EEG completed, results pending.  Maintain on D5W with 

improvement in sodium, Na 146.  Currently sedated on Diprovan gtt. telemetry 

sinus rhythm.  Ammonia level LII on lactulose and Xifaxan.








07/09/2020  EGD with PEG tube placement completed at bedside this morning, 

tolerated procedure well.  Like gastroesophageal reflux reported along the 

anterior abdominal wall.  Evaluated by neurology with recommendations noted and 

appreciated.  EEG completed yesterday reporting abnormal, background slowing 

,suggestive of generalized cerebral dysfunction seen with toxic metabolic 

encephalopathy related to diffuse structural brain abnormality, no obvious 

epileptiform activity seen.  Afebrile, WBC continues trending up, currently 

24.8, recultured.  During sedation holiday patient noted to spontaneously move 

his right hand, half open his eyes responding to his name.  Diprovan has been 

weaned off this morning.  Remains vent dependent with FiO2 40%/+5 of PEEP.  

Chest x-ray reporting significant interval increase in left basilar airspace 

disease, possible infectious or aspiration pneumonitis. Ammonia level 42.  

Receiving potassium supplements for potassium 2.8.  Sodium continues improving, 

138.











07/10/2020 Remains off sedation X 24 hrs, sensorium slowly improving.  Following

simple commands ;Opens eyes to name, shakes head yes and no to simple questions.

 Sinus tachycardia with heart rate in the 110s. Chest x-ray reporting persistent

left lower lobe infiltrate, IV push Lasix every 12h initiated. Vent dependent, 

maintained on FiO2 40%/+5 of PEEP.  Significant anasarca. Yesterday PEG tube 

placed, did not receive any lactulose-current ammonia level 45.  Bleeding around

tracheostomy site, controlled with multiple packs of gauze, DDAVP.  Hemoglobin 

8.6, platelets 37 New central line and art line placed.  Levophed currently off.

 T-max 99.7, WBC unchanged 24.6, recultured yesterday, cultures pending.Na 140.











07/13/2020 remains vent dependent with FiO2 50%/+5 of PEEP.. Staff reports 

patient following simple commands throughout the weekend when off sedation.  

Currently maintained on Levophed, diprovan, IV fluids of D5W and Eraxis.  Re

cultured last week, T-max  99.9,   WBC trending down ,20.3.  Tachycardic in the 

one-teens to 120s.  Hemoglobin 7.9 , platelets 64 .Sodium 148.  Potassium 3.2, 

receiving potassium supplements.  Diuresing well on Lasix IV push with 24-hour 

I&O reflecting a negative fluid balance.  Significant stool output/FMS, ammonia 

level LIX, lactulose decreased.  Chest x-ray reporting stable.  Creatinine 1.17.








07/14/2020   remains vent dependent, FiO2 down to 40%/+5 of PEEP currently on 

CPAP/SIMV setting.  Levophed weaned off. Diprovan recently weaned off this 

morning.  Maintained on D5W with sodium down to 145.  Lactulose decreased 

yesterday with ammonia level LVIII.  Chest x-ray reporting possible left lower 

lobe infiltrate/small pleural effusion.  Afebrile, WBC down to 14.  Mild 

tachycardia, hemoglobin decreased to 7.4.  Potassium 2.8.  BUN 47 creatinine 

0.96.  Diuresing on Lasix 40 IV push.





Objective





- Vital Signs


Vital signs: 


                                   Vital Signs











Temp  98.6 F   07/14/20 12:00


 


Pulse  105 H  07/14/20 14:00


 


Resp  27 H  07/14/20 14:00


 


BP  124/61   07/13/20 12:00


 


Pulse Ox  99   07/14/20 14:00








                                 Intake & Output











 07/13/20 07/14/20 07/14/20





 18:59 06:59 18:59


 


Intake Total 1876.719 1872.291 1267.498


 


Output Total 1338 1420 1305


 


Balance -53.205 317.291 -37.502


 


Weight 91.2 kg 91.7 kg 


 


Intake:   


 


   1001 728


 


    0.9  110 80


 


    Dextrose 5% in Water 1, 675 825 600





    000 ml @ 75 mls/hr IV .   





    [id number] RISA Rx#:[id number]   


 


    Normal Saline Pressure 78 66 48





    Bag   


 


  Intake, IV Titration 251.795 117.291 77.498





  Amount   


 


    Norepinephrine 8 mg In 61.665 18.957 33.950





    Sodium Chloride 0.9% 250   





    ml @ 0.05 MCG/KG/MIN 8.   





    872 mls/hr IV .Q24H RISA   





    Rx#:[id number]   


 


    Propofol 1,000 mg In 190.130 98.334 43.548





    Empty Bag 1 bag @ Titrate   





    IV .Q0M RISA Rx#:   





    [id number]   


 


  Tube Feeding 110 319 232


 


  Other  300 230


 


Output:   


 


  Urine 1338 1420 1305


 


  Stool 0  


 


Other:   


 


  Voiding Method Indwelling Catheter Indwelling Catheter Indwelling Catheter








                       ABP, PAP, CO, CI - Last Documented











Arterial Blood Pressure        129/41

















- Exam


VITAL SIGNS: As above


GENERAL: Sitting up in bed, intubated, sedated, positive anasarca


HEENT:  Conjunctivae normal.  Positive icterus, jaundice


NECK:  No JVD. No thyroid enlargement. No LNs.  Midline tracheostomy


CARDIOVASCULAR:  S1, S2 regular.  Improving Tachycardia, Systolic murmur,


RESPIRATION: Clear, with no rhonchi, fine bibasilar crackles, no wheezing.


ABDOMEN:  Soft, distended, positive bowel sounds.  PEG tube present. Scrotal 

edema


Extremities: Positive upper and lower extremity edema, no clubbing, no cyanosis.

Right foot dorsal edema with ecchymosis wearing boot.


NERVOUS SYSTEM: Unable to assess, on mechanical ventilation, sedated


Skin: Warm and dry, no rash 








                                  Microbiology





07/09/20 14:25   Blood   Blood Culture - Preliminary


                            No Growth after 96 hours


07/09/20 14:45   Blood   Blood Culture - Preliminary


                            No Growth after 96 hours


07/10/20 10:08   Catheter Tip   Catheter Tip Culture - Final


07/10/20 10:08   Catheter Tip   Catheter Tip Culture - Final


07/09/20 11:00   Sputum   Gram Stain - Final


07/09/20 11:00   Sputum   Sputum Culture - Final


                            Cassia albicans


                            Cassia glabrata


07/09/20 10:10   Urine,Catheterized   Urine Culture - Final


                            Cassia albicans


06/28/20 14:13   Blood   Blood Culture - Final


                            No Growth after 144 hours


06/29/20 03:46   Sputum   Gram Stain - Final


06/29/20 03:46   Sputum   Sputum Culture - Final











- Labs


CBC & Chem 7: 


                                 07/14/20 04:35





                                 07/14/20 13:35


Labs: 


                  Abnormal Lab Results - Last 24 Hours (Table)











  07/13/20 07/13/20 07/13/20 Range/Units





  18:35 19:15 23:23 


 


WBC     (3.8-10.6)  k/uL


 


RBC     (4.30-5.90)  m/uL


 


Hgb     (13.0-17.5)  gm/dL


 


Hct     (39.0-53.0)  %


 


MCV     (80.0-100.0)  fL


 


RDW     (11.5-15.5)  %


 


Plt Count     (150-450)  k/uL


 


Neutrophils #     (1.3-7.7)  k/uL


 


Macrocytosis     


 


PT     (9.0-12.0)  sec


 


INR     (<1.2)  


 


ABG Total CO2     (19-24)  mmol/L


 


ABG O2 Saturation     (94-97)  %


 


Potassium   3.1 L   (3.5-5.1)  mmol/L


 


Chloride     ()  mmol/L


 


BUN     (9-20)  mg/dL


 


Glucose     (74-99)  mg/dL


 


POC Glucose (mg/dL)  161 H   163 H  (75-99)  mg/dL


 


Calcium     (8.4-10.2)  mg/dL


 


Total Bilirubin     (0.2-1.3)  mg/dL


 


AST     (17-59)  U/L


 


ALT     (4-49)  U/L


 


Alkaline Phosphatase     ()  U/L


 


Ammonia     (<30)  umol/L


 


Total Protein     (6.3-8.2)  g/dL


 


Albumin     (3.5-5.0)  g/dL














  07/14/20 07/14/20 07/14/20 Range/Units





  04:35 04:35 04:35 


 


WBC  15.0 H    (3.8-10.6)  k/uL


 


RBC  2.17 L    (4.30-5.90)  m/uL


 


Hgb  7.4 L    (13.0-17.5)  gm/dL


 


Hct  22.5 L    (39.0-53.0)  %


 


MCV  103.4 H    (80.0-100.0)  fL


 


RDW  23.0 H    (11.5-15.5)  %


 


Plt Count  62 L    (150-450)  k/uL


 


Neutrophils #  12.1 H    (1.3-7.7)  k/uL


 


Macrocytosis  Marked A    


 


PT    14.8 H  (9.0-12.0)  sec


 


INR    1.5 H  (<1.2)  


 


ABG Total CO2     (19-24)  mmol/L


 


ABG O2 Saturation     (94-97)  %


 


Potassium   2.7 L*   (3.5-5.1)  mmol/L


 


Chloride   120 H   ()  mmol/L


 


BUN   47 H   (9-20)  mg/dL


 


Glucose   141 H   (74-99)  mg/dL


 


POC Glucose (mg/dL)     (75-99)  mg/dL


 


Calcium   7.7 L   (8.4-10.2)  mg/dL


 


Total Bilirubin   4.2 H   (0.2-1.3)  mg/dL


 


AST   121 H   (17-59)  U/L


 


ALT   70 H   (4-49)  U/L


 


Alkaline Phosphatase   169 H   ()  U/L


 


Ammonia     (<30)  umol/L


 


Total Protein   4.6 L   (6.3-8.2)  g/dL


 


Albumin   1.8 L   (3.5-5.0)  g/dL














  07/14/20 07/14/20 07/14/20 Range/Units





  04:45 05:08 06:01 


 


WBC     (3.8-10.6)  k/uL


 


RBC     (4.30-5.90)  m/uL


 


Hgb     (13.0-17.5)  gm/dL


 


Hct     (39.0-53.0)  %


 


MCV     (80.0-100.0)  fL


 


RDW     (11.5-15.5)  %


 


Plt Count     (150-450)  k/uL


 


Neutrophils #     (1.3-7.7)  k/uL


 


Macrocytosis     


 


PT     (9.0-12.0)  sec


 


INR     (<1.2)  


 


ABG Total CO2    26 H  (19-24)  mmol/L


 


ABG O2 Saturation    97.4 H  (94-97)  %


 


Potassium     (3.5-5.1)  mmol/L


 


Chloride     ()  mmol/L


 


BUN     (9-20)  mg/dL


 


Glucose     (74-99)  mg/dL


 


POC Glucose (mg/dL)   165 H   (75-99)  mg/dL


 


Calcium     (8.4-10.2)  mg/dL


 


Total Bilirubin     (0.2-1.3)  mg/dL


 


AST     (17-59)  U/L


 


ALT     (4-49)  U/L


 


Alkaline Phosphatase     ()  U/L


 


Ammonia  58 H    (<30)  umol/L


 


Total Protein     (6.3-8.2)  g/dL


 


Albumin     (3.5-5.0)  g/dL














  07/14/20 07/14/20 Range/Units





  11:52 13:35 


 


WBC    (3.8-10.6)  k/uL


 


RBC    (4.30-5.90)  m/uL


 


Hgb    (13.0-17.5)  gm/dL


 


Hct    (39.0-53.0)  %


 


MCV    (80.0-100.0)  fL


 


RDW    (11.5-15.5)  %


 


Plt Count    (150-450)  k/uL


 


Neutrophils #    (1.3-7.7)  k/uL


 


Macrocytosis    


 


PT    (9.0-12.0)  sec


 


INR    (<1.2)  


 


ABG Total CO2    (19-24)  mmol/L


 


ABG O2 Saturation    (94-97)  %


 


Potassium   2.8 L  (3.5-5.1)  mmol/L


 


Chloride    ()  mmol/L


 


BUN    (9-20)  mg/dL


 


Glucose    (74-99)  mg/dL


 


POC Glucose (mg/dL)  168 H   (75-99)  mg/dL


 


Calcium    (8.4-10.2)  mg/dL


 


Total Bilirubin    (0.2-1.3)  mg/dL


 


AST    (17-59)  U/L


 


ALT    (4-49)  U/L


 


Alkaline Phosphatase    ()  U/L


 


Ammonia    (<30)  umol/L


 


Total Protein    (6.3-8.2)  g/dL


 


Albumin    (3.5-5.0)  g/dL








                      Microbiology - Last 24 Hours (Table)











 07/09/20 14:25 Blood Culture - Preliminary





 Blood    No Growth after 96 hours


 


 07/09/20 14:45 Blood Culture - Preliminary





 Blood    No Growth after 96 hours














Assessment and Plan


Assessment: 


Sepsis secondary to aspiration bilateral pneumonia, possibly abdominal source,


Acute hypoxic respiratory failure,  related to the above,  ventilator dependent,

failure to wean, status post tracheostomy.


tracheostomy site postop bleeding,unexpected,status post DDAVP , witih 

tracheostomy removal and replacement, requiring sutures and electrocautery.


Septic and hypovolemic shock multifactorial, secondary to bilateral aspiration 

pneumonia, alcoholic liver disease


Septic shock status post pressor support


Hypotension, secondary to the above, pressor support dependent secondary to 

sepsis


Acute renal failure multifactorial, secondary to all the above, diuretics


Acute GI bleed, possible esophageal varices in a patient with significant 

alcohol dependence,  status post Sandostatin


Acute blood loss anemia, status post multiple transfusions of both FFP, packed 

RBCs


Acute Hepatic encephalopathy 


Acute toxic, metabolic encephalopathy


Alcoholic liver disease with ascites


Hyperammonia


Recent posterior lateral rib fractures of 9, 10 and 11 status post fall


History of pericarditis


Chronic back pain


Polysubstance abuse, per record review; patient reports alcohol, marijuana use 

currently.


History of nicotine dependence


COPD, emphysema


Legally blind


Obesity, BMI 30.7


Osteoarthritis


Venous insufficiency


Anxiety, depression, history of, currently controlled


Hyperglycemia, steroid-induced


Moderate protein calorie malnutrition


Hypernatremia, hyperchloremic secondary free water deficit, intravascular volume

depletion


Status post PEG tube placement


Hypokalemia














Plan: Continue on current medication regime ,monitoring and symptomatic 

treatment. Potential sedation holiday today as per intensivist.  Maintain on 

lactulose and Xifaxan.  Hemoglobin down to 7.4 possibly transfuse 1 unit if okay

with intensivist.  Potassium supplementation as per ICU replacement protocol.  

Diuretics. [facility] specialty reevaluation in progress.  Prognosis guarded given 

multiple complex medical issues.














The impression and plan of care has been dictated as directed.





:


I performed a history and examination of this patient,  discussed the same with 

the dictator.  I agree with the dictator's note ,documented as a scribe.  Any 

additional findings or plans will be noted.

## 2020-07-14 NOTE — XR
EXAMINATION TYPE: XR chest 1V portable

 

DATE OF EXAM: 7/14/2020

 

COMPARISON: 7/13/2020

 

INDICATION: Tracheostomy

 

TECHNIQUE: Single frontal view of the chest is obtained. Position is semiupright

 

FINDINGS:  

The heart size is normal.  

The pulmonary vasculature is slightly prominent.  

Left lower lobe infiltrate may be present. There is silhouetting the left diaphragm. Small effusion m
ay be present.  

Tracheostomy tube is identified. Left central venous catheter tip is within the proximal right atrium


 

IMPRESSION:  

1. Left lower lobe infiltrate and/or small left pleural effusion.

2. Lines and catheters discussed above

## 2020-07-15 VITALS — HEART RATE: 112 BPM | DIASTOLIC BLOOD PRESSURE: 58 MMHG | SYSTOLIC BLOOD PRESSURE: 111 MMHG

## 2020-07-15 VITALS — TEMPERATURE: 100 F

## 2020-07-15 VITALS — RESPIRATION RATE: 22 BRPM

## 2020-07-15 LAB
ALBUMIN SERPL-MCNC: 1.8 G/DL (ref 3.5–5)
ALP SERPL-CCNC: 162 U/L (ref 38–126)
ALT SERPL-CCNC: 76 U/L (ref 4–49)
ANION GAP SERPL CALC-SCNC: 2 MMOL/L
AST SERPL-CCNC: 135 U/L (ref 17–59)
BASOPHILS # BLD AUTO: 0 K/UL (ref 0–0.2)
BASOPHILS NFR BLD AUTO: 0 %
BUN SERPL-SCNC: 39 MG/DL (ref 9–20)
CALCIUM SPEC-MCNC: 7.7 MG/DL (ref 8.4–10.2)
CHLORIDE SERPL-SCNC: 117 MMOL/L (ref 98–107)
CO2 BLDA-SCNC: 28 MMOL/L (ref 19–24)
CO2 SERPL-SCNC: 27 MMOL/L (ref 22–30)
EOSINOPHIL # BLD AUTO: 0.2 K/UL (ref 0–0.7)
EOSINOPHIL NFR BLD AUTO: 2 %
ERYTHROCYTE [DISTWIDTH] IN BLOOD BY AUTOMATED COUNT: 2.22 M/UL (ref 4.3–5.9)
ERYTHROCYTE [DISTWIDTH] IN BLOOD: 22.6 % (ref 11.5–15.5)
GLUCOSE BLD-MCNC: 165 MG/DL (ref 75–99)
GLUCOSE BLD-MCNC: 169 MG/DL (ref 75–99)
GLUCOSE SERPL-MCNC: 147 MG/DL (ref 74–99)
HCO3 BLDA-SCNC: 27 MMOL/L (ref 21–25)
HCT VFR BLD AUTO: 23 % (ref 39–53)
HGB BLD-MCNC: 7.4 GM/DL (ref 13–17.5)
INR PPP: 1.5 (ref ?–1.2)
LYMPHOCYTES # SPEC AUTO: 1.5 K/UL (ref 1–4.8)
LYMPHOCYTES NFR SPEC AUTO: 12 %
MCH RBC QN AUTO: 33.4 PG (ref 25–35)
MCHC RBC AUTO-ENTMCNC: 32.2 G/DL (ref 31–37)
MCV RBC AUTO: 103.7 FL (ref 80–100)
MONOCYTES # BLD AUTO: 0.5 K/UL (ref 0–1)
MONOCYTES NFR BLD AUTO: 4 %
NEUTROPHILS # BLD AUTO: 10.2 K/UL (ref 1.3–7.7)
NEUTROPHILS NFR BLD AUTO: 81 %
PCO2 BLDA: 33 MMHG (ref 35–45)
PH BLDA: 7.52 [PH] (ref 7.35–7.45)
PLATELET # BLD AUTO: 69 K/UL (ref 150–450)
PO2 BLDA: 69 MMHG (ref 83–108)
POTASSIUM SERPL-SCNC: 3 MMOL/L (ref 3.5–5.1)
PROT SERPL-MCNC: 4.9 G/DL (ref 6.3–8.2)
PT BLD: 15.1 SEC (ref 9–12)
SODIUM SERPL-SCNC: 146 MMOL/L (ref 137–145)
WBC # BLD AUTO: 12.6 K/UL (ref 3.8–10.6)

## 2020-07-15 RX ADMIN — POTASSIUM BICARBONATE SCH MEQ: 782 TABLET, EFFERVESCENT ORAL at 06:46

## 2020-07-15 RX ADMIN — POTASSIUM CHLORIDE SCH MLS/HR: 14.9 INJECTION, SOLUTION INTRAVENOUS at 15:02

## 2020-07-15 RX ADMIN — IPRATROPIUM BROMIDE AND ALBUTEROL SULFATE SCH ML: .5; 3 SOLUTION RESPIRATORY (INHALATION) at 09:07

## 2020-07-15 RX ADMIN — NOREPINEPHRINE BITARTRATE SCH: 1 INJECTION, SOLUTION, CONCENTRATE INTRAVENOUS at 01:04

## 2020-07-15 RX ADMIN — IPRATROPIUM BROMIDE AND ALBUTEROL SULFATE SCH ML: .5; 3 SOLUTION RESPIRATORY (INHALATION) at 01:51

## 2020-07-15 RX ADMIN — CHLORHEXIDINE GLUCONATE SCH ML: 1.2 RINSE ORAL at 08:15

## 2020-07-15 RX ADMIN — IPRATROPIUM BROMIDE AND ALBUTEROL SULFATE SCH ML: .5; 3 SOLUTION RESPIRATORY (INHALATION) at 16:18

## 2020-07-15 RX ADMIN — LACTULOSE SCH GM: 20 SOLUTION ORAL at 08:15

## 2020-07-15 RX ADMIN — IPRATROPIUM BROMIDE AND ALBUTEROL SULFATE SCH ML: .5; 3 SOLUTION RESPIRATORY (INHALATION) at 04:11

## 2020-07-15 RX ADMIN — INSULIN ASPART SCH UNIT: 100 INJECTION, SOLUTION INTRAVENOUS; SUBCUTANEOUS at 12:26

## 2020-07-15 RX ADMIN — PANTOPRAZOLE SODIUM SCH MG: 40 INJECTION, POWDER, FOR SOLUTION INTRAVENOUS at 08:13

## 2020-07-15 RX ADMIN — POTASSIUM CHLORIDE SCH MLS/HR: 14.9 INJECTION, SOLUTION INTRAVENOUS at 01:11

## 2020-07-15 RX ADMIN — IPRATROPIUM BROMIDE AND ALBUTEROL SULFATE SCH ML: .5; 3 SOLUTION RESPIRATORY (INHALATION) at 12:09

## 2020-07-15 RX ADMIN — FUROSEMIDE SCH MG: 10 INJECTION, SOLUTION INTRAMUSCULAR; INTRAVENOUS at 08:40

## 2020-07-15 RX ADMIN — POTASSIUM BICARBONATE SCH MEQ: 782 TABLET, EFFERVESCENT ORAL at 05:59

## 2020-07-15 RX ADMIN — INSULIN ASPART SCH UNIT: 100 INJECTION, SOLUTION INTRAVENOUS; SUBCUTANEOUS at 05:36

## 2020-07-15 NOTE — P.DS
Providers


Date of admission: 


06/28/20 16:12





Expected date of discharge: 07/15/20


Attending physician: 


Reid Stromberg





Consults: 





                                        





06/28/20 16:12


Consult Physician Stat 


   Consulting Provider: Danelle Goodson


   Consult Reason/Comments: critical care


   Do you want consulting provider notified?: Already Contacted


Consult Physician Urgent 


   Consulting Provider: Sallie Heard


   Consult Reason/Comments: liver failure, hepatic encephalopathy, gi hemorrhage


   Do you want consulting provider notified?: Yes





06/30/20 10:46


Consult Physician Urgent 


   Consulting Provider: Kannan Farley


   Consult Reason/Comments: right foot, possible broken


   Do you want consulting provider notified?: Yes





07/06/20 09:47


Consult Physician Routine 


   Consulting Provider: Brown Alvarado


   Consult Reason/Comments: tracheostomy and peg tube placement


   Do you want consulting provider notified?: Yes





07/07/20 12:26


Consult Physician Routine 


   Consulting Provider: Celina Velasquez


   Consult Reason/Comments: poor neuro status on mechanical ventilator, not 

waking up on


                                                holidays


   Do you want consulting provider notified?: Yes











Primary care physician: 


[facility] Course: 


Final Diagnoses:


Sepsis secondary to aspiration bilateral pneumonia


Acute hypoxic respiratory failure,  related to the above,  ventilator dependent,

failure to wean, status post tracheostomy.


tracheostomy site postop bleeding,unexpected,status post DDAVP , witih 

tracheostomy removal and replacement, requiring sutures and electrocautery.


Septic and hypovolemic shock multifactorial, secondary to bilateral aspiration 

pneumonia, alcoholic liver disease


Septic shock status post pressor support


Hypotension, secondary to the above, status post pressor support dependent 

secondary to sepsis


Acute renal failure multifactorial, secondary to all the above, diuretics


Acute GI bleed, possible esophageal varices in a patient with significant 

alcohol dependence,  status post Sandostatin


Acute blood loss anemia, status post multiple transfusions of both FFP, packed 

RBCs


Acute Hepatic encephalopathy 


Acute toxic, metabolic encephalopathy


Alcoholic liver disease with ascites


Hyperammonia


Recent posterior lateral rib fractures of 9, 10 and 11 status post fall


History of pericarditis


Chronic back pain


Polysubstance abuse, per record review; patient reports alcohol, marijuana use 

currently.


History of nicotine dependence


COPD, emphysema


Legally blind


Obesity, BMI 30.7


Osteoarthritis


Venous insufficiency


Anxiety, depression, history of, currently controlled


Hyperglycemia, steroid-induced


Moderate protein calorie malnutrition


Hypernatremia, hyperchloremic secondary free water deficit, intravascular volume

depletion


Status post PEG tube placement


Hypokalemia











Hospital course:This is a 54-year-old gentleman, history of polysubstance abuse 

including alcohol abuse , falls with recent rib fractures, legally blind, 

osteoarthritis and multiple other medical issues presented to the ER with 

changes in mental status 2 days.  Drug screen positive for tricyclics and 

benzos, serum alcohol less than 10.  UA reported dark brown cloudy urine with 

occasional bacteria, hyaline casts, 2 WBCs, trace leukocytes, 4+ bilirubin, 

negative for nitrates. Ammonia level 91, T bili 14.9, currently 15.8 , elevated 

LFTs .Gallbladder ultrasound reporting abdominal ascites, abdominal x-ray 

nonacute, right foot x-ray reported no fracture, soft tissue swelling, EKG 

reported sinus tachycardia, troponin normal, , echo reported normal LV 

function, EF 60-65% ,lactic acid 2.1 now down to 1.7, BUN 25, creatinine 0.8 

chest x-ray reporting moderate pulmonary interstitial and airspace edema 

significantly worse than yesterday, pulmonary edema.  ABGs noted.  INR on 

admission 3.7, down to 2.2 Sepsis protocol, Received IV fluid resuscitation, IV 

antibiotics, cultures drawn.  Developed respiratory failure, requiring 

intubation during the night.  Currently on Sandostatin drip.  3 maroon stools 

during the night.  Received 4 units of FFP and 2 units of packed RBCs to date.  

Hemoglobin currently 8.5.  And had required pressor support with Levophed off 

since 06 100.  Maintained on IV fluid resuscitation.  Telemetry sinus rhythm.  

Potassium 3.2, magnesium 1.7. 








06/30/2020 chest x-ray reporting persistent bilateral scattered airspace 

infiltrates, pleural effusion unchanged.  Ventilator dependent, on FiO2 of 50 

with PEEP increased to 12.  Continues on lactulose with ammonia down to 80.  T 

bili decreased to 11.3, LFTs improving.  No further maroon stools.  Small black 

stool this morning.  Hemoglobin 8.6.  Telemetry sinus rhythm to sinus tach.  

Sandostatin drip discontinued this morning.  Last night patient asynchronous 

with vent, stacking breaths, Nimbex drip initiated.  This morning Nimbex 

discontinued, changed to fentanyl drip.  Requiring pressor support, Levophed 

resumed.  Received vitamin K yesterday, INR 2.1.  Marginal urine output, IV 

fluids decreased, and Lasix IV push added to med regime.  Tube feedings ordered.

 Afebrile, WBC 14.7.  Sputum culture pending, preliminary blood cultures 

negative at 48 hours.  ABGs noted.








07/01/2020  yesterday Lasix and Aldactone initiated, creatinine mildly worsened 

up to 1.32.  Ammonia increased up to 89, lactulose increased.  No further maroon

stools, hemoglobin increased to 9.4, platelets increased to 75.  T bili trending

down, 8.9, LFTs improving.  Potassium 3.4.  Chest x-ray reporting improving left

lower lobe aeration.  Right foot evaluated by orthopedic surgery, possible fluid

collection, seroma with potential x-ray tomorrow.  Tolerating tube feeds with 

minimal to no residuals, currently at 30 mls per hour with goal of 45.  IV 

steroids added to med regimen with blood sugars increasing.  Remains vent 

dependent with FiO2 at 50/+12 of PEEP.  Continues on Levophed, fentanyl, 

diprovan drips.  Afebrile, T-max 99.5, WBC 14.1.











07/02/2020 ABGs unchanged,remains vent dependent, FiO2 50/+12 PEEP.  Chest x-ray

reporting improvement.  Maintained on both Xifaxan & Lactulose, ammonia 

increasing, beginning to stool.  Creatinine trending up 1.4.  Hemoglobin 

decreased to 8.5.  INR 1.7 Levophed weaned off this morning.  Attempted a 

sedation holiday for about an hour this morning; patient did not wake up, became

agitated, tachycardic, tachypneic with respiratory rate up into the 40s, 

diprovan/fentanyl drips resumed.  Tolerating tube feeds at 40 with goal of 

45/minimal to no residual.  Orthopedics discussing x-ray and foot possibly 

tomorrow.  Afebrile WBC down to 11.











07/08/2020 patient was trached yesterday, remains vent dependent with FiO2 

45%/+5 of PEEP.  Chest x-ray suggestive of possible left lower lobe pneumonia, 

versus atelectasis, possible interstitial disease. Scheduled for PEG tube 

placement today.  To date, patient has not tolerated sedation holidays, not 

following commands, becomes agitated, and becomes asynchronous with the patient.

 Neurology consulted, EEG completed, results pending.  Maintain on D5W with 

improvement in sodium, Na 146.  Currently sedated on Diprovan gtt. telemetry 

sinus rhythm.  Ammonia level LII on lactulose and Xifaxan.








07/09/2020  EGD with PEG tube placement completed at bedside this morning, 

tolerated procedure well.  Like gastroesophageal reflux reported along the 

anterior abdominal wall.  Evaluated by neurology with recommendations noted and 

appreciated.  EEG completed yesterday reporting abnormal, background slowing 

,suggestive of generalized cerebral dysfunction seen with toxic metabolic 

encephalopathy related to diffuse structural brain abnormality, no obvious 

epileptiform activity seen.  Afebrile, WBC continues trending up, currently 

24.8, recultured.  During sedation holiday patient noted to spontaneously move 

his right hand, half open his eyes responding to his name.  Diprovan has been 

weaned off this morning.  Remains vent dependent with FiO2 40%/+5 of PEEP.  

Chest x-ray reporting significant interval increase in left basilar airspace 

disease, possible infectious or aspiration pneumonitis. Ammonia level 42.  

Receiving potassium supplements for potassium 2.8.  Sodium continues improving, 

138.











07/10/2020 Remains off sedation X 24 hrs, sensorium slowly improving.  Following

simple commands ;Opens eyes to name, shakes head yes and no to simple questions.

 Sinus tachycardia with heart rate in the 110s. Chest x-ray reporting persistent

left lower lobe infiltrate, IV push Lasix every 12h initiated. Vent dependent, 

maintained on FiO2 40%/+5 of PEEP.  Significant anasarca. Yesterday PEG tube 

placed, did not receive any lactulose-current ammonia level 45.  Bleeding around

tracheostomy site, controlled with multiple packs of gauze, DDAVP.  Hemoglobin 

8.6, platelets 37 New central line and art line placed.  Levophed currently off.

 T-max 99.7, WBC unchanged 24.6, recultured yesterday, cultures pending.Na 140.











07/13/2020 remains vent dependent with FiO2 50%/+5 of PEEP.. Staff reports 

patient following simple commands throughout the weekend when off sedation.  

Currently maintained on Levophed, diprovan, IV fluids of D5W and Eraxis.  

Recultured last week, T-max  99.9,   WBC trending down ,20.3.  Tachycardic in 

the one-teens to 120s.  Hemoglobin 7.9 , platelets 64 .Sodium 148.  Potassium 

3.2, receiving potassium supplements.  Diuresing well on Lasix IV push with 24-

hour I&O reflecting a negative fluid balance.  Significant stool output/FMS, amm

onia level LIX, lactulose decreased.  Chest x-ray reporting stable.  Creatinine 

1.17.








07/14/2020   remains vent dependent, FiO2 down to 40%/+5 of PEEP currently on 

CPAP/SIMV setting.  Levophed weaned off. Diprovan recently weaned off this 

morning.  Maintained on D5W with sodium down to 145.  Lactulose decreased 

yesterday with ammonia level LVIII.  Chest x-ray reporting possible left lower 

lobe infiltrate/small pleural effusion.  Afebrile, WBC down to 14.  Mild 

tachycardia, hemoglobin decreased to 7.4.  Potassium 2.8.  BUN 47 creatinine 

0.96.  Diuresing on Lasix 40 IV push.








Following commands, tolerated CPAP for 2 hours earlier today.  Patient will be 

[facility] in a stable condition with guarded 

prognosis pending clearance from both pulmonary and GI.  Diuretics as per 

pulmonary.














The impression and plan of care has been dictated as directed.





:


I performed a history and examination of this patient,  discussed the same with 

the dictator.  I agree with the dictator's note ,documented as a scribe.  Any 

additional findings or plans will be noted.


Patient Condition at Discharge: Stable





Plan - Discharge Summary


Discharge Rx Participant: No


New Discharge Prescriptions: 


New


   Lactulose [Cephulac] 20 gm PO DAILY  ml


   Ipratropium-Albuterol Nebulize [Duoneb 0.5 mg-3 mg/3 ml Soln] 3 ml INHALATION

RT-Q4H  ml


   Ipratropium-Albuterol Nebulize [Duoneb 0.5 mg-3 mg/3 ml Soln] 3 ml INHALATION

RT-Q2H PRN  ml


     PRN Reason: Shortness Of Breath Or Wheezing


   Furosemide [Lasix] 40 mg PO BID #1 tablet


   Chlorhexidine Gluconate [Peridex] 15 ml MUCOUS MEM BID  solution


   Pantoprazole Sodium [Protonix] 40 mg PO BID #60 tablet.dr Ruff


   Cetirizine HCl [Zyrtec] 10 mg PO DAILY


   Folic Acid 1 mg PO DAILY #30 tablet


   Multivitamins, Thera [Multivitamin (formulary)] 1 tab PO DAILY #30 tablet


   Thiamine [Vitamin B-1] 100 mg PO DAILY #30 tab





Discontinued


   Omeprazole 20 mg PO DAILY


   Albuterol Inhaler [Ventolin Hfa Inhaler] 1 - 2 puff INHALATION RT-Q4H PRN


     PRN Reason: Shortness Of Breath


Discharge Medication List





Cetirizine HCl [Zyrtec] 10 mg PO DAILY 02/05/20 [History]


Folic Acid 1 mg PO DAILY #30 tablet 02/07/20 [Rx]


Multivitamins, Thera [Multivitamin (formulary)] 1 tab PO DAILY #30 tablet 

02/07/20 [Rx]


Thiamine [Vitamin B-1] 100 mg PO DAILY #30 tab 02/07/20 [Rx]


Chlorhexidine Gluconate [Peridex] 15 ml MUCOUS MEM BID  solution 07/15/20 [Rx]


Furosemide [Lasix] 40 mg PO BID #1 tablet 07/15/20 [Rx]


Ipratropium-Albuterol Nebulize [Duoneb 0.5 mg-3 mg/3 ml Soln] 3 ml INHALATION 

RT-Q2H PRN  ml 07/15/20 [Rx]


Ipratropium-Albuterol Nebulize [Duoneb 0.5 mg-3 mg/3 ml Soln] 3 ml INHALATION 

RT-Q4H  ml 07/15/20 [Rx]


Lactulose [Cephulac] 20 gm PO DAILY  ml 07/15/20 [Rx]


Pantoprazole Sodium [Protonix] 40 mg PO BID #60 tablet. 07/15/20 [Rx]








Follow up Appointment(s)/Referral(s): 


Shahid Blackmon Jr,  [Primary Care Provider] - 1 Week (After discharge from 

[facility])


Jose Raul Schaffer MD [STAFF PHYSICIAN] - 2 Weeks


Activity/Diet/Wound Care/Special Instructions: 


 TFR [facility]   final DC recommendations including diuretics,dc 

clearance as per intensivist.

## 2020-07-15 NOTE — P.PN
Subjective


Progress Note Date: 07/15/20





On today's evaluation of 07/13/2020, the patient is being seen in follow-up.  

This is my first encounter with the patient.  I was able to review the records 

and events that occurred as the patient is currently on a mechanical ventilator 

and the patient has a cystic estimate of in place and a PEG tube in place.  The 

patient has a history of liver cirrhosis.  The patient has been in respiratory 

failure requiring tracheostomy tube insertion and ultimately there was some 

issues with a tracheostomy tube where there was significant amount of bleeding 

from the tracheostomy stoma on 07/11/2020.  The patient was taken to the 

operating room and control of bleeding was done and the patient currently has a 

shock tracheostomy tube in place #8.  Activity was also in place.  The patient 

is sedated with propofol of alcohol the rate of 50 g per KG pigmented.  The 

patient is on normal saline at the rate of 10 mL an hour.  The patient is on 

levo fed at the rate of 03 g per KG pigmented.  The patient is also receiving 

high protein vital for enteral nutrition at the rate of 55 mL an hour.  The 

patient is able to tolerate the tube feeds well.  Nevertheless, he is having 

significant amount of liquidy diarrhea which is large volume and this has 

resulted in development of intravascular volume depletion and electrodes 

imbalance.  Sodium level is up to 148.  He is on assist control mode of 

ventilation at the rate of 12 with tidal volume of 450 and FiO2 of 50% with D5. 

The blood gases from today showed a pH of 7.4 with a pCO2 of 35 and a pO2 of 87.

 Patient had a follow-up chest x-ray today that showed indwelling catheters are 

in place and they're unchanged compared to yesterday.  Pleural effusion is u

nchanged.  There is stable appearance of the cardia mediastinal structures at 

this point in time.  The findings on chest x-rays essentially stable.  He is 

afebrile.  The antibiotic coverage includes Eraxis and infectious diseases on 

the case.  Most recent blood cultures and catheter tip cultures of been 

negative.  The sputum culture was positive for Candida albicans.  There was also

Candida glabrata.  Urine culture was also positive for Candida albicans.  He 

does have significant amount of third spacing and anasarca as the patient has 

scrotal swelling, 5 swelling, lower extremity swelling, upper extremity 

swelling.  He is currently on IV Lasix.  He has been in a positive fluid 

balance.  His weight is currently up to 91 kg and the patient's BMI 31.5.  He is

receiving Lasix 40 mg IV every 12 hours.  The patient is also on Xifaxan 

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  The most recent 

ammonia level was at 59.





On 07/14/2020, the patient remains sedated with propofol.  I gave him a sedation

holiday yesterday.  He was taken off the sedation.  It took him approximately 10

hours to show some recovery of sedation.  Nevertheless, the patient became quite

restless and agitated and he had to be placed back on the respirator to maintain

synchrony with the mechanical ventilator.  On today's evaluation the patient 

remained on assist control mode of ventilation.  He is having double stacking on

his breath.  On today's evaluation is an assist-control at the rate of 12 with a

tidal volume of 450 and FiO2 of 40% with a PEEP of 5.  His blood gas show a pH 

of 7.42 with a pCO2 of 39 and pO2 of 86.  The chest x-ray is showing no interval

change and there are some stable infiltration and small effusion the lung bases 

bilaterally.  The patient is on enteral feeding for nutritional support and he 

is having vital high protein and he is also on water flushes through the NG at 

100 mL every 4 hours.  The patient is also receiving D5 water at the rate of 75 

mL's an hour.  He is currently on propofol at 25 g per KG per minute and levo 

fed is also running at 0.05 g per KG per minute.  The patient is in a positive 

fluid balance of 367 mL..  He is on D5 water at 75 mL an hour.  Sodium level is 

improved and is down to 145.  Provide level is also improving.  Lactulose dose 

has been drop down to 20 mg on a daily basis and the patient is having still 

liquidy stool.  The patient had an ammonia level of 58 is comparable to 

yesterday.  Norepinephrine infusion is running a 0.05 g per KG per minute.





On 07/15/2020, the patient is awake and following some simple commands.  He is 

profoundly weak.  Unable to move his upper extremities.  He is able to wiggle 

his toes upon demand.  Seems to be interacting with some facial expressions.  

His ammonia level is 54 and a bilirubin is at 4.7.  He is having loose liquidy 

bowel movements.  Times a day and the patient is on enteral feeding for 

nutritional support with vital high protein running at 29 mL an hour.  The 

patient is also on D5 water at the rate of 75 mL an hour.  He is on no pressors.

 No IV sedation.  In terms of her respiratory status, the patient is on SIMV 

mode of ventilation at the rate of 6 with a tidal volume of 550 and a pressure 

support of 10 with an FiO2 of 40%.  The chest x-ray shows patchy left lower lobe

pulmonary infiltrate.  Tracheostomy tube is in place.  There is also left 

subclavian central venous catheter which is also in place.  No significant 

orotracheal secretions.  No bleeding from the tracheostomy site.  The patient 

#89 Shiley nonfenestrated cuffed tracheostomy tube.  Abdomen is slightly 

distended.  Also with abnormal be done to rule out any significant ascites of 

May benefit from drainage.  The blood gases from today showed a pH of 7.52 with 

a pCO2 of 33 and pO2 69.  The patient's overall weaning parameters were 

adequate.  The patient was able to tolerate pressure support of 5 and a PEEP of 

5 generating good tidal volumes with a low respiratory rate.  INR is at 1.5.  

The blood work shows a drop in his sodium level down to 146.  This potassium 

level is at 3. on being replaced.  Renal function is normal.  LFTs are normal 

secondary to his liver cirrhosis and the albumin level is at 1.8 with a total 

protein of 4.9.  Total bilirubin is at 4.7.  Will consider transferring this 

patient to selective specialty post today's evaluation.





Objective





- Vital Signs


Vital signs: 


                                   Vital Signs











Temp  100.0 F H  07/15/20 12:00


 


Pulse  112 H  07/15/20 15:00


 


Resp  23   07/15/20 15:00


 


BP  114/62   07/15/20 15:00


 


Pulse Ox  100   07/15/20 15:00








                                 Intake & Output











 07/14/20 07/15/20 07/15/20





 18:59 06:59 18:59


 


Intake Total 2057.498 1530 1261


 


Output Total 1540 1110 870


 


Balance 517.498 420 391


 


Weight  92.1 kg 92.1 kg


 


Intake:   


 


  IV 1092 1092 819


 


    0.9  120 90


 


    Dextrose 5% in Water 1, 900 900 675





    000 ml @ 75 mls/hr IV .   





    [id number] [facility] Rx#:[id number]   


 


    Normal Saline Pressure 72 72 54





    Bag   


 


  Intake, IV Titration 177.498  100





  Amount   


 


    Anidulafungin 100 mg In 100  





    Sodium Chloride 0.9% 100   





    ml @ 84 mls/hr IVPB DAILY   





    @1800 [facility] Rx#:[id number]   


 


    Norepinephrine 8 mg In 33.950  





    Sodium Chloride 0.9% 250   





    ml @ 0.05 MCG/KG/MIN 8.   





    872 mls/hr IV .Q24H [facility]   





    Rx#:[id number]   


 


    Potassium Chloride 20 meq   100





    In Water For Injection 1   





    100ml.bag @ 50 mls/hr   





    IVPB Q2H [facility] Rx#:   





    [id number]   


 


    Propofol 1,000 mg In 43.548  





    Empty Bag 1 bag @ Titrate   





    IV .Q0M [facility] Rx#:   





    [id number]   


 


  Tube Feeding 348 348 282


 


  Other 440 90 60


 


Output:   


 


  Urine 1540 1110 870


 


Other:   


 


  Voiding Method Indwelling Catheter Indwelling Catheter Indwelling Catheter








                       ABP, PAP, CO, CI - Last Documented











Arterial Blood Pressure        123/45

















- Exam








GENERAL EXAM: On mechanical ventilator, minimally responsive 54-year-old 

gentleman, the patient is opening his eyes spontaneously.  Sound to be much more

awake and alert compared to yesterday.  Can't communicate with some facial 

expressions.  Able to wiggle his toes upon demand.  No agitation.  He is 

slightly jaundiced.


HEAD: Normocephalic.


EYES: Normal reaction of pupils, equal size.


NOSE: Clear with pink turbinates.


THROAT: Tracheostomy tube secured in place.  The patient has a Shiley #8 tra

cheostomy tube in place.  The tube is nonfenestrated in his cough.


NECK: No masses, no JVD.


CHEST: No chest wall deformity.


LUNGS: Equal air entry with crackles at the bilateral posterior bases.


CVS:Cardiac exam revealed the PMI to be normally situated and sized. The rhythm 

was regular and no extrasystoles were noted during several minutes of 

auscultation. The first and second heart sounds were normal and physiologic 

splitting of the second heart sound was noted. There were no murmurs, rubs, cli

cks, or gallops.


ABDOMEN: PEG tube exit site clean and dry, normal bowel sounds, no guarding or 

rigidity.  There is a slight abdominal distention with possibly some ascites 

with a fluid wave and shifting dullness.


SPINE: No scoliosis or deformity


SKIN: No rashes


CENTRAL NERVOUS SYSTEM: Global weakness in all 4 extremities.  Able to wiggle 

his toes.  No cranial nerve deficits.  Has a good cough and a gag.  Reflexes are

diminished in all 4 extremities.  Alert and able to communicate with the nursing

staff.


EXTREMITIES:   The patient has significant peripheral edema both in upper and 

lower extremities and the patient has diffuse anasarca in addition to scrotal 

edema.  No cyanosis.  No clubbing.





- Labs


CBC & Chem 7: 


                                 07/15/20 05:10





                                 07/15/20 14:10


Labs: 


                  Abnormal Lab Results - Last 24 Hours (Table)











  07/11/20 07/14/20 07/14/20 Range/Units





  10:50 18:00 23:23 


 


WBC     (3.8-10.6)  k/uL


 


RBC     (4.30-5.90)  m/uL


 


Hgb     (13.0-17.5)  gm/dL


 


Hct     (39.0-53.0)  %


 


MCV     (80.0-100.0)  fL


 


RDW     (11.5-15.5)  %


 


Plt Count     (150-450)  k/uL


 


Neutrophils #     (1.3-7.7)  k/uL


 


Macrocytosis     


 


PT     (9.0-12.0)  sec


 


INR     (<1.2)  


 


ABG pH     (7.35-7.45)  


 


ABG pCO2     (35-45)  mmHg


 


ABG pO2     ()  mmHg


 


ABG HCO3     (21-25)  mmol/L


 


ABG Total CO2     (19-24)  mmol/L


 


Sodium     (137-145)  mmol/L


 


Potassium     (3.5-5.1)  mmol/L


 


Chloride     ()  mmol/L


 


BUN     (9-20)  mg/dL


 


Glucose     (74-99)  mg/dL


 


POC Glucose (mg/dL)   157 H  150 H  (75-99)  mg/dL


 


Calcium     (8.4-10.2)  mg/dL


 


Total Bilirubin     (0.2-1.3)  mg/dL


 


AST     (17-59)  U/L


 


ALT     (4-49)  U/L


 


Alkaline Phosphatase     ()  U/L


 


Ammonia     (<30)  umol/L


 


Total Protein     (6.3-8.2)  g/dL


 


Albumin     (3.5-5.0)  g/dL


 


Crossmatch  See Detail    














  07/15/20 07/15/20 07/15/20 Range/Units





  05:10 05:10 05:10 


 


WBC  12.6 H    (3.8-10.6)  k/uL


 


RBC  2.22 L    (4.30-5.90)  m/uL


 


Hgb  7.4 L    (13.0-17.5)  gm/dL


 


Hct  23.0 L    (39.0-53.0)  %


 


MCV  103.7 H    (80.0-100.0)  fL


 


RDW  22.6 H    (11.5-15.5)  %


 


Plt Count  69 L    (150-450)  k/uL


 


Neutrophils #  10.2 H    (1.3-7.7)  k/uL


 


Macrocytosis  Marked A    


 


PT     (9.0-12.0)  sec


 


INR     (<1.2)  


 


ABG pH     (7.35-7.45)  


 


ABG pCO2     (35-45)  mmHg


 


ABG pO2     ()  mmHg


 


ABG HCO3     (21-25)  mmol/L


 


ABG Total CO2     (19-24)  mmol/L


 


Sodium   146 H   (137-145)  mmol/L


 


Potassium   3.0 L   (3.5-5.1)  mmol/L


 


Chloride   117 H   ()  mmol/L


 


BUN   39 H   (9-20)  mg/dL


 


Glucose   147 H   (74-99)  mg/dL


 


POC Glucose (mg/dL)     (75-99)  mg/dL


 


Calcium   7.7 L   (8.4-10.2)  mg/dL


 


Total Bilirubin   4.7 H   (0.2-1.3)  mg/dL


 


AST   135 H   (17-59)  U/L


 


ALT   76 H   (4-49)  U/L


 


Alkaline Phosphatase   162 H   ()  U/L


 


Ammonia    54 H  (<30)  umol/L


 


Total Protein   4.9 L   (6.3-8.2)  g/dL


 


Albumin   1.8 L   (3.5-5.0)  g/dL


 


Crossmatch     














  07/15/20 07/15/20 07/15/20 Range/Units





  05:10 05:28 05:29 


 


WBC     (3.8-10.6)  k/uL


 


RBC     (4.30-5.90)  m/uL


 


Hgb     (13.0-17.5)  gm/dL


 


Hct     (39.0-53.0)  %


 


MCV     (80.0-100.0)  fL


 


RDW     (11.5-15.5)  %


 


Plt Count     (150-450)  k/uL


 


Neutrophils #     (1.3-7.7)  k/uL


 


Macrocytosis     


 


PT  15.1 H    (9.0-12.0)  sec


 


INR  1.5 H    (<1.2)  


 


ABG pH   7.52 H   (7.35-7.45)  


 


ABG pCO2   33 L   (35-45)  mmHg


 


ABG pO2   69 L   ()  mmHg


 


ABG HCO3   27 H   (21-25)  mmol/L


 


ABG Total CO2   28 H   (19-24)  mmol/L


 


Sodium     (137-145)  mmol/L


 


Potassium     (3.5-5.1)  mmol/L


 


Chloride     ()  mmol/L


 


BUN     (9-20)  mg/dL


 


Glucose     (74-99)  mg/dL


 


POC Glucose (mg/dL)    165 H  (75-99)  mg/dL


 


Calcium     (8.4-10.2)  mg/dL


 


Total Bilirubin     (0.2-1.3)  mg/dL


 


AST     (17-59)  U/L


 


ALT     (4-49)  U/L


 


Alkaline Phosphatase     ()  U/L


 


Ammonia     (<30)  umol/L


 


Total Protein     (6.3-8.2)  g/dL


 


Albumin     (3.5-5.0)  g/dL


 


Crossmatch     














  07/15/20 07/15/20 Range/Units





  10:15 12:22 


 


WBC    (3.8-10.6)  k/uL


 


RBC    (4.30-5.90)  m/uL


 


Hgb    (13.0-17.5)  gm/dL


 


Hct    (39.0-53.0)  %


 


MCV    (80.0-100.0)  fL


 


RDW    (11.5-15.5)  %


 


Plt Count    (150-450)  k/uL


 


Neutrophils #    (1.3-7.7)  k/uL


 


Macrocytosis    


 


PT    (9.0-12.0)  sec


 


INR    (<1.2)  


 


ABG pH    (7.35-7.45)  


 


ABG pCO2    (35-45)  mmHg


 


ABG pO2    ()  mmHg


 


ABG HCO3    (21-25)  mmol/L


 


ABG Total CO2    (19-24)  mmol/L


 


Sodium    (137-145)  mmol/L


 


Potassium  3.1 L   (3.5-5.1)  mmol/L


 


Chloride    ()  mmol/L


 


BUN    (9-20)  mg/dL


 


Glucose    (74-99)  mg/dL


 


POC Glucose (mg/dL)   169 H  (75-99)  mg/dL


 


Calcium    (8.4-10.2)  mg/dL


 


Total Bilirubin    (0.2-1.3)  mg/dL


 


AST    (17-59)  U/L


 


ALT    (4-49)  U/L


 


Alkaline Phosphatase    ()  U/L


 


Ammonia    (<30)  umol/L


 


Total Protein    (6.3-8.2)  g/dL


 


Albumin    (3.5-5.0)  g/dL


 


Crossmatch    








                      Microbiology - Last 24 Hours (Table)











 07/09/20 14:25 Blood Culture - Preliminary





 Blood    No Growth after 120 hours


 


 07/09/20 14:45 Blood Culture - Preliminary





 Blood    No Growth after 120 hours














Assessment and Plan


Plan: 








1 Acute hypoxic respiratory failure, suspect aspiration pneumonia, inability to 

wean, status post tracheostomy tube placement post PEG tube placement.  On 

07/11/2020 the patient developed significant bleeding and from the tracheostomy 

tube site.  He was taken to the operating room and had a tracheostomy tube 

change out along with control of the bleeding vessels.  The patient is 

adequately ventilated for now.  In fact the patient has good weaning parameters.

 He was switched to a CPAP mode at a pressure of 5 cm of water and she was able 

to tolerate without any major difficulties.





2  Acute anemia secondary to above currently receiving his second unit of packed

red blood cells and he did receive 4 units of fresh frozen plasma and 5 units of

platelets .  The patient's hemoglobin stable at 7.4





3 hypotension/shock.  This is likely secondary to intravascular volume depletion

time of bleeds.  Septic shock cannot be completely ruled out is another contribu

ting factor.  this hypotension is recovered and the patient is hemodynamically 

stable for now on no pressors.  





4 Alcohol liver disease with ascites. 





5 hypernatremia secondary to aggressive use of lactulose and intravascular 

volume depletion with third spacing, the patient is on water flushes and D5 

water with IV and the sodium level is improved significantly.





6 chronic hepatic encephalopathy with elevated ammonia, improving and the 

patient's mental status has considerably improved and he is awake and alert on 

today's evaluation.  





7 Recent history of fall about 2 months ago, and multiple right-sided rib 

fractures.





8History of alcoholic neuropathy.





9 History of pericarditis





10 Chronic back pain





11 History of restless leg syndrome.





12 Acute kidney injury secondary to sepsis and intravascular volume depletion, 

recovered and the creatinine is normalized 





13 hyperchloremic hypernatremia secondary to above, improving, the sodium level 

is down to 146 





Plan





Continue CPAP on a daily basis 


Sodium level is improving 


Lactulose to once a day and discontinue Xifaxan 


Discontinue antifungal agents 


Continue enteral feeding for nutritional support


Monitor electrolytes


We'll transfer this patient for select specialty for further care.


Enteral feeding with vital high protein at the rate of 55 mL an hour


Watch for any signs of bleeding from the tracheostomy stoma


We'll continue to follow


Critically care evaluation was done and more than 30 minutes.


Time with Patient: Greater than 30

## 2020-07-15 NOTE — XR
EXAMINATION TYPE: XR chest 1V portable

 

DATE OF EXAM: 7/15/2020

 

COMPARISON: 7/14/2020

 

HISTORY: SOB, Follow Up

 

FINDINGS:

 

Patchy left lower lobe infiltrate persists. Continued pulmonary venous congestion. Tracheostomy tube 
and the left subclavian central venous line unchanged.

 

Stable appearance of the cardio-mediastinal structures at this time.

 

Pleural effusion unchanged.

 

IMPRESSION:

1.  Patchy left lower lobe infiltrate persists. Continued pulmonary venous congestion. Tracheostomy t
ube and the left subclavian central venous line unchanged.

## 2020-07-15 NOTE — US
EXAMINATION TYPE: US abdomen limited

 

DATE OF EXAM: 7/15/2020

 

COMPARISON: NONE

 

CLINICAL HISTORY: to evaluate for presence of ascites.

 

Mild to moderate ascites. 

 

 

 

IMPRESSION:  Ascites